# Patient Record
Sex: MALE | Race: WHITE | NOT HISPANIC OR LATINO | Employment: OTHER | URBAN - METROPOLITAN AREA
[De-identification: names, ages, dates, MRNs, and addresses within clinical notes are randomized per-mention and may not be internally consistent; named-entity substitution may affect disease eponyms.]

---

## 2017-01-11 ENCOUNTER — GENERIC CONVERSION - ENCOUNTER (OUTPATIENT)
Dept: OTHER | Facility: OTHER | Age: 47
End: 2017-01-11

## 2017-02-15 ENCOUNTER — GENERIC CONVERSION - ENCOUNTER (OUTPATIENT)
Dept: OTHER | Facility: OTHER | Age: 47
End: 2017-02-15

## 2017-03-15 ENCOUNTER — GENERIC CONVERSION - ENCOUNTER (OUTPATIENT)
Dept: OTHER | Facility: OTHER | Age: 47
End: 2017-03-15

## 2017-03-30 ENCOUNTER — GENERIC CONVERSION - ENCOUNTER (OUTPATIENT)
Dept: OTHER | Facility: OTHER | Age: 47
End: 2017-03-30

## 2017-03-30 DIAGNOSIS — L23.9 ALLERGIC CONTACT DERMATITIS: ICD-10-CM

## 2017-03-30 DIAGNOSIS — Z13.220 ENCOUNTER FOR SCREENING FOR LIPOID DISORDERS: ICD-10-CM

## 2017-03-30 DIAGNOSIS — E78.2 MIXED HYPERLIPIDEMIA: ICD-10-CM

## 2017-03-30 DIAGNOSIS — G80.9 CEREBRAL PALSY (HCC): ICD-10-CM

## 2017-03-30 DIAGNOSIS — R73.01 IMPAIRED FASTING GLUCOSE: ICD-10-CM

## 2017-04-04 ENCOUNTER — TRANSCRIBE ORDERS (OUTPATIENT)
Dept: ADMINISTRATIVE | Facility: HOSPITAL | Age: 47
End: 2017-04-04

## 2017-04-04 ENCOUNTER — APPOINTMENT (OUTPATIENT)
Dept: LAB | Facility: HOSPITAL | Age: 47
End: 2017-04-04
Attending: FAMILY MEDICINE
Payer: MEDICARE

## 2017-04-04 DIAGNOSIS — R00.2 PALPITATIONS: Primary | ICD-10-CM

## 2017-04-04 DIAGNOSIS — Z13.220 ENCOUNTER FOR SCREENING FOR LIPOID DISORDERS: ICD-10-CM

## 2017-04-04 DIAGNOSIS — R00.2 PALPITATIONS: ICD-10-CM

## 2017-04-04 DIAGNOSIS — G80.9 CEREBRAL PALSY (HCC): ICD-10-CM

## 2017-04-04 LAB
ALBUMIN SERPL BCP-MCNC: 4.1 G/DL (ref 3.5–5)
ALP SERPL-CCNC: 76 U/L (ref 46–116)
ALT SERPL W P-5'-P-CCNC: 20 U/L (ref 12–78)
ANION GAP SERPL CALCULATED.3IONS-SCNC: 10 MMOL/L (ref 4–13)
AST SERPL W P-5'-P-CCNC: 16 U/L (ref 5–45)
BILIRUB SERPL-MCNC: 0.4 MG/DL (ref 0.2–1)
BUN SERPL-MCNC: 12 MG/DL (ref 5–25)
CALCIUM SERPL-MCNC: 8.9 MG/DL (ref 8.3–10.1)
CHLORIDE SERPL-SCNC: 108 MMOL/L (ref 100–108)
CHOLEST SERPL-MCNC: 176 MG/DL (ref 50–200)
CO2 SERPL-SCNC: 29 MMOL/L (ref 21–32)
CREAT SERPL-MCNC: 0.95 MG/DL (ref 0.6–1.3)
ERYTHROCYTE [DISTWIDTH] IN BLOOD BY AUTOMATED COUNT: 14.1 % (ref 11.6–15.1)
GFR SERPL CREATININE-BSD FRML MDRD: >60 ML/MIN/1.73SQ M
GLUCOSE P FAST SERPL-MCNC: 98 MG/DL (ref 65–99)
HCT VFR BLD AUTO: 44.5 % (ref 42–52)
HDLC SERPL-MCNC: 38 MG/DL (ref 40–60)
HGB BLD-MCNC: 14.7 G/DL (ref 14–18)
LDLC SERPL CALC-MCNC: 97 MG/DL (ref 0–100)
MCH RBC QN AUTO: 27.8 PG (ref 27–31)
MCHC RBC AUTO-ENTMCNC: 33 G/DL (ref 31.4–37.4)
MCV RBC AUTO: 84 FL (ref 82–98)
PLATELET # BLD AUTO: 118 THOUSANDS/UL (ref 130–400)
PMV BLD AUTO: 10.6 FL (ref 8.9–12.7)
POTASSIUM SERPL-SCNC: 3.9 MMOL/L (ref 3.5–5.3)
PROT SERPL-MCNC: 7.4 G/DL (ref 6.4–8.2)
RBC # BLD AUTO: 5.3 MILLION/UL (ref 4.7–6.1)
SODIUM SERPL-SCNC: 147 MMOL/L (ref 136–145)
TRIGL SERPL-MCNC: 203 MG/DL
TSH SERPL DL<=0.05 MIU/L-ACNC: 2.8 UIU/ML (ref 0.36–3.74)
WBC # BLD AUTO: 4.7 THOUSAND/UL (ref 4.8–10.8)

## 2017-04-04 PROCEDURE — 84443 ASSAY THYROID STIM HORMONE: CPT

## 2017-04-04 PROCEDURE — 85027 COMPLETE CBC AUTOMATED: CPT

## 2017-04-04 PROCEDURE — 36415 COLL VENOUS BLD VENIPUNCTURE: CPT

## 2017-04-04 PROCEDURE — 80061 LIPID PANEL: CPT

## 2017-04-04 PROCEDURE — 80053 COMPREHEN METABOLIC PANEL: CPT

## 2017-04-05 ENCOUNTER — GENERIC CONVERSION - ENCOUNTER (OUTPATIENT)
Dept: OTHER | Facility: OTHER | Age: 47
End: 2017-04-05

## 2017-04-25 ENCOUNTER — ALLSCRIPTS OFFICE VISIT (OUTPATIENT)
Dept: OTHER | Facility: OTHER | Age: 47
End: 2017-04-25

## 2017-07-24 ENCOUNTER — APPOINTMENT (EMERGENCY)
Dept: RADIOLOGY | Facility: HOSPITAL | Age: 47
End: 2017-07-24
Payer: MEDICARE

## 2017-07-24 ENCOUNTER — HOSPITAL ENCOUNTER (EMERGENCY)
Facility: HOSPITAL | Age: 47
Discharge: HOME | End: 2017-07-24
Admitting: EMERGENCY MEDICINE
Payer: MEDICARE

## 2017-07-24 VITALS
RESPIRATION RATE: 17 BRPM | TEMPERATURE: 98.3 F | SYSTOLIC BLOOD PRESSURE: 110 MMHG | HEIGHT: 70 IN | BODY MASS INDEX: 27.2 KG/M2 | WEIGHT: 190 LBS | OXYGEN SATURATION: 98 % | HEART RATE: 79 BPM | DIASTOLIC BLOOD PRESSURE: 64 MMHG

## 2017-07-24 DIAGNOSIS — N20.0 RENAL CALCULUS, RIGHT: Primary | ICD-10-CM

## 2017-07-24 LAB
ALBUMIN SERPL BCP-MCNC: 4.5 G/DL (ref 3.5–5)
ALP SERPL-CCNC: 71 U/L (ref 46–116)
ALT SERPL W P-5'-P-CCNC: 27 U/L (ref 12–78)
ANION GAP SERPL CALCULATED.3IONS-SCNC: 12 MMOL/L (ref 4–13)
AST SERPL W P-5'-P-CCNC: 22 U/L (ref 5–45)
BASOPHILS # BLD AUTO: 0 THOUSANDS/ΜL (ref 0–0.1)
BASOPHILS NFR BLD AUTO: 0 % (ref 0–1)
BILIRUB SERPL-MCNC: 0.5 MG/DL (ref 0.2–1)
BUN SERPL-MCNC: 11 MG/DL (ref 5–25)
CALCIUM SERPL-MCNC: 9.3 MG/DL (ref 8.3–10.1)
CHLORIDE SERPL-SCNC: 104 MMOL/L (ref 100–108)
CO2 SERPL-SCNC: 26 MMOL/L (ref 21–32)
CREAT SERPL-MCNC: 1.28 MG/DL (ref 0.6–1.3)
EOSINOPHIL # BLD AUTO: 0 THOUSAND/ΜL (ref 0–0.61)
EOSINOPHIL NFR BLD AUTO: 0 % (ref 0–6)
ERYTHROCYTE [DISTWIDTH] IN BLOOD BY AUTOMATED COUNT: 14.4 % (ref 11.6–15.1)
GFR SERPL CREATININE-BSD FRML MDRD: 68 ML/MIN/1.73SQ M
GLUCOSE SERPL-MCNC: 108 MG/DL (ref 65–140)
HCT VFR BLD AUTO: 41.1 % (ref 42–52)
HGB BLD-MCNC: 13.9 G/DL (ref 14–18)
HOLD SPECIMEN: NORMAL
LYMPHOCYTES # BLD AUTO: 0.6 THOUSANDS/ΜL (ref 0.6–4.47)
LYMPHOCYTES NFR BLD AUTO: 7 % (ref 14–44)
MCH RBC QN AUTO: 28.4 PG (ref 27–31)
MCHC RBC AUTO-ENTMCNC: 33.9 G/DL (ref 31.4–37.4)
MCV RBC AUTO: 84 FL (ref 82–98)
MONOCYTES # BLD AUTO: 0.2 THOUSAND/ΜL (ref 0.17–1.22)
MONOCYTES NFR BLD AUTO: 3 % (ref 4–12)
NEUTROPHILS # BLD AUTO: 7.2 THOUSANDS/ΜL (ref 1.85–7.62)
NEUTS SEG NFR BLD AUTO: 90 % (ref 43–75)
NRBC BLD AUTO-RTO: 0 /100 WBCS
PLATELET # BLD AUTO: 140 THOUSANDS/UL (ref 130–400)
PMV BLD AUTO: 10 FL (ref 8.9–12.7)
POTASSIUM SERPL-SCNC: 3.7 MMOL/L (ref 3.5–5.3)
PROT SERPL-MCNC: 7.7 G/DL (ref 6.4–8.2)
RBC # BLD AUTO: 4.91 MILLION/UL (ref 4.7–6.1)
SODIUM SERPL-SCNC: 142 MMOL/L (ref 136–145)
WBC # BLD AUTO: 8 THOUSAND/UL (ref 4.8–10.8)

## 2017-07-24 PROCEDURE — 74176 CT ABD & PELVIS W/O CONTRAST: CPT

## 2017-07-24 PROCEDURE — 96375 TX/PRO/DX INJ NEW DRUG ADDON: CPT

## 2017-07-24 PROCEDURE — 99284 EMERGENCY DEPT VISIT MOD MDM: CPT

## 2017-07-24 PROCEDURE — 36415 COLL VENOUS BLD VENIPUNCTURE: CPT | Performed by: PHYSICIAN ASSISTANT

## 2017-07-24 PROCEDURE — 96361 HYDRATE IV INFUSION ADD-ON: CPT

## 2017-07-24 PROCEDURE — 96374 THER/PROPH/DIAG INJ IV PUSH: CPT

## 2017-07-24 PROCEDURE — 85025 COMPLETE CBC W/AUTO DIFF WBC: CPT | Performed by: PHYSICIAN ASSISTANT

## 2017-07-24 PROCEDURE — 80053 COMPREHEN METABOLIC PANEL: CPT | Performed by: PHYSICIAN ASSISTANT

## 2017-07-24 RX ORDER — ONDANSETRON 4 MG/1
4 TABLET, ORALLY DISINTEGRATING ORAL EVERY 8 HOURS PRN
Qty: 15 TABLET | Refills: 0 | Status: SHIPPED | OUTPATIENT
Start: 2017-07-24 | End: 2018-04-19 | Stop reason: ALTCHOICE

## 2017-07-24 RX ORDER — ONDANSETRON 2 MG/ML
4 INJECTION INTRAMUSCULAR; INTRAVENOUS ONCE
Status: COMPLETED | OUTPATIENT
Start: 2017-07-24 | End: 2017-07-24

## 2017-07-24 RX ORDER — KETOROLAC TROMETHAMINE 10 MG/1
10 TABLET, FILM COATED ORAL EVERY 6 HOURS PRN
Qty: 12 TABLET | Refills: 0 | Status: SHIPPED | OUTPATIENT
Start: 2017-07-24 | End: 2018-04-19 | Stop reason: ALTCHOICE

## 2017-07-24 RX ORDER — MORPHINE SULFATE 4 MG/ML
4 INJECTION, SOLUTION INTRAMUSCULAR; INTRAVENOUS ONCE
Status: COMPLETED | OUTPATIENT
Start: 2017-07-24 | End: 2017-07-24

## 2017-07-24 RX ORDER — KETOROLAC TROMETHAMINE 30 MG/ML
30 INJECTION, SOLUTION INTRAMUSCULAR; INTRAVENOUS ONCE
Status: COMPLETED | OUTPATIENT
Start: 2017-07-24 | End: 2017-07-24

## 2017-07-24 RX ORDER — BUSPIRONE HYDROCHLORIDE 10 MG/1
10 TABLET ORAL 3 TIMES DAILY
COMMUNITY
End: 2018-04-19 | Stop reason: ALTCHOICE

## 2017-07-24 RX ORDER — HYDROXYZINE PAMOATE 25 MG/1
25 CAPSULE ORAL 3 TIMES DAILY PRN
COMMUNITY
End: 2018-04-19 | Stop reason: ALTCHOICE

## 2017-07-24 RX ADMIN — ONDANSETRON 4 MG: 2 INJECTION INTRAMUSCULAR; INTRAVENOUS at 12:15

## 2017-07-24 RX ADMIN — KETOROLAC TROMETHAMINE 30 MG: 30 INJECTION, SOLUTION INTRAMUSCULAR at 12:17

## 2017-07-24 RX ADMIN — MORPHINE SULFATE 4 MG: 4 INJECTION, SOLUTION INTRAMUSCULAR; INTRAVENOUS at 14:43

## 2017-07-24 RX ADMIN — SODIUM CHLORIDE 1000 ML: 0.9 INJECTION, SOLUTION INTRAVENOUS at 12:23

## 2017-07-24 RX ADMIN — SODIUM CHLORIDE 1000 ML: 0.9 INJECTION, SOLUTION INTRAVENOUS at 13:30

## 2017-12-11 ENCOUNTER — APPOINTMENT (OUTPATIENT)
Dept: LAB | Facility: HOSPITAL | Age: 47
End: 2017-12-11
Payer: MEDICARE

## 2017-12-11 ENCOUNTER — TRANSCRIBE ORDERS (OUTPATIENT)
Dept: ADMINISTRATIVE | Facility: HOSPITAL | Age: 47
End: 2017-12-11

## 2017-12-11 DIAGNOSIS — F42.2 MIXED OBSESSIONAL THOUGHTS AND ACTS: Primary | ICD-10-CM

## 2017-12-11 DIAGNOSIS — F42.2 MIXED OBSESSIONAL THOUGHTS AND ACTS: ICD-10-CM

## 2017-12-11 LAB
25(OH)D3 SERPL-MCNC: 14.7 NG/ML (ref 30–100)
ALBUMIN SERPL BCP-MCNC: 3.9 G/DL (ref 3.5–5)
ALP SERPL-CCNC: 69 U/L (ref 46–116)
ALT SERPL W P-5'-P-CCNC: 40 U/L (ref 12–78)
ANION GAP SERPL CALCULATED.3IONS-SCNC: 9 MMOL/L (ref 4–13)
AST SERPL W P-5'-P-CCNC: 29 U/L (ref 5–45)
BASOPHILS # BLD AUTO: 0 THOUSANDS/ΜL (ref 0–0.1)
BASOPHILS NFR BLD AUTO: 0 % (ref 0–1)
BILIRUB DIRECT SERPL-MCNC: 0.1 MG/DL (ref 0–0.2)
BILIRUB SERPL-MCNC: 0.4 MG/DL (ref 0.2–1)
BILIRUB UR QL STRIP: NEGATIVE
BUN SERPL-MCNC: 15 MG/DL (ref 5–25)
CALCIUM SERPL-MCNC: 8.9 MG/DL (ref 8.3–10.1)
CHLORIDE SERPL-SCNC: 106 MMOL/L (ref 100–108)
CHOLEST SERPL-MCNC: 196 MG/DL (ref 50–200)
CLARITY UR: CLEAR
CO2 SERPL-SCNC: 28 MMOL/L (ref 21–32)
COLOR UR: YELLOW
CREAT SERPL-MCNC: 1.04 MG/DL (ref 0.6–1.3)
EOSINOPHIL # BLD AUTO: 0.1 THOUSAND/ΜL (ref 0–0.61)
EOSINOPHIL NFR BLD AUTO: 1 % (ref 0–6)
ERYTHROCYTE [DISTWIDTH] IN BLOOD BY AUTOMATED COUNT: 14.4 % (ref 11.6–15.1)
EST. AVERAGE GLUCOSE BLD GHB EST-MCNC: 114 MG/DL
GFR SERPL CREATININE-BSD FRML MDRD: 86 ML/MIN/1.73SQ M
GLUCOSE P FAST SERPL-MCNC: 100 MG/DL (ref 65–99)
GLUCOSE UR STRIP-MCNC: NEGATIVE MG/DL
HBA1C MFR BLD: 5.6 % (ref 4.2–6.3)
HCT VFR BLD AUTO: 38 % (ref 42–52)
HDLC SERPL-MCNC: 36 MG/DL (ref 40–60)
HGB BLD-MCNC: 12.5 G/DL (ref 14–18)
HGB UR QL STRIP.AUTO: NEGATIVE
KETONES UR STRIP-MCNC: NEGATIVE MG/DL
LDLC SERPL CALC-MCNC: 90 MG/DL (ref 0–100)
LEUKOCYTE ESTERASE UR QL STRIP: NEGATIVE
LYMPHOCYTES # BLD AUTO: 1.1 THOUSANDS/ΜL (ref 0.6–4.47)
LYMPHOCYTES NFR BLD AUTO: 22 % (ref 14–44)
MCH RBC QN AUTO: 26.4 PG (ref 27–31)
MCHC RBC AUTO-ENTMCNC: 32.9 G/DL (ref 31.4–37.4)
MCV RBC AUTO: 80 FL (ref 82–98)
MONOCYTES # BLD AUTO: 0.2 THOUSAND/ΜL (ref 0.17–1.22)
MONOCYTES NFR BLD AUTO: 5 % (ref 4–12)
NEUTROPHILS # BLD AUTO: 3.4 THOUSANDS/ΜL (ref 1.85–7.62)
NEUTS SEG NFR BLD AUTO: 71 % (ref 43–75)
NITRITE UR QL STRIP: NEGATIVE
NRBC BLD AUTO-RTO: 0 /100 WBCS
PH UR STRIP.AUTO: 5.5 [PH] (ref 5–9)
PLATELET # BLD AUTO: 141 THOUSANDS/UL (ref 130–400)
PMV BLD AUTO: 11 FL (ref 8.9–12.7)
POTASSIUM SERPL-SCNC: 4.1 MMOL/L (ref 3.5–5.3)
PROT SERPL-MCNC: 7 G/DL (ref 6.4–8.2)
PROT UR STRIP-MCNC: NEGATIVE MG/DL
RBC # BLD AUTO: 4.72 MILLION/UL (ref 4.7–6.1)
SODIUM SERPL-SCNC: 143 MMOL/L (ref 136–145)
SP GR UR STRIP.AUTO: 1.02 (ref 1–1.03)
T4 SERPL-MCNC: 6.3 UG/DL (ref 4.7–13.3)
TRIGL SERPL-MCNC: 348 MG/DL
TSH SERPL DL<=0.05 MIU/L-ACNC: 2.59 UIU/ML (ref 0.36–3.74)
UROBILINOGEN UR QL STRIP.AUTO: 0.2 E.U./DL
WBC # BLD AUTO: 4.8 THOUSAND/UL (ref 4.8–10.8)

## 2017-12-11 PROCEDURE — 82248 BILIRUBIN DIRECT: CPT

## 2017-12-11 PROCEDURE — 80053 COMPREHEN METABOLIC PANEL: CPT

## 2017-12-11 PROCEDURE — 85025 COMPLETE CBC W/AUTO DIFF WBC: CPT | Performed by: NURSE PRACTITIONER

## 2017-12-11 PROCEDURE — 36415 COLL VENOUS BLD VENIPUNCTURE: CPT | Performed by: NURSE PRACTITIONER

## 2017-12-11 PROCEDURE — 81003 URINALYSIS AUTO W/O SCOPE: CPT | Performed by: NURSE PRACTITIONER

## 2017-12-11 PROCEDURE — 83036 HEMOGLOBIN GLYCOSYLATED A1C: CPT

## 2017-12-11 PROCEDURE — 80061 LIPID PANEL: CPT

## 2017-12-11 PROCEDURE — 84436 ASSAY OF TOTAL THYROXINE: CPT

## 2017-12-11 PROCEDURE — 84479 ASSAY OF THYROID (T3 OR T4): CPT

## 2017-12-11 PROCEDURE — 82306 VITAMIN D 25 HYDROXY: CPT

## 2017-12-11 PROCEDURE — 84443 ASSAY THYROID STIM HORMONE: CPT

## 2017-12-12 LAB — T3RU NFR SERPL: 26 % (ref 24–39)

## 2018-01-10 NOTE — PROCEDURES
Procedures by Rohan Bingham DO at 8/16/2016   1:06 AM      Author:  Rohan Bingham DO Service:  Trauma Author Type:  Resident    Filed:  8/16/2016  1:13 AM Date of Service:  8/16/2016  1:06 AM Status:  Attested    :  Rohan Bingham DO (Resident)  Cosigner:  Gala Peterson MD at 8/16/2016  5:48 AM      Procedure Orders:       1  LACERATION REPAIR [[id number]] ordered by Rohan Bingham DO at 08/16/16 0106                 Post-procedure Diagnoses:       1  Laceration of right elbow, initial encounter [S51 011A]              Attestation signed by Gala Peterson MD at 8/16/2016  5:48 AM           I was present and supervised all critical elements of this procedure  I ensured full compliance with sterile procedure was followed  Comments:  No complications                                           Procedure  Laceration Repair  Date/Time: 8/16/2016 11:45 PM  Performed by: Oapl Mccoy by: Laurie Cuevas     Patient location:  ED and bedside  Other Assisting Provider:  Yes (comment) (medical student)    Consent:     Consent obtained:  Verbal    Consent given by:  Patient    Procedural risks discussed: yes  Alternatives discussed: yes  Universal protocol:     Procedure explained and questions answered to patient or proxy's satisfaction: yes      Imaging studies available: yes      Required blood products, implants, devices, and special equipment available:  yes      Immediately prior to procedure, a time out was called: yes      Patient identity confirmed:  Verbally with patient  Anesthesia (see MAR for exact dosages): Anesthesia method:  Local infiltration    Local anesthetic:  Lidocaine 1% w/o epi  Laceration details:     Location:  Shoulder/arm    Shoulder/arm location:  R elbow    Length (cm) of Repair:  6    Depth (mm):  4  Repair type:     Repair type:   Intermediate  Pre-procedure details:     Preparation:  Patient was prepped and draped in usual sterile fashion and imaging obtained to evaluate for foreign bodies  Exploration:     Hemostasis achieved with:  Direct pressure    Wound exploration: entire depth of wound probed and visualized      Wound exploration comment:  Prior to closure joint evaluated for involvement by orthopedics with no involvement noted    Contaminated: yes    Treatment:     Area cleansed with:  Saline and Betadine    Amount of cleaning:  Extensive    Irrigation solution:  Sterile saline    Irrigation method:  Pressure wash  Skin repair:     Repair method:  Sutures    Suture size:  4-0    Suture material:  Nylon    Suture technique:  Simple interrupted    Number of sutures:  20  Approximation:     Approximation:  Close    Approximation difficulty:  Intermediate  Post-procedure details:     Dressing:  Non-adherent dressing, bulky dressing and sterile dressing    Patient tolerance of procedure:   Tolerated well, no immediate complications                           Received for:Provider  EPIC   Aug 16 2016  5:46AM [facility] Standard Time

## 2018-01-12 VITALS
SYSTOLIC BLOOD PRESSURE: 112 MMHG | TEMPERATURE: 97.4 F | WEIGHT: 181.6 LBS | RESPIRATION RATE: 18 BRPM | HEIGHT: 70 IN | DIASTOLIC BLOOD PRESSURE: 66 MMHG | OXYGEN SATURATION: 97 % | BODY MASS INDEX: 26 KG/M2 | HEART RATE: 96 BPM

## 2018-01-13 NOTE — RESULT NOTES
Message   Please inform patient that there is no evidence of H  pylori infection  He did have some inflammation in his duodenum, I have ordered a celiac panel  Please send lab request with his results letter  Verified Results  (1) TISSUE EXAM [id number] 08:16AM Raffaele Ching     Test Name Result Flag Reference   LAB AP CASE REPORT (Report)     Surgical Pathology Report             Case: [id number]                   Authorizing Provider: Bautista Lerner MD      Collected:      05/09/2016 0816        Ordering Location:   [facility]  Received:      05/09/2016 [address] [place] [address]                                     Pathologist:      Kaila Wheeler MD                              Specimens:  A) - Duodenum, Duodenal bx's                                      B) - Stomach, Stomach body gastritis (r/o H  pylori)   LAB AP FINAL DIAGNOSIS (Report)     A  Small bowel, duodenum, biopsy:        - Small bowel mucosa with minute focus with villous flattening   and increased chronic inflammation in the lamina propria and rare   neutrophils in a few crypts and in the lamina propria, consistent with   very focal acute duodenitis  - No dysplasia or malignancy is identified  B  Stomach, body, biopsy:        - Oxyntic mucosa with mild chronic inactive gastritis  - No Helicobacter pylori organisms are identified on the   immunohistochemical stain, performed with an appropriate positive control         - No intestinal metaplasia, dysplasia or malignancy is   identified  Interpretation performed at , [address]   LAB AP SURGICAL ADDITIONAL INFORMATION (Report)     These tests were developed and their performance characteristics   determined by [facility] or [facility]  They may not be cleared or approved by the [place]  Food and   Drug Administration   The FDA has determined that such clearance or   approval is not necessary  These tests are used for clinical purposes  They should not be regarded as investigational or for research  This   laboratory has been approved by [place] [address], designated as a high-complexity   laboratory and is qualified to perform these tests  LAB AP GROSS DESCRIPTION (Report)     A  The specimen is received in formalin, labeled with the patient's name   and hospital number, and is designated duodenal biopsies  The specimen   consists of multiple tan soft tissue fragments measuring in aggregate 0 6   x 0 6 x 0 1 cm  Entirely submitted  One cassette  B  The specimen is received in formalin, labeled with the patient's name   and hospital number, and is designated stomach body gastritis rule out   H  pylori  The specimen consists of multiple tan soft tissue fragments   measuring in aggregate 0 5 x 0 5 x 0 1 cm  Entirely submitted  One   cassette  Note: The estimated total formalin fixation time based upon information   provided by the submitting clinician and the standard processing schedule   is 29 75 hours  MAC       Plan  Acid reflux disease    · (1) CELIAC DISEASE AB PROFILE; Status:Active;  Requested for:[id number];     Signatures   Electronically signed by : PAWEL Reynoso ; May 22 2016 10:49PM EST                       (Author)

## 2018-01-22 VITALS
RESPIRATION RATE: 16 BRPM | HEART RATE: 78 BPM | SYSTOLIC BLOOD PRESSURE: 110 MMHG | HEIGHT: 70 IN | WEIGHT: 183.56 LBS | BODY MASS INDEX: 26.28 KG/M2 | OXYGEN SATURATION: 97 % | DIASTOLIC BLOOD PRESSURE: 54 MMHG | TEMPERATURE: 96 F

## 2018-01-25 ENCOUNTER — OFFICE VISIT (OUTPATIENT)
Dept: FAMILY MEDICINE CLINIC | Facility: CLINIC | Age: 48
End: 2018-01-25
Payer: MEDICARE

## 2018-01-25 VITALS
HEART RATE: 127 BPM | RESPIRATION RATE: 18 BRPM | DIASTOLIC BLOOD PRESSURE: 40 MMHG | WEIGHT: 197 LBS | OXYGEN SATURATION: 97 % | SYSTOLIC BLOOD PRESSURE: 90 MMHG | BODY MASS INDEX: 28.27 KG/M2

## 2018-01-25 DIAGNOSIS — L81.9 BLEEDING PIGMENTED SKIN LESION: ICD-10-CM

## 2018-01-25 PROCEDURE — 17000 DESTRUCT PREMALG LESION: CPT | Performed by: FAMILY MEDICINE

## 2018-01-25 PROCEDURE — 17003 DESTRUCT PREMALG LES 2-14: CPT | Performed by: FAMILY MEDICINE

## 2018-01-26 NOTE — PROGRESS NOTES
Procedures  PROCEDURE: Skin tag removal  Indication: bleeding  Location: neck and abdomen  Quantity: 2    Informed Consent: The indications, risks, benefits and alternatives to the procedure, including no procedure, were discussed in detail  Risks of the procedure were described and verbal consent was obtained  Using sterile technique, area prep with betadine, 1% lidocaine injected approximately 0 5cc  Skin tags were removed using #10 scalp in fashion  The patient tolerated the procedure well  Follow-up care and instructions were reviewed       Assessment/Plan   Skin tag removal  - advise to follow up in 2 weeks  - keep dry for 24 hour, then resume activities

## 2018-02-15 ENCOUNTER — APPOINTMENT (OUTPATIENT)
Dept: LAB | Facility: HOSPITAL | Age: 48
End: 2018-02-15
Payer: MEDICARE

## 2018-02-15 ENCOUNTER — TRANSCRIBE ORDERS (OUTPATIENT)
Dept: ADMINISTRATIVE | Facility: HOSPITAL | Age: 48
End: 2018-02-15

## 2018-02-15 DIAGNOSIS — F42.9 OBSESSIVE-COMPULSIVE DISORDER, UNSPECIFIED TYPE: Primary | ICD-10-CM

## 2018-02-15 DIAGNOSIS — Z79.899 NEED FOR PROPHYLACTIC CHEMOTHERAPY: ICD-10-CM

## 2018-02-15 DIAGNOSIS — F42.9 OBSESSIVE-COMPULSIVE DISORDER, UNSPECIFIED TYPE: ICD-10-CM

## 2018-02-15 LAB
25(OH)D3 SERPL-MCNC: 17.4 NG/ML (ref 30–100)
ALBUMIN SERPL BCP-MCNC: 4 G/DL (ref 3.5–5)
ALP SERPL-CCNC: 67 U/L (ref 46–116)
ALT SERPL W P-5'-P-CCNC: 25 U/L (ref 12–78)
ANION GAP SERPL CALCULATED.3IONS-SCNC: 7 MMOL/L (ref 4–13)
AST SERPL W P-5'-P-CCNC: 19 U/L (ref 5–45)
BASOPHILS # BLD AUTO: 0 THOUSANDS/ΜL (ref 0–0.1)
BASOPHILS NFR BLD AUTO: 1 % (ref 0–1)
BILIRUB DIRECT SERPL-MCNC: 0.1 MG/DL (ref 0–0.2)
BILIRUB SERPL-MCNC: 0.6 MG/DL (ref 0.2–1)
BILIRUB UR QL STRIP: ABNORMAL
BUN SERPL-MCNC: 15 MG/DL (ref 5–25)
CALCIUM SERPL-MCNC: 8.9 MG/DL (ref 8.3–10.1)
CHLORIDE SERPL-SCNC: 105 MMOL/L (ref 100–108)
CHOLEST SERPL-MCNC: 178 MG/DL (ref 50–200)
CLARITY UR: CLEAR
CO2 SERPL-SCNC: 28 MMOL/L (ref 21–32)
COLOR UR: YELLOW
CREAT SERPL-MCNC: 1.08 MG/DL (ref 0.6–1.3)
EOSINOPHIL # BLD AUTO: 0 THOUSAND/ΜL (ref 0–0.61)
EOSINOPHIL NFR BLD AUTO: 1 % (ref 0–6)
ERYTHROCYTE [DISTWIDTH] IN BLOOD BY AUTOMATED COUNT: 15.3 % (ref 11.6–15.1)
EST. AVERAGE GLUCOSE BLD GHB EST-MCNC: 105 MG/DL
GFR SERPL CREATININE-BSD FRML MDRD: 82 ML/MIN/1.73SQ M
GLUCOSE P FAST SERPL-MCNC: 95 MG/DL (ref 65–99)
GLUCOSE UR STRIP-MCNC: NEGATIVE MG/DL
HBA1C MFR BLD: 5.3 % (ref 4.2–6.3)
HCT VFR BLD AUTO: 38.1 % (ref 42–52)
HDLC SERPL-MCNC: 37 MG/DL (ref 40–60)
HGB BLD-MCNC: 12.3 G/DL (ref 14–18)
HGB UR QL STRIP.AUTO: NEGATIVE
KETONES UR STRIP-MCNC: NEGATIVE MG/DL
LDLC SERPL CALC-MCNC: 87 MG/DL (ref 0–100)
LEUKOCYTE ESTERASE UR QL STRIP: NEGATIVE
LG PLATELETS BLD QL SMEAR: PRESENT
LYMPHOCYTES # BLD AUTO: 1 THOUSANDS/ΜL (ref 0.6–4.47)
LYMPHOCYTES NFR BLD AUTO: 23 % (ref 14–44)
MCH RBC QN AUTO: 25.2 PG (ref 27–31)
MCHC RBC AUTO-ENTMCNC: 32.3 G/DL (ref 31.4–37.4)
MCV RBC AUTO: 78 FL (ref 82–98)
MONOCYTES # BLD AUTO: 0.3 THOUSAND/ΜL (ref 0.17–1.22)
MONOCYTES NFR BLD AUTO: 6 % (ref 4–12)
NEUTROPHILS # BLD AUTO: 2.9 THOUSANDS/ΜL (ref 1.85–7.62)
NEUTS SEG NFR BLD AUTO: 69 % (ref 43–75)
NITRITE UR QL STRIP: NEGATIVE
NRBC BLD AUTO-RTO: 0 /100 WBCS
PH UR STRIP.AUTO: 5.5 [PH] (ref 5–9)
PLATELET # BLD AUTO: 151 THOUSANDS/UL (ref 130–400)
PLATELET BLD QL SMEAR: ADEQUATE
PMV BLD AUTO: 11.7 FL (ref 8.9–12.7)
POTASSIUM SERPL-SCNC: 3.5 MMOL/L (ref 3.5–5.3)
PROT SERPL-MCNC: 7 G/DL (ref 6.4–8.2)
PROT UR STRIP-MCNC: NEGATIVE MG/DL
RBC # BLD AUTO: 4.88 MILLION/UL (ref 4.7–6.1)
SODIUM SERPL-SCNC: 140 MMOL/L (ref 136–145)
SP GR UR STRIP.AUTO: >=1.03 (ref 1–1.03)
T4 SERPL-MCNC: 7.3 UG/DL (ref 4.7–13.3)
TRIGL SERPL-MCNC: 268 MG/DL
TSH SERPL DL<=0.05 MIU/L-ACNC: 4.64 UIU/ML (ref 0.36–3.74)
UROBILINOGEN UR QL STRIP.AUTO: 0.2 E.U./DL
WBC # BLD AUTO: 4.2 THOUSAND/UL (ref 4.8–10.8)

## 2018-02-15 PROCEDURE — 85025 COMPLETE CBC W/AUTO DIFF WBC: CPT

## 2018-02-15 PROCEDURE — 80053 COMPREHEN METABOLIC PANEL: CPT

## 2018-02-15 PROCEDURE — 81003 URINALYSIS AUTO W/O SCOPE: CPT | Performed by: NURSE PRACTITIONER

## 2018-02-15 PROCEDURE — 83036 HEMOGLOBIN GLYCOSYLATED A1C: CPT

## 2018-02-15 PROCEDURE — 84443 ASSAY THYROID STIM HORMONE: CPT

## 2018-02-15 PROCEDURE — 82306 VITAMIN D 25 HYDROXY: CPT

## 2018-02-15 PROCEDURE — 80061 LIPID PANEL: CPT

## 2018-02-15 PROCEDURE — 84479 ASSAY OF THYROID (T3 OR T4): CPT

## 2018-02-15 PROCEDURE — 84436 ASSAY OF TOTAL THYROXINE: CPT

## 2018-02-15 PROCEDURE — 82248 BILIRUBIN DIRECT: CPT

## 2018-02-15 PROCEDURE — 36415 COLL VENOUS BLD VENIPUNCTURE: CPT

## 2018-02-16 LAB — T3RU NFR SERPL: 27 % (ref 24–39)

## 2018-02-27 ENCOUNTER — TELEPHONE (OUTPATIENT)
Dept: GASTROENTEROLOGY | Facility: CLINIC | Age: 48
End: 2018-02-27

## 2018-02-27 DIAGNOSIS — K20.90 ESOPHAGITIS: Primary | ICD-10-CM

## 2018-02-27 RX ORDER — OMEPRAZOLE 40 MG/1
40 CAPSULE, DELAYED RELEASE ORAL DAILY
Qty: 30 CAPSULE | Refills: 1 | Status: SHIPPED | OUTPATIENT
Start: 2018-02-27 | End: 2018-07-16 | Stop reason: SDUPTHER

## 2018-03-06 ENCOUNTER — HOSPITAL ENCOUNTER (OUTPATIENT)
Dept: RADIOLOGY | Facility: HOSPITAL | Age: 48
Discharge: HOME | End: 2018-03-06
Payer: MEDICARE

## 2018-03-06 ENCOUNTER — TRANSCRIBE ORDERS (OUTPATIENT)
Dept: ADMINISTRATIVE | Facility: HOSPITAL | Age: 48
End: 2018-03-06

## 2018-03-06 DIAGNOSIS — M99.03 SOMATIC DYSFUNCTION OF LUMBAR REGION: ICD-10-CM

## 2018-03-06 DIAGNOSIS — M54.50 ACUTE MIDLINE LOW BACK PAIN WITHOUT SCIATICA: Primary | ICD-10-CM

## 2018-03-06 PROCEDURE — 72040 X-RAY EXAM NECK SPINE 2-3 VW: CPT

## 2018-03-06 PROCEDURE — 72100 X-RAY EXAM L-S SPINE 2/3 VWS: CPT

## 2018-04-19 ENCOUNTER — OFFICE VISIT (OUTPATIENT)
Dept: FAMILY MEDICINE CLINIC | Facility: CLINIC | Age: 48
End: 2018-04-19
Payer: MEDICARE

## 2018-04-19 VITALS
WEIGHT: 190 LBS | BODY MASS INDEX: 27.2 KG/M2 | HEART RATE: 77 BPM | OXYGEN SATURATION: 98 % | DIASTOLIC BLOOD PRESSURE: 64 MMHG | SYSTOLIC BLOOD PRESSURE: 108 MMHG | RESPIRATION RATE: 16 BRPM | HEIGHT: 70 IN

## 2018-04-19 DIAGNOSIS — D22.9 CHANGE IN COLOR OF SKIN MOLE: Primary | ICD-10-CM

## 2018-04-19 PROCEDURE — 11100 PR BIOPSY OF SKIN LESION: CPT | Performed by: FAMILY MEDICINE

## 2018-04-19 RX ORDER — HYDROXYZINE PAMOATE 25 MG/1
25 CAPSULE ORAL 3 TIMES DAILY PRN
COMMUNITY
End: 2019-10-24

## 2018-04-19 RX ORDER — ERGOCALCIFEROL 1.25 MG/1
CAPSULE ORAL DAILY
Refills: 0 | COMMUNITY
Start: 2018-03-27

## 2018-04-19 RX ORDER — BUSPIRONE HYDROCHLORIDE 15 MG/1
TABLET ORAL
Refills: 1 | COMMUNITY
Start: 2018-03-27 | End: 2019-10-24

## 2018-04-20 PROCEDURE — 88305 TISSUE EXAM BY PATHOLOGIST: CPT | Performed by: PATHOLOGY

## 2018-04-20 NOTE — PROGRESS NOTES
Ruddy Gatica is a 41 y o  male  Vitals:    04/19/18 1627   BP: 108/64   BP Location: Left arm   Patient Position: Sitting   Pulse: 77   Resp: 16   SpO2: 98%   Weight: 86 2 kg (190 lb)   Height: 5' 10" (1 778 m)     Chief Complaint   Patient presents with    Procedure     mole removal on chest         PRE-OP DIAGNOSIS: Suspicious Mole (color changes)  POST-OP DIAGNOSIS: Same   PROCEDURE: skin biopsy   Performing Physician: SOLO RÍOS        Informed consent: Procedure, alternate treatment options, risks, and benefits were thoroughly explained to the patient and informed consent was obtained before the procedure started  PROCEDURE: Excisional Biopsy     An appropriate timeout was performed  The area was prepped and draped in the usual sterile fashion  Local anesthesia achieved with 5 cc of Lidocaine 2% with epinephrine  An elliptical incision was made and the lesion excised  The wound was copiously irrigated  and Three interrupted stitches were placed using 3-0 Vicryl-rapid  Estimated blood loss was less than 1cc  The site was cleaned and antibiotic ointment was placed  The patient tolerated the procedure well  Patient counseled about wound care, when to call us and to follow up in 1 week to remove the stiches  Followup: The patient tolerated the procedure well without complications  Standard post-procedure care is explained and return precautions are given

## 2018-04-30 ENCOUNTER — OFFICE VISIT (OUTPATIENT)
Dept: FAMILY MEDICINE CLINIC | Facility: CLINIC | Age: 48
End: 2018-04-30
Payer: MEDICARE

## 2018-04-30 VITALS
RESPIRATION RATE: 16 BRPM | HEART RATE: 71 BPM | TEMPERATURE: 97.5 F | SYSTOLIC BLOOD PRESSURE: 98 MMHG | BODY MASS INDEX: 27.63 KG/M2 | DIASTOLIC BLOOD PRESSURE: 62 MMHG | HEIGHT: 70 IN | WEIGHT: 193 LBS | OXYGEN SATURATION: 99 %

## 2018-04-30 DIAGNOSIS — Z48.02 ENCOUNTER FOR REMOVAL OF SUTURES: Primary | ICD-10-CM

## 2018-04-30 PROCEDURE — 99213 OFFICE O/P EST LOW 20 MIN: CPT | Performed by: FAMILY MEDICINE

## 2018-05-01 NOTE — PROGRESS NOTES
Assessment/Plan:    No problem-specific Assessment & Plan notes found for this encounter  Diagnoses and all orders for this visit:    Encounter for removal of sutures    Other orders  -     Multiple Vitamins-Minerals (CENTRUM ADULTS PO); Centrum          Suture removed without any complication, pt tolerating suture removal well  No sign of infection, he was advise to leave it open, don't cover up the wound  Discussed with the patient and all questioned fully answered  He will call me if any problems arise  Subjective:      Patient ID: Guadalupe Orozco is a 41 y o  male  Chief Complaint   Patient presents with    Follow-up     mole removal       51-year-old male comes in for follow-up, he had a mole removal last week due to multiple discoloration on the mole, moles sent for   The came back with benign finding, and the suture look clean and intact with no complications        The following portions of the patient's history were reviewed and updated as appropriate: allergies, current medications, past family history, past medical history, past social history, past surgical history and problem list       Review of Systems   Constitutional: Negative for activity change, appetite change, fatigue and unexpected weight change  Chest wall: Left chest wall with 3 stitches  Neurological: Negative for dizziness and facial asymmetry  Psychiatric/Behavioral: Negative for agitation, behavioral problems, confusion and decreased concentration  Objective:    BP 98/62 (BP Location: Left arm, Patient Position: Sitting)   Pulse 71   Temp 97 5 °F (36 4 °C)   Resp 16   Ht 5' 10" (1 778 m)   Wt 87 5 kg (193 lb)   SpO2 99%   BMI 27 69 kg/m²       Physical Exam   Constitutional:  oriented to person, place, and time  well-developed and well-nourished  No distress  Chest wall: Left chest wall with 3 stitches  Psychiatric: normal mood and affect  behavior is normal  Judgment and thought content normal

## 2018-07-16 ENCOUNTER — TELEPHONE (OUTPATIENT)
Dept: GASTROENTEROLOGY | Facility: CLINIC | Age: 48
End: 2018-07-16

## 2018-07-16 DIAGNOSIS — K20.90 ESOPHAGITIS: ICD-10-CM

## 2018-07-16 RX ORDER — OMEPRAZOLE 40 MG/1
40 CAPSULE, DELAYED RELEASE ORAL DAILY
Qty: 30 CAPSULE | Refills: 0 | Status: SHIPPED | OUTPATIENT
Start: 2018-07-16 | End: 2018-09-13 | Stop reason: SDUPTHER

## 2018-07-16 NOTE — TELEPHONE ENCOUNTER
Please advise, I sent for omeprazole 40 mg once daily for 30 days, no refills, to [facility] on file    Thank you

## 2018-07-16 NOTE — TELEPHONE ENCOUNTER
Pt called requesting a refill of [facility]  Pt was aware that he needs fu appt before more refills but pt stated he cant go without the meds   appt scheduled for 08/01 in [place]

## 2018-08-01 ENCOUNTER — OFFICE VISIT (OUTPATIENT)
Dept: GASTROENTEROLOGY | Facility: CLINIC | Age: 48
End: 2018-08-01
Payer: MEDICARE

## 2018-08-01 VITALS
HEART RATE: 81 BPM | DIASTOLIC BLOOD PRESSURE: 66 MMHG | BODY MASS INDEX: 28.03 KG/M2 | WEIGHT: 195.8 LBS | SYSTOLIC BLOOD PRESSURE: 104 MMHG | HEIGHT: 70 IN | TEMPERATURE: 99 F

## 2018-08-01 DIAGNOSIS — K21.00 GASTROESOPHAGEAL REFLUX DISEASE WITH ESOPHAGITIS: Primary | ICD-10-CM

## 2018-08-01 PROCEDURE — 99213 OFFICE O/P EST LOW 20 MIN: CPT | Performed by: PHYSICIAN ASSISTANT

## 2018-08-01 NOTE — PROGRESS NOTES
Follow-up Note -  [facility]  Celia Banda 1970 41 y o  male         Reason:  Followup; GERD    HPI:  30-year-old male with history of cerebral palsy presents for follow-up regarding acid reflux  He had EGD with Dr José Lorenz in May 2016 for evaluation of chronic, long-standing GERD  This EGD showed Moderate duodenitis, mild gastritis and grade a esophagitis  Biopsies were negative for H  Pylori or malignancy  The patient says he is doing well with omeprazole 40 mg daily  He says he has been taking this for a long time, and he says that while he is on at his acid reflux is controlled  He says if he forgets to take it does, he will have some rebound reflux in about 2 or 3 days area he says his reflux symptoms do tend to get worse with certain foods such as pizza and red sauces  He does try to avoid these foods  Otherwise he denies any difficulty swallowing, denies any loss of appetite or weight  Says his bowel habits sometimes alternate between diarrhea and constipation but this has been the case for a long time and it has not really been bothering him lately  No known family history of colon cancer  Denies any rectal bleeding or melena  REVIEW OF SYSTEMS:      CONSTITUTIONAL: Denies any fever, chills, or rigors  Good appetite, and no recent weight loss  HEENT: No earache or tinnitus  Denies hearing loss or visual disturbances  CARDIOVASCULAR: No chest pain or palpitations  RESPIRATORY: Denies any cough, hemoptysis, shortness of breath or dyspnea on exertion  GASTROINTESTINAL: As noted in the History of Present Illness  GENITOURINARY: No problems with urination  Denies any hematuria or dysuria  NEUROLOGIC: No dizziness or vertigo, denies headaches  MUSCULOSKELETAL: Denies any muscle or joint pain  SKIN: Denies skin rashes or itching  ENDOCRINE: Denies excessive thirst  Denies intolerance to heat or cold  PSYCHOSOCIAL: Denies depression or anxiety   Denies any recent memory loss  Past Medical History:   Diagnosis Date    Acid reflux     Arthritis     Cerebral palsy (HCC)     GERD (gastroesophageal reflux disease)     Psychiatric disorder     anxiety, depression    Ulcer       Past Surgical History:   Procedure Laterality Date    ESOPHAGOGASTRODUODENOSCOPY N/A 5/9/2016    Procedure: ESOPHAGOGASTRODUODENOSCOPY (EGD); Surgeon: Brett Maxwell MD;  Location: [facility]; Service:     FOOT SURGERY Bilateral     hardware    OTHER SURGICAL HISTORY Bilateral     lower extremity ligiment extensions-multiple     Social History     Social History    Marital status: Single     Spouse name: N/A    Number of children: N/A    Years of education: N/A     Occupational History    Not on file       Social History Main Topics    Smoking status: Former Smoker     Packs/day: 3 00     Years: 15 00     Quit date: 1/25/1994    Smokeless tobacco: Never Used    Alcohol use Yes      Comment: 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    Drug use: Yes     Types: Marijuana      Comment: quit -greater than 1 year    Sexual activity: Not on file     Other Topics Concern    Not on file     Social History Narrative    No narrative on file     Family History   Problem Relation Age of Onset    No Known Problems Mother     No Known Problems Father     Cancer Maternal Grandmother     Cancer Maternal Grandfather      Aspirin  Current Outpatient Prescriptions   Medication Sig Dispense Refill    busPIRone (BUSPAR) 15 mg tablet TAKE ONE TABLET BY MOUTH THREE TIMES A DAY FOR MODERATE-SEVERE ANXIETY  1    ergocalciferol (VITAMIN D2) 50,000 units TAKE ONE CAPSULE BY MOUTH ONCE WEEKLY AS DIRECTED  0    hydrOXYzine pamoate (VISTARIL) 25 mg capsule Take 25 mg by mouth 3 (three) times a day as needed for anxiety      Multiple Vitamins-Minerals (CENTRUM ADULTS PO) Centrum      omeprazole (PriLOSEC) 40 MG capsule Take 1 capsule (40 mg total) by mouth daily 30 capsule 0    sertraline (ZOLOFT) 100 mg tablet Take 200 mg by mouth daily        multivitamin (THERAGRAN) TABS Take 1 tablet by mouth daily  No current facility-administered medications for this visit  Blood pressure 104/66, pulse 81, temperature 99 °F (37 2 °C), temperature source Tympanic, height 5' 10" (1 778 m), weight 88 8 kg (195 lb 12 8 oz)  PHYSICAL EXAM:      General Appearance:   Alert, some difficulty with ambulation (patient with cerebral palsy) cooperative, no distress, appears stated age    HEENT:   Normocephalic, atraumatic, anicteric      Neck:  Supple, symmetrical, trachea midline, no adenopathy;    thyroid: no enlargement/tenderness/nodules; no carotid  bruit or JVD    Lungs:   Clear to auscultation bilaterally; no rales, rhonchi or wheezing; respirations unlabored    Heart[de-identified]   S1 and S2 normal; regular rate and rhythm; no murmur, rub, or gallop  Abdomen:   Soft, non-tender, non-distended; normal bowel sounds; no masses, no organomegaly    Extremities: No edema, erythema, wounds, rashes   Rectal:   Deferred                      Lab Results   Component Value Date    WBC 4 20 (L) 02/15/2018    HGB 12 3 (L) 02/15/2018    HCT 38 1 (L) 02/15/2018    MCV 78 (L) 02/15/2018     02/15/2018     Lab Results   Component Value Date    GLUCOSE 108 07/24/2017    CALCIUM 8 9 02/15/2018     02/15/2018    K 3 5 02/15/2018    CO2 28 02/15/2018     02/15/2018    BUN 15 02/15/2018    CREATININE 1 08 02/15/2018     Lab Results   Component Value Date    ALT 25 02/15/2018    AST 19 02/15/2018    ALKPHOS 67 02/15/2018    BILITOT 0 60 02/15/2018     Lab Results   Component Value Date    INR 1 19 (H) 08/15/2016    PROTIME 15 2 (H) 08/15/2016       Xr Spine Cervical 2 Or 3 Vw Injury    Result Date: 3/7/2018  Impression: Straightening of cervical lordosis with multilevel degenerative disc disease   Workstation performed: [id number]     Xr Spine Lumbar 2 Or 3 Views Injury    Result Date: 3/7/2018  Impression: Scoliosis with multilevel degenerative disc disease  Workstation performed: [id number]       ASSESSMENT & PLAN:    Gastroesophageal reflux disease with esophagitis  GERD, symptomatically well managed with omeprazole 40 mg once daily  Ideally would like to reduce dependence on long-term PPI, he does report recurrence of reflux symptoms within 2-3 days after missing doses of his PPI  EGD in 2016 showed no evidence of Townsend's esophagus     - We'll review patient's prescription for omeprazole 40 mg once daily  -  Patient was explained about the lifestyle and dietary modifications  Advised to avoid fatty foods, chocolates, caffeine, alcohol and any other triggering foods  Avoid eating for at least 3 hours before going to bed  - I advised patient about trying to taper the PPI; he can try taking omeprazole every other day for 2-3 weeks, and if he has breakthrough reflux symptoms, he can try taking an over-the-counter H2 blocker such as Pepcid or Zantac on the days he is not taking the PPI  If he continues to do well with this, he can continue tapering the omeprazole to every third day, and eventually taper off the medication and control his reflux with diet  If he has continued difficulty with tapering, he can resume the omeprazole 40 mg daily and we will renew his prescriptions and reevaluate as needed

## 2018-08-01 NOTE — ASSESSMENT & PLAN NOTE
GERD, symptomatically well managed with omeprazole 40 mg once daily  Ideally would like to reduce dependence on long-term PPI, he does report recurrence of reflux symptoms within 2-3 days after missing doses of his PPI  EGD in 2016 showed no evidence of Townsend's esophagus     - We'll review patient's prescription for omeprazole 40 mg once daily  -  Patient was explained about the lifestyle and dietary modifications  Advised to avoid fatty foods, chocolates, caffeine, alcohol and any other triggering foods  Avoid eating for at least 3 hours before going to bed  - I advised patient about trying to taper the PPI; he can try taking omeprazole every other day for 2-3 weeks, and if he has breakthrough reflux symptoms, he can try taking an over-the-counter H2 blocker such as Pepcid or Zantac on the days he is not taking the PPI  If he continues to do well with this, he can continue tapering the omeprazole to every third day, and eventually taper off the medication and control his reflux with diet  If he has continued difficulty with tapering, he can resume the omeprazole 40 mg daily and we will renew his prescriptions and reevaluate as needed

## 2018-09-13 ENCOUNTER — TELEPHONE (OUTPATIENT)
Dept: GASTROENTEROLOGY | Facility: AMBULARY SURGERY CENTER | Age: 48
End: 2018-09-13

## 2018-09-13 DIAGNOSIS — K20.90 ESOPHAGITIS: ICD-10-CM

## 2018-09-13 RX ORDER — OMEPRAZOLE 40 MG/1
40 CAPSULE, DELAYED RELEASE ORAL DAILY
Qty: 30 CAPSULE | Refills: 5 | Status: SHIPPED | OUTPATIENT
Start: 2018-09-13 | End: 2019-06-17 | Stop reason: SDUPTHER

## 2018-09-13 NOTE — TELEPHONE ENCOUNTER
Dr Hrenandez's pt called requesting a medication refill for omeprazole (PriLOSEC) 40 MG to be sent to [address] [place] [address] [phone]  Pt is currently at the pharmacy   Please send script

## 2019-06-13 ENCOUNTER — OFFICE VISIT (OUTPATIENT)
Dept: FAMILY MEDICINE CLINIC | Facility: CLINIC | Age: 49
End: 2019-06-13
Payer: MEDICARE

## 2019-06-13 VITALS
DIASTOLIC BLOOD PRESSURE: 60 MMHG | BODY MASS INDEX: 26.11 KG/M2 | HEART RATE: 86 BPM | WEIGHT: 182 LBS | SYSTOLIC BLOOD PRESSURE: 98 MMHG | OXYGEN SATURATION: 98 % | TEMPERATURE: 97.7 F

## 2019-06-13 DIAGNOSIS — K08.9 DENTAL DISORDER: ICD-10-CM

## 2019-06-13 DIAGNOSIS — E03.9 HYPOTHYROIDISM, UNSPECIFIED TYPE: Primary | ICD-10-CM

## 2019-06-13 DIAGNOSIS — E78.1 HYPERTRIGLYCERIDEMIA: ICD-10-CM

## 2019-06-13 PROCEDURE — 99213 OFFICE O/P EST LOW 20 MIN: CPT | Performed by: FAMILY MEDICINE

## 2019-06-13 RX ORDER — NIACIN 1000 MG/1
1000 TABLET, EXTENDED RELEASE ORAL
Qty: 90 TABLET | Refills: 3 | Status: SHIPPED | OUTPATIENT
Start: 2019-06-13 | End: 2019-10-24

## 2019-06-13 RX ORDER — FLUOXETINE HYDROCHLORIDE 40 MG/1
40 CAPSULE ORAL EVERY MORNING
Refills: 3 | COMMUNITY
Start: 2019-03-28

## 2019-06-17 DIAGNOSIS — K20.90 ESOPHAGITIS: ICD-10-CM

## 2019-06-18 RX ORDER — OMEPRAZOLE 40 MG/1
CAPSULE, DELAYED RELEASE ORAL
Qty: 30 CAPSULE | Refills: 5 | Status: SHIPPED | OUTPATIENT
Start: 2019-06-18 | End: 2020-03-13

## 2019-06-27 ENCOUNTER — TELEPHONE (OUTPATIENT)
Dept: FAMILY MEDICINE CLINIC | Facility: CLINIC | Age: 49
End: 2019-06-27

## 2019-10-10 ENCOUNTER — TRANSCRIBE ORDERS (OUTPATIENT)
Dept: ADMINISTRATIVE | Facility: HOSPITAL | Age: 49
End: 2019-10-10

## 2019-10-10 ENCOUNTER — APPOINTMENT (OUTPATIENT)
Dept: LAB | Facility: HOSPITAL | Age: 49
End: 2019-10-10
Attending: FAMILY MEDICINE
Payer: MEDICARE

## 2019-10-10 DIAGNOSIS — F33.1 MAJOR DEPRESSIVE DISORDER, RECURRENT EPISODE, MODERATE (HCC): ICD-10-CM

## 2019-10-10 DIAGNOSIS — F33.1 MAJOR DEPRESSIVE DISORDER, RECURRENT EPISODE, MODERATE (HCC): Primary | ICD-10-CM

## 2019-10-10 DIAGNOSIS — I51.9 MYXEDEMA HEART DISEASE: Primary | ICD-10-CM

## 2019-10-10 DIAGNOSIS — E03.9 MYXEDEMA HEART DISEASE: Primary | ICD-10-CM

## 2019-10-10 DIAGNOSIS — E03.9 MYXEDEMA HEART DISEASE: ICD-10-CM

## 2019-10-10 DIAGNOSIS — E78.1 HYPERTRIGLYCERIDEMIA: ICD-10-CM

## 2019-10-10 DIAGNOSIS — I51.9 MYXEDEMA HEART DISEASE: ICD-10-CM

## 2019-10-10 LAB
25(OH)D3 SERPL-MCNC: 20 NG/ML (ref 30–100)
ALBUMIN SERPL BCP-MCNC: 4 G/DL (ref 3.5–5)
ALP SERPL-CCNC: 71 U/L (ref 46–116)
ALT SERPL W P-5'-P-CCNC: 23 U/L (ref 12–78)
ANION GAP SERPL CALCULATED.3IONS-SCNC: 8 MMOL/L (ref 4–13)
AST SERPL W P-5'-P-CCNC: 18 U/L (ref 5–45)
BASOPHILS # BLD AUTO: 0.03 THOUSANDS/ΜL (ref 0–0.1)
BASOPHILS NFR BLD AUTO: 1 % (ref 0–1)
BILIRUB SERPL-MCNC: 0.6 MG/DL (ref 0.2–1)
BUN SERPL-MCNC: 9 MG/DL (ref 5–25)
CALCIUM SERPL-MCNC: 8.5 MG/DL (ref 8.3–10.1)
CHLORIDE SERPL-SCNC: 106 MMOL/L (ref 100–108)
CHOLEST SERPL-MCNC: 165 MG/DL (ref 50–200)
CO2 SERPL-SCNC: 29 MMOL/L (ref 21–32)
CREAT SERPL-MCNC: 1.06 MG/DL (ref 0.6–1.3)
EOSINOPHIL # BLD AUTO: 0.03 THOUSAND/ΜL (ref 0–0.61)
EOSINOPHIL NFR BLD AUTO: 1 % (ref 0–6)
ERYTHROCYTE [DISTWIDTH] IN BLOOD BY AUTOMATED COUNT: 12.2 % (ref 11.6–15.1)
FOLATE SERPL-MCNC: 19.3 NG/ML (ref 3.1–17.5)
GFR SERPL CREATININE-BSD FRML MDRD: 84 ML/MIN/1.73SQ M
GLUCOSE P FAST SERPL-MCNC: 95 MG/DL (ref 65–99)
HCT VFR BLD AUTO: 45.1 % (ref 36.5–49.3)
HDLC SERPL-MCNC: 34 MG/DL (ref 40–60)
HGB BLD-MCNC: 15.2 G/DL (ref 12–17)
IMM GRANULOCYTES # BLD AUTO: 0.03 THOUSAND/UL (ref 0–0.2)
IMM GRANULOCYTES NFR BLD AUTO: 1 % (ref 0–2)
LDLC SERPL CALC-MCNC: 63 MG/DL (ref 0–100)
LYMPHOCYTES # BLD AUTO: 1.08 THOUSANDS/ΜL (ref 0.6–4.47)
LYMPHOCYTES NFR BLD AUTO: 24 % (ref 14–44)
MAGNESIUM SERPL-MCNC: 1.7 MG/DL (ref 1.6–2.6)
MCH RBC QN AUTO: 29.3 PG (ref 26.8–34.3)
MCHC RBC AUTO-ENTMCNC: 33.7 G/DL (ref 31.4–37.4)
MCV RBC AUTO: 87 FL (ref 82–98)
MONOCYTES # BLD AUTO: 0.28 THOUSAND/ΜL (ref 0.17–1.22)
MONOCYTES NFR BLD AUTO: 6 % (ref 4–12)
NEUTROPHILS # BLD AUTO: 3.01 THOUSANDS/ΜL (ref 1.85–7.62)
NEUTS SEG NFR BLD AUTO: 67 % (ref 43–75)
NONHDLC SERPL-MCNC: 131 MG/DL
NRBC BLD AUTO-RTO: 0 /100 WBCS
PLATELET # BLD AUTO: 138 THOUSANDS/UL (ref 149–390)
PMV BLD AUTO: 13 FL (ref 8.9–12.7)
POTASSIUM SERPL-SCNC: 3.7 MMOL/L (ref 3.5–5.3)
PROT SERPL-MCNC: 7.1 G/DL (ref 6.4–8.2)
RBC # BLD AUTO: 5.19 MILLION/UL (ref 3.88–5.62)
SODIUM SERPL-SCNC: 143 MMOL/L (ref 136–145)
TRIGL SERPL-MCNC: 338 MG/DL
TSH SERPL DL<=0.05 MIU/L-ACNC: 1.8 UIU/ML (ref 0.36–3.74)
VIT B12 SERPL-MCNC: 515 PG/ML (ref 100–900)
WBC # BLD AUTO: 4.46 THOUSAND/UL (ref 4.31–10.16)

## 2019-10-10 PROCEDURE — 82306 VITAMIN D 25 HYDROXY: CPT

## 2019-10-10 PROCEDURE — 85025 COMPLETE CBC W/AUTO DIFF WBC: CPT | Performed by: CLINICAL NURSE SPECIALIST

## 2019-10-10 PROCEDURE — 82746 ASSAY OF FOLIC ACID SERUM: CPT

## 2019-10-10 PROCEDURE — 83735 ASSAY OF MAGNESIUM: CPT

## 2019-10-10 PROCEDURE — 84443 ASSAY THYROID STIM HORMONE: CPT | Performed by: FAMILY MEDICINE

## 2019-10-10 PROCEDURE — 80053 COMPREHEN METABOLIC PANEL: CPT

## 2019-10-10 PROCEDURE — 36415 COLL VENOUS BLD VENIPUNCTURE: CPT

## 2019-10-10 PROCEDURE — 82607 VITAMIN B-12: CPT

## 2019-10-10 PROCEDURE — 80061 LIPID PANEL: CPT | Performed by: CLINICAL NURSE SPECIALIST

## 2019-10-24 ENCOUNTER — OFFICE VISIT (OUTPATIENT)
Dept: FAMILY MEDICINE CLINIC | Facility: CLINIC | Age: 49
End: 2019-10-24
Payer: MEDICARE

## 2019-10-24 VITALS
TEMPERATURE: 98.7 F | RESPIRATION RATE: 20 BRPM | DIASTOLIC BLOOD PRESSURE: 56 MMHG | SYSTOLIC BLOOD PRESSURE: 102 MMHG | WEIGHT: 187 LBS | HEART RATE: 84 BPM | BODY MASS INDEX: 26.83 KG/M2 | OXYGEN SATURATION: 99 %

## 2019-10-24 DIAGNOSIS — G80.9 CEREBRAL PALSY, UNSPECIFIED TYPE (HCC): ICD-10-CM

## 2019-10-24 DIAGNOSIS — E78.1 HYPERTRIGLYCERIDEMIA: Primary | ICD-10-CM

## 2019-10-24 DIAGNOSIS — Z23 ENCOUNTER FOR IMMUNIZATION: ICD-10-CM

## 2019-10-24 PROCEDURE — 90715 TDAP VACCINE 7 YRS/> IM: CPT

## 2019-10-24 PROCEDURE — 90471 IMMUNIZATION ADMIN: CPT

## 2019-10-24 PROCEDURE — 99213 OFFICE O/P EST LOW 20 MIN: CPT | Performed by: FAMILY MEDICINE

## 2019-10-24 RX ORDER — GEMFIBROZIL 600 MG/1
600 TABLET, FILM COATED ORAL
Qty: 60 TABLET | Refills: 6 | Status: SHIPPED | OUTPATIENT
Start: 2019-10-24 | End: 2020-02-13 | Stop reason: SDUPTHER

## 2019-10-27 NOTE — PROGRESS NOTES
Assessment/Plan:    No problem-specific Assessment & Plan notes found for this encounter  Diagnoses and all orders for this visit:    Hypertriglyceridemia  -     Comprehensive metabolic panel  -     Lipid panel  -     gemfibrozil (LOPID) 600 mg tablet; Take 1 tablet (600 mg total) by mouth 2 (two) times a day before meals    Cerebral palsy, unspecified type ([facility] )    Encounter for immunization  -     TDAP Vaccine greater than or equal to 6yo    Other orders  -     Melatonin 1 MG CAPS; Take 3 mg by mouth as needed          Discussed with the patient and all questioned fully answered  He will call me if any problems arise  Subjective:      Patient ID: Aubrey Guzman is a 57 y o  male  Chief Complaint   Patient presents with    Follow-up     test results       57year old male comes in for follow up, has hypertriglyceride, had niacin, pt stated he developed rashes alone with hot flashes, he had stopped medication, recent repeat blood work showed worsening of triglyceride      The following portions of the patient's history were reviewed and updated as appropriate: allergies, current medications, past family history, past medical history, past social history, past surgical history and problem list       Review of Systems   Constitutional: Negative for activity change, appetite change, fatigue and unexpected weight change  Cardiovascular: Negative for chest pain, palpitations and leg swelling  Gastrointestinal: Negative for abdominal distention, abdominal pain, anal bleeding, blood in stool and constipation  Psychiatric/Behavioral: Negative for agitation, behavioral problems, confusion and decreased concentration  Objective:    /56   Pulse 84   Temp 98 7 °F (37 1 °C)   Resp 20   Wt 84 8 kg (187 lb)   SpO2 99%   BMI 26 83 kg/m²       Physical Exam   Constitutional:  oriented to person, place, and time     Heart: S1, S2,Regular rate and rythem  Abdomen: Soft, Nontender  Psychiatric: normal mood and affect  behavior is normal  Judgment and thought content normal

## 2020-02-04 ENCOUNTER — TRANSCRIBE ORDERS (OUTPATIENT)
Dept: ADMINISTRATIVE | Facility: HOSPITAL | Age: 50
End: 2020-02-04

## 2020-02-04 ENCOUNTER — APPOINTMENT (OUTPATIENT)
Dept: LAB | Facility: HOSPITAL | Age: 50
End: 2020-02-04
Attending: FAMILY MEDICINE
Payer: MEDICARE

## 2020-02-04 DIAGNOSIS — E78.1 PURE HYPERGLYCERIDEMIA: Primary | ICD-10-CM

## 2020-02-04 DIAGNOSIS — E78.1 PURE HYPERGLYCERIDEMIA: ICD-10-CM

## 2020-02-04 LAB
ALBUMIN SERPL BCP-MCNC: 4.2 G/DL (ref 3.5–5)
ALP SERPL-CCNC: 60 U/L (ref 46–116)
ALT SERPL W P-5'-P-CCNC: 21 U/L (ref 12–78)
ANION GAP SERPL CALCULATED.3IONS-SCNC: 8 MMOL/L (ref 4–13)
AST SERPL W P-5'-P-CCNC: 14 U/L (ref 5–45)
BILIRUB SERPL-MCNC: 0.6 MG/DL (ref 0.2–1)
BUN SERPL-MCNC: 13 MG/DL (ref 5–25)
CALCIUM SERPL-MCNC: 8.8 MG/DL (ref 8.3–10.1)
CHLORIDE SERPL-SCNC: 104 MMOL/L (ref 100–108)
CHOLEST SERPL-MCNC: 179 MG/DL (ref 50–200)
CO2 SERPL-SCNC: 29 MMOL/L (ref 21–32)
CREAT SERPL-MCNC: 1.13 MG/DL (ref 0.6–1.3)
GFR SERPL CREATININE-BSD FRML MDRD: 77 ML/MIN/1.73SQ M
GLUCOSE P FAST SERPL-MCNC: 114 MG/DL (ref 65–99)
HDLC SERPL-MCNC: 41 MG/DL
LDLC SERPL CALC-MCNC: 109 MG/DL (ref 0–100)
NONHDLC SERPL-MCNC: 138 MG/DL
POTASSIUM SERPL-SCNC: 3.4 MMOL/L (ref 3.5–5.3)
PROT SERPL-MCNC: 7 G/DL (ref 6.4–8.2)
SODIUM SERPL-SCNC: 141 MMOL/L (ref 136–145)
TRIGL SERPL-MCNC: 145 MG/DL

## 2020-02-04 PROCEDURE — 80061 LIPID PANEL: CPT | Performed by: FAMILY MEDICINE

## 2020-02-04 PROCEDURE — 80053 COMPREHEN METABOLIC PANEL: CPT

## 2020-02-04 PROCEDURE — 36415 COLL VENOUS BLD VENIPUNCTURE: CPT

## 2020-02-13 ENCOUNTER — OFFICE VISIT (OUTPATIENT)
Dept: FAMILY MEDICINE CLINIC | Facility: CLINIC | Age: 50
End: 2020-02-13
Payer: MEDICARE

## 2020-02-13 VITALS
OXYGEN SATURATION: 98 % | SYSTOLIC BLOOD PRESSURE: 106 MMHG | WEIGHT: 190.5 LBS | HEART RATE: 72 BPM | BODY MASS INDEX: 27.33 KG/M2 | DIASTOLIC BLOOD PRESSURE: 64 MMHG | TEMPERATURE: 97.2 F

## 2020-02-13 DIAGNOSIS — E78.1 HYPERTRIGLYCERIDEMIA: ICD-10-CM

## 2020-02-13 DIAGNOSIS — G80.9 CEREBRAL PALSY, UNSPECIFIED TYPE (HCC): ICD-10-CM

## 2020-02-13 DIAGNOSIS — F33.1 MAJOR DEPRESSIVE DISORDER, RECURRENT EPISODE, MODERATE (HCC): ICD-10-CM

## 2020-02-13 PROCEDURE — 99213 OFFICE O/P EST LOW 20 MIN: CPT | Performed by: FAMILY MEDICINE

## 2020-02-13 PROCEDURE — 1036F TOBACCO NON-USER: CPT | Performed by: FAMILY MEDICINE

## 2020-02-13 RX ORDER — GEMFIBROZIL 600 MG/1
600 TABLET, FILM COATED ORAL
Qty: 60 TABLET | Refills: 6 | Status: SHIPPED | OUTPATIENT
Start: 2020-02-13 | End: 2021-03-09

## 2020-02-13 NOTE — PROGRESS NOTES
Assessment/Plan:    No problem-specific Assessment & Plan notes found for this encounter  Diagnoses and all orders for this visit:    Hypertriglyceridemia  -     gemfibrozil (LOPID) 600 mg tablet; Take 1 tablet (600 mg total) by mouth 2 (two) times a day before meals    Major depressive disorder, recurrent episode, moderate (HCC)    Cerebral palsy, unspecified type ([facility] )          Advise to repeat blood work in 4-6 months  Discussed with the patient and all questioned fully answered  He will call me if any problems arise  Subjective:      Patient ID: Ángel Becerra is a 54 y o  male  Chief Complaint   Patient presents with    Follow-up     follow up for blood work results       54year old male comes in for follow up, patient's triglyceride was high, I had started him on Niacin, which he can not tolerate the side effect, we subsequently changed to lopid, however he admitted to forget taking them sometimes, denied any side effect, doing OK with this meds      The following portions of the patient's history were reviewed and updated as appropriate: allergies, current medications, past family history, past medical history, past social history, past surgical history and problem list       Review of Systems   Constitutional: Negative for activity change, appetite change, fatigue and unexpected weight change  Respiratory: Negative for apnea, cough, choking, chest tightness and shortness of breath  Cardiovascular: Negative for chest pain, palpitations and leg swelling  Gastrointestinal: Negative for abdominal distention, abdominal pain, anal bleeding, blood in stool and constipation  Musculoskeletal:(+) deformity on foot for cerebral palsey  Psychiatric/Behavioral: Negative for agitation, behavioral problems, confusion and decreased concentration         Objective:    /64 (BP Location: Left arm, Patient Position: Sitting, Cuff Size: Large)   Pulse 72   Temp (!) 97 2 °F (36 2 °C) (Tympanic) Wt 86 4 kg (190 lb 8 oz)   SpO2 98%   BMI 27 33 kg/m²       Physical Exam   Constitutional:  oriented to person, place, and time  well-developed and well-nourished  No distress  Cardiovascular: Normal rate, regular rhythm, normal heart sounds and intact distal pulses  Exam reveals no gallop and no friction rub  No murmur heard  Pulmonary/Chest: Effort normal and breath sounds normal  No respiratory distress  no wheezes  no rales  no tenderness  Abdominal: Soft  Bowel sounds are normal  no distension  There is no tenderness  There is no rebound and no guarding  Musculoskeletal: deformities with his cerebral palsey    Psychiatric: normal mood and affect  behavior is normal  Judgment and thought content normal

## 2020-03-13 DIAGNOSIS — K20.90 ESOPHAGITIS: ICD-10-CM

## 2020-03-13 RX ORDER — OMEPRAZOLE 40 MG/1
CAPSULE, DELAYED RELEASE ORAL
Qty: 30 CAPSULE | Refills: 5 | Status: SHIPPED | OUTPATIENT
Start: 2020-03-13 | End: 2020-12-10

## 2020-05-13 ENCOUNTER — OFFICE VISIT (OUTPATIENT)
Dept: URGENT CARE | Facility: CLINIC | Age: 50
End: 2020-05-13
Payer: MEDICARE

## 2020-05-13 ENCOUNTER — TELEMEDICINE (OUTPATIENT)
Dept: FAMILY MEDICINE CLINIC | Facility: CLINIC | Age: 50
End: 2020-05-13
Payer: MEDICARE

## 2020-05-13 ENCOUNTER — TELEPHONE (OUTPATIENT)
Dept: FAMILY MEDICINE CLINIC | Facility: CLINIC | Age: 50
End: 2020-05-13

## 2020-05-13 VITALS
WEIGHT: 187 LBS | SYSTOLIC BLOOD PRESSURE: 121 MMHG | HEIGHT: 70 IN | HEART RATE: 108 BPM | BODY MASS INDEX: 26.77 KG/M2 | RESPIRATION RATE: 18 BRPM | TEMPERATURE: 100.7 F | DIASTOLIC BLOOD PRESSURE: 75 MMHG

## 2020-05-13 DIAGNOSIS — R39.9 UTI SYMPTOMS: Primary | ICD-10-CM

## 2020-05-13 DIAGNOSIS — R11.2 NON-INTRACTABLE VOMITING WITH NAUSEA, UNSPECIFIED VOMITING TYPE: ICD-10-CM

## 2020-05-13 DIAGNOSIS — Z87.442 HISTORY OF NEPHROLITHIASIS: ICD-10-CM

## 2020-05-13 DIAGNOSIS — N23 KIDNEY PAIN: Primary | ICD-10-CM

## 2020-05-13 DIAGNOSIS — R10.9 RIGHT FLANK PAIN: ICD-10-CM

## 2020-05-13 PROCEDURE — 1036F TOBACCO NON-USER: CPT | Performed by: PHYSICIAN ASSISTANT

## 2020-05-13 PROCEDURE — 99213 OFFICE O/P EST LOW 20 MIN: CPT | Performed by: PHYSICIAN ASSISTANT

## 2020-05-13 PROCEDURE — 99214 OFFICE O/P EST MOD 30 MIN: CPT | Performed by: NURSE PRACTITIONER

## 2020-05-13 RX ORDER — ZINC GLUCONATE 50 MG
50 TABLET ORAL DAILY
COMMUNITY
End: 2021-06-02

## 2020-05-13 RX ORDER — LEVOFLOXACIN 750 MG/1
750 TABLET ORAL EVERY 24 HOURS
Qty: 5 TABLET | Refills: 0 | Status: SHIPPED | OUTPATIENT
Start: 2020-05-13 | End: 2020-05-18

## 2020-05-13 RX ORDER — ONDANSETRON 4 MG/1
4 TABLET, ORALLY DISINTEGRATING ORAL EVERY 8 HOURS PRN
Qty: 15 TABLET | Refills: 0 | Status: SHIPPED | OUTPATIENT
Start: 2020-05-13 | End: 2021-03-29

## 2020-05-13 RX ORDER — ONDANSETRON 4 MG/1
4 TABLET, ORALLY DISINTEGRATING ORAL ONCE
Status: COMPLETED | OUTPATIENT
Start: 2020-05-13 | End: 2020-05-13

## 2020-05-13 RX ADMIN — ONDANSETRON 4 MG: 4 TABLET, ORALLY DISINTEGRATING ORAL at 16:17

## 2020-08-24 ENCOUNTER — TRANSCRIBE ORDERS (OUTPATIENT)
Dept: ADMINISTRATIVE | Facility: HOSPITAL | Age: 50
End: 2020-08-24

## 2020-08-24 ENCOUNTER — OFFICE VISIT (OUTPATIENT)
Dept: FAMILY MEDICINE CLINIC | Facility: CLINIC | Age: 50
End: 2020-08-24
Payer: MEDICARE

## 2020-08-24 ENCOUNTER — APPOINTMENT (OUTPATIENT)
Dept: LAB | Facility: HOSPITAL | Age: 50
End: 2020-08-24
Attending: FAMILY MEDICINE
Payer: MEDICARE

## 2020-08-24 VITALS
HEART RATE: 85 BPM | OXYGEN SATURATION: 98 % | TEMPERATURE: 97.9 F | HEIGHT: 69 IN | RESPIRATION RATE: 18 BRPM | SYSTOLIC BLOOD PRESSURE: 116 MMHG | DIASTOLIC BLOOD PRESSURE: 76 MMHG | BODY MASS INDEX: 28.58 KG/M2 | WEIGHT: 193 LBS

## 2020-08-24 DIAGNOSIS — Z13.0 SCREENING FOR IRON DEFICIENCY ANEMIA: ICD-10-CM

## 2020-08-24 DIAGNOSIS — D64.9 ANEMIA, UNSPECIFIED TYPE: ICD-10-CM

## 2020-08-24 DIAGNOSIS — Z13.0 SCREENING FOR IRON DEFICIENCY ANEMIA: Primary | ICD-10-CM

## 2020-08-24 DIAGNOSIS — Z13.0 SCREENING, ANEMIA, DEFICIENCY, IRON: ICD-10-CM

## 2020-08-24 DIAGNOSIS — E78.2 MIXED HYPERLIPIDEMIA: ICD-10-CM

## 2020-08-24 DIAGNOSIS — R79.89 ABNORMAL TSH: ICD-10-CM

## 2020-08-24 DIAGNOSIS — R31.9 HEMATURIA, UNSPECIFIED TYPE: Primary | ICD-10-CM

## 2020-08-24 LAB
ALBUMIN SERPL BCP-MCNC: 4.1 G/DL (ref 3.5–5)
ALP SERPL-CCNC: 55 U/L (ref 46–116)
ALT SERPL W P-5'-P-CCNC: 28 U/L (ref 12–78)
ANION GAP SERPL CALCULATED.3IONS-SCNC: 9 MMOL/L (ref 4–13)
AST SERPL W P-5'-P-CCNC: 16 U/L (ref 5–45)
BILIRUB SERPL-MCNC: 0.9 MG/DL (ref 0.2–1)
BUN SERPL-MCNC: 14 MG/DL (ref 5–25)
CALCIUM SERPL-MCNC: 8.8 MG/DL (ref 8.3–10.1)
CHLORIDE SERPL-SCNC: 104 MMOL/L (ref 100–108)
CHOLEST SERPL-MCNC: 198 MG/DL (ref 50–200)
CO2 SERPL-SCNC: 27 MMOL/L (ref 21–32)
CREAT SERPL-MCNC: 1.09 MG/DL (ref 0.6–1.3)
ERYTHROCYTE [DISTWIDTH] IN BLOOD BY AUTOMATED COUNT: 12.4 % (ref 11.6–15.1)
FERRITIN SERPL-MCNC: 22 NG/ML (ref 8–388)
GFR SERPL CREATININE-BSD FRML MDRD: 80 ML/MIN/1.73SQ M
GLUCOSE P FAST SERPL-MCNC: 87 MG/DL (ref 65–99)
HCT VFR BLD AUTO: 45.4 % (ref 36.5–49.3)
HDLC SERPL-MCNC: 39 MG/DL
HGB BLD-MCNC: 15 G/DL (ref 12–17)
IRON SATN MFR SERPL: 35 %
IRON SERPL-MCNC: 136 UG/DL (ref 65–175)
LDLC SERPL CALC-MCNC: 112 MG/DL (ref 0–100)
MCH RBC QN AUTO: 29 PG (ref 26.8–34.3)
MCHC RBC AUTO-ENTMCNC: 33 G/DL (ref 31.4–37.4)
MCV RBC AUTO: 88 FL (ref 82–98)
NONHDLC SERPL-MCNC: 159 MG/DL
PLATELET # BLD AUTO: 160 THOUSANDS/UL (ref 149–390)
PMV BLD AUTO: 12.7 FL (ref 8.9–12.7)
POTASSIUM SERPL-SCNC: 3.6 MMOL/L (ref 3.5–5.3)
PROT SERPL-MCNC: 7.3 G/DL (ref 6.4–8.2)
RBC # BLD AUTO: 5.17 MILLION/UL (ref 3.88–5.62)
SODIUM SERPL-SCNC: 140 MMOL/L (ref 136–145)
T4 FREE SERPL-MCNC: 1.02 NG/DL (ref 0.76–1.46)
TIBC SERPL-MCNC: 391 UG/DL (ref 250–450)
TRIGL SERPL-MCNC: 233 MG/DL
TSH SERPL DL<=0.05 MIU/L-ACNC: 4.5 UIU/ML (ref 0.36–3.74)
WBC # BLD AUTO: 4.66 THOUSAND/UL (ref 4.31–10.16)

## 2020-08-24 PROCEDURE — 83540 ASSAY OF IRON: CPT

## 2020-08-24 PROCEDURE — 84443 ASSAY THYROID STIM HORMONE: CPT | Performed by: FAMILY MEDICINE

## 2020-08-24 PROCEDURE — 83550 IRON BINDING TEST: CPT

## 2020-08-24 PROCEDURE — 80053 COMPREHEN METABOLIC PANEL: CPT | Performed by: FAMILY MEDICINE

## 2020-08-24 PROCEDURE — 1036F TOBACCO NON-USER: CPT | Performed by: FAMILY MEDICINE

## 2020-08-24 PROCEDURE — 82728 ASSAY OF FERRITIN: CPT

## 2020-08-24 PROCEDURE — 36415 COLL VENOUS BLD VENIPUNCTURE: CPT | Performed by: FAMILY MEDICINE

## 2020-08-24 PROCEDURE — 99214 OFFICE O/P EST MOD 30 MIN: CPT | Performed by: FAMILY MEDICINE

## 2020-08-24 PROCEDURE — 80061 LIPID PANEL: CPT | Performed by: FAMILY MEDICINE

## 2020-08-24 PROCEDURE — 85027 COMPLETE CBC AUTOMATED: CPT | Performed by: FAMILY MEDICINE

## 2020-08-24 PROCEDURE — 84439 ASSAY OF FREE THYROXINE: CPT | Performed by: FAMILY MEDICINE

## 2020-08-24 NOTE — PROGRESS NOTES
Assessment/Plan:    No problem-specific Assessment & Plan notes found for this encounter  Diagnoses and all orders for this visit:    Hematuria, unspecified type    Mixed hyperlipidemia  -     Comprehensive metabolic panel  -     Lipid panel    Screening, anemia, deficiency, iron  -     CBC    Abnormal TSH  -     TSH, 3rd generation with Free T4 reflex    Anemia, unspecified type  -     Iron Panel (Includes Ferritin, Iron Sat%, Iron, and TIBC); Future          54year old male with PMHx of Cerebral Palsy, GERD, and previous episodes of nephrolithiasis presents with resolved flank pain/hematuria  Urine analysis in the office was normal so urine was not sent out for further studies  Repeat lipid panel due to previously elevated LDL, TSH with reflex for previous elevation, CBC and iron studies due to previously being anemic  Patient was counseled on diet, exercise and sleep  No other screening is due at this time given his age  Subjective:      Patient ID: Morris Etienne is a 54 y o  male  Chief Complaint   Patient presents with    Follow-up      6 mths  f/u had UTI was treated ,no symptom now   no new food or drug allergies  KINGSTON Mota is a 54year old male with a PMHx of Cerebral Palsy, GERD, hyperlipidemia, and anxiety who presents to the office for an acute visit concerning a "UTI" that he had in May  During that time tripp was experiencing flank pain, urgency, and one episode of hematuria described as "a blood streak" though he is currently asymptomatic  He explains that he has had previous episodes of kidney stones for which he takes himself to the ED but due to the ongoing COVID-19 pandemic he decided not to go and deferred to calling [facility]  He does admits to some weight gain since the start of the pandemic due to a poor diet but denies any other Sx at this time, including bowel or urinary changes      PMHx  - Cerebral palsy  - GERD: currently on omeprazole  - Anxiety/depression: currently on fluoxetine    Meds  - as listed in PMHx  - gemfibrozil for elevated LDL  - melatonin 3mg  - vitamin D    Surgical Hx  - right foot surgery  - Fx of the right femoral neck    FHx  - 1 sister who he speaks to daily but does not know her PMHx  - Mother has hearing issues  - Not in contact with biological father    Rich Free  - Former tobacco use from teens to mid 25s  - States that he is an alcoholic but had his last drink 1/2015  - Not sexually active  - On disability, tries to exercise but is unable to main consistency, poor diet since the start of the pandemic, sleeps well but wakes up at least once to urinate      The following portions of the patient's history were reviewed and updated as appropriate: allergies, current medications, past family history, past medical history, past social history, past surgical history and problem list     Review of Systems   Constitutional: Negative for chills, fatigue and fever  HENT: Positive for dental problem ("loosing teeth")  Negative for hearing loss, sore throat and trouble swallowing  Eyes: Negative for visual disturbance  Respiratory: Negative for cough, shortness of breath and wheezing  Cardiovascular: Negative for chest pain and leg swelling  Gastrointestinal: Negative for abdominal pain, constipation, diarrhea, nausea and vomiting  Genitourinary: Positive for flank pain (has resolved), hematuria (has resolved) and urgency (has resolved)  Negative for difficulty urinating, discharge, dysuria and testicular pain  Musculoskeletal: Positive for arthralgias and gait problem (uses cane due to chornic condition)  Neurological: Negative for headaches  Psychiatric/Behavioral: Negative for sleep disturbance  The patient is not nervous/anxious            Objective:    /76 (BP Location: Left arm, Patient Position: Sitting, Cuff Size: Standard)   Pulse 85   Temp 97 9 °F (36 6 °C) (Tympanic)   Resp 18   Ht 5' 9" (1 753 m)   Wt 87 5 kg (193 lb)   SpO2 98%   BMI 28 50 kg/m²        Physical Exam  Vitals signs and nursing note reviewed  Constitutional:       Appearance: Normal appearance  HENT:      Head: Normocephalic and atraumatic  Right Ear: External ear normal       Left Ear: External ear normal       Mouth/Throat:      Mouth: Mucous membranes are moist       Comments: Missing teeth  Eyes:      Extraocular Movements: Extraocular movements intact  Conjunctiva/sclera: Conjunctivae normal    Neck:      Musculoskeletal: Neck supple  Cardiovascular:      Rate and Rhythm: Normal rate and regular rhythm  Heart sounds: No murmur  No gallop  Pulmonary:      Effort: Pulmonary effort is normal       Breath sounds: Normal breath sounds  Abdominal:      General: Bowel sounds are normal       Tenderness: There is abdominal tenderness (left lower quadrant, mild)  There is no right CVA tenderness, left CVA tenderness, guarding or rebound  Lymphadenopathy:      Cervical: No cervical adenopathy  Skin:     General: Skin is warm and dry  Capillary Refill: Capillary refill takes less than 2 seconds  Neurological:      Mental Status: He is alert and oriented to person, place, and time  Cranial Nerves: No cranial nerve deficit  Sensory: No sensory deficit  Motor: Weakness (2/5 dorsi/plantarflexion bilat, 5/5 for other muscle groups) present  Gait: Gait abnormal (uses cane due to chronicn condition)  Deep Tendon Reflexes: Reflexes abnormal (+3 patellar reflexes)     Psychiatric:         Mood and Affect: Mood normal                Luann Vale MD

## 2020-11-08 DIAGNOSIS — K20.90 ESOPHAGITIS: ICD-10-CM

## 2020-11-23 RX ORDER — OMEPRAZOLE 40 MG/1
CAPSULE, DELAYED RELEASE ORAL
Qty: 30 CAPSULE | Refills: 5 | OUTPATIENT
Start: 2020-11-23

## 2020-11-24 ENCOUNTER — TELEPHONE (OUTPATIENT)
Dept: GASTROENTEROLOGY | Facility: CLINIC | Age: 50
End: 2020-11-24

## 2020-12-08 DIAGNOSIS — K20.90 ESOPHAGITIS: ICD-10-CM

## 2020-12-10 RX ORDER — OMEPRAZOLE 40 MG/1
CAPSULE, DELAYED RELEASE ORAL
Qty: 30 CAPSULE | Refills: 1 | Status: SHIPPED | OUTPATIENT
Start: 2020-12-10 | End: 2021-03-29

## 2021-01-08 ENCOUNTER — OFFICE VISIT (OUTPATIENT)
Dept: GASTROENTEROLOGY | Facility: CLINIC | Age: 51
End: 2021-01-08
Payer: MEDICARE

## 2021-01-08 VITALS
HEIGHT: 69 IN | BODY MASS INDEX: 29.18 KG/M2 | DIASTOLIC BLOOD PRESSURE: 80 MMHG | TEMPERATURE: 97.4 F | WEIGHT: 197 LBS | SYSTOLIC BLOOD PRESSURE: 134 MMHG

## 2021-01-08 DIAGNOSIS — K21.00 GASTROESOPHAGEAL REFLUX DISEASE WITH ESOPHAGITIS WITHOUT HEMORRHAGE: Primary | ICD-10-CM

## 2021-01-08 DIAGNOSIS — K21.9 GASTROESOPHAGEAL REFLUX DISEASE, UNSPECIFIED WHETHER ESOPHAGITIS PRESENT: Primary | ICD-10-CM

## 2021-01-08 DIAGNOSIS — Z12.11 COLON CANCER SCREENING: ICD-10-CM

## 2021-01-08 PROCEDURE — 99213 OFFICE O/P EST LOW 20 MIN: CPT | Performed by: PHYSICIAN ASSISTANT

## 2021-01-08 RX ORDER — FAMOTIDINE 40 MG/1
40 TABLET, FILM COATED ORAL DAILY
Qty: 30 TABLET | Refills: 1 | Status: SHIPPED | OUTPATIENT
Start: 2021-01-08 | End: 2021-03-08

## 2021-01-08 NOTE — PROGRESS NOTES
Assessment and Plan    #1  GERD: on omeprazole 40 mg daily with good control of symptoms, if he skips 2 days he will have symptoms   -discussed long-term side effects of omeprazole with patient  Discussed the possibility of weaning off onto Pepcid  He will try omeprazole every other day with Pepcid on the days he is not taking it for about 2 weeks  If doing well he will convert to Pepcid 40 mg daily indefinitely  If he has worsening symptoms he will contact us we will resume omeprazole possibly at a lower dose such as 20 mg daily  -anti-reflux diet and measures discussed with the patient  -no alarm symptoms that would warrant EGD at this time    #2  Colon cancer screening:  Discussed that the new recommendation is to start age 39  Patient would like to discuss with his PCP prior to scheduling  He will call us after discussion with his PCP  --------------------------------------------------------------------------------------------------------------------    Chief Complaint:  Follow-up GERD    HPI: Porfirio Rodriguez is a 52 y o  male with a history of cerebral palsy, GERD, depression who presents today for follow up  He has not been seen since 2018  The patient is getting omeprazole 40 mg daily for his GERD symptoms  Reports that he is doing well on this but if he skips 2 days in a row he will have worsening symptoms  He denies any dysphagia symptoms  Denies any nausea or vomiting  Weight has been stable  Denies any significant abdominal pain  Reports that occasionally he may have some constipation or loose stools depending on what he eats which generally speaking his bowels are regular  Denies any melena or blood in the stool  His last upper endoscopy was in 2016 with some mild esophagitis and gastritis  He has never had a colonoscopy  He has no family history of colon cancer        Review of Systems:   General: negative for fatigue, fever, night sweats or unexpected weight loss  Psychological: negative for anxiety or depression  Ophthalmic: negative for blurry vision or scleral icterus  ENT: negative for headaches, oral lesions, sore throat, vocal changes or dysphagia  Hematological and Lymphatic: negative for pallor or swollen lymph nodes  Respiratory: negative for cough, shortness of breath or wheezing  Cardiovascular: negative for chest pain, edema or murmur  Gastrointestinal: as mentioned in HPI  Genito-Urinary: negative for dysuria or incontinence  Musculoskeletal: negative for joint pain, joint stiffness or joint swelling  Dermatological: negative for pruritus, rash, or jaundice    Current Medications  Current Outpatient Medications   Medication Sig Dispense Refill    ergocalciferol (VITAMIN D2) 50,000 units TAKE ONE CAPSULE BY MOUTH ONCE WEEKLY AS DIRECTED  0    FLUoxetine (PROzac) 40 MG capsule Take 40 mg by mouth every morning  3    gemfibrozil (LOPID) 600 mg tablet Take 1 tablet (600 mg total) by mouth 2 (two) times a day before meals 60 tablet 6    Melatonin 1 MG CAPS Take 3 mg by mouth as needed      Multiple Vitamins-Minerals (CENTRUM ADULTS PO) Centrum      NON FORMULARY       omeprazole (PriLOSEC) 40 MG capsule TAKE ONE CAPSULE ONCE DAILY BY MOUTH 30 capsule 1    zinc gluconate 50 mg tablet Take 50 mg by mouth daily      famotidine (PEPCID) 40 MG tablet Take 1 tablet (40 mg total) by mouth daily 30 tablet 1    multivitamin (THERAGRAN) TABS Take 1 tablet by mouth daily   ondansetron (ZOFRAN-ODT) 4 mg disintegrating tablet Take 1 tablet (4 mg total) by mouth every 8 (eight) hours as needed for nausea or vomiting (Patient not taking: Reported on 8/24/2020) 15 tablet 0     No current facility-administered medications for this visit          Past Medical History  Past Medical History:   Diagnosis Date    Acid reflux     Arthritis     Cerebral palsy (HCC)     GERD (gastroesophageal reflux disease)     Psychiatric disorder     anxiety, depression    Ulcer        Past Surgical History  Past Surgical History:   Procedure Laterality Date    ESOPHAGOGASTRODUODENOSCOPY N/A 2016    Procedure: ESOPHAGOGASTRODUODENOSCOPY (EGD); Surgeon: Gurmeet Lama MD;  Location: [facility];   Service:     FOOT SURGERY Bilateral     hardware    HIP SURGERY      OTHER SURGICAL HISTORY Bilateral     lower extremity ligiment extensions-multiple    UPPER GASTROINTESTINAL ENDOSCOPY         Past Social History   Social History     Socioeconomic History    Marital status: Single     Spouse name: None    Number of children: None    Years of education: None    Highest education level: None   Occupational History    None   Social Needs    Financial resource strain: None    Food insecurity     Worry: None     Inability: None    Transportation needs     Medical: None     Non-medical: None   Tobacco Use    Smoking status: Former Smoker     Packs/day: 3 00     Years: 15 00     Pack years: 45 00     Quit date: 1994     Years since quittin 9    Smokeless tobacco: Never Used   Substance and Sexual Activity    Alcohol use: Not Currently     Comment: 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    Drug use: Not Currently     Types: Marijuana     Comment: quit -greater than 1 year    Sexual activity: None   Lifestyle    Physical activity     Days per week: None     Minutes per session: None    Stress: None   Relationships    Social connections     Talks on phone: None     Gets together: None     Attends Mandaeism service: None     Active member of club or organization: None     Attends meetings of clubs or organizations: None     Relationship status: None    Intimate partner violence     Fear of current or ex partner: None     Emotionally abused: None     Physically abused: None     Forced sexual activity: None   Other Topics Concern    None   Social History Narrative    None       The following portions of the patient's history were reviewed and updated as appropriate: allergies, current medications, past family history, past medical history, past social history, past surgical history and problem list     Vital Signs  Vitals:    01/08/21 1351   BP: 134/80   BP Location: Left arm   Patient Position: Sitting   Cuff Size: Adult   Temp: (!) 97 4 °F (36 3 °C)   TempSrc: Temporal   Weight: 89 4 kg (197 lb)   Height: 5' 9" (1 753 m)       Physical Exam:  General appearance: alert, cooperative, no distress  HEENT: normocephalic, anicteric, no eye erythema or discharge, no oropharyngeal thrush  Neck: supple, trachea midline, no adenopathy  Lungs: CTA b/l, no rales, rhonchi, or wheezing, unlabored respirations  Heart: RRR, no murmur, rubs, or gallops  Abdomen: soft, non-tender, non-distended, normal bowel sounds, no masses or organomegaly  Rectal: deferred  Extremities: no cyanosis, clubbing, or edema  Musculoskeletal:  Abnormal gait and uses cane secondary to cerebral palsy  Skin: color and texture normal, no jaundice, no rashes or lesions  Psychiatric: alert and oriented, normal affect and behavior

## 2021-01-08 NOTE — PATIENT INSTRUCTIONS
Take pepcid 40 mg every toher day for 2 weeks alternating with omeprazole 40 mg every other day    After two weeks, convert to Pepcid 40 mg daily and stop omeprazole    Call with worsening symptoms if they occur while switching

## 2021-02-03 ENCOUNTER — TELEPHONE (OUTPATIENT)
Dept: FAMILY MEDICINE CLINIC | Facility: CLINIC | Age: 51
End: 2021-02-03

## 2021-02-03 NOTE — TELEPHONE ENCOUNTER
Left patient a VM asking to please call back to set up an apt  If patient calls back please refer to message below  Thank you        ----- Message from Louisa Armas MD sent at 2/1/2021 11:34 AM EST -----  Regarding: Please schedule patient for Medicare Annual Wellness Visit  Please schedule patient for Medicare Annual Wellness Visit within the next 8 weeks  PCP transferred from Dr Mary Heller to Dr Terri Vines   Thanks, Dr Eller Pallas

## 2021-02-06 DIAGNOSIS — E78.1 HYPERTRIGLYCERIDEMIA: ICD-10-CM

## 2021-02-26 ENCOUNTER — TELEPHONE (OUTPATIENT)
Dept: FAMILY MEDICINE CLINIC | Facility: CLINIC | Age: 51
End: 2021-02-26

## 2021-02-26 NOTE — TELEPHONE ENCOUNTER
Patient came to provide copies of COVID vaccination records from [facility]  Copies are scanned into this encounter

## 2021-03-05 DIAGNOSIS — K21.00 GASTROESOPHAGEAL REFLUX DISEASE WITH ESOPHAGITIS WITHOUT HEMORRHAGE: ICD-10-CM

## 2021-03-08 RX ORDER — FAMOTIDINE 40 MG/1
TABLET, FILM COATED ORAL
Qty: 30 TABLET | Refills: 5 | Status: SHIPPED | OUTPATIENT
Start: 2021-03-08 | End: 2021-11-03

## 2021-03-09 ENCOUNTER — TELEPHONE (OUTPATIENT)
Dept: FAMILY MEDICINE CLINIC | Facility: CLINIC | Age: 51
End: 2021-03-09

## 2021-03-09 RX ORDER — GEMFIBROZIL 600 MG/1
TABLET, FILM COATED ORAL
Qty: 60 TABLET | Refills: 6 | Status: SHIPPED | OUTPATIENT
Start: 2021-03-09

## 2021-03-09 NOTE — TELEPHONE ENCOUNTER
Patient came into office and asked for refill of gemfibrozil (LOPID) 600 mg tablet  Pharm requested refill in Feb  Confirmed to send to [address] [place] [address]

## 2021-03-09 NOTE — TELEPHONE ENCOUNTER
Called patient to set up appt  Patient stated he was on other line right now and needs to call us back

## 2021-03-09 NOTE — TELEPHONE ENCOUNTER
Refill request  This was probably sent via interface to wrong provider and now patient doesn't have any medication  This is a [facility] patient, but since it's urgent can you please address this since you are here in office? Thank you!

## 2021-03-09 NOTE — TELEPHONE ENCOUNTER
----- Message from Teodora Spatz, MD sent at 3/9/2021  2:27 PM EST -----  Can you please call this patient to schedule office visit with me in person  His medication is refilled x1, but he needs to be seen   Ty

## 2021-03-29 ENCOUNTER — HOSPITAL ENCOUNTER (EMERGENCY)
Facility: HOSPITAL | Age: 51
Discharge: HOME | End: 2021-03-29
Attending: EMERGENCY MEDICINE
Payer: MEDICARE

## 2021-03-29 ENCOUNTER — APPOINTMENT (EMERGENCY)
Dept: RADIOLOGY | Facility: HOSPITAL | Age: 51
End: 2021-03-29
Payer: MEDICARE

## 2021-03-29 VITALS
BODY MASS INDEX: 28.52 KG/M2 | TEMPERATURE: 97.9 F | OXYGEN SATURATION: 98 % | SYSTOLIC BLOOD PRESSURE: 140 MMHG | DIASTOLIC BLOOD PRESSURE: 76 MMHG | WEIGHT: 193.12 LBS | RESPIRATION RATE: 22 BRPM

## 2021-03-29 DIAGNOSIS — M25.511 RIGHT SHOULDER PAIN: Primary | ICD-10-CM

## 2021-03-29 DIAGNOSIS — S43.409A SHOULDER SPRAIN: ICD-10-CM

## 2021-03-29 DIAGNOSIS — S40.019A CONTUSION OF SHOULDER REGION: ICD-10-CM

## 2021-03-29 PROCEDURE — 73030 X-RAY EXAM OF SHOULDER: CPT

## 2021-03-29 PROCEDURE — 99283 EMERGENCY DEPT VISIT LOW MDM: CPT

## 2021-03-29 PROCEDURE — 99284 EMERGENCY DEPT VISIT MOD MDM: CPT | Performed by: EMERGENCY MEDICINE

## 2021-03-29 RX ORDER — CYCLOBENZAPRINE HCL 10 MG
5 TABLET ORAL 2 TIMES DAILY PRN
Qty: 10 TABLET | Refills: 0 | Status: SHIPPED | OUTPATIENT
Start: 2021-03-29 | End: 2021-04-05 | Stop reason: ALTCHOICE

## 2021-03-29 NOTE — DISCHARGE INSTRUCTIONS
-apply ice to the affected area follow up home compresses  Please get back in to slow activity so as not to freeze her shoulder by keeping it not mobile   -follow-up with orthopedics in a week if your symptoms do not improve

## 2021-03-29 NOTE — ED PROVIDER NOTES
History  Chief Complaint   Patient presents with    Shoulder Injury     Pt c/o R shoulder pain since 1400 yesterday after a mechancle fall while trying to get up  82-year-old male presents with the right-sided shoulder pain since 2:00 p m  Yesterday when he tried to set a bed frame when he slipped and fell onto his right shoulder  He is right-hand dominant  Painful movement of the arm  Denies any other injuries or complaints  Has been taking Tylenol  History provided by:  Patient   used: No        Prior to Admission Medications   Prescriptions Last Dose Informant Patient Reported? Taking? FLUoxetine (PROzac) 40 MG capsule 3/28/2021 at Unknown time Self Yes Yes   Sig: Take 40 mg by mouth every morning   Melatonin 1 MG CAPS 3/28/2021 at Unknown time Self Yes Yes   Sig: Take 3 mg by mouth as needed   Multiple Vitamins-Minerals (CENTRUM ADULTS PO) 3/28/2021 at Unknown time Self Yes Yes   Sig: Centrum   NON FORMULARY 3/28/2021 at Unknown time Self Yes Yes   ergocalciferol (VITAMIN D2) 50,000 units 3/28/2021 at Unknown time Self Yes Yes   Sig: daily    famotidine (PEPCID) 40 MG tablet 3/28/2021 at Unknown time  No Yes   Sig: TAKE ONE TABLET ONCE DAILY BY MOUTH   gemfibrozil (LOPID) 600 mg tablet 3/28/2021 at Unknown time  No Yes   Sig: TAKE ONE TABLET BY MOUTH TWICE A DAY BEFORE MEALS   zinc gluconate 50 mg tablet  Self Yes No   Sig: Take 50 mg by mouth daily      Facility-Administered Medications: None       Past Medical History:   Diagnosis Date    Acid reflux     Arthritis     Cerebral palsy (HCC)     GERD (gastroesophageal reflux disease)     Psychiatric disorder     anxiety, depression    Ulcer        Past Surgical History:   Procedure Laterality Date    ESOPHAGOGASTRODUODENOSCOPY N/A 5/9/2016    Procedure: ESOPHAGOGASTRODUODENOSCOPY (EGD); Surgeon: Cirilo Coulter MD;  Location: [facility];   Service:     FOOT SURGERY Bilateral     hardware    HIP SURGERY      OTHER SURGICAL HISTORY Bilateral     lower extremity ligiment extensions-multiple    UPPER GASTROINTESTINAL ENDOSCOPY         Family History   Problem Relation Age of Onset    No Known Problems Mother     No Known Problems Father     Cancer Maternal Grandmother     Cancer Maternal Grandfather     Cancer Maternal Aunt      I have reviewed and agree with the history as documented  E-Cigarette/Vaping     E-Cigarette/Vaping Substances     Social History     Tobacco Use    Smoking status: Former Smoker     Packs/day: 3 00     Years: 15 00     Pack years: 45 00     Quit date: 1994     Years since quittin 1    Smokeless tobacco: Never Used   Substance Use Topics    Alcohol use: Not Currently     Comment: 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    Drug use: Not Currently     Types: Marijuana     Comment: quit -greater than 1 year       Review of Systems   Constitutional: Negative  HENT: Negative  Eyes: Negative  Respiratory: Negative  Cardiovascular: Negative  Gastrointestinal: Negative  Endocrine: Negative  Genitourinary: Negative  Musculoskeletal: Positive for arthralgias and myalgias  Skin: Negative  Allergic/Immunologic: Negative  Neurological: Negative  Hematological: Negative  Psychiatric/Behavioral: Negative  All other systems reviewed and are negative  Physical Exam  Physical Exam  Vitals signs and nursing note reviewed  Constitutional:       Appearance: He is well-developed  HENT:      Head: Normocephalic and atraumatic  Eyes:      Pupils: Pupils are equal, round, and reactive to light  Neck:      Musculoskeletal: Normal range of motion and neck supple  Cardiovascular:      Rate and Rhythm: Normal rate and regular rhythm  Pulmonary:      Effort: Pulmonary effort is normal       Breath sounds: Normal breath sounds  Abdominal:      General: Bowel sounds are normal       Palpations: Abdomen is soft  Musculoskeletal: Normal range of motion        Comments: Right shoulder: tender to palpation by the acromioclavicular joint, no deformity, axillary nerve is intact, radial pulse is intact,    Skin:     General: Skin is warm and dry  Neurological:      Mental Status: He is alert and oriented to person, place, and time  Vital Signs  ED Triage Vitals [03/29/21 0604]   Temperature Pulse Respirations Blood Pressure SpO2   97 9 °F (36 6 °C) -- 22 140/76 98 %      Temp Source Heart Rate Source Patient Position - Orthostatic VS BP Location FiO2 (%)   Tympanic -- Sitting Left arm --      Pain Score       7           Vitals:    03/29/21 0604   BP: 140/76   Patient Position - Orthostatic VS: Sitting         Visual Acuity      ED Medications  Medications - No data to display    Diagnostic Studies  Results Reviewed     None                 XR shoulder 2+ views RIGHT    (Results Pending)              Procedures  Procedures         ED Course                                           MDM  Number of Diagnoses or Management Options     Amount and/or Complexity of Data Reviewed  Tests in the radiology section of CPT®: reviewed and ordered  Tests in the medicine section of CPT®: ordered and reviewed    Patient Progress  Patient progress: stable      Disposition  Final diagnoses:   Right shoulder pain   Contusion of shoulder region   Shoulder sprain     Time reflects when diagnosis was documented in both MDM as applicable and the Disposition within this note     Time User Action Codes Description Comment    3/29/2021  6:36 AM AneanitauDenzelIvette Add [M25 511] Right shoulder pain     3/29/2021  6:36 AM Denzel Laineztha Add [S40 019A] Contusion of shoulder region     3/29/2021  6:36 AM AneDenzel sabillontha Add [S43 409A] Shoulder sprain       ED Disposition     ED Disposition Condition Date/Time Comment    Discharge Stable Mon Mar 29, 2021  6:36 AM An Linda discharge to home/self care              Follow-up Information     Follow up With Specialties Details Why Contact Info Additional [address] Emergency Department Emergency Medicine  If symptoms worsen [address] [address]  [address] [address] Emergency Department, [place], [place], Ashley Campbell MD Orthopedic Surgery   [address] [place], [place] [address]  [phone]             Patient's Medications   Discharge Prescriptions    CYCLOBENZAPRINE (FLEXERIL) 10 MG TABLET    Take 0 5 tablets (5 mg total) by mouth 2 (two) times a day as needed for muscle spasms for up to 5 days       Start Date: 3/29/2021 End Date: 4/3/2021       Order Dose: 5 mg       Quantity: 10 tablet    Refills: 0     No discharge procedures on file      PDMP Review     None          ED Provider  Electronically Signed by           Nataly Madrid DO  03/29/21 8482

## 2021-04-05 ENCOUNTER — OFFICE VISIT (OUTPATIENT)
Dept: FAMILY MEDICINE CLINIC | Facility: CLINIC | Age: 51
End: 2021-04-05
Payer: MEDICARE

## 2021-04-05 VITALS
SYSTOLIC BLOOD PRESSURE: 110 MMHG | HEIGHT: 69 IN | RESPIRATION RATE: 18 BRPM | BODY MASS INDEX: 28.46 KG/M2 | OXYGEN SATURATION: 98 % | TEMPERATURE: 97.7 F | HEART RATE: 128 BPM | WEIGHT: 192.13 LBS | DIASTOLIC BLOOD PRESSURE: 70 MMHG

## 2021-04-05 DIAGNOSIS — E78.1 HYPERTRIGLYCERIDEMIA: ICD-10-CM

## 2021-04-05 DIAGNOSIS — Z71.3 NUTRITIONAL COUNSELING: ICD-10-CM

## 2021-04-05 DIAGNOSIS — H54.7 POOR VISION: ICD-10-CM

## 2021-04-05 DIAGNOSIS — Z11.59 NEED FOR HEPATITIS C SCREENING TEST: ICD-10-CM

## 2021-04-05 DIAGNOSIS — S72.001S CLOSED FRACTURE OF NECK OF RIGHT FEMUR, SEQUELA: ICD-10-CM

## 2021-04-05 DIAGNOSIS — M85.861 OTHER SPECIFIED DISORDERS OF BONE DENSITY AND STRUCTURE, RIGHT LOWER LEG: ICD-10-CM

## 2021-04-05 DIAGNOSIS — S46.811A: ICD-10-CM

## 2021-04-05 DIAGNOSIS — Z00.00 MEDICARE ANNUAL WELLNESS VISIT, INITIAL: Primary | ICD-10-CM

## 2021-04-05 PROCEDURE — G0439 PPPS, SUBSEQ VISIT: HCPCS | Performed by: FAMILY MEDICINE

## 2021-04-05 RX ORDER — BACLOFEN 10 MG/1
TABLET ORAL
Qty: 60 TABLET | Refills: 1 | Status: SHIPPED | OUTPATIENT
Start: 2021-04-05 | End: 2021-06-02

## 2021-04-05 NOTE — PATIENT INSTRUCTIONS

## 2021-04-06 ENCOUNTER — TRANSCRIBE ORDERS (OUTPATIENT)
Dept: ADMINISTRATIVE | Facility: HOSPITAL | Age: 51
End: 2021-04-06

## 2021-04-06 ENCOUNTER — APPOINTMENT (OUTPATIENT)
Dept: LAB | Facility: HOSPITAL | Age: 51
End: 2021-04-06
Payer: MEDICARE

## 2021-04-06 DIAGNOSIS — Z11.59 NEED FOR HEPATITIS C SCREENING TEST: ICD-10-CM

## 2021-04-06 DIAGNOSIS — E78.1 HYPERTRIGLYCERIDEMIA: ICD-10-CM

## 2021-04-06 LAB
CHOLEST SERPL-MCNC: 183 MG/DL (ref 50–200)
HCV AB SER QL: NORMAL
HDLC SERPL-MCNC: 43 MG/DL
LDLC SERPL CALC-MCNC: 66 MG/DL (ref 0–100)
TRIGL SERPL-MCNC: 371 MG/DL

## 2021-04-06 PROCEDURE — 36415 COLL VENOUS BLD VENIPUNCTURE: CPT

## 2021-04-06 PROCEDURE — 86803 HEPATITIS C AB TEST: CPT

## 2021-04-06 PROCEDURE — 80061 LIPID PANEL: CPT
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
## 2021-04-09 ENCOUNTER — TELEPHONE (OUTPATIENT)
Dept: GASTROENTEROLOGY | Facility: CLINIC | Age: 51
End: 2021-04-09

## 2021-04-09 NOTE — TELEPHONE ENCOUNTER
I called patient and left a message on his voicemail to return my phone call at the [facility] the location so we can address this further

## 2021-04-15 ENCOUNTER — APPOINTMENT (OUTPATIENT)
Dept: RADIOLOGY | Facility: CLINIC | Age: 51
End: 2021-04-15
Payer: MEDICARE

## 2021-04-15 ENCOUNTER — OFFICE VISIT (OUTPATIENT)
Dept: OBGYN CLINIC | Facility: CLINIC | Age: 51
End: 2021-04-15
Payer: MEDICARE

## 2021-04-15 VITALS
WEIGHT: 194.8 LBS | SYSTOLIC BLOOD PRESSURE: 110 MMHG | DIASTOLIC BLOOD PRESSURE: 71 MMHG | BODY MASS INDEX: 28.85 KG/M2 | HEIGHT: 69 IN | HEART RATE: 81 BPM

## 2021-04-15 DIAGNOSIS — M89.8X1 PAIN OF RIGHT CLAVICLE: ICD-10-CM

## 2021-04-15 DIAGNOSIS — M24.811 INTERNAL DERANGEMENT OF RIGHT SHOULDER: Primary | ICD-10-CM

## 2021-04-15 PROCEDURE — 99203 OFFICE O/P NEW LOW 30 MIN: CPT | Performed by: ORTHOPAEDIC SURGERY

## 2021-04-15 PROCEDURE — 73000 X-RAY EXAM OF COLLAR BONE: CPT

## 2021-04-15 RX ORDER — CHLORAL HYDRATE 500 MG
1000 CAPSULE ORAL 2 TIMES DAILY
COMMUNITY

## 2021-04-15 NOTE — PROGRESS NOTES
Assessment/Plan:  1  Internal derangement of right shoulder  Ambulatory referral to Orthopedic Surgery    MRI shoulder right wo contrast   2  Pain of right clavicle  XR clavicle right     Annalise Schuler has right shoulder pain concerning for possible rotator cuff injury  He does have some tenderness along the clavicle but there is no fracture on his previous x-rays or on repeat x-rays today  Given his weakness and lack of range of motion in the shoulder I would like an MRI to evaluate the rotator cuff for a tear  He will follow up in the office after the MRI is complete  Subjective:   Didier Lu is a 52 y o  male who presents to the office for evaluation for right-sided shoulder pain  He has a history of cerebral palsy and lost his balance after he was trying to take apart a bed frame and landed on his right shoulder  This injury occurred on 3/28/2021  He went to the emergency room the next day and had x-rays of the right shoulder showing no abnormality  He was given a sling and advised to follow up in our office  Today he has pain that is aching and throbbing and constant over the lateral aspect of the shoulder  He has noticed that he cannot raise his arm to his side  He has severe discomfort with motion or even trying to put his arm behind his back  He denies any history of shoulder pain or weakness in the past   He has been taking Tylenol for the pain which has not helped significantly  Review of Systems   Constitutional: Negative for chills, fever and unexpected weight change  HENT: Negative for hearing loss, nosebleeds and sore throat  Eyes: Negative for pain, redness and visual disturbance  Respiratory: Negative for cough, shortness of breath and wheezing  Cardiovascular: Negative for chest pain, palpitations and leg swelling  Gastrointestinal: Negative for abdominal pain, nausea and vomiting  Endocrine: Negative for polyphagia and polyuria     Genitourinary: Negative for dysuria and hematuria  Musculoskeletal:        See HPI   Skin: Negative for rash and wound  Neurological: Negative for dizziness, numbness and headaches  Psychiatric/Behavioral: Negative for decreased concentration and suicidal ideas  The patient is not nervous/anxious  Past Medical History:   Diagnosis Date    Acid reflux     Arthritis     Cerebral palsy (HCC)     GERD (gastroesophageal reflux disease)     Psychiatric disorder     anxiety, depression    Ulcer        Past Surgical History:   Procedure Laterality Date    ESOPHAGOGASTRODUODENOSCOPY N/A 2016    Procedure: ESOPHAGOGASTRODUODENOSCOPY (EGD); Surgeon: Maritza Salomon MD;  Location: [facility]; Service:     FOOT SURGERY Bilateral     hardware    HIP SURGERY      OTHER SURGICAL HISTORY Bilateral     lower extremity ligiment extensions-multiple    UPPER GASTROINTESTINAL ENDOSCOPY         Family History   Problem Relation Age of Onset    No Known Problems Mother     No Known Problems Father     Cancer Maternal Grandmother     Cancer Maternal Grandfather     Cancer Maternal Aunt        Social History     Occupational History    Not on file   Tobacco Use    Smoking status: Former Smoker     Packs/day: 3 00     Years: 15 00     Pack years: 45 00     Quit date: 1994     Years since quittin 2    Smokeless tobacco: Never Used   Substance and Sexual Activity    Alcohol use: Not Currently     Comment: 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    Drug use: Not Currently     Types: Marijuana     Comment: quit -greater than 1 year    Sexual activity: Not on file         Current Outpatient Medications:     baclofen 10 mg tablet, Take 1 tablet (10 mg total) by mouth daily at bedtime for 1 day, THEN 1 tablet (10 mg total) 2 (two) times a day for 1 day, THEN 1 tablet (10 mg total) 3 (three) times a day for 20 days  , Disp: 60 tablet, Rfl: 1    ergocalciferol (VITAMIN D2) 50,000 units, daily , Disp: , Rfl: 0    famotidine (PEPCID) 40 MG tablet, TAKE ONE TABLET ONCE DAILY BY MOUTH, Disp: 30 tablet, Rfl: 5    FLUoxetine (PROzac) 40 MG capsule, Take 40 mg by mouth every morning, Disp: , Rfl: 3    gemfibrozil (LOPID) 600 mg tablet, TAKE ONE TABLET BY MOUTH TWICE A DAY BEFORE MEALS, Disp: 60 tablet, Rfl: 6    Melatonin 1 MG CAPS, Take 3 mg by mouth as needed, Disp: , Rfl:     Multiple Vitamins-Minerals (CENTRUM ADULTS PO), Centrum, Disp: , Rfl:     NON FORMULARY, , Disp: , Rfl:     Omega-3 Fatty Acids (fish oil) 1,000 mg, Take 1,000 mg by mouth 2 (two) times a day, Disp: , Rfl:     zinc gluconate 50 mg tablet, Take 50 mg by mouth daily, Disp: , Rfl:     Allergies   Allergen Reactions    Aspirin GI Bleeding and Vomiting     Other reaction(s): Unknown  Patient has ulcers and does not take aspirin        Objective:  Vitals:    04/15/21 1106   BP: 110/71   Pulse: 81       Right Shoulder Exam     Tenderness   The patient is experiencing tenderness in the clavicle (Supraspinatus insertion)  Range of Motion   Active abduction:  90 abnormal   Passive abduction:  100 abnormal   Extension:  20 abnormal   External rotation:  70 abnormal   Forward flexion:  100 abnormal   Internal rotation 0 degrees:  Sacrum abnormal     Muscle Strength   Abduction: 3/5   Internal rotation: 5/5   External rotation: 4/5   Supraspinatus: 3/5   Biceps: 5/5     Tests   Butt test: positive  Impingement: positive  Drop arm: positive    Other   Erythema: absent  Sensation: normal  Pulse: present            Physical Exam  Vitals signs reviewed  Constitutional:       Appearance: He is well-developed  HENT:      Head: Normocephalic and atraumatic  Eyes:      Conjunctiva/sclera: Conjunctivae normal       Pupils: Pupils are equal, round, and reactive to light  Neck:      Musculoskeletal: Normal range of motion and neck supple  Cardiovascular:      Rate and Rhythm: Normal rate  Pulmonary:      Effort: Pulmonary effort is normal  No respiratory distress     Musculoskeletal: Comments: As noted in HPI   Skin:     General: Skin is warm and dry  Neurological:      Mental Status: He is alert and oriented to person, place, and time  Psychiatric:         Behavior: Behavior normal          I have personally reviewed pertinent films in PACS and my interpretation is as follows: Three-view x-rays of the right shoulder  Dated 3/29/2021  demonstrate no evidence of acute fracture  Two view x-rays of the right clavicle today demonstrate no abnormality

## 2021-04-23 ENCOUNTER — TELEMEDICINE (OUTPATIENT)
Dept: FAMILY MEDICINE CLINIC | Facility: CLINIC | Age: 51
End: 2021-04-23
Payer: MEDICARE

## 2021-04-23 DIAGNOSIS — R29.6 MULTIPLE FALLS: Primary | ICD-10-CM

## 2021-04-23 DIAGNOSIS — G80.9 CEREBRAL PALSY, UNSPECIFIED TYPE (HCC): ICD-10-CM

## 2021-04-23 PROCEDURE — 99442 PR PHYS/QHP TELEPHONE EVALUATION 11-20 MIN: CPT | Performed by: FAMILY MEDICINE

## 2021-04-23 NOTE — PROGRESS NOTES
Virtual Brief Visit    Assessment/Plan:    Problem List Items Addressed This Visit        Nervous and Auditory    Cerebral palsy [facility])    Relevant Orders    Ambulatory referral to Physical Therapy      Other Visit Diagnoses     Multiple falls    -  Primary    Relevant Orders    Ambulatory referral to Physical Therapy        History of cerebral palsy with recent increase in falls at home, no concern for syncope or vertigo, as described sounds more mechanical/MSK related  Believe he will benefit from formal PT with balance center, he has not had any formal PT in several years  Referral placed & will send PT office information to patient via [address]  Continue to follow up with orthopedics and MRI for shoulder injury as already scheduled  Call back with any worsening symptoms or new concerns  Discussed with attending physician Dr Bang Nevarez  Reason for visit is   Chief Complaint   Patient presents with    Virtual Brief Visit        Encounter provider Qasim Lobo DO    Provider located at [address] [place] [id number]    Recent Visits  No visits were found meeting these conditions  Showing recent visits within past 7 days and meeting all other requirements     Today's Visits  Date Type Provider Dept   04/23/21 Telemedicine Qasim Lobo DO [address] today's visits and meeting all other requirements     Future Appointments  No visits were found meeting these conditions  Showing future appointments within next 150 days and meeting all other requirements        After connecting through telephone, the patient was identified by name and date of birth  Mina Luis Manuels was informed that this is a telemedicine visit and that the visit is being conducted through telephone  My office door was closed  No one else was in the room  He acknowledged consent and understanding of privacy and security of the platform   The patient has agreed to participate and understands he can discontinue the visit at any time  Patient is aware this is a billable service  Subjective    Maryuri Macdonald is a 52 y o  male  Hx cerebral palsy  Recent fall with shoulder injury 3/29, seen by orthopedics, has MRI scheduled  Since then he reports several more falls  Normally ambulates with a cane  He feels his balance has worsened since recent injury and frequently has to take several extra steps to regain balance, more unsteady when changing positions or certain movements (ex reaching for a door), feels his center of gravity is changing  No chest pain, palpitations, dizziness or vertigo related  Not following with any specialists related to cerebral palsy, was last in PT related to recovery from hip fracture  Had more regular/extensive therapy when he was a child/teenager  Does have a home program he does some exercises/stretches from but not on a regular basis  Past Medical History:   Diagnosis Date    Acid reflux     Arthritis     Cerebral palsy (HCC)     GERD (gastroesophageal reflux disease)     Psychiatric disorder     anxiety, depression    Ulcer        Past Surgical History:   Procedure Laterality Date    ESOPHAGOGASTRODUODENOSCOPY N/A 5/9/2016    Procedure: ESOPHAGOGASTRODUODENOSCOPY (EGD); Surgeon: Devon Murray MD;  Location: [facility] GI [facility]; Service:     FOOT SURGERY Bilateral     hardware    HIP SURGERY      OTHER SURGICAL HISTORY Bilateral     lower extremity ligiment extensions-multiple    UPPER GASTROINTESTINAL ENDOSCOPY         Current Outpatient Medications   Medication Sig Dispense Refill    baclofen 10 mg tablet Take 1 tablet (10 mg total) by mouth daily at bedtime for 1 day, THEN 1 tablet (10 mg total) 2 (two) times a day for 1 day, THEN 1 tablet (10 mg total) 3 (three) times a day for 20 days   60 tablet 1    ergocalciferol (VITAMIN D2) 50,000 units daily   0    famotidine (PEPCID) 40 MG tablet TAKE ONE TABLET ONCE DAILY BY MOUTH 30 tablet 5    FLUoxetine (PROzac) 40 MG capsule Take 40 mg by mouth every morning  3    gemfibrozil (LOPID) 600 mg tablet TAKE ONE TABLET BY MOUTH TWICE A DAY BEFORE MEALS 60 tablet 6    Melatonin 1 MG CAPS Take 3 mg by mouth as needed      Multiple Vitamins-Minerals (CENTRUM ADULTS PO) Centrum      NON FORMULARY       Omega-3 Fatty Acids (fish oil) 1,000 mg Take 1,000 mg by mouth 2 (two) times a day      zinc gluconate 50 mg tablet Take 50 mg by mouth daily       No current facility-administered medications for this visit  Allergies   Allergen Reactions    Aspirin GI Bleeding and Vomiting     Other reaction(s): Unknown  Patient has ulcers and does not take aspirin        Review of Systems   Constitutional: Negative for chills and fever  Respiratory: Negative for cough and shortness of breath  Cardiovascular: Negative for chest pain and palpitations  Neurological: Negative for dizziness, syncope and weakness  There were no vitals filed for this visit  I spent 15 minutes directly with the patient during this visit    VIRTUAL VISIT DISCLAIMER    Maryuri Macdonald acknowledges that he has consented to an online visit or consultation  He understands that the online visit is based solely on information provided by him, and that, in the absence of a face-to-face physical evaluation by the physician, the diagnosis he receives is both limited and provisional in terms of accuracy and completeness  This is not intended to replace a full medical face-to-face evaluation by the physician  Maryuri Macdonald understands and accepts these terms

## 2021-05-03 ENCOUNTER — EVALUATION (OUTPATIENT)
Dept: PHYSICAL THERAPY | Facility: CLINIC | Age: 51
End: 2021-05-03
Payer: MEDICARE

## 2021-05-03 DIAGNOSIS — G80.9 CEREBRAL PALSY, UNSPECIFIED TYPE (HCC): ICD-10-CM

## 2021-05-03 DIAGNOSIS — R29.6 MULTIPLE FALLS: Primary | ICD-10-CM

## 2021-05-03 PROCEDURE — 97163 PT EVAL HIGH COMPLEX 45 MIN: CPT

## 2021-05-03 NOTE — PROGRESS NOTES
PT Evaluation   Today's date: 5/3/2021  Patient name: Christian Ferrer  : 1970  MRN: [id number]  Referring provider: Mona Carreno DO  Dx:   Encounter Diagnosis     ICD-10-CM    1  Multiple falls  R29 6 Ambulatory referral to Physical Therapy   2  Cerebral palsy, unspecified type ([facility] )  G80 9 Ambulatory referral to Physical Therapy         Assessment  Assessment details: Patient is a 52 y o  Male who presents to skilled outpatient PT with a PMHx of Cerebral Palsy and deficits in LE strength, balance, and ambulation impacting his ability to perform ADLs safely  Patient's results of sensation and coordination testing were unremarkable with noted slowed alternate toe taps movements  Patient's score on TUG yielded results within normal ranges, however is at high risk for falls per results of 5xSTS, 10 meter, and JONES with cut off scores taken from [employer]TA and Rehab Measures  Reduced somatosensory awareness as indicated by increased postural sway and LoB on 4th condition of mCTSIB with demonstrated difficulty on compliant surfaces and dependence on vision  Patient utilized B/L medial knee collapse during standing activities as compensatory mechanism for improved balance and stability  He ambulated with SPC throughout the session and his gait abnormalities are as follows: crouched gait, B/L hip IR, B/L medial knee collapse, increased [place], decreased toe clearance, inconsistent step length/foot placement, and significant lateral lean to cane side  Due to time constraints, unable to assess functional endurance via 6 Minute Walk Test today with plan to assess next session  Educated the patient on difference between strength and spasticity as well as how we can utilize vibration therapy to assist in general strengthening and gait improvements   Patient will benefit from skilled outpatient PT in order to maximize his function with ADLs and ambulation and reduce his risk for falls  Impairments: Abnormal coordination, Abnormal gait, Abnormal muscle tone, Abnormal or restricted ROM, Activity intolerance, Impaired balance, Impaired physical strength, Lacks appropriate HEP, Poor posture, Safety issue and Weight-bearing intolerance  Understanding of Dx/Px/POC: Good  Prognosis: Good    Patient verbalized understanding of POC  Please contact me if you have any questions or recommendations  Thank you for the referral and the opportunity to share in [address] [place] [address] care          Plan  Plan details: Vibration plate, LE strengthening, ambulation, balance    Patient would benefit from: PT Eval and Skilled PT  Planned modality interventions: Biofeedback and TENS  Planned therapy interventions: Abdominal trunk stabilization, ADL training, Balance, Balance/WB training, Breathing training, Body mechanics training, Coordination, Functional ROM exercises, Gait training, HEP, Joint mobilization, Manual therapy, Tobin taping, Motor coordination training, Neuromuscular re-education, Patient education, Postural training, Strengthening, Stretching, Therapeutic activities, Therapeutic exercises, Therapeutic training and Activity modification  Frequency: 2x/wk  Duration in weeks: 12  Plan of Care beginning date: 5-3-2021  Plan of Care expiration date: 12 weeks - 7-  Treatment plan discussed with: Patient       Goals  Short Term Goals (4 weeks):    - Patient will improve time on TUG by 2 9 seconds from 13 35 seconds to 10 45 seconds to facilitate improved safety in all ambulation  - Patient will be independent in basic HEP 2-3 weeks  - Patient will improve 5xSTS score by 2 3 seconds from 23 89 seconds to 21 59 seconds to promote improved LE functional strength needed for ADLs  - Patient will complete 6 Minute Walk Test in order to promote improving cardiovascular endurance    Long Term Goals (12 weeks):  - Patient will be independent in a comprehensive home exercise program  - Patient will complete the DGI in order to progress safety  - Patient will improve gait speed by 0 59 ft/sec from 2 95 ft/sec to 3 54 ft/sec to improve safety with community ambulation  - Patient will improve JONES by 6 points from 34/56 to 40/56 to facilitate return to safe independent ambulation  - Patient will be able to demonstrate HT in gait without veering  - Patient will improve 6 Minute Walk Test score by 190 feet to promote improved cardiovascular endurance  - Patient will report 50% reduction in near falls in order to improve safety with functional tasks and reduce his risk for falls  - Patient will report going on walks at least 3 days per week to promote independence and improved cardiovascular endurance  - Patient will report 50% reduction in near falls when ambulating on uneven terrain      Cut off score    All date taken from APTA Neuro Section or Rehab Measures      Jones/64  MDC: 6 pts  Age Norms:  61-76: M - 54   F - 55  70-79: M - 47   F - 53  80-89: M - 48   F - 50 5xSTS: Nida et al 2010  MDC: 2 3 sec  Age Norms:  60-69: 11 1 sec  70-79: 12 6 sec  80-89: 14 8 sec   TUG  MDC: 4 14 sec  Cut off score:  >13 5 sec community dwelling adults  >32 2 frail elderly  <20 I for basic transfers  >30 dependent on transfers 10 Meter Walk Test: Jerrell Ocasio and Dominique Medico al   20-29: M - 1 35 m   F - 1 34 m  30-39: M - 1 43 m   F - 1 34 m  40-49: M - 1 43 m   F - 1 39 m  50-59: M - 1 43 m   F - 1 31 m  60-69: M - 1 34 m   F - 1 24 m  70-79: M - 1 26 m   F - 1 13 m  80-89: M - 0 97 m   F - 0 94 m   FGA  MCID: 4 pts  Geriatrics/community < 22/30 fall risk  Geriatrics/community < 20/30 unexplained falls DGI  MDC: vestibular - 4 pts  MDC: geriatric/community - 3 pts  Falls risk <19/24   6 Minute Walk Test  Age Norms  61-76: M - 1876 ft (571 80 m)  F - 1765 ft (537 98 m)  70-79: M - 1729 ft (527 00 m)  F - 1545 ft (470 92 m)  80-89: M - 1368 ft (416 97 m)  F - 1286 ft (391 97 m) mCTSIB  Norm: 20-60 yrs  Eyes open firm: norm sway 0 21-0 48  Eyes closed firm: norm sway 0 48-0 99  Eyes open foam: norm sway 0 38-0 71  Eyes closed foam: norm sway 0 70-2 22         Subjective    History of Present Illness  Mechanism of injury: Patient is a 53 y/o male who reports to skilled outpatient PT with complaints of frequent falls and a PMHx of Cerebral Palsy  He noted over the last year his balance and strength with ambulation has been declining with frequent missteps and staggering  He ambulates independently with a SPC  Most recent fall resulted in landing on his R shoulder and has had pain with it ever since  He note he has an upcoming MRI for it  Primary AD: SPC  Assist level at home: Independent      Pain  Current pain ratin/10  At best pain ratin/10  At worst pain rating: 3/10  Location: R shoulder  Aggravating factors: moving, [address]  Steps to enter house: No  Stairs in house: No   Lives in: 1st floor apartment  Lives with: Alone    Employment status: Disability  Hand dominance: R    Treatments  Previous treatment: PT  Current treatment: PT  Diagnostic Testing: Upcoming MRI for R shoulder      Objective     Sensation  - Light touch:  Intact  - Deep pressure: Intact    Coordination  - Dysmetria: Intact  - Dysdiadochokinesia: Intact  - Alternating Toe Taps: able to perform, slowed movement    Tone  - Modified Catarina: 2        Outcome Measures Initial Eval  5/3/2021        5xSTS 23 89 sec,   2 UE        TUG 13 35 sec, w/ SPC        10 meter 2 95 ft/sec  0 89 m/s        JONES 34/56        FGA Defer        DGI Defer        mCTSIB  - FTEO (firm)  - FTEC (firm)  - FTEO (foam)  - FTEC (foam)   30 sec  30 sec (+)  30 sec (+)  3 sec LoB        6MWT Defer                               Precautions: Hx of Cerebral Palsy, Falls  Past Medical History:   Diagnosis Date    Acid reflux     Arthritis     Cerebral palsy (HCC)     GERD (gastroesophageal reflux disease)     Psychiatric disorder anxiety, depression    Ulcer

## 2021-05-04 ENCOUNTER — OFFICE VISIT (OUTPATIENT)
Dept: PHYSICAL THERAPY | Facility: CLINIC | Age: 51
End: 2021-05-04
Payer: MEDICARE

## 2021-05-04 DIAGNOSIS — G80.9 CEREBRAL PALSY, UNSPECIFIED TYPE (HCC): ICD-10-CM

## 2021-05-04 DIAGNOSIS — R29.6 MULTIPLE FALLS: Primary | ICD-10-CM

## 2021-05-04 PROCEDURE — 97530 THERAPEUTIC ACTIVITIES: CPT

## 2021-05-04 NOTE — PROGRESS NOTES
Daily Note  IE: 5-3-2021 (POC: 2021)    Today's date: 2021  Patient name: Yusef Warren  : 1970  MRN: [id number]  Referring provider: Olivia Monday,   Dx:   Encounter Diagnosis     ICD-10-CM    1  Multiple falls  R29 6    2  Cerebral palsy, unspecified type ([facility] )  G80 9                   Subjective: Patient reports no new changes, complaints, or falls  Objective: See treatment diary below    TA:  - Objective Measures: 6 Minute Walk (2x - before and after vibration plate)  - Patient education: see assessment  - Stair negotiation (up/over  steps): 3 cycles, 2 HR, PTCG    NMR:  - Vibration Plate (4 min, level 45): Static Standing, Mini Squats    Assessment: Patient able to tolerate treatment session well today with initiation of exercise program  Trialed vibration plate training to promote improved quadriceps strengthening with ability to tolerate up to 4 minutes today  Educated patient on research on vibration plate, how it can promote strengthening and improved gait mechanics, and its effects on spasticity  Trialed 6 Minute Walk Test before and after vibration plate training with ability to ambulate 100 ft more immediately following  Patient fatigued at end of session, however noted "I definitely feel a big difference in my legs after getting off the vibration " He will continue to benefit from skilled outpatient PT in order to maximize his function and reduce his risk for falls  Plan: Continue per plan of care             Outcome Measures Initial Eval  5-3-2021   5-4-2021       5xSTS 23 89 sec,   2 UE        TUG 13 35 sec, w/ SPC        10 meter 2 95 ft/sec  0 89 m/s        KAREN 34/56        FGA Defer        DGI Defer        mCTSIB  - FTEO (firm)  - FTEC (firm)  - FTEO (foam)  - FTEC (foam)   30 sec  30 sec (+)  30 sec (+)  3 sec LoB        6MWT Defer 675 ft w/ SPC, PTCG  (before)    775 ft w/ SPC, PTCG (after)                              Precautions: Hx of Cerebral Palsy, Falls  Past Medical History:   Diagnosis Date    Acid reflux     Arthritis     Cerebral palsy (HCC)     GERD (gastroesophageal reflux disease)     Psychiatric disorder     anxiety, depression    Ulcer

## 2021-05-07 ENCOUNTER — HOSPITAL ENCOUNTER (OUTPATIENT)
Dept: RADIOLOGY | Facility: HOSPITAL | Age: 51
Discharge: HOME | End: 2021-05-07
Attending: ORTHOPAEDIC SURGERY
Payer: MEDICARE

## 2021-05-07 DIAGNOSIS — M24.811 INTERNAL DERANGEMENT OF RIGHT SHOULDER: ICD-10-CM

## 2021-05-07 PROCEDURE — 73221 MRI JOINT UPR EXTREM W/O DYE: CPT

## 2021-05-07 PROCEDURE — G1004 CDSM NDSC: HCPCS

## 2021-05-11 ENCOUNTER — OFFICE VISIT (OUTPATIENT)
Dept: OBGYN CLINIC | Facility: CLINIC | Age: 51
End: 2021-05-11
Payer: MEDICARE

## 2021-05-11 VITALS
BODY MASS INDEX: 27.63 KG/M2 | DIASTOLIC BLOOD PRESSURE: 71 MMHG | HEIGHT: 70 IN | SYSTOLIC BLOOD PRESSURE: 110 MMHG | WEIGHT: 193 LBS | HEART RATE: 90 BPM

## 2021-05-11 DIAGNOSIS — S46.011D TRAUMATIC COMPLETE TEAR OF RIGHT ROTATOR CUFF, SUBSEQUENT ENCOUNTER: Primary | ICD-10-CM

## 2021-05-11 DIAGNOSIS — M67.813 BICEPS TENDINOSIS OF RIGHT SHOULDER: ICD-10-CM

## 2021-05-11 PROCEDURE — 99213 OFFICE O/P EST LOW 20 MIN: CPT | Performed by: ORTHOPAEDIC SURGERY

## 2021-05-11 NOTE — PROGRESS NOTES
Assessment/Plan:  1  Traumatic complete tear of right rotator cuff, subsequent encounter  Ambulatory referral to Orthopedic Surgery   2  Biceps tendinosis of right shoulder  Ambulatory referral to Orthopedic Surgery       Radha Hays has right-sided shoulder pain and a full-thickness rotator cuff tear involving the supraspinatus and infraspinatus tendon  There also appears to be a intrasubstance  Proximal biceps tear and severe tenosynovitis in this region  I recommend that he see an orthopedic surgeon and consider arthroscopic repair of his rotator cuff at this time given his significant weakness and findings on MRI  He states that he had previously seen Dr Mel Gardner is requesting that he return to see Dr Mel Gardner for this injury and surgical consultation  For was placed today and he will follow up with Dr Mel Gardner going forward for all treatment options  Subjective:   Frann Severe is a 52 y o  male who presents   To the office for follow-up for right-sided shoulder pain  He was last seen in the office on 4/15/2021 after a fall as he was trying to take apart a bed frame  This injury occurred on 3/28/2021  At last office visit he had significant weakness on examination of his right shoulder and there was concern for rotator cuff injury  He was sent for an MRI and returns for results today  He states his pain is feeling slightly better but he still has weakness and difficulty with range of motion  Review of Systems   Constitutional: Negative for chills, fever and unexpected weight change  HENT: Negative for hearing loss, nosebleeds and sore throat  Eyes: Negative for pain, redness and visual disturbance  Respiratory: Negative for cough, shortness of breath and wheezing  Cardiovascular: Negative for chest pain, palpitations and leg swelling  Gastrointestinal: Negative for abdominal pain, nausea and vomiting  Endocrine: Negative for polyphagia and polyuria     Genitourinary: Negative for dysuria and hematuria  Musculoskeletal:        See HPI   Skin: Negative for rash and wound  Neurological: Negative for dizziness, numbness and headaches  Psychiatric/Behavioral: Negative for decreased concentration and suicidal ideas  The patient is not nervous/anxious  Past Medical History:   Diagnosis Date    Acid reflux     Arthritis     Cerebral palsy (HCC)     GERD (gastroesophageal reflux disease)     Psychiatric disorder     anxiety, depression    Ulcer        Past Surgical History:   Procedure Laterality Date    ESOPHAGOGASTRODUODENOSCOPY N/A 2016    Procedure: ESOPHAGOGASTRODUODENOSCOPY (EGD); Surgeon: Jaleesa Villagran MD;  Location: [facility];   Service:     FOOT SURGERY Bilateral     hardware    HIP SURGERY      OTHER SURGICAL HISTORY Bilateral     lower extremity ligiment extensions-multiple    UPPER GASTROINTESTINAL ENDOSCOPY         Family History   Problem Relation Age of Onset    No Known Problems Mother     No Known Problems Father     Cancer Maternal Grandmother     Cancer Maternal Grandfather     Cancer Maternal Aunt        Social History     Occupational History    Not on file   Tobacco Use    Smoking status: Former Smoker     Packs/day: 3 00     Years: 15 00     Pack years: 45 00     Quit date: 1994     Years since quittin 3    Smokeless tobacco: Never Used   Substance and Sexual Activity    Alcohol use: Not Currently     Comment: 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    Drug use: Not Currently     Types: Marijuana     Comment: quit -greater than 1 year    Sexual activity: Not on file         Current Outpatient Medications:     ergocalciferol (VITAMIN D2) 50,000 units, daily , Disp: , Rfl: 0    famotidine (PEPCID) 40 MG tablet, TAKE ONE TABLET ONCE DAILY BY MOUTH, Disp: 30 tablet, Rfl: 5    FLUoxetine (PROzac) 40 MG capsule, Take 40 mg by mouth every morning, Disp: , Rfl: 3    gemfibrozil (LOPID) 600 mg tablet, TAKE ONE TABLET BY MOUTH TWICE A DAY BEFORE MEALS, Disp: 60 tablet, Rfl: 6    Melatonin 1 MG CAPS, Take 3 mg by mouth as needed, Disp: , Rfl:     Multiple Vitamins-Minerals (CENTRUM ADULTS PO), Centrum, Disp: , Rfl:     NON FORMULARY, , Disp: , Rfl:     Omega-3 Fatty Acids (fish oil) 1,000 mg, Take 1,000 mg by mouth 2 (two) times a day, Disp: , Rfl:     baclofen 10 mg tablet, Take 1 tablet (10 mg total) by mouth daily at bedtime for 1 day, THEN 1 tablet (10 mg total) 2 (two) times a day for 1 day, THEN 1 tablet (10 mg total) 3 (three) times a day for 20 days  , Disp: 60 tablet, Rfl: 1    zinc gluconate 50 mg tablet, Take 50 mg by mouth daily, Disp: , Rfl:     Allergies   Allergen Reactions    Aspirin GI Bleeding and Vomiting     Other reaction(s): Unknown  Patient has ulcers and does not take aspirin        Objective:  Vitals:    05/11/21 1227   BP: 110/71   Pulse: 90       Right Shoulder Exam     Tenderness   The patient is experiencing no tenderness  Range of Motion   Active abduction:  90 abnormal   Passive abduction:  90 abnormal   External rotation: normal   Forward flexion:  90 abnormal   Internal rotation 0 degrees:  Sacrum abnormal     Muscle Strength   Abduction: 3/5   Internal rotation: 5/5   External rotation: 4/5   Supraspinatus: 3/5   Subscapularis: 5/5   Biceps: 4/5     Tests   Butt test: positive  Impingement: positive  Drop arm: positive    Other   Erythema: absent  Sensation: normal  Pulse: present            Physical Exam  Vitals signs reviewed  Constitutional:       Appearance: He is well-developed  HENT:      Head: Normocephalic and atraumatic  Eyes:      Conjunctiva/sclera: Conjunctivae normal       Pupils: Pupils are equal, round, and reactive to light  Neck:      Musculoskeletal: Normal range of motion and neck supple  Cardiovascular:      Rate and Rhythm: Normal rate  Pulmonary:      Effort: Pulmonary effort is normal  No respiratory distress     Musculoskeletal:      Comments: As noted in HPI   Skin: General: Skin is warm and dry  Neurological:      Mental Status: He is alert and oriented to person, place, and time  Psychiatric:         Behavior: Behavior normal          I have personally reviewed pertinent films in PACS and my interpretation is as follows:    MRI of the right shoulder demonstrates a full-thickness tear of the supraspinatus and infraspinatus tendon without retraction  Partial tear of the proximal biceps with significant tenosynovitis

## 2021-05-12 ENCOUNTER — OFFICE VISIT (OUTPATIENT)
Dept: PHYSICAL THERAPY | Facility: CLINIC | Age: 51
End: 2021-05-12
Payer: MEDICARE

## 2021-05-12 DIAGNOSIS — R29.6 MULTIPLE FALLS: Primary | ICD-10-CM

## 2021-05-12 DIAGNOSIS — G80.9 CEREBRAL PALSY, UNSPECIFIED TYPE (HCC): ICD-10-CM

## 2021-05-12 PROCEDURE — 97112 NEUROMUSCULAR REEDUCATION: CPT | Performed by: PHYSICAL THERAPIST

## 2021-05-12 NOTE — PROGRESS NOTES
Daily Note  IE: 5-3-2021 (POC: 2021)    Today's date: 2021  Patient name: Jennifer Ng  : 1970  MRN: [id number]  Referring provider: Jaiden Duffy DO  Dx:   Encounter Diagnosis     ICD-10-CM    1  Multiple falls  R29 6    2  Cerebral palsy, unspecified type ([facility] )  G80 9                   Subjective: Patient reports no new changes, complaints, or falls  Pt has HA rated 2-3/10  Objective: See treatment diary below    NMR:  - Vibration Plate (5 min, level 50): Static Standing, Mini Squats, Marching   - STS, 10x  - Side step at // bars, 10 laps  - Standing marching on foam pad with 1-2" hold, 15x each leg  - Standing hip abduction on foam pad with 1-2" hold, 15x each leg  - Obstacle course with wooden beams, foam pad, and hurdles, fwd 3x, lat 2x  - Side step with cone taps with B feet, 3 laps in // bars with 5 cones   - Stair negotiation (up/over  steps): 3 cycles, 2 HR, PTCG     Assessment: Patient tolerated treatment well today  Pt progressed time on vibration plate and variety of exercises before feeling leg fatigue  Pt was able to negotiate stairs with reciprocal pattern  Pt did well with obstacle course but has difficulty clearing hurdles, R>L  Pt shows difficulty with lateral stepping with wide steps, using narrower base instead  Pt showed fairly good motor control with cone taps although used 1UE for balance  Pt will benefit from skilled physical therapy to improve balance and safety throughout all ADLs  Plan: Continue per plan of care             Outcome Measures Initial Eval  5-3-2021   5-4-2021       5xSTS 23 89 sec,   2 UE        TUG 13 35 sec, w/ SPC        10 meter 2 95 ft/sec  0 89 m/s        JONES 34/56        FGA Defer        DGI Defer        mCTSIB  - FTEO (firm)  - FTEC (firm)  - FTEO (foam)  - FTEC (foam)   30 sec  30 sec (+)  30 sec (+)  3 sec LoB        6MWT Defer 675 ft w/ SPC, PTCG  (before)    775 ft w/ SPC, PTCG (after) Precautions: Hx of Cerebral Palsy, Falls  Past Medical History:   Diagnosis Date    Acid reflux     Arthritis     Cerebral palsy (HCC)     GERD (gastroesophageal reflux disease)     Psychiatric disorder     anxiety, depression    Ulcer

## 2021-05-14 ENCOUNTER — OFFICE VISIT (OUTPATIENT)
Dept: PHYSICAL THERAPY | Facility: CLINIC | Age: 51
End: 2021-05-14
Payer: MEDICARE

## 2021-05-14 DIAGNOSIS — R29.6 MULTIPLE FALLS: Primary | ICD-10-CM

## 2021-05-14 DIAGNOSIS — G80.9 CEREBRAL PALSY, UNSPECIFIED TYPE (HCC): ICD-10-CM

## 2021-05-14 PROCEDURE — 97112 NEUROMUSCULAR REEDUCATION: CPT | Performed by: PHYSICAL THERAPIST

## 2021-05-14 NOTE — PROGRESS NOTES
Daily Note  IE: 5-3-2021 (POC: 2021)    Today's date: 2021  Patient name: Pili Briones  : 1970  MRN: [id number]  Referring provider: Mando Burleson DO  Dx:   Encounter Diagnosis     ICD-10-CM    1  Multiple falls  R29 6    2  Cerebral palsy, unspecified type ([facility] )  G80 9                   Subjective: Patient reports 1 LOB since previous session and bumped into the wall       Objective: See treatment diary below    NMR:    - STS, 15x, 2 UE  - Side step at // bars, 10 laps  - Standing marching, 15x each leg w/ 2" hold, 1 UE  - Standing hip abduction, 15x each leg, w/ 2" hold, 1 UE  - Stepping over 6" hurdles, foam beams, boomwhackers in // bars: 15x 2 UE  - Stair negotiation (up/over  steps): 5 cycles, 2 HR, PTCG       Assessment: Patient tolerated treatment well  Patient challenged with foot clearance, most difficult with RLE  When stepping over obstacles, patient utilizes external rotation of LE to assist with foot clearance  He maintains 1-2 UE assist during all exercises  He will benefit from skilled PT services to improve balance and safety with all ADLs  Plan: Continue per plan of care             Outcome Measures Initial Eval  5-3-2021   5-4-2021       5xSTS 23 89 sec,   2 UE        TUG 13 35 sec, w/ SPC        10 meter 2 95 ft/sec  0 89 m/s        KAREN 34/56        FGA Defer        DGI Defer        mCTSIB  - FTEO (firm)  - FTEC (firm)  - FTEO (foam)  - FTEC (foam)   30 sec  30 sec (+)  30 sec (+)  3 sec LoB        6MWT Defer 675 ft w/ SPC, PTCG  (before)    775 ft w/ SPC, PTCG (after)                              Precautions: Hx of Cerebral Palsy, Falls  Past Medical History:   Diagnosis Date    Acid reflux     Arthritis     Cerebral palsy (HCC)     GERD (gastroesophageal reflux disease)     Psychiatric disorder     anxiety, depression    Ulcer

## 2021-05-17 ENCOUNTER — TELEPHONE (OUTPATIENT)
Dept: OBGYN CLINIC | Facility: HOSPITAL | Age: 51
End: 2021-05-17

## 2021-05-18 ENCOUNTER — OFFICE VISIT (OUTPATIENT)
Dept: PHYSICAL THERAPY | Facility: CLINIC | Age: 51
End: 2021-05-18
Payer: MEDICARE

## 2021-05-18 DIAGNOSIS — R29.6 MULTIPLE FALLS: Primary | ICD-10-CM

## 2021-05-18 DIAGNOSIS — G80.9 CEREBRAL PALSY, UNSPECIFIED TYPE (HCC): ICD-10-CM

## 2021-05-18 PROCEDURE — 97112 NEUROMUSCULAR REEDUCATION: CPT | Performed by: PHYSICAL THERAPIST

## 2021-05-18 NOTE — PROGRESS NOTES
Daily Note  IE: 5-3-2021 (POC: 2021)    Today's date: 2021  Patient name: Renaldo Montenegro  : 1970  MRN: [id number]  Referring provider: Mark Tran DO  Dx:   Encounter Diagnosis     ICD-10-CM    1  Multiple falls  R29 6    2  Cerebral palsy, unspecified type ([facility] [address] )  G80 9        Start Time: 3451  Stop Time: 0930  Total time in clinic (min): 39 minutes    Subjective: Client reports having his "foot drag" the other day, but denied any falls  He arrived late to session  Objective: See treatment diary below    NMR: Solo step utilized throughout session     - STS, 15x, 0-1 UE  - Walking with green theraband resisting against hip flexion on RLE  - Fwd/bwd walking 50 feet x 8 with emphasis on hip extension  - Vibration plate 50 mHz x 5 5 min 1UE support  Also completed aye  - Educated on HEP to include prone laying x 5-10 minutes to promote stretch to hip flexors  He verbalized good understanding      Assessment: Client with good participation throughout  Re-initiated use of vibration plate today with good tolerance, but reported fatigue after 5 minutes  Initiated and trained client with resisted hip flexion during walking today with noted improvements in foot clearance  Also initiated and trained backwards walking to improve weight shifting and glute activation  He will benefit from skilled PT services to improve balance and safety with all ADLs  Plan: Continue per plan of care             Outcome Measures Initial Eval  5-3-2021   5-4-2021       5xSTS 23 89 sec,   2 UE        TUG 13 35 sec, w/ SPC        10 meter 2 95 ft/sec  0 89 m/s        JONES 34/56        FGA Defer        DGI Defer        mCTSIB  - FTEO (firm)  - FTEC (firm)  - FTEO (foam)  - FTEC (foam)   30 sec  30 sec (+)  30 sec (+)  3 sec LoB        6MWT Defer 675 ft w/ SPC, PTCG  (before)    775 ft w/ SPC, PTCG (after)                              Precautions: Hx of Cerebral Palsy, Falls  Past Medical History:   Diagnosis Date    Acid reflux     Arthritis     Cerebral palsy (HCC)     GERD (gastroesophageal reflux disease)     Psychiatric disorder     anxiety, depression    Ulcer

## 2021-05-21 ENCOUNTER — OFFICE VISIT (OUTPATIENT)
Dept: PHYSICAL THERAPY | Facility: CLINIC | Age: 51
End: 2021-05-21
Payer: MEDICARE

## 2021-05-21 DIAGNOSIS — R29.6 MULTIPLE FALLS: Primary | ICD-10-CM

## 2021-05-21 DIAGNOSIS — G80.9 CEREBRAL PALSY, UNSPECIFIED TYPE (HCC): ICD-10-CM

## 2021-05-21 PROCEDURE — 97112 NEUROMUSCULAR REEDUCATION: CPT | Performed by: PHYSICAL THERAPIST

## 2021-05-21 NOTE — PROGRESS NOTES
Daily Note  IE: 5-3-2021 (POC: 2021)    Today's date: 2021  Patient name: Frann Severe  : 1970  MRN: [id number]  Referring provider: Kar Beach DO  Dx:   Encounter Diagnosis     ICD-10-CM    1  Multiple falls  R29 6    2  Cerebral palsy, unspecified type ([facility] [address] )  G80 9                   Subjective: Patient reports to therapy with no new changes or falls        Objective: See treatment diary below    NMR:   * Gait belt all exercises    - STS, 15x, 1 UE  - Sidestepping in // bars: 4 laps, 1 UE  - FWD/BWD walking: 10 ft x 10 laps  - Step taps: 6" step, 20x, 1 UE  - Walking with green TB against hip flexion on RLE  - Vibration plate: 6 minutes, HL, 50 mHz, 1 UE support        Assessment: Patient tolerated treatment well  Patient able to tolerated 6 minutes on vibration plate with 20 marches before fatigue  Utilized green TB during walking, which showed improvements with foot clearance  Green TB used during backward walking to assist with hip extension  He will benefit from skilled PT services to improve balance and safety with all ADLs  Plan: Continue per plan of care             Outcome Measures Initial Eval  5-3-2021   5-4-2021       5xSTS 23 89 sec,   2 UE        TUG 13 35 sec, w/ SPC        10 meter 2 95 ft/sec  0 89 m/s        JONES 34/56        FGA Defer        DGI Defer        mCTSIB  - FTEO (firm)  - FTEC (firm)  - FTEO (foam)  - FTEC (foam)   30 sec  30 sec (+)  30 sec (+)  3 sec LoB        6MWT Defer 675 ft w/ SPC, PTCG  (before)    775 ft w/ SPC, PTCG (after)                              Precautions: Hx of Cerebral Palsy, Falls  Past Medical History:   Diagnosis Date    Acid reflux     Arthritis     Cerebral palsy (HCC)     GERD (gastroesophageal reflux disease)     Psychiatric disorder     anxiety, depression    Ulcer

## 2021-05-24 ENCOUNTER — OFFICE VISIT (OUTPATIENT)
Dept: PHYSICAL THERAPY | Facility: CLINIC | Age: 51
End: 2021-05-24
Payer: MEDICARE

## 2021-05-24 DIAGNOSIS — G80.9 CEREBRAL PALSY, UNSPECIFIED TYPE (HCC): ICD-10-CM

## 2021-05-24 DIAGNOSIS — R29.6 MULTIPLE FALLS: Primary | ICD-10-CM

## 2021-05-24 PROCEDURE — 97112 NEUROMUSCULAR REEDUCATION: CPT | Performed by: PHYSICAL THERAPIST

## 2021-05-24 NOTE — PROGRESS NOTES
Daily Note  IE: 5-3-2021 (POC: 2021)    Today's date: 2021  Patient name: Mina Tan  : 1970  MRN: [id number]  Referring provider: Génesis Blandon DO  Dx:   Encounter Diagnosis     ICD-10-CM    1  Multiple falls  R29 6    2  Cerebral palsy, unspecified type ([facility] [address] )  G80 9                   Subjective: Patient reports to therapy with no new changes or falls        Objective: See treatment diary below    NMR:   * SOLO Step    - STS, 15x, 0 UE  - FWD/BWD walkin ft x 5 laps  - Step taps: 6" step, 15x, 1 UE  - Walking with green TB against hip flexion on RLE  - Walking with green TB assist hip extension on RLE  - Standing marches: 10x  - Ambulation w/o SPC: 2 laps x 20 ft  - Vibration plate: 3 minutes, HL, 50 mHz, 1 UE support---- due to increased pain        Assessment: Patient tolerated treatment well  Utilized green TB during walking to add resistance when moving forward and add assistance with hip extension when moving backward  Patient reports difficulty with prone stretches at home due to increased right shoulder pain  He was able to complete 3 minutes on vibration plate at end of session before needing to stop due to increased pain in B/L legs  He will benefit from skilled PT services to improve balance and safety with all ADLs  Plan: Continue per plan of care             Outcome Measures Initial Eval  5-3-2021   5-4-2021       5xSTS 23 89 sec,   2 UE        TUG 13 35 sec, w/ SPC        10 meter 2 95 ft/sec  0 89 m/s        KAREN 34/56        FGA Defer        DGI Defer        mCTSIB  - FTEO (firm)  - FTEC (firm)  - FTEO (foam)  - FTEC (foam)   30 sec  30 sec (+)  30 sec (+)  3 sec LoB        6MWT Defer 675 ft w/ SPC, PTCG  (before)    775 ft w/ SPC, PTCG (after)                              Precautions: Hx of Cerebral Palsy, Falls  Past Medical History:   Diagnosis Date    Acid reflux     Arthritis     Cerebral palsy (HCC)     GERD (gastroesophageal reflux disease)     Psychiatric disorder     anxiety, depression    Ulcer

## 2021-05-26 ENCOUNTER — OFFICE VISIT (OUTPATIENT)
Dept: PHYSICAL THERAPY | Facility: CLINIC | Age: 51
End: 2021-05-26
Payer: MEDICARE

## 2021-05-26 DIAGNOSIS — G80.9 CEREBRAL PALSY, UNSPECIFIED TYPE (HCC): ICD-10-CM

## 2021-05-26 DIAGNOSIS — R29.6 MULTIPLE FALLS: Primary | ICD-10-CM

## 2021-05-26 PROCEDURE — 97112 NEUROMUSCULAR REEDUCATION: CPT

## 2021-05-26 PROCEDURE — 97116 GAIT TRAINING THERAPY: CPT

## 2021-05-26 NOTE — PROGRESS NOTES
Daily Note  IE: 5-3-2021 (POC: 2021)    Today's date: 2021  Patient name: Marc Grewal  : 1970  MRN: [id number]  Referring provider: Nely Trinidad DO  Dx:   Encounter Diagnosis     ICD-10-CM    1  Multiple falls  R29 6    2  Cerebral palsy, unspecified type ([address] )  G80 9                   Subjective: Patient reports to therapy with no new changes or falls        Objective: See treatment diary below    NMR:   * SOLO Step    - STS, 20x, 0 UE  - FWD/BWD walking w/ SPC: 20 ft x 6 laps  - Fwd/bwd march to knee pad over foam beam: 10 reps B/L w/ SPC  - Reciprocal fwd ambulation over foam beams: 6 cycles,     - Step taps: 6" step, 15x, 1 UE  - Walking with green TB against hip flexion on RLE  - Walking with green TB assist hip extension on RLE  - Standing marches: 10x  - Ambulation w/o SPC: 2 laps x 20 ft  - Vibration plate: 4 minutes, HL, 50 mHz, 1 UE support---- due to increased pain        Assessment: Patient tolerated treatment well  Utilized green TB during walking to add resistance when moving forward and add assistance with hip extension when moving backward  Patient reports difficulty with prone stretches at home due to increased right shoulder pain  He was able to complete 3 minutes on vibration plate at end of session before needing to stop due to increased pain in B/L legs  He will benefit from skilled PT services to improve balance and safety with all ADLs  Plan: Continue per plan of care             Outcome Measures Initial Eval  5-3-2021   5-4-2021       5xSTS 23 89 sec,   2 UE        TUG 13 35 sec, w/ SPC        10 meter 2 95 ft/sec  0 89 m/s        JONES 34/56        FGA Defer        DGI Defer        mCTSIB  - FTEO (firm)  - FTEC (firm)  - FTEO (foam)  - FTEC (foam)   30 sec  30 sec (+)  30 sec (+)  3 sec LoB        6MWT Defer 675 ft w/ SPC, PTCG  (before)    775 ft w/ SPC, PTCG (after)                              Precautions: Hx of Cerebral Palsy, Falls  Past Medical History:   Diagnosis Date    Acid reflux     Arthritis     Cerebral palsy (HCC)     GERD (gastroesophageal reflux disease)     Psychiatric disorder     anxiety, depression    Ulcer

## 2021-05-27 ENCOUNTER — OFFICE VISIT (OUTPATIENT)
Dept: OBGYN CLINIC | Facility: OTHER | Age: 51
End: 2021-05-27
Payer: MEDICARE

## 2021-05-27 ENCOUNTER — TELEPHONE (OUTPATIENT)
Dept: OBGYN CLINIC | Facility: MEDICAL CENTER | Age: 51
End: 2021-05-27

## 2021-05-27 VITALS
HEART RATE: 74 BPM | HEIGHT: 70 IN | SYSTOLIC BLOOD PRESSURE: 114 MMHG | BODY MASS INDEX: 27.03 KG/M2 | WEIGHT: 188.8 LBS | DIASTOLIC BLOOD PRESSURE: 63 MMHG

## 2021-05-27 DIAGNOSIS — M75.101 TEAR OF RIGHT SUPRASPINATUS TENDON: ICD-10-CM

## 2021-05-27 DIAGNOSIS — S46.811A TEAR OF RIGHT INFRASPINATUS TENDON, INITIAL ENCOUNTER: Primary | ICD-10-CM

## 2021-05-27 DIAGNOSIS — M25.511 ACUTE PAIN OF RIGHT SHOULDER: ICD-10-CM

## 2021-05-27 PROCEDURE — 99213 OFFICE O/P EST LOW 20 MIN: CPT | Performed by: ORTHOPAEDIC SURGERY

## 2021-05-27 RX ORDER — CHLORHEXIDINE GLUCONATE 0.12 MG/ML
15 RINSE ORAL ONCE
Status: CANCELLED | OUTPATIENT
Start: 2021-05-27 | End: 2021-05-27

## 2021-05-27 NOTE — PATIENT INSTRUCTIONS
You are being scheduled for a shoulder arthroscopy to treat your symptoms  Below are some instructions and information on what to expect before and after your surgery  Pre-Surgical Preparation for Arthroscopic Shoulder Surgery: You will be contacted the evening prior to your surgery to confirm the scheduled time of the procedure and when to arrive at the hospital    Do not eat or drink anything after midnight the night before your surgery  Since you are having out-patient surgery, make sure that you have someone who can drive you home later in the day  Also, prepare that person for a long day, as the process of safely preparing for and recovering from the procedure is more time consuming than the actual procedure! As you will be in a sling after surgery, please wear or bring a loose fitting button-down shirt so that you can easily place this over the sling when you leave the surgical suite  This avoids having to place the operative arm in a sleeve  Most patients find that this is the easiest outfit to wear for the first week or so after surgery so you may want to plan accordingly  Most patients find that lying down in bed after shoulder surgery accentuates their discomfort  This is likely related to the effect of gravity on the swelling in the shoulder  As a result, most patients sleep better in a recliner or in bed with pillows propped up behind their back for the first few days or weeks after surgery  It is a good idea to plan for this ahead of time so there will be less hassle getting things set up the night after surgery  What to Expect After Arthroscopic Shoulder Surgery: It is normal to have swelling and discomfort in the shoulder for several days or a week after surgery  It is also normal to have a small amount of drainage from the surgical wounds (especially the first few days after surgery), as we put fluid into the shoulder to visualize the structures during surgery  It is NOT normal to have foul smelling, purulent drainage and if this is noted, please contact the office immediately or proceed to the emergency room for evaluation as this may indicate an infection  Applying ice bags to the shoulder may help with pain that is not controlled by the regional block  Ice should be applied 20-30 minutes at a time, every hour or two  Make sure to put a thin towel or T-shirt next to your skin to avoid direct contact of the ice with the skin  Icing is most helpful in the first 48 hours, although many people find that continuing past this time frame lessens their postoperative pain  Please note that your post-operative dressing is not conductive to ice, so if you need to, it is okay to remove that dressing even the night after surgery and place band-aids over the wounds in order for the ice to take effect  Pain Control    Most patients will receive a nerve block, the local anesthetic may keep your whole arm numb for up to 4 days  You will be given a prescription for narcotic pain medication when you are discharged from the hospital   With the newer nerve block that is being utilized, patients are rarely requiring the use of this narcotic pain medication  If you find you do not tolerate that type of pain medicine well, call our office and we will try another one  In addition to the narcotic pain medication, it is safe to use an anti-inflammatory (unless the patient has a medical condition that would not allow safe use of this mediation)  This includes the Advil, Motrin, Ibuprofen and Alleve category of medications  Simply follow the over the counter dosing on the package and use as indicated as another adjunct  Importantly since these medications are all very similar, use only one of them  Tylenol is a separate medication that can be utilized as well and can be taken at the same time as the other medication or given in a "staggered" manner    Just make sure that you follow the dosing on the over the counter bottle instructions  Also make sure that the pain medication prescribed by Dr Mer Fang team does not contain acetaminophen (this is found in Percocet and Vicodin)  Typically we do not prescribe those types of pain medications but if for some reason that has been prescribed DO NOT add more Tylenol (acetaminophen) as you could end up taking too much of that medication  As mentioned above, most patients find that lying down accentuates their discomfort  You might sleep better in a recliner, or propped up in bed  Dr Millie Figueroa encourages patients to safely ambulate around the house as much as possible in the first few days after the procedure as this can help with blood circulation in the legs  While the incidence of blood clots is very rare following shoulder surgery, early ambulation is a great way to help decrease the already low rate  24 hours after the surgery you may remove the bandage and cover incisions with Band-Aids if needed  At that time you may shower, the wounds will have a surgical glue that will protect them from shower water but do not submerge your incisions directly (bathing or swimming) until at least 2 weeks post-operatively  It is safe to let the arm hang at the side and take a shower and put on a shirt without the sling on  Just make sure that you do not use the operative are to reach out and grab anything as that may damage the repair  When you are done showering and getting dressed please return the operative arm to the sling  Unless noted otherwise in your discharge paperwork, Dr Millie Figueroa uses absorbable sutures so they do not need to be removed  Dr Lala Ortiz physician assistant (PA) will see you in the office a few days after the procedure to review the intra-operative findings and to initiate physical therapy if appropriate    A post-operative appointment should have been scheduled for you already, but if for some reason this did not happen, please call the office to make one  Physical therapy is important after nearly all shoulder surgeries and a detailed rehabilitation plan based on the specific intra-operative findings and procedures will be provided to your therapist at the first post-operative office visit  Most patients have post-operative therapy appointments scheduled pre-operatively, but if you do not, that will be handled at the first post-operative office visit  Unless expressly directed otherwise it is safe to remove the sling even the first day after the surgery and let the arm hang by the side  This allows patients to shower and even put a shirt on (bad arm in the sleeve first)  It is also safe to flex and extend their wrist, hand and fingers as much as possible when the block wears off  These simple motions can serve to pump fluid out of the forearm and decrease swelling in the arm

## 2021-05-27 NOTE — PROGRESS NOTES
Assessment  Diagnoses and all orders for this visit:    Tear of right infraspinatus tendon, initial encounter    Tear of right supraspinatus tendon    Acute pain of right shoulder        Discussion and Plan:    Patient does have a full-thickness complete supraspinatus and infraspinatus tendon tear without signs of chronicity, this is consistent with an acute injury as he described and fortunately is amenable to repair, non-operative care was discussed but would likely lead to a situation where the tear retracts and becomes atrophic, therefore I am recommending the patient consider acute arthroscopic repair of his right rotator cuff  After discussing this he does wish to proceed forward with scheduling    A thorough discussion was performed with the patient regarding the risks and benefit of operative and nonoperative treatment of their rotator cuff tear  Risks discussed include but were not limited to infection, neurovascular injury, recurrent tear, nonhealing of the repair, need for further surgery, need for biceps tenodesis or tenotomy, stiffness, need for prolonged rehabilitation, as well as the risk of anesthesia  After this discussion all questions were answered and informed consent was obtained in the office for arthroscopic rotator cuff repair of the right shoulder  The patient will be scheduled for this procedure accordingly  Subjective:   Patient ID: Sherie Bustillos is a 52 y o  male      HPI  The patient presents with a chief complaint of right shoulder pain  The pain began 1 month(s) ago and is associated with an acute injury  Patient reports on 4/15/21 he was taking apart a bed frame when he fell  He was seen by Dr Dary Page who ordered an MRI and referred the patient here today for orthopedic consultation  The patient describes the pain as aching, dull and sharp in intensity,  intermittent in timing, and localizes the pain to the  right globally    The pain is worse with movement and relieved by rest   The pain is not associated with numbness and tingling  The pain is not associated with constitutional symptoms  The patient is awoken at night by the pain  The following portions of the patient's history were reviewed and updated as appropriate: allergies, current medications, past family history, past medical history, past social history, past surgical history and problem list     Review of Systems   Constitutional: Negative for chills and fever  HENT: Negative for drooling and hearing loss  Eyes: Negative for visual disturbance  Respiratory: Negative for cough and shortness of breath  Cardiovascular: Negative for chest pain  Gastrointestinal: Negative for abdominal pain  Skin: Negative for rash  Psychiatric/Behavioral: Negative for agitation  Objective:  /63 (BP Location: Left arm, Patient Position: Sitting, Cuff Size: Standard)   Pulse 74   Ht 5' 10" (1 778 m)   Wt 85 6 kg (188 lb 12 8 oz)   BMI 27 09 kg/m²       Right Shoulder Exam     Range of Motion   External rotation: 40 (PROM 80)   Forward flexion: 90 (PROM 160)   Internal rotation 0 degrees: Lumbar     Muscle Strength   Abduction: 3/5   External rotation: 3/5     Tests   Butt test: positive  Impingement: positive    Other   Erythema: absent  Sensation: normal  Pulse: present            Physical Exam  Vitals signs and nursing note reviewed  Constitutional:       Appearance: He is well-developed  HENT:      Head: Normocephalic and atraumatic  Eyes:      Pupils: Pupils are equal, round, and reactive to light  Neck:      Musculoskeletal: Normal range of motion and neck supple  Cardiovascular:      Rate and Rhythm: Normal rate and regular rhythm  Heart sounds: Normal heart sounds  Pulmonary:      Effort: Pulmonary effort is normal  No respiratory distress  Breath sounds: Normal breath sounds  Abdominal:      General: There is no distension        Palpations: Abdomen is soft    Skin:     General: Skin is warm and dry  Neurological:      Mental Status: He is alert and oriented to person, place, and time  Psychiatric:         Behavior: Behavior normal            I have personally reviewed pertinent films in PACS and my interpretation is as follows    Right shoulder MRI demonstrates full thickness tears of the supraspinatus and infraspinatus, small intrasubstance tear long head of the biceps      Scribe Attestation    I,:  Ingris Shoemaker am acting as a scribe while in the presence of the attending physician :       I,:  Carolyn Ren MD personally performed the services described in this documentation    as scribed in my presence :

## 2021-05-27 NOTE — TELEPHONE ENCOUNTER
Patient sees Dr Patrice Keyes a Financial Counselor calling to let the office know that he was approved for a tomas care of 100% after Medicare      Faisal Keyes No [phone]

## 2021-05-28 NOTE — TELEPHONE ENCOUNTER
Looks like patient is scheduled for surgery with Dr Rissa Younger on 06/04  Not sure if you needed this info

## 2021-06-01 ENCOUNTER — APPOINTMENT (OUTPATIENT)
Dept: PHYSICAL THERAPY | Facility: CLINIC | Age: 51
End: 2021-06-01
Payer: MEDICARE

## 2021-06-02 ENCOUNTER — ANESTHESIA EVENT (OUTPATIENT)
Dept: PERIOP | Facility: AMBULARY SURGERY CENTER | Age: 51
End: 2021-06-02
Payer: MEDICARE

## 2021-06-02 NOTE — PRE-PROCEDURE INSTRUCTIONS
Pre-Surgery Instructions:   Medication Instructions    ergocalciferol (VITAMIN D2) 50,000 units Instructed patient per Anesthesia Guidelines   famotidine (PEPCID) 40 MG tablet Instructed patient per Anesthesia Guidelines   FLUoxetine (PROzac) 40 MG capsule Instructed patient per Anesthesia Guidelines   gemfibrozil (LOPID) 600 mg tablet Instructed patient per Anesthesia Guidelines   Melatonin 1 MG CAPS Instructed patient per Anesthesia Guidelines   Multiple Vitamins-Minerals (CENTRUM ADULTS PO) Instructed patient per Anesthesia Guidelines   NON FORMULARY Instructed patient per Anesthesia Guidelines   Omega-3 Fatty Acids (fish oil) 1,000 mg Instructed patient per Anesthesia Guidelines  Spoke with patient medication list reviewed  Pt told to stop all vitamins/supplements now  Told pt cbd drop ok but not the DOS  Pt states he wont take any medications DOS  Npo after midnight  Pt denies covid symptoms hes been vaccinated fully 2/21/21  Pt understands shower instructions 1 adult visitor policy and the need for a ride home after surgery  [facility] will call between 2p-7p 6/3 with arrival time and directions

## 2021-06-03 ENCOUNTER — OFFICE VISIT (OUTPATIENT)
Dept: PHYSICAL THERAPY | Facility: CLINIC | Age: 51
End: 2021-06-03
Payer: MEDICARE

## 2021-06-03 DIAGNOSIS — R29.6 MULTIPLE FALLS: Primary | ICD-10-CM

## 2021-06-03 DIAGNOSIS — G80.9 CEREBRAL PALSY, UNSPECIFIED TYPE (HCC): ICD-10-CM

## 2021-06-03 PROCEDURE — 97112 NEUROMUSCULAR REEDUCATION: CPT

## 2021-06-03 NOTE — LETTER
Kim 3, 2021    Tami Nicholas DO  [facility]    Patient: Renaldo Montenegro   YOB: 1970   Date of Visit: 6/3/2021     Encounter Diagnosis     ICD-10-CM    1  Multiple falls  R29 6    2  Cerebral palsy, unspecified type [facility])  G80 9        Dear Dr Sarika Benavides: Thank you for your recent referral of Renaldo Montenegro  Please review the attached evaluation summary from Gregorio's recent visit  Please verify that you agree with the plan of care by signing the attached order  If you have any questions or concerns, please do not hesitate to call  We will stop PT for balance while patient heals from shoulder surgery  Please reconsult for balance therapy when medically appropriate post shoulder surgery  I sincerely appreciate the opportunity to share in the care of one of your patients and hope to have another opportunity to work with you in the near future  Sincerely,    Jose Manuel Slade, PT      Referring Provider:      I certify that I have read the below Plan of Care and certify the need for these services furnished under this plan of treatment while under my care  Tami Nicholas DO  [address] [address]  [place]          Daily Note/ Discharge Summary  IE: 5-3-2021 ([address] [facility] [address]: 2021)    Today's date: 6/3/2021  Patient name: Renaldo Montenegro  : 1970  MRN: [id number]  Referring provider: Mark Tran DO  Dx:   Encounter Diagnosis     ICD-10-CM    1  Multiple falls  R29 6    2  Cerebral palsy, unspecified type ([facility] [address] )  G80 9                   Subjective: Patient reports to therapy with no new changes or falls  He reports pending shoulder surgery tomorrow and will not be able to continue balance therapy until medically appropriate  Objective: See treatment diary below   Reassessed- see measures below:    Collazo/56 (was 34/56)  10MWT (gaitspeed): 1 0 m/second (was  89 m/sec)  6MWT: 1000 feet (RPE 7/10), 2 LOB on turns w/ self-correction (was 775 feet)  5xSTS: 19 27 seconds  (was 23 89 seconds)  TU 07 seconds (was 13 35 seconds)    NMR: All NMR not performed today (only reassessment for discharge performed)  * SOLO Step    - STS, 20x, 0 UE  - FWD/BWD walking w/ SPC: 20 ft x 6 laps  - Fwd/bwd march to knee pad over foam beam: 10 reps B/L w/ SPC  - Reciprocal fwd ambulation over foam beams: 6 cycles,     - Step taps: 6" step, 15x, 1 UE  - Walking with green TB against hip flexion on RLE  - Walking with green TB assist hip extension on RLE  - Standing marches: 10x  - Ambulation w/o SPC: 2 laps x 20 ft  - Vibration plate: 4 minutes, HL, 50 mHz, 1 UE support---- due to increased pain        Assessment: Patient tolerated treatment well  Reassessed  Partially met PT goals but will discharge from PT for this episode due to pending surgery (shld) tomorrow  Significant gains made with balance and gait speed, reduced falls reported, but he is still a fall risk and will continue to improve with additional therapy  Recommend restart PT for balance once appropriate and medically able  Patient agreeable with plan  Will discharge PT for this episode of care and restart when appropriate     Goals  Short Term Goals (4 weeks):    - Patient will improve time on TUG by 2 9 seconds from 13 35 seconds to 10 45 seconds to facilitate improved safety in all ambulation- NOT MET  - Patient will be independent in basic HEP 2-3 weeks- MET  - Patient will improve 5xSTS score by 2 3 seconds from 23 89 seconds to 21 59 seconds to promote improved LE functional strength needed for ADLs- MET  - Patient will complete 6 Minute Walk Test in order to promote improving cardiovascular endurance- MET     Long Term Goals (12 weeks):  - Patient will be independent in a comprehensive home exercise program  - Patient will complete the DGI in order to progress safety- NOT MET  - Patient will improve gait speed by 0 59 ft/sec from 2 95 ft/sec to 3 54 ft/sec to improve safety with community ambulation- NOT MET  - Patient will improve JONES by 6 points from 34/56 to 40/56 to facilitate return to safe independent ambulation- MET  - Patient will be able to demonstrate HT in gait without veering-NOT MET  - Patient will improve 6 Minute Walk Test score by 190 feet to promote improved cardiovascular endurance-MET  - Patient will report 50% reduction in near falls in order to improve safety with functional tasks and reduce his risk for falls- MET  - Patient will report going on walks at least 3 days per week to promote independence and improved cardiovascular endurance- Not MET  - Patient will report 50% reduction in near falls when ambulating on uneven terrain- MET        Plan: Continue per plan of care             Outcome Measures Initial Eval  5-3-2021   5-4-2021 6/3/21      5xSTS 23 89 sec,   2 UE  19 27 sec      TUG 13 35 sec, w/ SPC  12 07 w/ SPC      10 meter 2 95 ft/sec  0 89 m/s  1 0 m/sec      JONES 34/56  40/56      FGA Defer        DGI Defer        mCTSIB  - FTEO (firm)  - FTEC (firm)  - FTEO (foam)  - FTEC (foam)   30 sec  30 sec (+)  30 sec (+)  3 sec LoB        6MWT Defer 675 ft w/ SPC, PTCG  (before)    775 ft w/ SPC, PTCG (after) 1000 ft w/ SPC, 2 LOB w/ self-correction                             Precautions: Hx of Cerebral Palsy, Falls  Past Medical History:   Diagnosis Date    Acid reflux     Arthritis     Cerebral palsy (HCC)     GERD (gastroesophageal reflux disease)     Psychiatric disorder     anxiety, depression    Ulcer

## 2021-06-03 NOTE — PROGRESS NOTES
Daily Note/ Discharge Summary  IE: 5-3-2021 (POC: 2021)    Today's date: 6/3/2021  Patient name: Clarissa Pickard  : 1970  MRN: [id number]  Referring provider: Jewell Mcqueen DO  Dx:   Encounter Diagnosis     ICD-10-CM    1  Multiple falls  R29 6    2  Cerebral palsy, unspecified type ([facility] [address] )  G80 9                   Subjective: Patient reports to therapy with no new changes or falls  He reports pending shoulder surgery tomorrow and will not be able to continue balance therapy until medically appropriate  Objective: See treatment diary below  Reassessed- see measures below:    Collazo/56 (was 34/56)  10MWT (gaitspeed): 1 0 m/second (was   89 m/sec)  6MWT: 1000 feet (RPE 7/10), 2 LOB on turns w/ self-correction (was 775 feet)  5xSTS: 19 27 seconds  (was 23 89 seconds)  TU 07 seconds (was 13 35 seconds)    NMR: All NMR not performed today (only reassessment for discharge performed)  * SOLO Step    - STS, 20x, 0 UE  - FWD/BWD walking w/ SPC: 20 ft x 6 laps  - Fwd/bwd march to knee pad over foam beam: 10 reps B/L w/ SPC  - Reciprocal fwd ambulation over foam beams: 6 cycles,     - Step taps: 6" step, 15x, 1 UE  - Walking with green TB against hip flexion on RLE  - Walking with green TB assist hip extension on RLE  - Standing marches: 10x  - Ambulation w/o SPC: 2 laps x 20 ft  - Vibration plate: 4 minutes, HL, 50 mHz, 1 UE support---- due to increased pain        Assessment: Patient tolerated treatment well  Reassessed  Partially met PT goals but will discharge from PT for this episode due to pending surgery (shld) tomorrow  Significant gains made with balance and gait speed, reduced falls reported, but he is still a fall risk and will continue to improve with additional therapy  Recommend restart PT for balance once appropriate and medically able  Patient agreeable with plan  Will discharge PT for this episode of care and restart when appropriate     Goals  Short Term Goals (4 weeks):    - Patient will improve time on TUG by 2 9 seconds from 13 35 seconds to 10 45 seconds to facilitate improved safety in all ambulation- NOT MET  - Patient will be independent in basic HEP 2-3 weeks- MET  - Patient will improve 5xSTS score by 2 3 seconds from 23 89 seconds to 21 59 seconds to promote improved LE functional strength needed for ADLs- MET  - Patient will complete 6 Minute Walk Test in order to promote improving cardiovascular endurance- MET     Long Term Goals (12 weeks):  - Patient will be independent in a comprehensive home exercise program  - Patient will complete the DGI in order to progress safety- NOT MET  - Patient will improve gait speed by 0 59 ft/sec from 2 95 ft/sec to 3 54 ft/sec to improve safety with community ambulation- NOT MET  - Patient will improve JONES by 6 points from 34/56 to 40/56 to facilitate return to safe independent ambulation- MET  - Patient will be able to demonstrate HT in gait without veering-NOT MET  - Patient will improve 6 Minute Walk Test score by 190 feet to promote improved cardiovascular endurance-MET  - Patient will report 50% reduction in near falls in order to improve safety with functional tasks and reduce his risk for falls- MET  - Patient will report going on walks at least 3 days per week to promote independence and improved cardiovascular endurance- Not MET  - Patient will report 50% reduction in near falls when ambulating on uneven terrain- MET        Plan: Continue per plan of care             Outcome Measures Initial Eval  5-3-2021   5-4-2021 6/3/21      5xSTS 23 89 sec,   2 UE  19 27 sec      TUG 13 35 sec, w/ SPC  12 07 w/ SPC      10 meter 2 95 ft/sec  0 89 m/s  1 0 m/sec      JONES 34/56  40/56      FGA Defer        DGI Defer        mCTSIB  - FTEO (firm)  - FTEC (firm)  - FTEO (foam)  - FTEC (foam)   30 sec  30 sec (+)  30 sec (+)  3 sec LoB        6MWT Defer 675 ft w/ SPC, PTCG  (before)    775 ft w/ SPC, PTCG (after) 1000 ft w/ SPC, 2 LOB w/ self-correction                             Precautions: Hx of Cerebral Palsy, Falls  Past Medical History:   Diagnosis Date    Acid reflux     Arthritis     Cerebral palsy (HCC)     GERD (gastroesophageal reflux disease)     Psychiatric disorder     anxiety, depression    Ulcer

## 2021-06-03 NOTE — ANESTHESIA PREPROCEDURE EVALUATION
Procedure:  SHOULDER ARTHROSCOPIC ROTATOR CUFF REPAIR (Right Shoulder)    Relevant Problems   GI/HEPATIC   (+) Gastroesophageal reflux disease with esophagitis      NEURO/PSYCH   (+) Major depressive disorder, recurrent episode, moderate (HCC)      Other   (+) Cerebral palsy (HCC)        Physical Exam    Airway    Mallampati score: II  TM Distance: >3 FB  Neck ROM: full     Dental       Cardiovascular      Pulmonary      Other Findings        Anesthesia Plan  ASA Score- 2     Anesthesia Type- general with ASA Monitors  Additional Monitors:   Airway Plan: LMA  Comment: IS Block for post op pain per surgeon request          Plan Factors-    Chart reviewed  Induction- intravenous  Postoperative Plan-     Informed Consent- Anesthetic plan and risks discussed with patient  I personally reviewed this patient with the CRNA  Discussed and agreed on the Anesthesia Plan with the CRNA  Elder Acuna

## 2021-06-04 ENCOUNTER — HOSPITAL ENCOUNTER (OUTPATIENT)
Facility: AMBULARY SURGERY CENTER | Age: 51
Setting detail: OUTPATIENT SURGERY
Discharge: HOME | End: 2021-06-04
Attending: ORTHOPAEDIC SURGERY | Admitting: ORTHOPAEDIC SURGERY
Payer: MEDICARE

## 2021-06-04 ENCOUNTER — ANESTHESIA (OUTPATIENT)
Dept: PERIOP | Facility: AMBULARY SURGERY CENTER | Age: 51
End: 2021-06-04
Payer: MEDICARE

## 2021-06-04 VITALS
HEIGHT: 70 IN | WEIGHT: 188 LBS | BODY MASS INDEX: 26.92 KG/M2 | SYSTOLIC BLOOD PRESSURE: 112 MMHG | RESPIRATION RATE: 17 BRPM | TEMPERATURE: 97.5 F | OXYGEN SATURATION: 99 % | HEART RATE: 88 BPM | DIASTOLIC BLOOD PRESSURE: 69 MMHG

## 2021-06-04 DIAGNOSIS — S46.811A TEAR OF RIGHT INFRASPINATUS TENDON, INITIAL ENCOUNTER: ICD-10-CM

## 2021-06-04 DIAGNOSIS — M75.101 TEAR OF RIGHT SUPRASPINATUS TENDON: Primary | ICD-10-CM

## 2021-06-04 PROCEDURE — C9290 INJ, BUPIVACAINE LIPOSOME: HCPCS | Performed by: ANESTHESIOLOGY

## 2021-06-04 PROCEDURE — 99024 POSTOP FOLLOW-UP VISIT: CPT | Performed by: ORTHOPAEDIC SURGERY

## 2021-06-04 PROCEDURE — 29827 SHO ARTHRS SRG RT8TR CUF RPR: CPT | Performed by: ORTHOPAEDIC SURGERY

## 2021-06-04 PROCEDURE — 29826 SHO ARTHRS SRG DECOMPRESSION: CPT | Performed by: ORTHOPAEDIC SURGERY

## 2021-06-04 PROCEDURE — C1713 ANCHOR/SCREW BN/BN,TIS/BN: HCPCS | Performed by: ORTHOPAEDIC SURGERY

## 2021-06-04 DEVICE — DEPUY MITEK HEALIX ADVANCE 4.5 BR: Type: IMPLANTABLE DEVICE | Site: SHOULDER | Status: FUNCTIONAL

## 2021-06-04 DEVICE — HEALIX ADVANCE KNOTLESS BR ANCHOR TCP/PLGA ABSORBABLE ANCHOR 5.5MM
Type: IMPLANTABLE DEVICE | Site: SHOULDER | Status: FUNCTIONAL
Brand: HEALIX ADVANCE

## 2021-06-04 RX ORDER — OXYCODONE HYDROCHLORIDE 5 MG/1
5 TABLET ORAL EVERY 4 HOURS PRN
Status: DISCONTINUED | OUTPATIENT
Start: 2021-06-04 | End: 2021-06-04 | Stop reason: HOSPADM

## 2021-06-04 RX ORDER — ONDANSETRON 2 MG/ML
4 INJECTION INTRAMUSCULAR; INTRAVENOUS EVERY 6 HOURS PRN
Status: DISCONTINUED | OUTPATIENT
Start: 2021-06-04 | End: 2021-06-04 | Stop reason: HOSPADM

## 2021-06-04 RX ORDER — DEXAMETHASONE SODIUM PHOSPHATE 10 MG/ML
INJECTION, SOLUTION INTRAMUSCULAR; INTRAVENOUS AS NEEDED
Status: DISCONTINUED | OUTPATIENT
Start: 2021-06-04 | End: 2021-06-04

## 2021-06-04 RX ORDER — PROPOFOL 10 MG/ML
INJECTION, EMULSION INTRAVENOUS AS NEEDED
Status: DISCONTINUED | OUTPATIENT
Start: 2021-06-04 | End: 2021-06-04

## 2021-06-04 RX ORDER — MIDAZOLAM HYDROCHLORIDE 2 MG/2ML
INJECTION, SOLUTION INTRAMUSCULAR; INTRAVENOUS AS NEEDED
Status: DISCONTINUED | OUTPATIENT
Start: 2021-06-04 | End: 2021-06-04

## 2021-06-04 RX ORDER — OXYCODONE HYDROCHLORIDE 5 MG/1
TABLET ORAL
Qty: 13 TABLET | Refills: 0 | Status: SHIPPED | OUTPATIENT
Start: 2021-06-04

## 2021-06-04 RX ORDER — CEFAZOLIN SODIUM 2 G/50ML
2000 SOLUTION INTRAVENOUS ONCE
Status: COMPLETED | OUTPATIENT
Start: 2021-06-04 | End: 2021-06-04

## 2021-06-04 RX ORDER — KETOROLAC TROMETHAMINE 30 MG/ML
INJECTION, SOLUTION INTRAMUSCULAR; INTRAVENOUS AS NEEDED
Status: DISCONTINUED | OUTPATIENT
Start: 2021-06-04 | End: 2021-06-04

## 2021-06-04 RX ORDER — ONDANSETRON 2 MG/ML
INJECTION INTRAMUSCULAR; INTRAVENOUS AS NEEDED
Status: DISCONTINUED | OUTPATIENT
Start: 2021-06-04 | End: 2021-06-04

## 2021-06-04 RX ORDER — FENTANYL CITRATE/PF 50 MCG/ML
50 SYRINGE (ML) INJECTION
Status: DISCONTINUED | OUTPATIENT
Start: 2021-06-04 | End: 2021-06-04 | Stop reason: HOSPADM

## 2021-06-04 RX ORDER — CHLORHEXIDINE GLUCONATE 0.12 MG/ML
15 RINSE ORAL ONCE
Status: DISCONTINUED | OUTPATIENT
Start: 2021-06-04 | End: 2021-06-04 | Stop reason: HOSPADM

## 2021-06-04 RX ORDER — LIDOCAINE HYDROCHLORIDE 10 MG/ML
INJECTION, SOLUTION EPIDURAL; INFILTRATION; INTRACAUDAL; PERINEURAL AS NEEDED
Status: DISCONTINUED | OUTPATIENT
Start: 2021-06-04 | End: 2021-06-04

## 2021-06-04 RX ORDER — METOCLOPRAMIDE HYDROCHLORIDE 5 MG/ML
10 INJECTION INTRAMUSCULAR; INTRAVENOUS ONCE AS NEEDED
Status: DISCONTINUED | OUTPATIENT
Start: 2021-06-04 | End: 2021-06-04 | Stop reason: HOSPADM

## 2021-06-04 RX ORDER — ACETAMINOPHEN 325 MG/1
650 TABLET ORAL EVERY 6 HOURS PRN
Status: DISCONTINUED | OUTPATIENT
Start: 2021-06-04 | End: 2021-06-04 | Stop reason: HOSPADM

## 2021-06-04 RX ORDER — ONDANSETRON 2 MG/ML
4 INJECTION INTRAMUSCULAR; INTRAVENOUS ONCE AS NEEDED
Status: DISCONTINUED | OUTPATIENT
Start: 2021-06-04 | End: 2021-06-04 | Stop reason: HOSPADM

## 2021-06-04 RX ORDER — BUPIVACAINE HYDROCHLORIDE 5 MG/ML
INJECTION, SOLUTION PERINEURAL AS NEEDED
Status: DISCONTINUED | OUTPATIENT
Start: 2021-06-04 | End: 2021-06-04

## 2021-06-04 RX ORDER — EPHEDRINE SULFATE 50 MG/ML
INJECTION INTRAVENOUS AS NEEDED
Status: DISCONTINUED | OUTPATIENT
Start: 2021-06-04 | End: 2021-06-04

## 2021-06-04 RX ORDER — SODIUM CHLORIDE, SODIUM LACTATE, POTASSIUM CHLORIDE, CALCIUM CHLORIDE 600; 310; 30; 20 MG/100ML; MG/100ML; MG/100ML; MG/100ML
50 INJECTION, SOLUTION INTRAVENOUS CONTINUOUS
Status: DISCONTINUED | OUTPATIENT
Start: 2021-06-04 | End: 2021-06-04 | Stop reason: HOSPADM

## 2021-06-04 RX ADMIN — MIDAZOLAM HYDROCHLORIDE 2 MG: 1 INJECTION, SOLUTION INTRAMUSCULAR; INTRAVENOUS at 09:18

## 2021-06-04 RX ADMIN — KETOROLAC TROMETHAMINE 30 MG: 30 INJECTION, SOLUTION INTRAMUSCULAR at 10:58

## 2021-06-04 RX ADMIN — SODIUM CHLORIDE, SODIUM LACTATE, POTASSIUM CHLORIDE, AND CALCIUM CHLORIDE: .6; .31; .03; .02 INJECTION, SOLUTION INTRAVENOUS at 08:58

## 2021-06-04 RX ADMIN — EPHEDRINE SULFATE 10 MG: 50 INJECTION, SOLUTION INTRAVENOUS at 10:33

## 2021-06-04 RX ADMIN — LIDOCAINE HYDROCHLORIDE 50 MG: 10 INJECTION, SOLUTION EPIDURAL; INFILTRATION; INTRACAUDAL at 10:10

## 2021-06-04 RX ADMIN — ONDANSETRON 4 MG: 2 INJECTION INTRAMUSCULAR; INTRAVENOUS at 10:14

## 2021-06-04 RX ADMIN — CEFAZOLIN SODIUM 2000 MG: 2 SOLUTION INTRAVENOUS at 10:05

## 2021-06-04 RX ADMIN — PHENYLEPHRINE HYDROCHLORIDE 50 MCG: 10 INJECTION INTRAVENOUS at 10:33

## 2021-06-04 RX ADMIN — PROPOFOL 200 MG: 10 INJECTION, EMULSION INTRAVENOUS at 10:10

## 2021-06-04 RX ADMIN — BUPIVACAINE 20 ML: 13.3 INJECTION, SUSPENSION, LIPOSOMAL INFILTRATION at 09:20

## 2021-06-04 RX ADMIN — DEXAMETHASONE SODIUM PHOSPHATE 4 MG: 10 INJECTION, SOLUTION INTRAMUSCULAR; INTRAVENOUS at 10:14

## 2021-06-04 RX ADMIN — BUPIVACAINE HYDROCHLORIDE 7.5 ML: 5 INJECTION, SOLUTION PERINEURAL at 09:20

## 2021-06-04 NOTE — ANESTHESIA PROCEDURE NOTES
Peripheral Block    Patient location during procedure: holding area  Start time: 6/4/2021 9:20 AM  Reason for block: at surgeon's request and post-op pain management  Staffing  Anesthesiologist: Messi Ornelas MD  Performed: anesthesiologist   Preanesthetic Checklist  Completed: patient identified, site marked, surgical consent, pre-op evaluation, timeout performed, IV checked, risks and benefits discussed and monitors and equipment checked  Peripheral Block  Patient position: supine  Prep: ChloraPrep  Patient monitoring: continuous pulse ox and frequent blood pressure checks  Block type: interscalene  Laterality: right  Injection technique: single-shot  Procedures: ultrasound guided, Ultrasound guidance required for the procedure to increase accuracy and safety of medication placement and decrease risk of complications    Ultrasound permanent image saved  Needle  Needle type: Stimuplex   Needle gauge: 22 G  Needle length: 5 cm  Needle localization: ultrasound guidance  Test dose: negative  Assessment  Injection assessment: incremental injection and local visualized surrounding nerve on ultrasound  Paresthesia pain: none  Heart rate change: no  Slow fractionated injection: yes  Post-procedure:  site cleaned  patient tolerated the procedure well with no immediate complications

## 2021-06-04 NOTE — OP NOTE
OPERATIVE REPORT  PATIENT NAME: Clarissa Pickard    :  1970  MRN: [id number]  Pt Location: AN  OR ROOM 06    SURGERY DATE: 2021     SURGEON: Mary Garcia MD     ASSISTANT: Glenda Reyes PA-C     NOTE: Glenda Reyes PA-C was present throughout the entire procedure and performed essential assistance with patient prepping, draping, positioning, suture management, arthroscope handling while knots were being tied and sutures were being passed, wound closure, sterile dressing application and sling application, all under my direct supervision  NOTE: No qualified resident physician was available for assistance    PREOPERATIVE DIAGNOSIS:  Right Shoulder Supraspinatus Tear    POSTOPERATIVE DIAGNOSIS: Same    PROCEDURES: Surgical Arthroscopy Right Shoulder with Rotator Cuff Repair and Subacromial Decompression    ANESTHESIA STAFF: Gurvinder Martinez MD     ANESTHESIA TYPE: General LMA with ultrasound guided interscalene block (Exparel)  The interscalene block was provided by the anesthesia staff per my request for postoperative pain control and to decrease the use of postoperative narcotic medication for pain control  COMPLICATIONS: None    FINDINGS: Supraspinatus Tear    SPECIMEN(S):  None    ESTIMATED BLOOD LOSS: Minimal    INDICATION:  Briefly, the patient is a 52 y o   male with right shoulder pain following an acute injury  MRI scan confirmed a full thickness supraspinatus tear  The patient elected for arthroscopic treatment  Informed consent was obtained after a thorough discussion of the risks and benefits of the procedure, as well as alternatives to the procedure  OPERATIVE TECHNIQUE:  On the day of surgery, I identified the patients right shoulder and marked it with my initials  The patient was taken to the operating room where anesthesia was induced and 2 grams of IV Cefazolin were given   The patient was examined in the supine position and was found to have globally limited range of motion of the right shoulder that was equivalent to his unaffected left shoulder and this was felt to represent full normal motion for this patient with no instability   The patient was then positioned in the [address] [place] [address] chair position  All bony prominences were padded  The shoulder was prepped and draped in normal sterile fashion  After a time-out for safety, a standard posterolateral arthroscopic portal was made  Glenohumeral evaluation revealed intact glenohumeral articular cartilage with no loose bodies  There was a full thickness supraspinatus tear which did not include the infraspinatus as was suggested on the preoperative imaging by the radiologist but this was what I expected based on my review of his images  The subscapularis was intact as was the circumferential glenoid labrum and the long-head biceps was exiting normally, the bicipital pulley was not disrupted with a full-thickness supraspinatus tendon tear  After the intra-articular evaluation was completed, the scope was then placed in the subacromial space through the same portal where a thorough bursectomy was performed  The full thickness supraspinatus tear was found to measure 2 0 cm from anterior to posterior and was able to be easily reduced to the tuberosity  The tuberosity was prepared in routine fashion and a double row suture bridge configuration repair with two medial 4 5 mm double loaded Mitek Healix Advanced anchors and two lateral 5 5 mm Mitek Healix Advanced anchors using seven stiches through the tendon (one simple and three horizontal mattress stitches) was performed achieving anatomic reduction of the rotator cuff tendon to the tuberosity  The long head of biceps was not indicated for tenodesis given intra-operative appearance  The CA ligament was frayed so it was released off the anterolateral edge of acromion and a gentle acromioplasty was performed  The area was then irrigated  Scope was withdrawn   Wounds were closed with 4-0 Monocryl and Histoacryl  Sterile dressings and a sling with an abduction pillow was placed  The patient was awoken without complication and returned to the recovery room in good condition  We will see the patient back in the office next week to initiate therapy following standard rotator cuff repair rehabilitation protocol  At the end of procedure, the counts were correct       PATIENT DISPOSITION:  Stable to PACU      SIGNATURE: Peace Connelly MD  DATE: June 4, 2021  TIME: 11:04 AM

## 2021-06-04 NOTE — ANESTHESIA POSTPROCEDURE EVALUATION
Post-Op Assessment Note    CV Status:  Stable  Pain Score: 0    Pain management: adequate     Mental Status:  Alert and awake   Hydration Status:  Euvolemic   PONV Controlled:  Controlled   Airway Patency:  Patent      Post Op Vitals Reviewed: Yes      Staff: Anesthesiologist, CRNA         No complications documented      /61 (06/04/21 1109)    Temp (!) 97 °F (36 1 °C) (06/04/21 1109)    Pulse 74 (06/04/21 1109)   Resp 14 (06/04/21 1109)    SpO2 (P) 97 % (06/04/21 1109)

## 2021-06-04 NOTE — H&P
I identified and marked the patient in the pre-op holding area after confirming the surgical consent  No changes to medical health since the H&P was preformed  The patient's prescription history was queried in the [facility] to ensure compliance with applicable state laws

## 2021-06-04 NOTE — DISCHARGE INSTRUCTIONS
You are being scheduled for a shoulder arthroscopy to treat your symptoms  Below are some instructions and information on what to expect before and after your surgery  Pre-Surgical Preparation for Arthroscopic Shoulder Surgery: You will be contacted the evening prior to your surgery to confirm the scheduled time of the procedure and when to arrive at the hospital    Do not eat or drink anything after midnight the night before your surgery  Since you are having out-patient surgery, make sure that you have someone who can drive you home later in the day  Also, prepare that person for a long day, as the process of safely preparing for and recovering from the procedure is more time consuming than the actual procedure! As you will be in a sling after surgery, please wear or bring a loose fitting button-down shirt so that you can easily place this over the sling when you leave the surgical suite  This avoids having to place the operative arm in a sleeve  Most patients find that this is the easiest outfit to wear for the first week or so after surgery so you may want to plan accordingly  Most patients find that lying down in bed after shoulder surgery accentuates their discomfort  This is likely related to the effect of gravity on the swelling in the shoulder  As a result, most patients sleep better in a recliner or in bed with pillows propped up behind their back for the first few days or weeks after surgery  It is a good idea to plan for this ahead of time so there will be less hassle getting things set up the night after surgery  What to Expect After Arthroscopic Shoulder Surgery: It is normal to have swelling and discomfort in the shoulder for several days or a week after surgery  It is also normal to have a small amount of drainage from the surgical wounds (especially the first few days after surgery), as we put fluid into the shoulder to visualize the structures during surgery  It is NOT normal to have foul smelling, purulent drainage and if this is noted, please contact the office immediately or proceed to the emergency room for evaluation as this may indicate an infection  Applying ice bags to the shoulder may help with pain that is not controlled by the regional block  Ice should be applied 20-30 minutes at a time, every hour or two  Make sure to put a thin towel or T-shirt next to your skin to avoid direct contact of the ice with the skin  Icing is most helpful in the first 48 hours, although many people find that continuing past this time frame lessens their postoperative pain  Please note that your post-operative dressing is not conductive to ice, so if you need to, it is okay to remove that dressing even the night after surgery and place band-aids over the wounds in order for the ice to take effect  Pain Control    Most patients will receive a nerve block, the local anesthetic may keep your whole arm numb for up to 4 days  You will be given a prescription for narcotic pain medication when you are discharged from the hospital   With the newer nerve block that is being utilized, patients are rarely requiring the use of this narcotic pain medication  If you find you do not tolerate that type of pain medicine well, call our office and we will try another one  In addition to the narcotic pain medication, it is safe to use an anti-inflammatory (unless the patient has a medical condition that would not allow safe use of this mediation)  This includes the Advil, Motrin, Ibuprofen and Alleve category of medications  Simply follow the over the counter dosing on the package and use as indicated as another adjunct  Importantly since these medications are all very similar, use only one of them  Tylenol is a separate medication that can be utilized as well and can be taken at the same time as the other medication or given in a "staggered" manner    Just make sure that you follow the dosing on the over the counter bottle instructions  Also make sure that the pain medication prescribed by Dr Alvy Aase team does not contain acetaminophen (this is found in Percocet and Vicodin)  Typically we do not prescribe those types of pain medications but if for some reason that has been prescribed DO NOT add more Tylenol (acetaminophen) as you could end up taking too much of that medication  As mentioned above, most patients find that lying down accentuates their discomfort  You might sleep better in a recliner, or propped up in bed  Dr Miesha Suero encourages patients to safely ambulate around the house as much as possible in the first few days after the procedure as this can help with blood circulation in the legs  While the incidence of blood clots is very rare following shoulder surgery, early ambulation is a great way to help decrease the already low rate  24 hours after the surgery you may remove the bandage and cover incisions with Band-Aids if needed  At that time you may shower, the wounds will have a surgical glue that will protect them from shower water but do not submerge your incisions directly (bathing or swimming) until at least 2 weeks post-operatively  It is safe to let the arm hang at the side and take a shower and put on a shirt without the sling on  Just make sure that you do not use the operative are to reach out and grab anything as that may damage the repair  When you are done showering and getting dressed please return the operative arm to the sling  Unless noted otherwise in your discharge paperwork, Dr Miesha Suero uses absorbable sutures so they do not need to be removed  Dr Astrid Thorpe physician assistant (PA) will see you in the office a few days after the procedure to review the intra-operative findings and to initiate physical therapy if appropriate    A post-operative appointment should have been scheduled for you already, but if for some reason this did not happen, please call the office to make one  Physical therapy is important after nearly all shoulder surgeries and a detailed rehabilitation plan based on the specific intra-operative findings and procedures will be provided to your therapist at the first post-operative office visit  Most patients have post-operative therapy appointments scheduled pre-operatively, but if you do not, that will be handled at the first post-operative office visit  Unless expressly directed otherwise it is safe to remove the sling even the first day after the surgery and let the arm hang by the side  This allows patients to shower and even put a shirt on (bad arm in the sleeve first)  It is also safe to flex and extend their wrist, hand and fingers as much as possible when the block wears off  These simple motions can serve to pump fluid out of the forearm and decrease swelling in the arm

## 2021-06-07 ENCOUNTER — APPOINTMENT (OUTPATIENT)
Dept: PHYSICAL THERAPY | Facility: CLINIC | Age: 51
End: 2021-06-07
Payer: MEDICARE

## 2021-06-07 ENCOUNTER — OFFICE VISIT (OUTPATIENT)
Dept: OBGYN CLINIC | Facility: OTHER | Age: 51
End: 2021-06-07

## 2021-06-07 VITALS
HEIGHT: 70 IN | HEART RATE: 120 BPM | SYSTOLIC BLOOD PRESSURE: 98 MMHG | DIASTOLIC BLOOD PRESSURE: 67 MMHG | WEIGHT: 189.2 LBS | BODY MASS INDEX: 27.09 KG/M2

## 2021-06-07 DIAGNOSIS — Z47.89 AFTERCARE FOLLOWING SURGERY OF THE MUSCULOSKELETAL SYSTEM: Primary | ICD-10-CM

## 2021-06-07 PROCEDURE — 99024 POSTOP FOLLOW-UP VISIT: CPT | Performed by: PHYSICIAN ASSISTANT

## 2021-06-07 NOTE — PROGRESS NOTES
Assessment:       1  Aftercare following surgery of the musculoskeletal system          Plan:          Patient is doing well postoperatively following rotator cuff repair  Operative note, images, and standard rotator cuff repair rehab protocol were discussed/reviewed  All questions were addressed/answered to the patient's satisfaction  He has an appointment with PT  Follow-up is in 2 months to assess patient's progress  Subjective:     Patient ID: Pili Briones is a 52 y o  male  Chief Complaint:    HPI    Patient presents for follow-up status post right shoulder arthroscopy with rotator cuff repair on 2021  Pain is controlled  Patient has mild residual paresthesia following anesthetic block  Social History     Occupational History    Not on file   Tobacco Use    Smoking status: Former Smoker     Packs/day: 3 00     Years: 15 00     Pack years: 45 00     Quit date: 1994     Years since quittin 3    Smokeless tobacco: Never Used   Substance and Sexual Activity    Alcohol use: Not Currently     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    Drug use: Not Currently     Types: Marijuana     Comment: quit -greater than 1 year    Sexual activity: Not on file      Review of Systems   Constitutional: Negative  Respiratory: Negative  Musculoskeletal: Positive for myalgias  Negative for arthralgias  Skin: Positive for wound  Neurological: Positive for weakness and numbness  Psychiatric/Behavioral: Negative  Objective:         Neurological Testing     Additional Neurological Details  Mild paresthesia in index, middle, and ring fingers of right hand - relieved by removal of abduction pillow  Physical Exam  HENT:      Head: Atraumatic  Cardiovascular:      Pulses: Normal pulses  Pulmonary:      Effort: Pulmonary effort is normal    Musculoskeletal:      Comments: Right Arm in sling  Range of motion and strength not tested  Skin:     General: Skin is warm and dry  Capillary Refill: Capillary refill takes less than 2 seconds  Comments: Incisions dry and clean  Neurological:      Mental Status: He is alert and oriented to person, place, and time  Sensory: Sensory deficit present  Comments: Mild paresthesia in index, middle, and ring fingers of right hand - relieved by removal of abduction pillow     Psychiatric:         Mood and Affect: Mood normal          Behavior: Behavior normal

## 2021-06-08 ENCOUNTER — EVALUATION (OUTPATIENT)
Dept: PHYSICAL THERAPY | Facility: CLINIC | Age: 51
End: 2021-06-08
Payer: MEDICARE

## 2021-06-08 DIAGNOSIS — S46.811A TEAR OF RIGHT INFRASPINATUS TENDON, INITIAL ENCOUNTER: ICD-10-CM

## 2021-06-08 DIAGNOSIS — M75.101 TEAR OF RIGHT SUPRASPINATUS TENDON: ICD-10-CM

## 2021-06-08 PROCEDURE — 97110 THERAPEUTIC EXERCISES: CPT

## 2021-06-08 PROCEDURE — 97162 PT EVAL MOD COMPLEX 30 MIN: CPT

## 2021-06-08 NOTE — PROGRESS NOTES
PT Evaluation     Today's date: 2021  Patient name: Cleveland Plaza  : 1970  MRN: [id number]  Referring provider: Kathy Almanzar MD  Dx:   Encounter Diagnosis     ICD-10-CM    1  Tear of right supraspinatus tendon  M75 101 Ambulatory referral to Physical Therapy   2  Tear of right infraspinatus tendon, initial encounter  S46 811A Ambulatory referral to Physical Therapy       Start Time: 1400  Stop Time: 1443  Total time in clinic (min): 43 minutes    Assessment  Assessment details: Cleveland Plaza is a pleasant 52 y o  male who presents with referring diagnoses of tear of right supraspinatus and infraspinatus tendons  The patient is s/p the following procedure completed on 21: Surgical Arthroscopy Right Shoulder with Rotator Cuff Repair and Subacromial Decompression  The patient's greatest concerns are future ill health (and wanting to prevent it)  The primary movement impairment diagnoses include muscular power and hypomobility (due to post-operative restrictions at this time)  The previously identified impairments have limited the patient's ability to perform usual activities, including reach over head, lift objects, and perform usual ADLs  No referral is needed at this time based upon examination findings  Primary Impairments:  1) Hypomobility  2) Muscular power    Etiologic factors include recent surgery  Patient will be progressed in accordance with post-operative protocol established by surgeon  Patient was educated on post-operative precautions and restrictions during today's session, including lifting and range of motion restrictions  He was educated on HEP during today's session, including elbow, wrist, and c/s ROM and gentle scapular isos, expressed good understanding      Patient was educated on importance of abiding to restrictions with driving for 6 weeks due to limited range of motion and active movement, he expressed good understanding but expressed concern with ability to go places (including PT) due to this  Impairments: abnormal coordination, abnormal muscle firing, abnormal muscle tone, abnormal or restricted ROM, impaired physical strength, lacks appropriate home exercise program, pain with function and scapular dyskinesis  Understanding of Dx/Px/POC: good   Prognosis: good  Prognosis details: Positive Prognostic Factors: age, motivation for improvement     Negative Prognostic Factors: post-operative status, comorbid conditions    Goals  STG - 6 weeks (end of phase I)  1  Patient will be I and compliant with HEP per post-op protocol  2  Pt will achieve passive forward flexion to at least 125 degrees (per protocol)  3  Pt will achieve passive ER in scaption to at least 40 degrees (per protocol)  4  Pt will achieve passive IR in scaption to at least 50 degrees (per protocol)  5  Pt will express reduction in pain by 50%  ITG - 12 weeks (end of phase II)  1  Pt FOTO will improve to >43/100   2  Pt will achieve full AROM  3  Pt will express no pain  4  Pt will demonstrate 4/5 strength in all planes  5  Pt will perform AROM shoulder flexion w/o scapular compensation/shrugging  LTG - 16 weeks (end of phase III)  1  Pt will be able to tolerate progression to-low level functional activities (ie  Light chores around house) (per protocol)  2  Pt will demonstrate return of dynamic shoulder stability (per protocol)  3  Pt will return to higher demanding work/ADLs  4  Pt FOTO will improve to >60/100   5  Pt will demonstrate at least 4+/5 strength in all planes           Plan  Patient would benefit from: skilled physical therapy  Planned modality interventions: TENS, thermotherapy: hydrocollator packs and cryotherapy  Planned therapy interventions: balance/weight bearing training, joint mobilization, manual therapy, neuromuscular re-education, patient education, postural training, strengthening, stretching, therapeutic activities, therapeutic exercise, home exercise program, gait training and functional ROM exercises  Frequency: 2x week  Treatment plan discussed with: patient        Subjective Evaluation    History of Present Illness  Date of surgery: 2021  Mechanism of injury: Patient is a 51 y/o male reporting to physical therapy s/p R RTC repair (infraspinatus and supraspinatus) on 21  Patient reports having follow up with surgeon yesterday, who states they are pleased with his status at this time  Patient will be staying with his mom for the next 10 days before returning to his home alone  Note: Patient has hx of CP, was being seen by neuro PT prior to surgery for balance training  He reports having a few stumbles while in sling since surgery  He has been ambulating w/ SPC in L hand  Goals: Restore shoulder ROM  Independent donning/doffing clothes  Lifting >10 lbs  Independent w/ ADLs (e g  putting dishes away)  Lifting overhead  Note: Patient is unable to use cryotherapy due to CP  Pain  Current pain ratin  At best pain ratin  At worst pain ratin  Quality: dull ache    Hand dominance: right    Patient Goals  Patient goals for therapy: decreased pain, increased motion, increased strength, independence with ADLs/IADLs and return to sport/leisure activities          Objective     Observations     Right Shoulder  Positive for incision  Additional Observation Details  3 portal incisions at shoulder, (-) for redness, warmth, drainage    Cervical/Thoracic Screen   Cervical range of motion within normal limits with the following exceptions: 25% restriction in all planes      Neurological Testing     Sensation     Shoulder   Left Shoulder   Intact: light touch    Right Shoulder   Intact: light touch    Active Range of Motion     Additional Active Range of Motion Details  Unable to perform due to post-operative status  Elbow, wrist, hand ROM WFL    Passive Range of Motion     Additional Passive Range of Motion Details  Unable to perform shoulder PROM due to post-operative status      Strength/Myotome Testing     Additional Strength Details  Unable to perform shoulder MMT due to post-operative status  Initiated light scap isometrics (flexion, IR, ER) during eval, clayton well              Precautions: Cerebral Palsy, s/p R RTC repair (6/4/21)  Phase 1 - Immediate Post-Operative Phase: 6/4/21 - 7/16/21  Phase 2 - Protection and Active Motion Phase: 7/16/21 - 8/27/21  Phase 3 - Early Strengthening Phase: 8/27/21 - 9/24/21  Phase 4 - Aggressive Rehab Phase: 9/24/21 -   See protocol attached to paper chart for further details      Manuals 6/8            Visit Number IE                                                   Neuro Re-Ed             Scap isos 3" x5                                                                                          Ther Ex             Elbow ROM x10            Wrist, hand ROM x10            Cervical ROM x10                                                                             Ther Activity                                       Gait Training                                       Modalities

## 2021-06-09 ENCOUNTER — APPOINTMENT (OUTPATIENT)
Dept: PHYSICAL THERAPY | Facility: CLINIC | Age: 51
End: 2021-06-09
Payer: MEDICARE

## 2021-06-11 ENCOUNTER — OFFICE VISIT (OUTPATIENT)
Dept: PHYSICAL THERAPY | Facility: CLINIC | Age: 51
End: 2021-06-11
Payer: MEDICARE

## 2021-06-11 DIAGNOSIS — S46.811A TEAR OF RIGHT INFRASPINATUS TENDON, INITIAL ENCOUNTER: ICD-10-CM

## 2021-06-11 DIAGNOSIS — M75.101 TEAR OF RIGHT SUPRASPINATUS TENDON: Primary | ICD-10-CM

## 2021-06-11 PROCEDURE — 97112 NEUROMUSCULAR REEDUCATION: CPT

## 2021-06-11 PROCEDURE — 97110 THERAPEUTIC EXERCISES: CPT

## 2021-06-11 NOTE — PROGRESS NOTES
Daily Note     Today's date: 2021  Patient name: Maryuri Macdonald  : 1970  MRN: [id number]  Referring provider: Sherice Henriquez MD  Dx:   Encounter Diagnosis     ICD-10-CM    1  Tear of right supraspinatus tendon  M75 101    2  Tear of right infraspinatus tendon, initial encounter  S46 811A        Start Time: 1145  Stop Time: 1228  Total time in clinic (min): 43 minutes    Subjective: Patient reports mild shoulder soreness at the start of today's session  He reports compliance with HEP, and that he would like to review them again today  Patient reports having a "heat rash" over left side of neck he attributes to sling (picture uploaded to media section of patient's chart)  Objective: See treatment diary below  PROM flexion: 75 degrees  PROM abduction: 55 degrees  PROM ER: 20 degrees    Assessment: Tolerated treatment well  Initiated gentle PT PROM for right shoulder during today's session, clayton well  Reviewed initial HEP including PROM of c/s, elbow, and wrist, clayton well and expressed good understanding  All activities (PROM, pendulums) completed within restrictions of post-operative protocol  Completed stair taps and review of turning due to dx of CP and concern of falls s/p RTC sx  Pt will continue to benefit from skilled PT to improve functional mobility and activity tolerance  Plan: Continue per plan of care        Precautions: Cerebral Palsy, s/p R RTC repair (21), FALL RISK, no cryotherapy (not tolerated)    Phase 1 - Immediate Post-Operative Phase: 21 - 21  Phase 2 - Protection and Active Motion Phase: 21 - 21  Phase 3 - Early Strengthening Phase: 21 - 21  Phase 4 - Aggressive Rehab Phase: 21 -   See protocol attached to paper chart for further details      Manuals            Visit Number IE 2                                                  Neuro Re-Ed             Scap isos 3" x5 3" x10 ea           Ball squeeze  x30 Ther Ex             Elbow ROM x10 x10           Wrist, hand ROM x10 x10           Cervical ROM x10 x10           Pendulums  sw  cw/ccw x10 ea                                                  PROM R shld  10' flex, abd, ER           Ther Activity                                       Gait Training             Step taps  x15 ea           Turn w/ amb  rev           Exagg gait, marching  rev           Modalities                                         [facility] Access Code [email or web address]

## 2021-06-14 ENCOUNTER — APPOINTMENT (OUTPATIENT)
Dept: PHYSICAL THERAPY | Facility: CLINIC | Age: 51
End: 2021-06-14
Payer: MEDICARE

## 2021-06-15 ENCOUNTER — OFFICE VISIT (OUTPATIENT)
Dept: PHYSICAL THERAPY | Facility: CLINIC | Age: 51
End: 2021-06-15
Payer: MEDICARE

## 2021-06-15 DIAGNOSIS — S46.811A TEAR OF RIGHT INFRASPINATUS TENDON, INITIAL ENCOUNTER: ICD-10-CM

## 2021-06-15 DIAGNOSIS — M75.101 TEAR OF RIGHT SUPRASPINATUS TENDON: Primary | ICD-10-CM

## 2021-06-15 PROCEDURE — 97112 NEUROMUSCULAR REEDUCATION: CPT

## 2021-06-15 PROCEDURE — 97110 THERAPEUTIC EXERCISES: CPT

## 2021-06-15 NOTE — PROGRESS NOTES
Daily Note     Today's date: 6/15/2021  Patient name: Geraldo Vallejo  : 1970  MRN: [id number]  Referring provider: Gabrielle Estes MD  Dx:   Encounter Diagnosis     ICD-10-CM    1  Tear of right supraspinatus tendon  M75 101    2  Tear of right infraspinatus tendon, initial encounter  S46 811A        Start Time: 1700  Stop Time: 1738  Total time in clinic (min): 38 minutes    Subjective: Patient reports no pain or soreness at the start of today's session  He reports having a few bouts of tone/spasticity into R UE today  Objective: See treatment diary below      Assessment: Tolerated treatment well  Patient progressing well toward phase 1 ROM goals within post-operative restrictions  Patient with improving tolerance to isometric exercises  Pt will continue to benefit from skilled PT to improve functional mobility and activity tolerance  Plan: Continue per plan of care        Precautions: Cerebral Palsy, s/p R RTC repair (21), FALL RISK, no cryotherapy (not tolerated)    Phase 1 - Immediate Post-Operative Phase: 21 - 21  Phase 2 - Protection and Active Motion Phase: 21 - 21  Phase 3 - Early Strengthening Phase: 21 - 21  Phase 4 - Aggressive Rehab Phase: 21 -   See protocol attached to paper chart for further details      Manuals 6/8 6/11 6/15          Visit Number IE 2 3                                                 Neuro Re-Ed             Scap isos 3" x5 3" x10 ea 3" x20 ea          Ball squeeze  x30 x30                                                                           Ther Ex             Elbow ROM x10 x10 x30          Wrist, hand ROM x10 x10 x30          Cervical ROM x10 x10 x30          Pendulums  sw  cw/ccw x10 ea Sw, cw/ccw x15 ea                                                 PROM R shld  10' flex, abd, ER 10' total          Ther Activity                                       Gait Training             Step taps  x15 ea nv          Turn w/ amb rev nikolas arrieta marching  rev connor          Modalities                                         [facility] Access Code [email or web address]

## 2021-06-16 ENCOUNTER — APPOINTMENT (OUTPATIENT)
Dept: PHYSICAL THERAPY | Facility: CLINIC | Age: 51
End: 2021-06-16
Payer: MEDICARE

## 2021-06-17 ENCOUNTER — OFFICE VISIT (OUTPATIENT)
Dept: PHYSICAL THERAPY | Facility: CLINIC | Age: 51
End: 2021-06-17
Payer: MEDICARE

## 2021-06-17 DIAGNOSIS — M75.101 TEAR OF RIGHT SUPRASPINATUS TENDON: Primary | ICD-10-CM

## 2021-06-17 DIAGNOSIS — S46.811A TEAR OF RIGHT INFRASPINATUS TENDON, INITIAL ENCOUNTER: ICD-10-CM

## 2021-06-17 PROCEDURE — 97112 NEUROMUSCULAR REEDUCATION: CPT

## 2021-06-17 PROCEDURE — 97110 THERAPEUTIC EXERCISES: CPT

## 2021-06-17 NOTE — PROGRESS NOTES
Daily Note     Today's date: 2021  Patient name: Chele Ibarra  : 1970  MRN: [id number]  Referring provider: Alda Noel MD  Dx:   Encounter Diagnosis     ICD-10-CM    1  Tear of right supraspinatus tendon  M75 101    2  Tear of right infraspinatus tendon, initial encounter  S46 811A        Start Time:   Stop Time:   Total time in clinic (min): 40 minutes    Subjective: Patient reports no shoulder soreness at the start of today's session  He reports experiencing occasional "cold pains" into right shoulder  Objective: See treatment diary below      Assessment: Tolerated treatment well  Review of appropriate turning techniques to improve JENNA, clayton well  R shoulder PROM progressing well toward phase 1 goals (90 deg flexion, 60 deg abd)  Pt will continue to benefit from skilled PT to improve functional mobility and activity tolerance  Plan: Continue per plan of care        Precautions: Cerebral Palsy, s/p R RTC repair (21), FALL RISK, no cryotherapy (not tolerated)    Phase 1 - Immediate Post-Operative Phase: 21 - 21  Phase 2 - Protection and Active Motion Phase: 21 - 21  Phase 3 - Early Strengthening Phase: 21 - 21  Phase 4 - Aggressive Rehab Phase: 21 -   See protocol attached to paper chart for further details      Manuals 6/8 6/11 6/15 6/18         Visit Number IE 2 3 4                                                Neuro Re-Ed             Scap isos 3" x5 3" x10 ea 3" x20 ea          Ball squeeze  x30 x30                                                                           Ther Ex             Elbow ROM x10 x10 x30 x30         Wrist, hand ROM x10 x10 x30 x30         Cervical ROM x10 x10 x30 x30         Pendulums  sw  cw/ccw x10 ea Sw, cw/ccw x15 ea Sw, cw/ccw x15                                                PROM R shld  10' flex, abd, ER 10' total 10' total         Ther Activity                                       Gait Training Step taps  x15 ea nv x25 lalita         Turn w/ amb  rev nv x10         Exagg gait, marching  rev nv 1000'         Modalities                                         [facility] Access Code [email or web address]

## 2021-06-21 ENCOUNTER — APPOINTMENT (OUTPATIENT)
Dept: PHYSICAL THERAPY | Facility: CLINIC | Age: 51
End: 2021-06-21
Payer: MEDICARE

## 2021-06-22 ENCOUNTER — OFFICE VISIT (OUTPATIENT)
Dept: PHYSICAL THERAPY | Facility: CLINIC | Age: 51
End: 2021-06-22
Payer: MEDICARE

## 2021-06-22 DIAGNOSIS — M75.101 TEAR OF RIGHT SUPRASPINATUS TENDON: Primary | ICD-10-CM

## 2021-06-22 DIAGNOSIS — S46.811A TEAR OF RIGHT INFRASPINATUS TENDON, INITIAL ENCOUNTER: ICD-10-CM

## 2021-06-22 PROCEDURE — 97110 THERAPEUTIC EXERCISES: CPT

## 2021-06-22 NOTE — PROGRESS NOTES
Daily Note     Today's date: 2021  Patient name: Paul Eubanks  : 1970  MRN: [id number]  Referring provider: Lazarus Hake, MD  Dx:   Encounter Diagnosis     ICD-10-CM    1  Tear of right supraspinatus tendon  M75 101    2  Tear of right infraspinatus tendon, initial encounter  S46 811A        Start Time: 1700  Stop Time: 1745  Total time in clinic (min): 45 minutes    Subjective: Patient reports experiencing mild shoulder pain at the start of today's session after dropping an object and using R UE to reach for it  Objective: See treatment diary below      Assessment: Tolerated treatment well  Patient w/o shoulder pain post session  He continues to progress toward ROM targets for phase 1 in all planes within post-operative protocol  Continued w/ gait training within recommendations of protocol due to high fall risk category  Pt will continue to benefit from skilled PT to improve functional mobility and activity tolerance  Plan: Continue per plan of care        Precautions: Cerebral Palsy, s/p R RTC repair (21), FALL RISK, no cryotherapy (not tolerated)    Phase 1 - Immediate Post-Operative Phase: 21 - 21  Phase 2 - Protection and Active Motion Phase: 21 - 21  Phase 3 - Early Strengthening Phase: 21 - 21  Phase 4 - Aggressive Rehab Phase: 21 -   See protocol attached to paper chart for further details      Manuals 6/8 6/11 6/15 6/18 6/22        Visit Number IE 2 3 4 5                                               Neuro Re-Ed             Scap isos 3" x5 3" x10 ea 3" x20 ea          Ball squeeze  x30 x30  x30                                                                         Ther Ex             Elbow ROM x10 x10 x30 x30 x30        Wrist, hand ROM x10 x10 x30 x30 x30        Cervical ROM x10 x10 x30 x30 x30        Pendulums  sw  cw/ccw x10 ea Sw, cw/ccw x15 ea Sw, cw/ccw x15 Sw, cw/ccw x20 ea                                               PROM R shld 10' flex, abd, ER 10' total 10' total 15'        Ther Activity                                       Gait Training             Staggered stance hold on foam      3x30" ea        Step taps  x15 ea nv x25 ea x25 ea        Turn w/ amb  rev nv x10 x10        Exagg gait, marching  rev nv 1000' 1000'        [facility]

## 2021-06-23 ENCOUNTER — APPOINTMENT (OUTPATIENT)
Dept: PHYSICAL THERAPY | Facility: CLINIC | Age: 51
End: 2021-06-23
Payer: MEDICARE

## 2021-06-24 ENCOUNTER — OFFICE VISIT (OUTPATIENT)
Dept: PHYSICAL THERAPY | Facility: CLINIC | Age: 51
End: 2021-06-24
Payer: MEDICARE

## 2021-06-24 DIAGNOSIS — M75.101 TEAR OF RIGHT SUPRASPINATUS TENDON: Primary | ICD-10-CM

## 2021-06-24 DIAGNOSIS — S46.811A TEAR OF RIGHT INFRASPINATUS TENDON, INITIAL ENCOUNTER: ICD-10-CM

## 2021-06-24 PROCEDURE — 97116 GAIT TRAINING THERAPY: CPT

## 2021-06-24 PROCEDURE — 97110 THERAPEUTIC EXERCISES: CPT

## 2021-06-24 NOTE — PROGRESS NOTES
Daily Note     Today's date: 2021  Patient name: Sourav Costa  : 1970  MRN: [id number]  Referring provider: Perry Chery MD  Dx:   Encounter Diagnosis     ICD-10-CM    1  Tear of right supraspinatus tendon  M75 101    2  Tear of right infraspinatus tendon, initial encounter  S46 811A        Start Time: 174  Stop Time:   Total time in clinic (min): 43 minutes    Subjective: Patient reports no shoulder pain or soreness at the start of today's session  He states that he has been using his rolling walker w/ tray table on it only with his left UE to transfer objects/meals around his apartment  He inquired during today's session if this was an acceptable way to use AD over [facility]  Objective: See treatment diary below      Assessment: Tolerated treatment well  Gait training included education on JENNA and review of ambulation w/ RW (staying within support of walker, hand placement to limit tipping), pt expressed good understanding  Pt continues to progress well toward ROM goals for phase I of RTC rehab  Pt will continue to benefit from skilled PT to improve functional mobility and activity tolerance  Plan: Continue per plan of care        Precautions: Cerebral Palsy, s/p R RTC repair (21), FALL RISK, no cryotherapy (not tolerated)    Phase 1 - Immediate Post-Operative Phase: 21 - 21  Phase 2 - Protection and Active Motion Phase: 21 - 21  Phase 3 - Early Strengthening Phase: 21 - 21  Phase 4 - Aggressive Rehab Phase: 21 -   See protocol attached to paper chart for further details      Manuals 6/8 6/11 6/15 6/18 6/22 6/24       Visit Number IE 2 3 4 5 6                                              Neuro Re-Ed             Scap isos 3" x5 3" x10 ea 3" x20 ea          Ball squeeze  x30 x30  x30 x30                                                                        Ther Ex             Elbow ROM x10 x10 x30 x30 x30 x30       Wrist, hand ROM x10 x10 x30 x30 x30 x30       Cervical ROM x10 x10 x30 x30 x30 x30       Pendulums  sw  cw/ccw x10 ea Sw, cw/ccw x15 ea Sw, cw/ccw x15 Sw, cw/ccw x20 ea Sw, cw/ccw x30 ea                                              PROM R shld  10' flex, abd, ER 10' total 10' total 15' 15'       Ther Activity                                       Gait Training             Staggered stance hold on foam      3x30" ea        Step taps  x15 ea nv x25 ea x25 ea x25 ea       Turn w/ amb  rev nv x10 x10 x10       Exagg gait, marching  rev nv 1000' 1000' 10' total       Modalities                                         [facility] Access Code [email or web address]

## 2021-06-28 ENCOUNTER — APPOINTMENT (OUTPATIENT)
Dept: PHYSICAL THERAPY | Facility: CLINIC | Age: 51
End: 2021-06-28
Payer: MEDICARE

## 2021-06-29 ENCOUNTER — OFFICE VISIT (OUTPATIENT)
Dept: PHYSICAL THERAPY | Facility: CLINIC | Age: 51
End: 2021-06-29
Payer: MEDICARE

## 2021-06-29 DIAGNOSIS — S46.811A TEAR OF RIGHT INFRASPINATUS TENDON, INITIAL ENCOUNTER: ICD-10-CM

## 2021-06-29 DIAGNOSIS — M75.101 TEAR OF RIGHT SUPRASPINATUS TENDON: Primary | ICD-10-CM

## 2021-06-29 PROCEDURE — 97110 THERAPEUTIC EXERCISES: CPT

## 2021-06-29 NOTE — PROGRESS NOTES
Daily Note     Today's date: 2021  Patient name: Kelly Gong  : 1970  MRN: [id number]  Referring provider: Sophie Calderon MD  Dx:   Encounter Diagnosis     ICD-10-CM    1  Tear of right supraspinatus tendon  M75 101    2  Tear of right infraspinatus tendon, initial encounter  S46 811A        Start Time: 1615  Stop Time: 1655  Total time in clinic (min): 40 minutes    Subjective: Patient reports experiencing increased levels of pain while attempting to sleep over the weekend  He states that he took tylenol and this pain improved  Objective: See treatment diary below      Assessment: Tolerated treatment well  Patient progressing well toward phase 1 goals and range of motion requirements  Limitation noted in passive ER (20 deg) due to soreness, improved following STM  Occasional spasticity noted with passive range through right UE that improved w/ sustained holds  Pt will continue to benefit from skilled PT to improve functional mobility and activity tolerance  Plan: Continue per plan of care        Precautions: Cerebral Palsy, s/p R RTC repair (21), FALL RISK, no cryotherapy (not tolerated)    Phase 1 - Immediate Post-Operative Phase: 21 - 21  Phase 2 - Protection and Active Motion Phase: 21 - 21  Phase 3 - Early Strengthening Phase: 21 - 21  Phase 4 - Aggressive Rehab Phase: 21 -   See protocol attached to paper chart for further details      Manuals 6/8 6/11 6/15 6/18 6/22 6/24 6/29      Visit Number IE 2 3 4 5 6 7                                             Neuro Re-Ed             Scap isos 3" x5 3" x10 ea 3" x20 ea          Ball squeeze  x30 x30  x30 x30 x30                                                                       Ther Ex             Elbow ROM x10 x10 x30 x30 x30 x30 2x30      Wrist, hand ROM x10 x10 x30 x30 x30 x30 x30      Cervical ROM x10 x10 x30 x30 x30 x30 x30      Pendulums  sw  cw/ccw x10 ea Sw, cw/ccw x15 ea Sw, cw/ccw x15 Sw, cw/ccw x20 ea Sw, cw/ccw x30 ea Sw, cw/ccw x30 ea                                             PROM R shld  10' flex, abd, ER 10' total 10' total 15' 15' 15'      Ther Activity                                       Gait Training             Staggered stance hold on foam      3x30" ea        Step taps  x15 ea nv x25 ea x25 ea x25 ea       Turn w/ amb  rev nv x10 x10 x10       Exagg gait, marching  rev nv 1000' 1000' 10' total       Modalities                                         [address]

## 2021-06-30 ENCOUNTER — APPOINTMENT (OUTPATIENT)
Dept: PHYSICAL THERAPY | Facility: CLINIC | Age: 51
End: 2021-06-30
Payer: MEDICARE

## 2021-07-01 ENCOUNTER — OFFICE VISIT (OUTPATIENT)
Dept: PHYSICAL THERAPY | Facility: CLINIC | Age: 51
End: 2021-07-01
Payer: MEDICARE

## 2021-07-01 DIAGNOSIS — M75.101 TEAR OF RIGHT SUPRASPINATUS TENDON: Primary | ICD-10-CM

## 2021-07-01 DIAGNOSIS — S46.811A TEAR OF RIGHT INFRASPINATUS TENDON, INITIAL ENCOUNTER: ICD-10-CM

## 2021-07-01 PROCEDURE — 97116 GAIT TRAINING THERAPY: CPT

## 2021-07-01 PROCEDURE — 97110 THERAPEUTIC EXERCISES: CPT

## 2021-07-01 NOTE — PROGRESS NOTES
Daily Note     Today's date: 2021  Patient name: Colin Braxton  : 1970  MRN: [id number]  Referring provider: Derek Gordon MD  Dx:   Encounter Diagnosis     ICD-10-CM    1  Tear of right supraspinatus tendon  M75 101    2  Tear of right infraspinatus tendon, initial encounter  S46 811A        Start Time: 1615  Stop Time: 1655  Total time in clinic (min): 40 minutes    Subjective: Patient reports improvement in right shoulder pain/soreness since previous session  Objective: See treatment diary below      Assessment: Tolerated treatment well  Patient able to achieve 90 degrees flexion, 60 degrees flexion, and 25 degrees ER in passive range of motion  PROM will be progressed nv within post-operative protocol (after 21)  Pt will continue to benefit from skilled PT to improve functional mobility and activity tolerance  Plan: Continue per plan of care        Precautions: Cerebral Palsy, s/p R RTC repair (21), FALL RISK, no cryotherapy (not tolerated)    Phase 1 - Immediate Post-Operative Phase: 21 - 21  Phase 2 - Protection and Active Motion Phase: 21 - 21  Phase 3 - Early Strengthening Phase: 21 - 21  Phase 4 - Aggressive Rehab Phase: 21 -   See protocol attached to paper chart for further details      Manuals 6/8 6/11 6/15 6/18 6/22 6/24 6/29 7/1     Visit Number IE 2 3 4 5 6 7 8     STM R shld        5'     Gr I-III AP, inf glide R shld                          Neuro Re-Ed             Scap isos 3" x5 3" x10 ea 3" x20 ea          Ball squeeze  x30 x30  x30 x30 x30 3' total                                                                      Ther Ex             Elbow ROM x10 x10 x30 x30 x30 x30 2x30 3' total     Wrist, hand ROM x10 x10 x30 x30 x30 x30 x30 3' total     Cervical ROM x10 x10 x30 x30 x30 x30 x30 90" ea     Pendulums  sw  cw/ccw x10 ea Sw, cw/ccw x15 ea Sw, cw/ccw x15 Sw, cw/ccw x20 ea Sw, cw/ccw x30 ea Sw, cw/ccw x30 ea 5' total PROM R shld  10' flex, abd, ER 10' total 10' total 15' 15' 15' 15'     Ther Activity                                       Gait Training             Staggered stance hold on foam      3x30" ea        Step taps  x15 ea nv x25 ea x25 ea x25 ea  x30 ea     Turn w/ amb  rev nv x10 x10 x10       Exagg gait, marching  rev nv 1000' 1000' 10' total       Modalities                                         [facility] Access Code [email or web address]

## 2021-07-06 ENCOUNTER — OFFICE VISIT (OUTPATIENT)
Dept: PHYSICAL THERAPY | Facility: CLINIC | Age: 51
End: 2021-07-06
Payer: MEDICARE

## 2021-07-06 DIAGNOSIS — M75.101 TEAR OF RIGHT SUPRASPINATUS TENDON: Primary | ICD-10-CM

## 2021-07-06 DIAGNOSIS — S46.811A TEAR OF RIGHT INFRASPINATUS TENDON, INITIAL ENCOUNTER: ICD-10-CM

## 2021-07-06 PROCEDURE — 97140 MANUAL THERAPY 1/> REGIONS: CPT

## 2021-07-06 PROCEDURE — 97110 THERAPEUTIC EXERCISES: CPT

## 2021-07-06 NOTE — PROGRESS NOTES
Daily Note     Today's date: 2021  Patient name: Nava Torres  : 1970  MRN: [id number]  Referring provider: Barbie Estrella MD  Dx:   Encounter Diagnosis     ICD-10-CM    1  Tear of right supraspinatus tendon  M75 101    2  Tear of right infraspinatus tendon, initial encounter  S46 811A        Start Time: 1700  Stop Time: 1740  Total time in clinic (min): 40 minutes    Subjective: Patient reports continued compliance w/ HEP  He reports mild shoulder soreness at the start of today's session  Objective: See treatment diary below      Assessment: Tolerated treatment well  Patient eligible to progress PROM per post-operative protocol; ~95 deg flexion, 80 deg abduction, 25 deg ER achieved  Muscle guarding improved w/ manual contact to superior aspect of shoulder  Initiated AAROM shoulder flexion in supine per post-op protocol, clayton well  Pt will continue to benefit from skilled PT to improve functional mobility and activity tolerance  Plan: Continue per plan of care        Precautions: Cerebral Palsy, s/p R RTC repair (21), FALL RISK, no cryotherapy (not tolerated)    Phase 1 - Immediate Post-Operative Phase: 21 - 21  Phase 2 - Protection and Active Motion Phase: 21 - 21  Phase 3 - Early Strengthening Phase: 21 - 21  Phase 4 - Aggressive Rehab Phase: 21 -   See protocol attached to paper chart for further details      Manuals 6/8 6/11 6/15 6/18 6/22 6/24 6/29 7/1 7/6    Visit Number IE 2 3 4 5 6 7 8 9    STM R shld        5' 8'    Gr I-III AP, inf glide R shld                          Neuro Re-Ed             Scap isos 3" x5 3" x10 ea 3" x20 ea          Ball squeeze  x30 x30  x30 x30 x30 3' total 3' total                                                                     Ther Ex             Elbow ROM x10 x10 x30 x30 x30 x30 2x30 3' total 3'    Wrist, hand ROM x10 x10 x30 x30 x30 x30 x30 3' total 3'    Cervical ROM x10 x10 x30 x30 x30 x30 x30 90" ea 90" ea Pendulums  sw  cw/ccw x10 ea Sw, cw/ccw x15 ea Sw, cw/ccw x15 Sw, cw/ccw x20 ea Sw, cw/ccw x30 ea Sw, cw/ccw x30 ea 5' total 5' total                                           PROM R shld  10' flex, abd, ER 10' total 10' total 15' 15' 15' 15' 15'    Ther Activity                                       Gait Training             Staggered stance hold on foam      3x30" ea    3x30"    Step taps  x15 ea nv x25 ea x25 ea x25 ea  x30 ea x30 ea    Turn w/ amb  rev nv x10 x10 x10       Exagg gait, marching  rev nv 1000' 1000' 10' total       Modalities                                         [facility] Access Code [email or web address]

## 2021-07-08 ENCOUNTER — APPOINTMENT (OUTPATIENT)
Dept: PHYSICAL THERAPY | Facility: CLINIC | Age: 51
End: 2021-07-08
Payer: MEDICARE

## 2021-07-13 ENCOUNTER — OFFICE VISIT (OUTPATIENT)
Dept: PHYSICAL THERAPY | Facility: CLINIC | Age: 51
End: 2021-07-13
Payer: MEDICARE

## 2021-07-13 DIAGNOSIS — S46.811A TEAR OF RIGHT INFRASPINATUS TENDON, INITIAL ENCOUNTER: ICD-10-CM

## 2021-07-13 DIAGNOSIS — M75.101 TEAR OF RIGHT SUPRASPINATUS TENDON: Primary | ICD-10-CM

## 2021-07-13 PROCEDURE — 97140 MANUAL THERAPY 1/> REGIONS: CPT

## 2021-07-13 PROCEDURE — 97110 THERAPEUTIC EXERCISES: CPT

## 2021-07-13 NOTE — PROGRESS NOTES
Progress Note     Today's date: 2021  Patient name: Edil Mata  : 1970  MRN: [id number]  Referring provider: Kerry Delgado MD  Dx:   Encounter Diagnosis     ICD-10-CM    1  Tear of right supraspinatus tendon  M75 101    2  Tear of right infraspinatus tendon, initial encounter  S46 811A        Start Time: 1315  Stop Time: 1358  Total time in clinic (min): 43 minutes    Subjective: Patient states he is pleased with his progress and pain control to this point  He states he has been compliant w/ sling wear as well as HEP  Goals  STG - 6 weeks (end of phase I)  1  Patient will be I and compliant with HEP per post-op protocol  - MET  2  Pt will achieve passive forward flexion to at least 125 degrees (per protocol)  - MET  3  Pt will achieve passive ER in scaption to at least 40 degrees (per protocol)  - MET  4  Pt will achieve passive IR in scaption to at least 50 degrees (per protocol)  - MET  5  Pt will express reduction in pain by 50%  - MET    ITG - 12 weeks (end of phase II) - CONTINUED AT PN ON 21  1  Pt FOTO will improve to >43/100   2  Pt will achieve full AROM  3  Pt will express no pain  4  Pt will demonstrate 4/5 strength in all planes  5  Pt will perform AROM shoulder flexion w/o scapular compensation/shrugging  LTG - 16 weeks (end of phase III) - CONTINUED AT PN ON 21  1  Pt will be able to tolerate progression to-low level functional activities (ie  Light chores around house) (per protocol)  2  Pt will demonstrate return of dynamic shoulder stability (per protocol)  3  Pt will return to higher demanding work/ADLs  4  Pt FOTO will improve to >60/100   5  Pt will demonstrate at least 4+/5 strength in all planes  Objective: See treatment diary below    Observations     Right Shoulder -well-healed portal incisions at PN on 21  Positive for incision       Additional Observation Details  3 portal incisions at shoulder, (-) for redness, warmth, drainage    Cervical/Thoracic Screen - grossly the same at  on 7/13/21  Cervical range of motion within normal limits with the following exceptions: 25% restriction in all planes  Neurological Testing     Sensation     Shoulder   Left Shoulder   Intact: light touch    Right Shoulder   Intact: light touch    Active Range of Motion     Not performed at  on 7/13/21 in accordance with post-operative protocol    Passive Range of Motion - at  on 7/13/21  Flexion: 125 degrees  Abduction: 90 degrees  IR: to body  ER: 40 degrees      Strength/Myotome Testing     Additional Strength Details  Unable to perform shoulder MMT due to post-operative status        Assessment: Patient has made gains in range of motion and movement tolerance since starting physical therapy  Progressions have occurred within accordance with surgeon's post-operative protocol  He has progressed well toward or met all goals set for phase 1 (immeidate post-operative phase) at this time and will benefit from progression to phase 2 after 7/16/21  Patient is a strong candidate to benefit from continued skilled PT due to progression toward goals, motivation for improvement, and as he is below his PLOF  Plan: Continue per plan of care    Patient eligible to progress to phase 2 after 7/16/21     Precautions: Cerebral Palsy, s/p R RTC repair (6/4/21), FALL RISK, no cryotherapy (not tolerated)    Phase 1 - Immediate Post-Operative Phase: 6/4/21 - 7/16/21  Phase 2 - Protection and Active Motion Phase: 7/16/21 - 8/27/21  Phase 3 - Early Strengthening Phase: 8/27/21 - 9/24/21  Phase 4 - Aggressive Rehab Phase: 9/24/21 -   See protocol attached to paper chart for further details      Manuals 6/8 6/11 6/15 6/18 6/22 6/24 6/29 7/1 7/6 7/13   Visit Number IE 2 3 4 5 6 7 8 9 10   STM R shld        5' 8' 8'   Gr I-III AP, inf glide R shld                          Neuro Re-Ed             Scap isos 3" x5 3" x10 ea 3" x20 ea          Ball squeeze  x30 x30  x30 x30 x30 3' total 3' total 3'                                                                    Ther Ex             Elbow ROM x10 x10 x30 x30 x30 x30 2x30 3' total 3' 3'   Wrist, hand ROM x10 x10 x30 x30 x30 x30 x30 3' total 3' 3'   Cervical ROM x10 x10 x30 x30 x30 x30 x30 90" ea 90" ea 90" ea   Pendulums  sw  cw/ccw x10 ea Sw, cw/ccw x15 ea Sw, cw/ccw x15 Sw, cw/ccw x20 ea Sw, cw/ccw x30 ea Sw, cw/ccw x30 ea 5' total 5' total 5' total    Pulleys          5" x10 flexion                             PROM R shld  10' flex, abd, ER 10' total 10' total 15' 15' 15' 15' 15' 15'   Ther Activity                                       Gait Training             Staggered stance hold on foam      3x30" ea    3x30"    Step taps  x15 ea nv x25 ea x25 ea x25 ea  x30 ea x30 ea    Turn w/ amb  rev nv x10 x10 x10       Exagg gait, marching  rev nv 1000' 1000' 10' total       Modalities                                         [facility] Access Code [email or web address]

## 2021-07-15 ENCOUNTER — OFFICE VISIT (OUTPATIENT)
Dept: PHYSICAL THERAPY | Facility: CLINIC | Age: 51
End: 2021-07-15
Payer: MEDICARE

## 2021-07-15 DIAGNOSIS — S46.811A TEAR OF RIGHT INFRASPINATUS TENDON, INITIAL ENCOUNTER: ICD-10-CM

## 2021-07-15 DIAGNOSIS — M75.101 TEAR OF RIGHT SUPRASPINATUS TENDON: Primary | ICD-10-CM

## 2021-07-15 PROCEDURE — 97110 THERAPEUTIC EXERCISES: CPT

## 2021-07-15 NOTE — PROGRESS NOTES
Daily Note     Today's date: 7/15/2021  Patient name: Elizabeth Oreilly  : 1970  MRN: [id number]  Referring provider: Jarvis Dangelo MD  Dx:   Encounter Diagnosis     ICD-10-CM    1  Tear of right supraspinatus tendon  M75 101    2  Tear of right infraspinatus tendon, initial encounter  S46 811A        Start Time: 1655  Stop Time: 1745  Total time in clinic (min): 50 minutes    Subjective: Patient reports no pain at the start of today's session  Objective: See treatment diary below      Assessment: Tolerated treatment well  Patient with mild anterior shoulder discomfort that subsided following MT and PROM to shoulder  Pt continues to progress well w/ PROM/AAROM  Pt will continue to benefit from skilled PT to improve functional mobility and activity tolerance  Plan: Continue per plan of care        Precautions: Cerebral Palsy, s/p R RTC repair (21), FALL RISK, no cryotherapy (not tolerated)    Phase 1 - Immediate Post-Operative Phase: 21 - 21  Phase 2 - Protection and Active Motion Phase: 21 - 21  Phase 3 - Early Strengthening Phase: 21 - 21  Phase 4 - Aggressive Rehab Phase: 21 -   See protocol attached to paper chart for further details      Manuals 7/15  6/15 6/18 6/22 6/24 6/29 7/1 7/6 7/13   Visit Number 11  3 4 5 6 7 8 9 10   STM R shld        5' 8' 8'   Gr I-III AP, inf glide R shld                          Neuro Re-Ed             Scap isos   3" x20 ea          Ball squeeze 3'  x30  x30 x30 x30 3' total 3' total 3'                                                                    Ther Ex             Elbow ROM 3'  x30 x30 x30 x30 2x30 3' total 3' 3'   Wrist, hand ROM 3'  x30 x30 x30 x30 x30 3' total 3' 3'   Cervical ROM 90" ea  x30 x30 x30 x30 x30 90" ea 90" ea 90" ea   Pendulums 5' total  Sw, cw/ccw x15 ea Sw, cw/ccw x15 Sw, cw/ccw x20 ea Sw, cw/ccw x30 ea Sw, cw/ccw x30 ea 5' total 5' total 5' total    Pulleys 5" x20         5" x10 flexion PROM R shld   10' total 10' total 15' 15' 15' 15' 15' 15'   Ther Activity                                       Gait Training             Staggered stance hold on foam      3x30" ea    3x30"    Step taps   nv x25 ea x25 ea x25 ea  x30 ea x30 ea    Turn w/ amb   nv x10 x10 x10       Exagg gait, marching   nv 1000' 1000' 10' total       Modalities                                         [facility] Access Code [email or web address]

## 2021-07-20 ENCOUNTER — OFFICE VISIT (OUTPATIENT)
Dept: PHYSICAL THERAPY | Facility: CLINIC | Age: 51
End: 2021-07-20
Payer: MEDICARE

## 2021-07-20 DIAGNOSIS — S46.811A TEAR OF RIGHT INFRASPINATUS TENDON, INITIAL ENCOUNTER: ICD-10-CM

## 2021-07-20 DIAGNOSIS — M75.101 TEAR OF RIGHT SUPRASPINATUS TENDON: Primary | ICD-10-CM

## 2021-07-20 PROCEDURE — 97140 MANUAL THERAPY 1/> REGIONS: CPT

## 2021-07-20 PROCEDURE — 97110 THERAPEUTIC EXERCISES: CPT

## 2021-07-20 NOTE — PROGRESS NOTES
Daily Note     Today's date: 2021  Patient name: Sofia Kilpatrick  : 1970  MRN: [id number]  Referring provider: Elaina Wang  Dx:   Encounter Diagnosis     ICD-10-CM    1  Tear of right supraspinatus tendon  M75 101    2  Tear of right infraspinatus tendon, initial encounter  C65 856X                   Subjective: Patient states he has d/c sling per post-operative protocol  He states he has begun to gently move his arm actively within comfortable ranges  He states he has been experiences mild soreness  Objective: See treatment diary below      Assessment: Tolerated treatment well  Patient with quick fatigue during AROM progressions in both flexion and SL ER  Provided pt w/ manual contact to improve joint alignment and external rotator activation, clayton well  Pt ROM progressing well at this time  He will continue to benefit from skilled PT to improve functional mobility and activity tolerance  Plan: Continue per plan of care        Precautions: Cerebral Palsy, s/p R RTC repair (21), FALL RISK, no cryotherapy (not tolerated)    Phase 1 - Immediate Post-Operative Phase: 21 - 21  Phase 2 - Protection and Active Motion Phase: 21 - 21  Phase 3 - Early Strengthening Phase: 21 - 21  Phase 4 - Aggressive Rehab Phase: 21 -   See protocol attached to paper chart for further details      Manuals 7/15 7/20  6/18 6/22 6/24 6/29 7/1 7/6 7/13   Visit Number 11 12  4 5 6 7 8 9 10   STM R shld        5' 8' 8'   Gr I-III AP, inf glide R shld                          Neuro Re-Ed             Scap isos             Ball squeeze 3' 3'   x30 x30 x30 3' total 3' total 3'                                                                    Ther Ex             Elbow ROM 3' 3'  x30 x30 x30 2x30 3' total 3' 3'   Wrist, hand ROM 3' 3'  x30 x30 x30 x30 3' total 3' 3'   Cervical ROM 90" ea 90" ea  x30 x30 x30 x30 90" ea 90" ea 90" ea   Pendulums 5' total 5' total  Sw, cw/ccw x15 Sw, cw/ccw x20 ea Sw, cw/ccw x30 ea Sw, cw/ccw x30 ea 5' total 5' total 5' total    Pulleys 5" x20 5" x20         5" x10 flexion   Shld flexion  Supine  AAROM 2x10   AROM x10           Shld ER  SL AROM x10           Shld abd                                       PROM R shld  15'  10' total 15' 15' 15' 15' 15' 15'   Ther Activity                                       Gait Training             Staggered stance hold on foam      3x30" ea    3x30"    Step taps    x25 ea x25 ea x25 ea  x30 ea x30 ea    Turn w/ amb    x10 x10 x10       Exagg gait, marching    1000' 1000' 10' total       Modalities                                         [facility] Access Code [email or web address]

## 2021-07-22 ENCOUNTER — OFFICE VISIT (OUTPATIENT)
Dept: PHYSICAL THERAPY | Facility: CLINIC | Age: 51
End: 2021-07-22
Payer: MEDICARE

## 2021-07-22 DIAGNOSIS — M75.101 TEAR OF RIGHT SUPRASPINATUS TENDON: Primary | ICD-10-CM

## 2021-07-22 DIAGNOSIS — S46.811A TEAR OF RIGHT INFRASPINATUS TENDON, INITIAL ENCOUNTER: ICD-10-CM

## 2021-07-22 PROCEDURE — 97140 MANUAL THERAPY 1/> REGIONS: CPT

## 2021-07-22 PROCEDURE — 97110 THERAPEUTIC EXERCISES: CPT

## 2021-07-22 NOTE — PROGRESS NOTES
Daily Note     Today's date: 2021  Patient name: Ady Restrepo  : 1970  MRN: [id number]  Referring provider: Jerzy Granados  Dx:   Encounter Diagnosis     ICD-10-CM    1  Tear of right supraspinatus tendon  M75 101    2  Tear of right infraspinatus tendon, initial encounter  S46 811A        Start Time: 1615  Stop Time: 1700  Total time in clinic (min): 45 minutes    Subjective: Patient reports some soreness after last session which has improved since  Objective: See treatment diary below      Assessment: Tolerated treatment well  Patient tolerated exercises today, difficulty with AROM  Carole Peals He will continue to benefit from skilled PT to improve functional mobility and activity tolerance  Plan: Continue per plan of care        Precautions: Cerebral Palsy, s/p R RTC repair (21), FALL RISK, no cryotherapy (not tolerated)    Phase 1 - Immediate Post-Operative Phase: 21 - 21  Phase 2 - Protection and Active Motion Phase: 21 - 21  Phase 3 - Early Strengthening Phase: 21 - 21  Phase 4 - Aggressive Rehab Phase: 21 -   See protocol attached to paper chart for further details      Manuals 7/15 7/20 7/22  6/22 6/24 6/29 7/1 7/6 7/13   Visit Number 11 12 13  5 6 7 8 9 10   STM R shld        5' 8' 8'   Gr I-III AP, inf glide R shld                          Neuro Re-Ed             Scap isos             Ball squeeze 3' 3' 3'  x30 x30 x30 3' total 3' total 3'                                                                    Ther Ex             Elbow ROM 3' 3' 3'  x30 x30 2x30 3' total 3' 3'   Wrist, hand ROM 3' 3' 3'  x30 x30 x30 3' total 3' 3'   Cervical ROM 90" ea 90" ea 90" ea  x30 x30 x30 90" ea 90" ea 90" ea   Pendulums 5' total 5' total 5' total  Sw, cw/ccw x20 ea Sw, cw/ccw x30 ea Sw, cw/ccw x30 ea 5' total 5' total 5' total    Pulleys 5" x20 5" x20  5"x20       5" x10 flexion   Shld flexion  Supine  AAROM 2x10   AROM x10 Supine  AAROM 2x10   AROM x10 Shld ER  SL AROM x10 SL AROM x10          Shld abd                                       PROM R shld  15' 15'  15' 15' 15' 15' 15' 15'   Ther Activity                                       Gait Training             Staggered stance hold on foam      3x30" ea    3x30"    Step taps     x25 ea x25 ea  x30 ea x30 ea    Turn w/ amb     x10 x10       Exagg gait, marching     1000' 10' total       Modalities                                         [employer] Access Code [email or web address]

## 2021-07-27 ENCOUNTER — OFFICE VISIT (OUTPATIENT)
Dept: PHYSICAL THERAPY | Facility: CLINIC | Age: 51
End: 2021-07-27
Payer: MEDICARE

## 2021-07-27 DIAGNOSIS — M75.101 TEAR OF RIGHT SUPRASPINATUS TENDON: Primary | ICD-10-CM

## 2021-07-27 DIAGNOSIS — S46.811A TEAR OF RIGHT INFRASPINATUS TENDON, INITIAL ENCOUNTER: ICD-10-CM

## 2021-07-27 PROCEDURE — 97110 THERAPEUTIC EXERCISES: CPT

## 2021-07-27 PROCEDURE — 97112 NEUROMUSCULAR REEDUCATION: CPT

## 2021-07-27 NOTE — PROGRESS NOTES
Daily Note     Today's date: 2021  Patient name: Brain Amin  : 1970  MRN: [id number]  Referring provider: Maria Luz Pinedo  Dx:   Encounter Diagnosis     ICD-10-CM    1  Tear of right supraspinatus tendon  M75 101    2  Tear of right infraspinatus tendon, initial encounter  S46 811A        Start Time: 1615  Stop Time: 1700  Total time in clinic (min): 45 minutes    Subjective: Patient reports 0/10 pain in his R shoulder today  He states he was able to reach his hair this morning in the shower, noting minimal difficulties  Patient asked about kayaking at [place] the second week of August        Objective: See treatment diary below      Assessment: Tolerated treatment well  Progressing well through protocol  Fatigued quickly by end of session  Manual assist required for R shoulder flex and ER  No pain noted post tx  Advised to ask primary PT regarding kayaking at [place]  Patient would benefit from continued PT to maximize function for independence with community activities  Plan: Continue per plan of care        Precautions: Cerebral Palsy, s/p R RTC repair (21), FALL RISK, no cryotherapy (not tolerated)    Phase 1 - Immediate Post-Operative Phase: 21 - 21  Phase 2 - Protection and Active Motion Phase: 21 - 21  Phase 3 - Early Strengthening Phase: 21 - 21  Phase 4 - Aggressive Rehab Phase: 21 -   See protocol attached to paper chart for further details      Manuals 7/15 7/20 7/22 7/27 6/22 6/24 6/29 7/1 7/6 7/13   Visit Number 11 12 13 14 5 6 7 8 9 10   STM R shld        5' 8' 8'   Gr I-III AP, inf glide R shld                          Neuro Re-Ed             Scap isos             Ball squeeze 3' 3' 3' 3' x30 x30 x30 3' total 3' total 3'                                                                    Ther Ex             Elbow ROM 3' 3' 3' 3' x30 x30 2x30 3' total 3' 3'   Wrist, hand ROM 3' 3' 3' 3' x30 x30 x30 3' total 3' 3'   Cervical ROM 90" ea 90" ea 90" ea 90" ea x30 x30 x30 90" ea 90" ea 90" ea   Pendulums 5' total 5' total 5' total 5' total Sw, cw/ccw x20 ea Sw, cw/ccw x30 ea Sw, cw/ccw x30 ea 5' total 5' total 5' total    Pulleys 5" x20 5" x20  5"x20 5"x20      5" x10 flexion   Shld flexion  Supine  AAROM 2x10   AROM x10 Supine  AAROM 2x10   AROM x10 Supine  AAROM 2x10   AROM x10         Shld ER  SL AROM x10 SL AROM x10 SL AROM x10         Shld abd                                       PROM R shld  15' 15' 15' 15' 15' 15' 15' 15' 15'   Ther Activity                                       Gait Training             Staggered stance hold on foam      3x30" ea    3x30"    Step taps     x25 ea x25 ea  x30 ea x30 ea    Turn w/ amb     x10 x10       Exagg gait, marching     1000' 10' total       Modalities                                         [facility] Access Code [email or web address]

## 2021-07-29 ENCOUNTER — OFFICE VISIT (OUTPATIENT)
Dept: PHYSICAL THERAPY | Facility: CLINIC | Age: 51
End: 2021-07-29
Payer: MEDICARE

## 2021-07-29 DIAGNOSIS — S46.811A TEAR OF RIGHT INFRASPINATUS TENDON, INITIAL ENCOUNTER: ICD-10-CM

## 2021-07-29 DIAGNOSIS — M75.101 TEAR OF RIGHT SUPRASPINATUS TENDON: Primary | ICD-10-CM

## 2021-07-29 PROCEDURE — 97140 MANUAL THERAPY 1/> REGIONS: CPT

## 2021-07-29 PROCEDURE — 97110 THERAPEUTIC EXERCISES: CPT

## 2021-07-29 NOTE — PROGRESS NOTES
Daily Note     Today's date: 2021  Patient name: Ady Restrepo  : 1970  MRN: [id number]  Referring provider: Jerzy Granados  Dx:   Encounter Diagnosis     ICD-10-CM    1  Tear of right supraspinatus tendon  M75 101    2  Tear of right infraspinatus tendon, initial encounter  S46 811A        Start Time: 1700  Stop Time: 1740  Total time in clinic (min): 40 minutes    Subjective: Patient states he is pleased with his progress to this point  Patient has f/u with surgeon scheduled for 21  He states he will be going away starting  and inquired about kayaking while away  He reports continued compliance with HEP and limited pain to this point  Objective: See treatment diary below  A/PROM shoulder:  -Flexion: 100 / 130 degrees  -Abduction: 90 / 100 degrees  -ER: 30 / 45 degrees  -IR: to body      Assessment: Tolerated treatment well  Patient heavily educated regarding adherence to post-operative protocol, mainly no resistance training (and no kayaking) until next phase due to tissue healing; expressed good understanding  Patient with restriction in AROM during today's session  Pt encouraged to perform AROM ER in sitting; demo good technique and understanding  Pt w/ tone during PROM, which may be limiting progression in range  Pt will continue to benefit from skilled PT to improve functional mobility and activity tolerance  Plan: Continue per plan of care        Precautions: Cerebral Palsy, s/p R RTC repair (21), FALL RISK, no cryotherapy (not tolerated)    Phase 1 - Immediate Post-Operative Phase: 21 - 21  Phase 2 - Protection and Active Motion Phase: 21 - 21  Phase 3 - Early Strengthening Phase: 21 - 21  Phase 4 - Aggressive Rehab Phase: 21 -   See protocol attached to paper chart for further details      Manuals 7/15 7/20 7/22 7/27 7/29  6/29 7/1 7/6 7/13   Visit Number 11 12 13 14 15  7 8 9 10   STM R shld     5'   5' 8' 8'   Gr I-III AP, inf glide R shld     5'                     Neuro Re-Ed             Scap isos             Ball squeeze 3' 3' 3' 3' 3'  x30 3' total 3' total 3'                                                                    Ther Ex             Elbow ROM 3' 3' 3' 3' 3'  2x30 3' total 3' 3'   Wrist, hand ROM 3' 3' 3' 3' 3'  x30 3' total 3' 3'   Cervical ROM 90" ea 90" ea 90" ea 90" ea 90" ea  x30 90" ea 90" ea 90" ea   Pendulums 5' total 5' total 5' total 5' total rev  Sw, cw/ccw x30 ea 5' total 5' total 5' total    Pulleys 5" x20 5" x20  5"x20 5"x20 Flex, abd 5" x20 ea     5" x10 flexion   Shld flexion  Supine  AAROM 2x10   AROM x10 Supine  AAROM 2x10   AROM x10 Supine  AAROM 2x10   AROM x10 supine AAROM 2x10    AROM x10        Shld ER  SL AROM x10 SL AROM x10 SL AROM x10 seated AROM 2x10        Shld abd             Wall slides                          PROM R shld  15' 15' 15' 15'  15' 15' 15' 15'   Ther Activity                                       Gait Training             Staggered stance hold on foam          3x30"    Step taps        x30 ea x30 ea    Turn w/ amb             Exagg gait, marching             Modalities                                         [facility] Access Code [email or web address]

## 2021-08-02 ENCOUNTER — OFFICE VISIT (OUTPATIENT)
Dept: OBGYN CLINIC | Facility: OTHER | Age: 51
End: 2021-08-02

## 2021-08-02 VITALS
SYSTOLIC BLOOD PRESSURE: 95 MMHG | HEART RATE: 86 BPM | WEIGHT: 193.2 LBS | BODY MASS INDEX: 27.66 KG/M2 | DIASTOLIC BLOOD PRESSURE: 60 MMHG | HEIGHT: 70 IN

## 2021-08-02 DIAGNOSIS — Z47.89 AFTERCARE FOLLOWING SURGERY OF THE MUSCULOSKELETAL SYSTEM: Primary | ICD-10-CM

## 2021-08-02 PROCEDURE — 99024 POSTOP FOLLOW-UP VISIT: CPT | Performed by: PHYSICIAN ASSISTANT

## 2021-08-02 NOTE — PROGRESS NOTES
Assessment:       1  Aftercare following surgery of the musculoskeletal system          Plan:          Patient is doing well postoperatively  And improving with PT  Operative note, images, and rehab protocol were revisited  I highlighted the importance adhering to timeline detailed in the rehab protocol  All questions were addressed to the patient's satisfaction  Follow-up will be in 2 months to assess patient's progress  Subjective:     Patient ID: Jazmin Sepulveda is a 52 y o  male  Chief Complaint:    HPI      The patient presents to the office follow-up status post right shoulder arthroscopy with rotator cuff repair and subacromial decompression  He reports only occasional pain, usually after rehab exercises, that resolves a few hours later  He has no new concerns  He recognizes his limitations relative to his CP  Social History     Occupational History    Not on file   Tobacco Use    Smoking status: Former Smoker     Packs/day: 3 00     Years: 15 00     Pack years: 45 00     Quit date: 1994     Years since quittin 5    Smokeless tobacco: Never Used   Vaping Use    Vaping Use: Never used   Substance and Sexual Activity    Alcohol use: Not Currently     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    Drug use: Not Currently     Types: Marijuana     Comment: quit -greater than 1 year    Sexual activity: Not on file      Review of Systems   Constitutional: Negative  Respiratory: Negative  Musculoskeletal: Positive for myalgias  Negative for arthralgias  Skin: Negative for wound  Neurological: Positive for weakness  Negative for numbness  Psychiatric/Behavioral: Negative  Objective:     Ortho ExamPhysical Exam  HENT:      Head: Atraumatic  Cardiovascular:      Pulses: Normal pulses  Pulmonary:      Effort: Pulmonary effort is normal    Musculoskeletal:      Comments: Range of motion right shoulder: Active forward flexion 60°, passive forward flexion 100°  External rotation 30°   Skin:     General: Skin is warm and dry  Capillary Refill: Capillary refill takes less than 2 seconds  Comments: Surgical incisions dry and clean, healed  Neurological:      Mental Status: He is alert and oriented to person, place, and time  Sensory: No sensory deficit     Psychiatric:         Mood and Affect: Mood normal          Behavior: Behavior normal

## 2021-08-03 ENCOUNTER — OFFICE VISIT (OUTPATIENT)
Dept: PHYSICAL THERAPY | Facility: CLINIC | Age: 51
End: 2021-08-03
Payer: MEDICARE

## 2021-08-03 DIAGNOSIS — S46.811A TEAR OF RIGHT INFRASPINATUS TENDON, INITIAL ENCOUNTER: ICD-10-CM

## 2021-08-03 DIAGNOSIS — M75.101 TEAR OF RIGHT SUPRASPINATUS TENDON: Primary | ICD-10-CM

## 2021-08-03 PROCEDURE — 97140 MANUAL THERAPY 1/> REGIONS: CPT

## 2021-08-03 PROCEDURE — 97110 THERAPEUTIC EXERCISES: CPT

## 2021-08-03 NOTE — PROGRESS NOTES
Daily Note     Today's date: 8/3/2021  Patient name: Tay Durand  : 1970  MRN: [id number]  Referring provider: Luis Hopper  Dx:   Encounter Diagnosis     ICD-10-CM    1  Tear of right supraspinatus tendon  M75 101    2  Tear of right infraspinatus tendon, initial encounter  S46 811A        Start Time: 1700  Stop Time: 1743  Total time in clinic (min): 43 minutes    Subjective: Patient reports no pain at the start of today's session  He had f/u appointment w/ ortho , who was pleased with his progress to this point  Objective: See treatment diary below      Assessment: Tolerated treatment well  Patient able to achieve >90 degrees of AROM flexion; AROM abduction to 45 degrees, quick fatigue noted  Pt making gains in motor control and muscular recruitment during today's session  Pt provided w/ vc to limit scapular shrug and trunk SB as compensatory patterns  Pt will continue to benefit form skilled PT to improve functional mobility and activity tolerance  Plan: Continue per plan of care        Precautions: Cerebral Palsy, s/p R RTC repair (21), FALL RISK, no cryotherapy (not tolerated)    Phase 1 - Immediate Post-Operative Phase: 21 - 21  Phase 2 - Protection and Active Motion Phase: 21 - 21  Phase 3 - Early Strengthening Phase: 21 - 21  Phase 4 - Aggressive Rehab Phase: 21 -   See protocol attached to paper chart for further details      Manuals 7/15 7/20 7/22 7/27 7/29 8/3  7/1 7/6 7/13   Visit Number 11 12 13 14 15 16  8 9 10   STM R shld     5' 5'  5' 8' 8'   Gr I-III AP, inf glide R shld     5' 5'                    Neuro Re-Ed             Scap isos             Ball squeeze 3' 3' 3' 3' 3'   3' total 3' total 3'                                                                    Ther Ex             Elbow ROM 3' 3' 3' 3' 3'   3' total 3' 3'   Wrist, hand ROM 3' 3' 3' 3' 3'   3' total 3' 3'   Cervical ROM 90" ea 90" ea 90" ea 90" ea 90" ea   90" ea 90" ea 90" ea   Pendulums 5' total 5' total 5' total 5' total rev   5' total 5' total 5' total    Pulleys 5" x20 5" x20  5"x20 5"x20 Flex, abd 5" x20 ea Flex, abd 5" x20 ea    5" x10 flexion   Shld flexion  Supine  AAROM 2x10   AROM x10 Supine  AAROM 2x10   AROM x10 Supine  AAROM 2x10   AROM x10 supine AAROM 2x10    AROM x10 seated AROM 2x8 w/ self-OP       Shld ER  SL AROM x10 SL AROM x10 SL AROM x10 seated AROM 2x10 seated AROM x30       Shld abd      seated x15       Wall slides      2x10                    PROM R shld  15' 15' 15' 15'   15' 15' 15'   Ther Activity                                       Gait Training             Staggered stance hold on foam          3x30"    Step taps        x30 ea x30 ea    Turn w/ amb             Exagg gait, marching             Modalities                                         [facility] Access Code [email or web address]

## 2021-08-05 ENCOUNTER — OFFICE VISIT (OUTPATIENT)
Dept: PHYSICAL THERAPY | Facility: CLINIC | Age: 51
End: 2021-08-05
Payer: MEDICARE

## 2021-08-05 DIAGNOSIS — S46.811A TEAR OF RIGHT INFRASPINATUS TENDON, INITIAL ENCOUNTER: ICD-10-CM

## 2021-08-05 DIAGNOSIS — M75.101 TEAR OF RIGHT SUPRASPINATUS TENDON: Primary | ICD-10-CM

## 2021-08-05 PROCEDURE — 97110 THERAPEUTIC EXERCISES: CPT

## 2021-08-05 PROCEDURE — 97140 MANUAL THERAPY 1/> REGIONS: CPT

## 2021-08-05 NOTE — PROGRESS NOTES
Daily Note     Today's date: 2021  Patient name: Keiko Man  : 1970  MRN: [id number]  Referring provider: Abimael Gupta  Dx:   Encounter Diagnosis     ICD-10-CM    1  Tear of right supraspinatus tendon  M75 101    2  Tear of right infraspinatus tendon, initial encounter  S46 811A        Start Time: 1700  Stop Time: 1740  Total time in clinic (min): 40 minutes    Subjective: Patient reports no right shoulder pain or soreness at the start of today's session  He states he has been trying to use his arm ROM more t/o the day  Objective: See treatment diary below      Assessment: Patient pushed up from [address] during sit <> stand transfer during today's session  He then stated he has realized he has been doing this more at home  Pt heavily educated about fragility of RTC repair at this phase of healing, as well as post-operative restrictions, in which pt cannot perform any strengthening/weight through R UE; pt expressed good understanding  Pt provided w/ vc to limit scap shrug w/ flexion, abduction  Pt will continue to benefit from skilled PT to improve functional mobility and activity tolerance  Plan: Continue per plan of care        Precautions: Cerebral Palsy, s/p R RTC repair (21), FALL RISK, no cryotherapy (not tolerated)    Phase 1 - Immediate Post-Operative Phase: 21 - 21  Phase 2 - Protection and Active Motion Phase: 21 - 21  Phase 3 - Early Strengthening Phase: 21 - 21  Phase 4 - Aggressive Rehab Phase: 21 -   See protocol attached to paper chart for further details      Manuals 7/15 7/20 7/22 7/27 7/29 8/3 8/5      Visit Number 11 12 13 14 15 16 17      STM R shld     5' 5' 5'      Gr I-III AP, inf glide R shld     5' 5' 5'                   Neuro Re-Ed             Scap isos             Ball squeeze 3' 3' 3' 3' 3'                                                                         Ther Ex             Elbow ROM 3' 3' 3' 3' 3'  3' Wrist, hand ROM 3' 3' 3' 3' 3'  3'      Cervical ROM 90" ea 90" ea 90" ea 90" ea 90" ea        Pendulums 5' total 5' total 5' total 5' total rev  rev      Pulleys 5" x20 5" x20  5"x20 5"x20 Flex, abd 5" x20 ea Flex, abd 5" x20 ea Flex, abd 5" x20 ea      Shld flexion  Supine  AAROM 2x10   AROM x10 Supine  AAROM 2x10   AROM x10 Supine  AAROM 2x10   AROM x10 supine AAROM 2x10    AROM x10 seated AROM 2x8 w/ self-OP seated AROM 2x10      Shld ER  SL AROM x10 SL AROM x10 SL AROM x10 seated AROM 2x10 seated AROM x30 seated AROM x30      Shld abd      seated x15 seated x15      Wall slides      2x10 2x10                   PROM R shld  15' 15' 15' 15'  15'      Ther Activity                                       Gait Training             Staggered stance hold on foam              Step taps             Turn w/ amb             Exagg gait, marching             Modalities                                         [facility] Access Code [email or web address]

## 2021-08-10 ENCOUNTER — APPOINTMENT (OUTPATIENT)
Dept: PHYSICAL THERAPY | Facility: CLINIC | Age: 51
End: 2021-08-10
Payer: MEDICARE

## 2021-08-12 ENCOUNTER — OFFICE VISIT (OUTPATIENT)
Dept: PHYSICAL THERAPY | Facility: CLINIC | Age: 51
End: 2021-08-12
Payer: MEDICARE

## 2021-08-12 DIAGNOSIS — S46.811A TEAR OF RIGHT INFRASPINATUS TENDON, INITIAL ENCOUNTER: ICD-10-CM

## 2021-08-12 DIAGNOSIS — M75.101 TEAR OF RIGHT SUPRASPINATUS TENDON: Primary | ICD-10-CM

## 2021-08-12 PROCEDURE — 97110 THERAPEUTIC EXERCISES: CPT

## 2021-08-12 NOTE — PROGRESS NOTES
Daily Note     Today's date: 2021  Patient name: Patricia Vega  : 1970  MRN: [id number]  Referring provider: Dory Solomon  Dx:   Encounter Diagnosis     ICD-10-CM    1  Tear of right supraspinatus tendon  M75 101    2  Tear of right infraspinatus tendon, initial encounter  S46 811A        Start Time: 1700  Stop Time: 1730  Total time in clinic (min): 30 minutes    Subjective: Patient returns to physical therapy after being away for the last week  He states he has been moving his arm more with little discomfort  Objective: See treatment diary below  shld flexion to 130; abduction to 95 deg      Assessment: Tolerated treatment fair  Patient with soreness at muscle belly of supraspinatus post session  Limited AROM due to irritability and stiffness reported  Quick fatigue noted w/ shld ER in SL  Pt will continue to benefit from skilled PT to improve functional mobility and activity tolerance  Plan: Continue per plan of care        Precautions: Cerebral Palsy, s/p R RTC repair (21), FALL RISK, no cryotherapy (not tolerated)    Phase 1 - Immediate Post-Operative Phase: 21 - 21  Phase 2 - Protection and Active Motion Phase: 21 - 21  Phase 3 - Early Strengthening Phase: 21 - 21  Phase 4 - Aggressive Rehab Phase: 21 -   See protocol attached to paper chart for further details      Manuals 7/15 7/20 7/22 7/27 7/29 8/3 8/5 8/12     Visit Number 11 12 13 14 15 16 17 18     STM R shld     5' 5' 5' 5'     Gr I-III AP, inf glide R shld     5' 5' 5' 5'    stm right shld                  Neuro Re-Ed             Scap isos             Ball squeeze 3' 3' 3' 3' 3'                                                                         Ther Ex             Elbow ROM 3' 3' 3' 3' 3'  3' 3'     Wrist, hand ROM 3' 3' 3' 3' 3'  3' 3'     Cervical ROM 90" ea 90" ea 90" ea 90" ea 90" ea        Pendulums 5' total 5' total 5' total 5' total rev  rev      Pulleys 5" x20 5" x20  5"x20 5"x20 Flex, abd 5" x20 ea Flex, abd 5" x20 ea Flex, abd 5" x20 ea Flex, abd 5" x20     Shld flexion  Supine  AAROM 2x10   AROM x10 Supine  AAROM 2x10   AROM x10 Supine  AAROM 2x10   AROM x10 supine AAROM 2x10    AROM x10 seated AROM 2x8 w/ self-OP seated AROM 2x10 supine AROM w/ wand 2x10    AROM 2x10     Shld ER  SL AROM x10 SL AROM x10 SL AROM x10 seated AROM 2x10 seated AROM x30 seated AROM x30 SL 2x10     Shld abd      seated x15 seated x15 seated x20     Wall slides      2x10 2x10 nt                   PROM R shld  15' 15' 15' 15'  15' 15'     Ther Activity                                       Gait Training             Staggered stance hold on foam              Step taps             Turn w/ amb             Exagg gait, marching             Modalities                                         [facility] Access Code [employer]

## 2021-08-17 ENCOUNTER — OFFICE VISIT (OUTPATIENT)
Dept: PHYSICAL THERAPY | Facility: CLINIC | Age: 51
End: 2021-08-17
Payer: MEDICARE

## 2021-08-17 DIAGNOSIS — S46.811A TEAR OF RIGHT INFRASPINATUS TENDON, INITIAL ENCOUNTER: ICD-10-CM

## 2021-08-17 DIAGNOSIS — M75.101 TEAR OF RIGHT SUPRASPINATUS TENDON: Primary | ICD-10-CM

## 2021-08-17 PROCEDURE — 97110 THERAPEUTIC EXERCISES: CPT

## 2021-08-17 NOTE — PROGRESS NOTES
Daily Note     Today's date: 2021  Patient name: Sofia Kilpatrick  : 1970  MRN: [id number]  Referring provider: Elaina Wang  Dx:   Encounter Diagnosis     ICD-10-CM    1  Tear of right supraspinatus tendon  M75 101    2  Tear of right infraspinatus tendon, initial encounter  S46 811A        Start Time: 1650  Stop Time: 1745  Total time in clinic (min): 55 minutes    Subjective: Patient states he fell at camp last week, landing on buttocks, utilized left UE to stand back up, no injury sustained  Pt states he has been experiencing intermittent anterior shoulder pain over the last few days  Additionally, he states he has been experiencing lateral elbow pain frequently, which he states has been a problem in the past, previous dx of epicondylitis  Objective: See treatment diary below  Pain at origin of biceps long head tendon during active shld flexion  No pain along biceps with resisted elbow flexion  No pain with resisted pronation/supination, wrist flexion/extension  Assessment: Tolerated treatment fair  Patient with limited by lateral elbow pain during today's session  Performed IASTM at wrist extensor mm origin which yielded improvement in pain  Pt encouraged to perform shld A/AROM to tolerance until nv  Pt will continue to benefit from skilled PT to improve functional mobility and activity tolerance  Plan: Continue per plan of care        Precautions: Cerebral Palsy, s/p R RTC repair (21), FALL RISK, no cryotherapy (not tolerated)    Phase 1 - Immediate Post-Operative Phase: 21 - 21  Phase 2 - Protection and Active Motion Phase: 21 - 21  Phase 3 - Early Strengthening Phase: 21 - 21  Phase 4 - Aggressive Rehab Phase: 21 -   See protocol attached to paper chart for further details      Manuals 7/15 7/20 7/22 7/27 7/29 8/3 8/5 8/12 8/17    Visit Number 11 12 13 14 15 16 17 18 19    STM R shld     5' 5' 5' 5' 5'    IASTM lateral elbow    Gr I-III AP, inf glide R shld     5' 5' 5' 5'     5'                 Neuro Re-Ed             Scap isos             Ball squeeze 3' 3' 3' 3' 3'                                                                         Ther Ex             Elbow ROM 3' 3' 3' 3' 3'  3' 3' 3'    Wrist, hand ROM 3' 3' 3' 3' 3'  3' 3' 3'    Cervical ROM 90" ea 90" ea 90" ea 90" ea 90" ea        Pendulums 5' total 5' total 5' total 5' total rev  rev      Pulleys 5" x20 5" x20  5"x20 5"x20 Flex, abd 5" x20 ea Flex, abd 5" x20 ea Flex, abd 5" x20 ea Flex, abd 5" x20 Flex, abd 5" x20    Shld flexion  Supine  AAROM 2x10   AROM x10 Supine  AAROM 2x10   AROM x10 Supine  AAROM 2x10   AROM x10 supine AAROM 2x10    AROM x10 seated AROM 2x8 w/ self-OP seated AROM 2x10 supine AROM w/ wand 2x10    AROM 2x10 AAROM supine 2x10    AROM 2x10    Shld ER  SL AROM x10 SL AROM x10 SL AROM x10 seated AROM 2x10 seated AROM x30 seated AROM x30 SL 2x10 SL x10    seated x20    Shld abd      seated x15 seated x15 seated x20 seated x20    Wall slides      2x10 2x10 nt  Partial x5  Full x5                 PROM R shld  15' 15' 15' 15'  15' 15' 15'    Ther Activity                                       Gait Training             Staggered stance hold on foam              Step taps             Turn w/ amb             Exagg gait, marching             Modalities                                         [facility] Access Code [employer]

## 2021-08-19 ENCOUNTER — OFFICE VISIT (OUTPATIENT)
Dept: PHYSICAL THERAPY | Facility: CLINIC | Age: 51
End: 2021-08-19
Payer: MEDICARE

## 2021-08-19 DIAGNOSIS — M75.101 TEAR OF RIGHT SUPRASPINATUS TENDON: Primary | ICD-10-CM

## 2021-08-19 DIAGNOSIS — S46.811A TEAR OF RIGHT INFRASPINATUS TENDON, INITIAL ENCOUNTER: ICD-10-CM

## 2021-08-19 PROCEDURE — 97110 THERAPEUTIC EXERCISES: CPT

## 2021-08-19 NOTE — PROGRESS NOTES
Progress Note     Today's date: 2021  Patient name: Patricia Vega  : 1970  MRN: [id number]  Referring provider: Yves Andino  Dx:   Encounter Diagnosis     ICD-10-CM    1  Tear of right supraspinatus tendon  M75 101    2  Tear of right infraspinatus tendon, initial encounter  K06 [id number]                   Subjective: "I think I slept on my shoulder funny last night so it hurt this morning, but it's better now "    Goals  STG - 6 weeks (end of phase I)  1  Patient will be I and compliant with HEP per post-op protocol  - MET  2  Pt will achieve passive forward flexion to at least 125 degrees (per protocol)  - MET  3  Pt will achieve passive ER in scaption to at least 40 degrees (per protocol)  - MET  4  Pt will achieve passive IR in scaption to at least 50 degrees (per protocol)  - MET  5  Pt will express reduction in pain by 50%  - MET    ITG - 12 weeks (end of phase II) - CONTINUED AT PN ON 21 (11 weeks post-op)  1  Pt FOTO will improve to >43/100  - PROGRESSED  2  Pt will achieve full AROM  - PROGRESSED   3  Pt will express no pain  - NOT MET  4  Pt will demonstrate 4/5 strength in all planes  - UNABLE TO TEST 2/2 POST OP STATUS  5  Pt will perform AROM shoulder flexion w/o scapular compensation/shrugging  - PROGRESSED    LTG - 16 weeks (end of phase III) - CONTINUED AT PN ON 21  1  Pt will be able to tolerate progression to-low level functional activities (ie  Light chores around house) (per protocol)  2  Pt will demonstrate return of dynamic shoulder stability (per protocol)  3  Pt will return to higher demanding work/ADLs  4  Pt FOTO will improve to >60/100   5  Pt will demonstrate at least 4+/5 strength in all planes         Objective: See treatment diary below    Passive Range of Motion - at PN on 21  Flexion: 125 degrees  Abduction: 90 degrees  IR: to body  ER: 40 degrees    RIGHT SHOULDER A/PROM AT PN ON 21  Flexion: 110 / 130  Abduction: 90 / 100  IR: to body / 50  ER: 50 / 60      Strength/Myotome Testing     Additional Strength Details  Unable to perform shoulder MMT due to post-operative status      Assessment: Patient has made gains in range of motion and functional mobility since starting physical therapy  Patient has demonstrated improved ability to perform ADLs w/ utilization of right UE while limiting lifting/resisted movements  Despite these gains, the patient remains below his PLOF due to post-operative restrictions, as well as stiffness  Pt w/ TTP at anterior aspect of right shoulder which is exacerbated with active shoulder flexion/abduction  Pt sx will be closely monitored for possible referral back to ortho surg for follow up of anterior shld pain at end of phase 2  PT POC and goals were updated during today's session; pt expressed good understanding and no questions  Plan: Continue per plan of care        Precautions: Cerebral Palsy, s/p R RTC repair (6/4/21), FALL RISK, no cryotherapy (not tolerated)    Phase 1 - Immediate Post-Operative Phase: 6/4/21 - 7/16/21  Phase 2 - Protection and Active Motion Phase: 7/16/21 - 8/27/21  Phase 3 - Early Strengthening Phase: 8/27/21 - 9/24/21  Phase 4 - Aggressive Rehab Phase: 9/24/21 -   See protocol attached to paper chart for further details      Manuals 7/15 7/20 7/22 7/27 7/29 8/3 8/5 8/12 8/17 8/19   Visit Number 11 12 13 14 15 16 17 18 19 20   STM R shld     5' 5' 5' 5' 5'    IASTM lateral elbow    Gr I-III AP, inf glide R shld     5' 5' 5' 5'     5' 5'                Neuro Re-Ed             Scap isos             Ball squeeze 3' 3' 3' 3' 3'                                                                         Ther Ex             Elbow ROM 3' 3' 3' 3' 3'  3' 3' 3' 3'   Wrist, hand ROM 3' 3' 3' 3' 3'  3' 3' 3' 3'   Cervical ROM 90" ea 90" ea 90" ea 90" ea 90" ea        Pendulums 5' total 5' total 5' total 5' total rev  rev      Pulleys 5" x20 5" x20  5"x20 5"x20 Flex, abd 5" x20 ea Flex, abd 5" x20 ea Flex, abd 5" x20 ea Flex, abd 5" x20 Flex, abd 5" x20 Flex, abd 5" x20   Shld flexion  Supine  AAROM 2x10   AROM x10 Supine  AAROM 2x10   AROM x10 Supine  AAROM 2x10   AROM x10 supine AAROM 2x10    AROM x10 seated AROM 2x8 w/ self-OP seated AROM 2x10 supine AROM w/ wand 2x10    AROM 2x10 AAROM supine 2x10    AROM 2x10 AAROM supine 2x10    AROM 2x10   Shld ER  SL AROM x10 SL AROM x10 SL AROM x10 seated AROM 2x10 seated AROM x30 seated AROM x30 SL 2x10 SL x10    seated x20 SL x20    seated x20   Shld abd      seated x15 seated x15 seated x20 seated x20 seated w/ cane x20    Wall slides      2x10 2x10 nt  Partial x5  Full x5 Partial x5                PROM R shld  15' 15' 15' 15'  15' 15' 15' 15'   Ther Activity                                       Gait Training             Staggered stance hold on foam              Step taps             Turn w/ amb             Exagg gait, marching             Modalities                                         [facility] Access Code [employer]

## 2021-08-24 ENCOUNTER — OFFICE VISIT (OUTPATIENT)
Dept: PHYSICAL THERAPY | Facility: CLINIC | Age: 51
End: 2021-08-24
Payer: MEDICARE

## 2021-08-24 DIAGNOSIS — M75.101 TEAR OF RIGHT SUPRASPINATUS TENDON: Primary | ICD-10-CM

## 2021-08-24 DIAGNOSIS — S46.811A TEAR OF RIGHT INFRASPINATUS TENDON, INITIAL ENCOUNTER: ICD-10-CM

## 2021-08-24 PROCEDURE — 97110 THERAPEUTIC EXERCISES: CPT

## 2021-08-24 NOTE — PROGRESS NOTES
Daily Note     Today's date: 2021  Patient name: Deidre Lainez  : 1970  MRN: [id number]  Referring provider: Nish Sapp  Dx:   Encounter Diagnosis     ICD-10-CM    1  Tear of right supraspinatus tendon  M75 101    2  Tear of right infraspinatus tendon, initial encounter  S46 811A        Start Time: 1700  Stop Time: 1740  Total time in clinic (min): 40 minutes    Subjective: "I feel like I've been doing better with my range of motion over the last few days "      Objective: See treatment diary below      Assessment: Tolerated treatment well  Patient demonstrated improving control during AROM abduction and flexion during today's session  Patient with tenderness at biceps long head insertion; pain reproduced with minimal resistance  Pt sx will be closely monitored through progression to phase 3 for possible referral back to surgeon  Pt will continue to benefit from skilled PT to improve functional mobility and activity tolerance  Plan: Continue per plan of care        Precautions: Cerebral Palsy, s/p R RTC repair (21), FALL RISK, no cryotherapy (not tolerated)    Phase 1 - Immediate Post-Operative Phase: 21 - 21  Phase 2 - Protection and Active Motion Phase: 21 - 21  Phase 3 - Early Strengthening Phase: 21 - 21  Phase 4 - Aggressive Rehab Phase: 21 -   See protocol attached to paper chart for further details      Manuals 8/24  7/22 7/27 7/29 8/3 8/5 8/12 8/17 8/19   Visit Number 21  13 14 15 16 17 18 19 20   STM R shld 5'    5' 5' 5' 5' 5'    IASTM lateral elbow    Gr I-III AP, inf glide R shld 5'    5' 5' 5' 5'     5' 5'                Neuro Re-Ed             Scap isos             Ball squeeze   3' 3' 3'                                                                         Ther Ex             Elbow ROM 3'  3' 3' 3'  3' 3' 3' 3'   Wrist, hand ROM 3'  3' 3' 3'  3' 3' 3' 3'   Cervical ROM   90" ea 90" ea 90" ea        Pendulums   5' total 5' total rev  rev      Pulleys Flex, abd 5" x20  5"x20 5"x20 Flex, abd 5" x20 ea Flex, abd 5" x20 ea Flex, abd 5" x20 ea Flex, abd 5" x20 Flex, abd 5" x20 Flex, abd 5" x20   Shld flexion AAROM seated x20    AROM x20  Supine  AAROM 2x10   AROM x10 Supine  AAROM 2x10   AROM x10 supine AAROM 2x10    AROM x10 seated AROM 2x8 w/ self-OP seated AROM 2x10 supine AROM w/ wand 2x10    AROM 2x10 AAROM supine 2x10    AROM 2x10 AAROM supine 2x10    AROM 2x10   Shld ER seated x30  SL AROM x10 SL AROM x10 seated AROM 2x10 seated AROM x30 seated AROM x30 SL 2x10 SL x10    seated x20 SL x20    seated x20   Shld abd Seated w/ cane x20    ecc control x10     seated x15 seated x15 seated x20 seated x20 seated w/ cane x20    Wall slides      2x10 2x10 nt  Partial x5  Full x5 Partial x5                PROM R shld 15'  15' 15' 15'  15' 15' 15' 15'   Ther Activity                                       Gait Training             Staggered stance hold on foam              Step taps             Turn w/ amb             Exagg gait, marching             Modalities                                         [facility] Access Code [employer]

## 2021-08-26 ENCOUNTER — OFFICE VISIT (OUTPATIENT)
Dept: PHYSICAL THERAPY | Facility: CLINIC | Age: 51
End: 2021-08-26
Payer: MEDICARE

## 2021-08-26 DIAGNOSIS — S46.811A TEAR OF RIGHT INFRASPINATUS TENDON, INITIAL ENCOUNTER: ICD-10-CM

## 2021-08-26 DIAGNOSIS — M75.101 TEAR OF RIGHT SUPRASPINATUS TENDON: Primary | ICD-10-CM

## 2021-08-26 PROCEDURE — 97140 MANUAL THERAPY 1/> REGIONS: CPT

## 2021-08-26 PROCEDURE — 97110 THERAPEUTIC EXERCISES: CPT

## 2021-08-26 NOTE — PROGRESS NOTES
Daily Note     Today's date: 2021  Patient name: John Cuevas  : 1970  MRN: [id number]  Referring provider: Annmarie Florez  Dx:   Encounter Diagnosis     ICD-10-CM    1  Tear of right supraspinatus tendon  M75 101    2  Tear of right infraspinatus tendon, initial encounter  S46 811A        Start Time: 1700  Stop Time: 1743  Total time in clinic (min): 43 minutes    Subjective: "I feel like I've been doing pretty good with my motion over the last few days " Patient reports experiencing occasional anterior and lateral shoulder pain since previous session  Objective: See treatment diary below  AROM shoulder flexion: 115 degrees  PROM shoulder flexion: 145 degrees      Assessment: Due to moderate irritability of right shoulder pain, physician contacted regarding possible biceps pathology  Pt w/ quick fatigue during active shoulder flexion and abduction during today's session  Pt with improvement in ROM following STM and TPR to pec major, subscap, and biceps  Pt eligible to progress to phase 3 of post-op protocol pending progress after 21; plan to initiate additional mina-scap activities and resistance to distal mm nv if appropriate  Pt will continue to benefit from skilled PT to improve functional mobility and activity tolerance  Plan: Continue per plan of care        Precautions: Cerebral Palsy, s/p R RTC repair (21), FALL RISK, no cryotherapy (not tolerated)    Phase 1 - Immediate Post-Operative Phase: 21 - 21  Phase 2 - Protection and Active Motion Phase: 21 - 21  Phase 3 - Early Strengthening Phase: 21 - 21  Phase 4 - Aggressive Rehab Phase: 21 -   See protocol attached to paper chart for further details      Manuals 8/24 8/26  7/27 7/29 8/3 8/5 8/12 8/17 8/19   Visit Number 21 22  14 15 16 17 18 19 20   STM R shld 5' 8' total pec major, biceps, subscap   5' 5' 5' 5' 5'    IASTM lateral elbow    Gr I-III AP, inf glide R shld 5' 5'   5' 5' 5' 5'     5' 5'   KT tape, R biceps  3'           Neuro Re-Ed             Scap isos             Ball squeeze    3' 3'                                                                         Ther Ex             Elbow ROM 3' 3'  3' 3'  3' 3' 3' 3'   Wrist, hand ROM 3' 3'  3' 3'  3' 3' 3' 3'   Cervical ROM    90" ea 90" ea        Pendulums    5' total rev  rev      Pulleys Flex, abd 5" x20 Flex, abd 5" x20  5"x20 Flex, abd 5" x20 ea Flex, abd 5" x20 ea Flex, abd 5" x20 ea Flex, abd 5" x20 Flex, abd 5" x20 Flex, abd 5" x20   Shld flexion AAROM seated x20    AROM x20 AROM x20  Supine  AAROM 2x10   AROM x10 supine AAROM 2x10    AROM x10 seated AROM 2x8 w/ self-OP seated AROM 2x10 supine AROM w/ wand 2x10    AROM 2x10 AAROM supine 2x10    AROM 2x10 AAROM supine 2x10    AROM 2x10   Shld ER seated x30 seated x30  SL AROM x10 seated AROM 2x10 seated AROM x30 seated AROM x30 SL 2x10 SL x10    seated x20 SL x20    seated x20   Shld abd Seated w/ cane x20    ecc control x10 AROM x10    seated x15 seated x15 seated x20 seated x20 seated w/ cane x20    Wall slides      2x10 2x10 nt  Partial x5  Full x5 Partial x5                PROM R shld 15' 15'  15' 15'  15' 15' 15' 15'   Ther Activity                                       Gait Training             Staggered stance hold on foam              Step taps             Turn w/ amb             Exagg gait, marching             Modalities                                         [facility] Access Code [employer]

## 2021-08-31 ENCOUNTER — OFFICE VISIT (OUTPATIENT)
Dept: PHYSICAL THERAPY | Facility: CLINIC | Age: 51
End: 2021-08-31
Payer: MEDICARE

## 2021-08-31 DIAGNOSIS — M75.101 TEAR OF RIGHT SUPRASPINATUS TENDON: Primary | ICD-10-CM

## 2021-08-31 DIAGNOSIS — S46.811A TEAR OF RIGHT INFRASPINATUS TENDON, INITIAL ENCOUNTER: ICD-10-CM

## 2021-08-31 PROCEDURE — 97140 MANUAL THERAPY 1/> REGIONS: CPT

## 2021-08-31 PROCEDURE — 97112 NEUROMUSCULAR REEDUCATION: CPT

## 2021-08-31 PROCEDURE — 97110 THERAPEUTIC EXERCISES: CPT

## 2021-08-31 NOTE — PROGRESS NOTES
Daily Note     Today's date: 2021  Patient name: Ary Yun  : 1970  MRN: [id number]  Referring provider: Brittney Tran  Dx:   Encounter Diagnosis     ICD-10-CM    1  Tear of right supraspinatus tendon  M75 101    2  Tear of right infraspinatus tendon, initial encounter  S46 811A        Start Time:   Stop Time:   Total time in clinic (min): 45 minutes    Subjective: Pt reports no pain prior to session      Objective: See treatment diary below        Assessment: Progressed to phase 3 strengthening today with no pain  Pt demo inc shld hiking with shld abd  Pt will continue to benefit from skilled PT to improve functional mobility and activity tolerance  Plan: Continue per plan of care        Precautions: Cerebral Palsy, s/p R RTC repair (21), FALL RISK, no cryotherapy (not tolerated)    Phase 1 - Immediate Post-Operative Phase: 21 - 21  Phase 2 - Protection and Active Motion Phase: 21 - 21  Phase 3 - Early Strengthening Phase: 21 - 21  Phase 4 - Aggressive Rehab Phase: 21 -   See protocol attached to paper chart for further details      Manuals 8/24 8/26 8/31  7/29 8/3 8/5 8/12 8/17 8/19   Visit Number 21 22 23  15 16 17 18 19 20   STM R shld 5' 8' total pec major, biceps, subscap   5' 5' 5' 5' 5'    IASTM lateral elbow    Gr I-III AP, inf glide R shld 5' 5'   5' 5' 5' 5'     5' 5'   KT tape, R biceps  3' 3' DM          Neuro Re-Ed             Scap isos             Ball squeeze     3'                                                                         Ther Ex             Elbow ROM 3' 3' 3'  3'  3' 3' 3' 3'   Wrist, hand ROM 3' 3' 3'  3'  3' 3' 3' 3'   Cervical ROM     90" ea        Pendulums     rev  rev      Pulleys Flex, abd 5" x20 Flex, abd 5" x20 Flex, abd 5" x20  Flex, abd 5" x20 ea Flex, abd 5" x20 ea Flex, abd 5" x20 ea Flex, abd 5" x20 Flex, abd 5" x20 Flex, abd 5" x20   Shld flexion AAROM seated x20    AROM x20 AROM x20 AROM x20  supine AAROM 2x10    AROM x10 seated AROM 2x8 w/ self-OP seated AROM 2x10 supine AROM w/ wand 2x10    AROM 2x10 AAROM supine 2x10    AROM 2x10 AAROM supine 2x10    AROM 2x10   Shld ER seated x30 seated x30 seated 20x  seated AROM 2x10 seated AROM x30 seated AROM x30 SL 2x10 SL x10    seated x20 SL x20    seated x20   Shld abd Seated w/ cane x20    ecc control x10 AROM x10 AROM x10   seated x15 seated x15 seated x20 seated x20 seated w/ cane x20    Wall slides      2x10 2x10 nt  Partial x5  Full x5 Partial x5   Prone rows  20x 20x          Prone ext  20x 20x          Prone horiz abd   AA therapist 10x          Tband IR/ER   YTB standing 20x ea                       PROM R shld 15' 15' 10'  15'  15' 15' 15' 15'   Ther Activity                                       Gait Training             Staggered stance hold on foam              Step taps             Turn w/ amb             Exagg gait, marching             Modalities                                         [employer] Access Code [employer]

## 2021-09-02 ENCOUNTER — OFFICE VISIT (OUTPATIENT)
Dept: PHYSICAL THERAPY | Facility: CLINIC | Age: 51
End: 2021-09-02
Payer: MEDICARE

## 2021-09-02 DIAGNOSIS — S46.811A TEAR OF RIGHT INFRASPINATUS TENDON, INITIAL ENCOUNTER: ICD-10-CM

## 2021-09-02 DIAGNOSIS — M75.101 TEAR OF RIGHT SUPRASPINATUS TENDON: Primary | ICD-10-CM

## 2021-09-02 PROCEDURE — 97110 THERAPEUTIC EXERCISES: CPT

## 2021-09-02 NOTE — PROGRESS NOTES
Daily Note     Today's date: 2021  Patient name: Elma Peabody  : 1970  MRN: [id number]  Referring provider: Juan Diego Austin  Dx:   Encounter Diagnosis     ICD-10-CM    1  Tear of right supraspinatus tendon  M75 101    2  Tear of right infraspinatus tendon, initial encounter  S46 811A        Start Time: 1645  Stop Time: 1740  Total time in clinic (min): 55 minutes    Subjective: Patient reports experiencing moderate shoulder soreness at the start of today's session  Objective: See treatment diary below      Assessment: Tolerated treatment well  Patient provided w/ graded verbal and tactile cues during today's session to limit scap protraction and improve mina scap activation, particularly with prone exercises and standing shoulder IR/ER  Pt will continue to benefit from skilled PT to improve functional mobility and activity tolerance  Plan: Continue per plan of care        Precautions: Cerebral Palsy, s/p R RTC repair (21), FALL RISK, no cryotherapy (not tolerated)    Phase 1 - Immediate Post-Operative Phase: 21 - 21  Phase 2 - Protection and Active Motion Phase: 21 - 21  Phase 3 - Early Strengthening Phase: 21 - 21  Phase 4 - Aggressive Rehab Phase: 21 -   See protocol attached to paper chart for further details      Manuals 8/24 8/26 8/31 9/2  8/3 8/5 8/12 8/17 8/19   Visit Number 21 22 23 24  16 17 18 19 20   STM R shld 5' 8' total pec major, biceps, subscap  8' total  5' 5' 5' 5'    IASTM lateral elbow    Gr I-III AP, inf glide R shld 5' 5'    5' 5' 5'     5' 5'   KT tape, R biceps  3' 3' DM          Neuro Re-Ed             Scap isos             Ball squeeze                                                                              Ther Ex             Elbow ROM 3' 3' 3' 3# x20   3' 3' 3' 3'   Wrist, hand ROM 3' 3' 3'    3' 3' 3' 3'   Cervical ROM             Pendulums       rev      Pulleys Flex, abd 5" x20 Flex, abd 5" x20 Flex, abd 5" x20 Flex, abd 5" x20   Flex, abd 5" x20 ea Flex, abd 5" x20 ea Flex, abd 5" x20 Flex, abd 5" x20 Flex, abd 5" x20   Shld flexion AAROM seated x20    AROM x20 AROM x20 AROM x20 W/ cane, ecc emphasis x10  seated AROM 2x8 w/ self-OP seated AROM 2x10 supine AROM w/ wand 2x10    AROM 2x10 AAROM supine 2x10    AROM 2x10 AAROM supine 2x10    AROM 2x10   Shld ER seated x30 seated x30 seated 20x   seated AROM x30 seated AROM x30 SL 2x10 SL x10    seated x20 SL x20    seated x20   Shld abd Seated w/ cane x20    ecc control x10 AROM x10 AROM x10 W/ cane, ecc empahsis x10  seated x15 seated x15 seated x20 seated x20 seated w/ cane x20    Wall slides      2x10 2x10 nt  Partial x5  Full x5 Partial x5   Prone rows  20x 20x x20         Prone ext  20x 20x x20         Prone horiz abd   AA therapist 10x AROM x10         Tband IR/ER   YTB standing 20x ea YTB x20 ea                      PROM R shld 15' 15' 10' 15'   15' 15' 15' 15'   Ther Activity                                       Gait Training             Staggered stance hold on foam              Step taps             Turn w/ amb             Exagg gait, marching             Modalities

## 2021-09-07 ENCOUNTER — OFFICE VISIT (OUTPATIENT)
Dept: PHYSICAL THERAPY | Facility: CLINIC | Age: 51
End: 2021-09-07
Payer: MEDICARE

## 2021-09-07 DIAGNOSIS — M75.101 TEAR OF RIGHT SUPRASPINATUS TENDON: Primary | ICD-10-CM

## 2021-09-07 DIAGNOSIS — S46.811A TEAR OF RIGHT INFRASPINATUS TENDON, INITIAL ENCOUNTER: ICD-10-CM

## 2021-09-07 PROCEDURE — 97110 THERAPEUTIC EXERCISES: CPT

## 2021-09-07 NOTE — PROGRESS NOTES
Daily Note     Today's date: 2021  Patient name: Stefani Del Angel  : 1970  MRN: [id number]  Referring provider: Alcides Garza  Dx:   Encounter Diagnosis     ICD-10-CM    1  Tear of right supraspinatus tendon  M75 101    2  Tear of right infraspinatus tendon, initial encounter  S46 811A        Start Time: 1615  Stop Time: 1655  Total time in clinic (min): 40 minutes    Subjective: Patient reports continued improvement in shoulder function since previous session  Objective: See treatment diary below      Assessment: Tolerated treatment well  Patient demo significant compensatory scap elevation during early phase of prone horizontal abduction that improved with graded verbal and tactile cues  Patient with quick fatigue during active shoulder flexion and abduction activities  Patient will continue to benefit from skilled PT to improve functional mobility and activity tolerance  Plan: Continue per plan of care        Precautions: Cerebral Palsy, s/p R RTC repair (21), FALL RISK, no cryotherapy (not tolerated)    Phase 1 - Immediate Post-Operative Phase: 21 - 21  Phase 2 - Protection and Active Motion Phase: 21 - 21  Phase 3 - Early Strengthening Phase: 21 - 21  Phase 4 - Aggressive Rehab Phase: 21 -   See protocol attached to paper chart for further details      Manuals    Visit Number 21 22 23 24 25  17 18 19 20   STM R shld 5' 8' total pec major, biceps, subscap  8' total   5' 5' 5'    IASTM lateral elbow    Gr I-III AP, inf glide R shld 5' 5'     5' 5'     5' 5'   KT tape, R biceps  3' 3' DM  3'        Neuro Re-Ed             Scap isos             Ball squeeze                                                                              Ther Ex             Elbow ROM 3' 3' 3' 3# x20 3# x30  3' 3' 3' 3'   Wrist, hand ROM 3' 3' 3'    3' 3' 3' 3'   Cervical ROM             Pendulums       rev      Pulleys Flex, abd 5" x20 Flex, abd 5" x20 Flex, abd 5" x20 Flex, abd 5" x20  Flex, abd 5" x20  Flex, abd 5" x20 ea Flex, abd 5" x20 Flex, abd 5" x20 Flex, abd 5" x20   Shld flexion AAROM seated x20    AROM x20 AROM x20 AROM x20 W/ cane, ecc emphasis x10 Wall slide w/ lift off x10  seated AROM 2x10 supine AROM w/ wand 2x10    AROM 2x10 AAROM supine 2x10    AROM 2x10 AAROM supine 2x10    AROM 2x10   Shld ER seated x30 seated x30 seated 20x    seated AROM x30 SL 2x10 SL x10    seated x20 SL x20    seated x20   Shld abd Seated w/ cane x20    ecc control x10 AROM x10 AROM x10 W/ cane, ecc empahsis x10 W/ cane, ecc empahsis x10  seated x15 seated x20 seated x20 seated w/ cane x20    Wall slides       2x10 nt  Partial x5  Full x5 Partial x5   Prone rows  20x 20x x20 x20        Prone ext  20x 20x x20 x20        Prone horiz abd   AA therapist 10x AROM x10 x10        Tband IR/ER   YTB standing 20x ea YTB x20 ea YTB x30 ea                     PROM R shld 15' 15' 10' 15' 15'  15' 15' 15' 15'   Ther Activity                                       Gait Training             Staggered stance hold on foam              Step taps             Turn w/ amb             Exagg gait, marching             Modalities

## 2021-09-09 ENCOUNTER — OFFICE VISIT (OUTPATIENT)
Dept: PHYSICAL THERAPY | Facility: CLINIC | Age: 51
End: 2021-09-09
Payer: MEDICARE

## 2021-09-09 DIAGNOSIS — M75.101 TEAR OF RIGHT SUPRASPINATUS TENDON: Primary | ICD-10-CM

## 2021-09-09 DIAGNOSIS — S46.811A TEAR OF RIGHT INFRASPINATUS TENDON, INITIAL ENCOUNTER: ICD-10-CM

## 2021-09-09 PROCEDURE — 97110 THERAPEUTIC EXERCISES: CPT

## 2021-09-09 NOTE — PROGRESS NOTES
Daily Note     Today's date: 2021  Patient name: Roman Yoo  : 1970  MRN: [id number]  Referring provider: Kimberly Ruelas  Dx:   Encounter Diagnosis     ICD-10-CM    1  Tear of right supraspinatus tendon  M75 101    2  Tear of right infraspinatus tendon, initial encounter  S46 811A        Start Time: 1615  Stop Time: 1700  Total time in clinic (min): 45 minutes    Subjective: Patient reports mild shoulder soreness at the start of today's session  Objective: See treatment diary below      Assessment: Tolerated treatment well  Patient provided with graded verbal and tactile cues to improve shoulder flexion quality and scap depression/retraction via both open and close chain activities; demonstrated improved ROM w/ postural correction  Quick fatigue noted w/ prone exercises  Progressed resistance with standing IR/ER, clayton well w/ good technique  Pt will continue to benefit from skilled PT to improve functional mobility and activity tolerance  Plan: Continue per plan of care        Precautions: Cerebral Palsy, s/p R RTC repair (21), FALL RISK, no cryotherapy (not tolerated)    Phase 1 - Immediate Post-Operative Phase: 21 - 21  Phase 2 - Protection and Active Motion Phase: 21 - 21  Phase 3 - Early Strengthening Phase: 21 - 21  Phase 4 - Aggressive Rehab Phase: 21 -   See protocol attached to paper chart for further details      Manuals    Visit Number 21 22 23 24 25 26  18 19 20   STM R shld 5' 8' total pec major, biceps, subscap  8' total    5' 5'    IASTM lateral elbow    Gr I-III AP, inf glide R shld 5' 5'      5'     5' 5'   KT tape, R biceps  3' 3' DM  3'        Neuro Re-Ed             Scap isos             Ball squeeze                                                                              Ther Ex             Elbow ROM 3' 3' 3' 3# x20 3# x30 4# 3x15  3' 3' 3'   Wrist, hand ROM 3' 3' 3'     3' 3' 3' Cervical ROM             Pendulums             Pulleys Flex, abd 5" x20 Flex, abd 5" x20 Flex, abd 5" x20 Flex, abd 5" x20  Flex, abd 5" x20 Flexion, abd 5" 3x8 ea  Flex, abd 5" x20 Flex, abd 5" x20 Flex, abd 5" x20   Shld flexion AAROM seated x20    AROM x20 AROM x20 AROM x20 W/ cane, ecc emphasis x10 Wall slide w/ lift off x10 AROM 3 x 5-8  Tactile cues scap depression  supine AROM w/ wand 2x10    AROM 2x10 AAROM supine 2x10    AROM 2x10 AAROM supine 2x10    AROM 2x10   Shld ER seated x30 seated x30 seated 20x     SL 2x10 SL x10    seated x20 SL x20    seated x20   Shld abd Seated w/ cane x20    ecc control x10 AROM x10 AROM x10 W/ cane, ecc empahsis x10 W/ cane, ecc empahsis x10   seated x20 seated x20 seated w/ cane x20    Wall slides        nt  Partial x5  Full x5 Partial x5   Prone rows  20x 20x x20 x20 2x12       Prone ext  20x 20x x20 x20 2x12       Prone horiz abd   AA therapist 10x AROM x10 x10 2x8       Tband IR/ER   YTB standing 20x ea YTB x20 ea YTB x30 ea RTB x30 ea       Incline pushup for scap setting      5' total w/ edu                                 PROM R shld 15' 15' 10' 15' 15' 10'  15' 15' 15'   Ther Activity                                       Gait Training             Staggered stance hold on foam              Step taps             Turn w/ amb             Exagg gait, marching             Modalities

## 2021-09-14 ENCOUNTER — OFFICE VISIT (OUTPATIENT)
Dept: PHYSICAL THERAPY | Facility: CLINIC | Age: 51
End: 2021-09-14
Payer: MEDICARE

## 2021-09-14 DIAGNOSIS — S46.811A TEAR OF RIGHT INFRASPINATUS TENDON, INITIAL ENCOUNTER: ICD-10-CM

## 2021-09-14 DIAGNOSIS — M75.101 TEAR OF RIGHT SUPRASPINATUS TENDON: Primary | ICD-10-CM

## 2021-09-14 PROCEDURE — 97110 THERAPEUTIC EXERCISES: CPT

## 2021-09-14 PROCEDURE — 97140 MANUAL THERAPY 1/> REGIONS: CPT

## 2021-09-14 NOTE — PROGRESS NOTES
Daily Note     Today's date: 2021  Patient name: Ary Yun  : 1970  MRN: [id number]  Referring provider: Brittney Tran  Dx:   Encounter Diagnosis     ICD-10-CM    1  Tear of right supraspinatus tendon  M75 101    2  Tear of right infraspinatus tendon, initial encounter  S46 811A        Start Time: 1700  Stop Time: 1745  Total time in clinic (min): 45 minutes    Subjective: Patient reports experiencing muscular soreness following previous session that subsided the next day  He reports no pain or soreness at the start of today's session  Objective: See treatment diary below      Assessment: Tolerated treatment well  Patient with improved mina-scap activation at end of today's session; utilized mirror feedback during active shoulder elevation in scapular plane to improve compensatory scap shrug, clayton well  Improved rotational ROM noted  Pt will continue to benefit from skilled PT to improve functional mobility and activity tolerance  Plan: Continue per plan of care        Precautions: Cerebral Palsy, s/p R RTC repair (21), FALL RISK, no cryotherapy (not tolerated)    Phase 1 - Immediate Post-Operative Phase: 21 - 21  Phase 2 - Protection and Active Motion Phase: 21 - 21  Phase 3 - Early Strengthening Phase: 21 - 21  Phase 4 - Aggressive Rehab Phase: 21 -   See protocol attached to paper chart for further details      Manuals    Visit Number 21 22 23 24 25 26 27  19 20   STM R shld 5' 8' total pec major, biceps, subscap  8' total   3'  5'    IASTM lateral elbow    Gr I-III AP, inf glide R shld 5' 5'     4'  5' 5'   KT tape, R biceps  3' 3' DM  3'  3'      Neuro Re-Ed             Scap isos             Ball squeeze                                                                              Ther Ex             Elbow ROM 3' 3' 3' 3# x20 3# x30 4# 3x15 4# 3x15  3' 3'   Wrist, hand ROM 3' 3' 3'      3' 3' Cervical ROM             Pendulums             Pulleys Flex, abd 5" x20 Flex, abd 5" x20 Flex, abd 5" x20 Flex, abd 5" x20  Flex, abd 5" x20 Flexion, abd 5" 3x8 ea flexion x20 w/ contract relax for scap depression/retraction  Flex, abd 5" x20 Flex, abd 5" x20   Shld flexion AAROM seated x20    AROM x20 AROM x20 AROM x20 W/ cane, ecc emphasis x10 Wall slide w/ lift off x10 AROM 3 x 5-8  Tactile cues scap depression AROM x8 w/ scap set    Wall slides x8  AAROM supine 2x10    AROM 2x10 AAROM supine 2x10    AROM 2x10   Shld ER seated x30 seated x30 seated 20x    SL w/ manual contact 5" x10  SL x10    seated x20 SL x20    seated x20   Shld abd Seated w/ cane x20    ecc control x10 AROM x10 AROM x10 W/ cane, ecc empahsis x10 W/ cane, ecc empahsis x10    seated x20 seated w/ cane x20    Wall slides         Partial x5  Full x5 Partial x5   Prone rows  20x 20x x20 x20 2x12       Prone ext  20x 20x x20 x20 2x12       Prone horiz abd   AA therapist 10x AROM x10 x10 2x8       Tband IR/ER   YTB standing 20x ea YTB x20 ea YTB x30 ea RTB x30 ea       Incline pushup for scap setting      5' total w/ edu 5' total                                 PROM R shld 15' 15' 10' 15' 15' 10' 15'  15' 15'   Ther Activity                                       Gait Training             Staggered stance hold on foam              Step taps             Turn w/ amb             Exagg gait, marching             Modalities

## 2021-09-16 ENCOUNTER — OFFICE VISIT (OUTPATIENT)
Dept: PHYSICAL THERAPY | Facility: CLINIC | Age: 51
End: 2021-09-16
Payer: MEDICARE

## 2021-09-16 DIAGNOSIS — S46.811A TEAR OF RIGHT INFRASPINATUS TENDON, INITIAL ENCOUNTER: ICD-10-CM

## 2021-09-16 DIAGNOSIS — M75.101 TEAR OF RIGHT SUPRASPINATUS TENDON: Primary | ICD-10-CM

## 2021-09-16 PROCEDURE — 97140 MANUAL THERAPY 1/> REGIONS: CPT

## 2021-09-16 PROCEDURE — 97110 THERAPEUTIC EXERCISES: CPT

## 2021-09-16 PROCEDURE — 97112 NEUROMUSCULAR REEDUCATION: CPT

## 2021-09-16 NOTE — PROGRESS NOTES
Daily Note     Today's date: 2021  Patient name: Fatmata Rios  : 1970  MRN: [id number]  Referring provider: Edwardo Coates  Dx:   Encounter Diagnosis     ICD-10-CM    1  Tear of right supraspinatus tendon  M75 101    2  Tear of right infraspinatus tendon, initial encounter  S46 811A        Start Time: 1655          Subjective: Patient reports no pain prior to session  Objective: See treatment diary below      Assessment: Tolerated treatment well  Patient required cues for proper scapular setting during exercises, demo good understanding  Pt will continue to benefit from skilled PT to improve functional mobility and activity tolerance  Plan: Continue per plan of care        Precautions: Cerebral Palsy, s/p R RTC repair (21), FALL RISK, no cryotherapy (not tolerated)    Phase 1 - Immediate Post-Operative Phase: 21 - 21  Phase 2 - Protection and Active Motion Phase: 21 - 21  Phase 3 - Early Strengthening Phase: 21 - 21  Phase 4 - Aggressive Rehab Phase: 21 -   See protocol attached to paper chart for further details      Manuals      Visit Number 21 22 23 24 25 26 27 28     STM R shld 5' 8' total pec major, biceps, subscap  8' total   3'      Gr I-III AP, inf glide R shld 5' 5'     4'      KT tape, R biceps  3' 3' DM  3'  3'      Neuro Re-Ed             Scap isos             Ball squeeze                                                                              Ther Ex             Elbow ROM 3' 3' 3' 3# x20 3# x30 4# 3x15 4# 3x15 4# 3x15     Wrist, hand ROM 3' 3' 3'          Cervical ROM             Pendulums             Pulleys Flex, abd 5" x20 Flex, abd 5" x20 Flex, abd 5" x20 Flex, abd 5" x20  Flex, abd 5" x20 Flexion, abd 5" 3x8 ea flexion x20 w/ contract relax for scap depression/retraction Flexion, abd 5" 3x8 ea     Shld flexion AAROM seated x20    AROM x20 AROM x20 AROM x20 W/ cane, ecc emphasis x10 Wall slide w/ lift off x10 AROM 3 x 5-8  Tactile cues scap depression AROM x8 w/ scap set    Wall slides x8 AROM x8 w/ scap set    Wall slides x8       Shld ER seated x30 seated x30 seated 20x    SL w/ manual contact 5" x10 SL ER w/ assist  x10     Shld abd Seated w/ cane x20    ecc control x10 AROM x10 AROM x10 W/ cane, ecc empahsis x10 W/ cane, ecc empahsis x10        Wall slides             Prone rows  20x 20x x20 x20 2x12       Prone ext  20x 20x x20 x20 2x12       Prone horiz abd   AA therapist 10x AROM x10 x10 2x8       Tband IR/ER   YTB standing 20x ea YTB x20 ea YTB x30 ea RTB x30 ea  RTB 30x     Incline pushup for scap setting      5' total w/ edu 5' total                                 PROM R shld 15' 15' 10' 15' 15' 10' 15' 15'     Ther Activity                                       Gait Training             Staggered stance hold on foam              Step taps             Turn w/ amb             Exagg gait, marching             Modalities

## 2021-09-20 ENCOUNTER — OFFICE VISIT (OUTPATIENT)
Dept: PHYSICAL THERAPY | Facility: CLINIC | Age: 51
End: 2021-09-20
Payer: MEDICARE

## 2021-09-20 DIAGNOSIS — S46.811A TEAR OF RIGHT INFRASPINATUS TENDON, INITIAL ENCOUNTER: ICD-10-CM

## 2021-09-20 DIAGNOSIS — M75.101 TEAR OF RIGHT SUPRASPINATUS TENDON: Primary | ICD-10-CM

## 2021-09-20 PROCEDURE — 97140 MANUAL THERAPY 1/> REGIONS: CPT

## 2021-09-20 PROCEDURE — 97110 THERAPEUTIC EXERCISES: CPT

## 2021-09-20 NOTE — PROGRESS NOTES
Daily Note     Today's date: 2021  Patient name: Laila Higginbotham  : 1970  MRN: [id number]  Referring provider: Yajaira Davies  Dx:   Encounter Diagnosis     ICD-10-CM    1  Tear of right supraspinatus tendon  M75 101    2  Tear of right infraspinatus tendon, initial encounter  S46 811A        Start Time: 1315  Stop Time: 1400  Total time in clinic (min): 45 minutes    Subjective: Patient reports mild shoulder soreness at the start of today's session, stating he has been trying to use it more functionally over the last few days  Objective: See treatment diary below      Assessment: Tolerated treatment well  Patient with improved ability to perform shoulder elevation activities without scap shrug  Initiated lateral raise, elicited lateral elbow pain, held additional reps  Pt will continue to benefit from skilled PT to improve functional mobility and activity tolerance  Plan: Continue per plan of care        Precautions: Cerebral Palsy, s/p R RTC repair (21), FALL RISK, no cryotherapy (not tolerated)    Phase 1 - Immediate Post-Operative Phase: 21 - 21  Phase 2 - Protection and Active Motion Phase: 21 - 21  Phase 3 - Early Strengthening Phase: 21 - 21  Phase 4 - Aggressive Rehab Phase: 21 -   See protocol attached to paper chart for further details      Manuals     Visit Number 21 22 23 24 25 26 27 28 29    STM R shld 5' 8' total pec major, biceps, subscap  8' total   3'  IASTM biceps x8'    Gr I-III AP, inf glide R shld 5' 5'     4'      KT tape, R biceps  3' 3' DM  3'  3'      Neuro Re-Ed             Scap isos             Ball squeeze                                                                              Ther Ex             Elbow ROM 3' 3' 3' 3# x20 3# x30 4# 3x15 4# 3x15 4# 3x15 4# 3x15 (3-x-5)    Wrist, hand ROM 3' 3' 3'          Cervical ROM             Pendulums             Pulleys Flex, abd 5" x20 Flex, abd 5" x20 Flex, abd 5" x20 Flex, abd 5" x20  Flex, abd 5" x20 Flexion, abd 5" 3x8 ea flexion x20 w/ contract relax for scap depression/retraction Flexion, abd 5" 3x8 ea Flexion x15    Shld flexion AAROM seated x20    AROM x20 AROM x20 AROM x20 W/ cane, ecc emphasis x10 Wall slide w/ lift off x10 AROM 3 x 5-8  Tactile cues scap depression AROM x8 w/ scap set    Wall slides x8 AROM x8 w/ scap set    Wall slides x8   AROM x15 w/ scap set    Shld ER seated x30 seated x30 seated 20x    SL w/ manual contact 5" x10 SL ER w/ assist  x10 SL ER w/ assist x15    Shld abd Seated w/ cane x20    ecc control x10 AROM x10 AROM x10 W/ cane, ecc empahsis x10 W/ cane, ecc empahsis x10        Wall slides             Prone rows  20x 20x x20 x20 2x12   2x12    Prone ext  20x 20x x20 x20 2x12   2x12    Prone horiz abd   AA therapist 10x AROM x10 x10 2x8   2x8    Tband IR/ER   YTB standing 20x ea YTB x20 ea YTB x30 ea RTB x30 ea  RTB 30x RTB x30 ea    Incline pushup for scap setting      5' total w/ edu 5' total                                 PROM R shld 15' 15' 10' 15' 15' 10' 15' 15' 15'    Ther Activity                                       Gait Training             Staggered stance hold on foam              Step taps             Turn w/ amb             Exagg gait, marching             Modalities

## 2021-09-21 ENCOUNTER — APPOINTMENT (OUTPATIENT)
Dept: PHYSICAL THERAPY | Facility: CLINIC | Age: 51
End: 2021-09-21
Payer: MEDICARE

## 2021-09-23 ENCOUNTER — OFFICE VISIT (OUTPATIENT)
Dept: PHYSICAL THERAPY | Facility: CLINIC | Age: 51
End: 2021-09-23
Payer: MEDICARE

## 2021-09-23 DIAGNOSIS — S46.811A TEAR OF RIGHT INFRASPINATUS TENDON, INITIAL ENCOUNTER: ICD-10-CM

## 2021-09-23 DIAGNOSIS — M75.101 TEAR OF RIGHT SUPRASPINATUS TENDON: Primary | ICD-10-CM

## 2021-09-23 PROCEDURE — 97110 THERAPEUTIC EXERCISES: CPT

## 2021-09-23 NOTE — PROGRESS NOTES
Daily Note     Today's date: 2021  Patient name: Sofia Kilpatrick  : 1970  MRN: [id number]  Referring provider: Elaina Wang  Dx:   Encounter Diagnosis     ICD-10-CM    1  Tear of right supraspinatus tendon  M75 101    2  Tear of right infraspinatus tendon, initial encounter  S46 811A        Start Time: 1650  Stop Time: 1745  Total time in clinic (min): 55 minutes    Subjective: Pt comes in feeling good today, mentions that carrying his umbrella today was different and that "I could feel the muscles kicking in for the first time in a while"  Objective: See treatment diary below      Assessment: Tolerated treatment well  Patient demonstrated better technique for shoulder elevation without compensatory scap shrug or cueing to "pull down and back"  Pt showed good form and endurance during prone rows, extensions, and abductions  Patient with moderate discomfort at proximal biceps during today's session  Pt w/ mild skin irritation, KT tape held, pt expressed good understanding w/ continued monitoring  Pt will continue to benefit from skilled PT to improve functional mobility and activity tolerance  Plan: Continue per plan of care        Precautions: Cerebral Palsy, s/p R RTC repair (21), FALL RISK, no cryotherapy (not tolerated)    Phase 1 - Immediate Post-Operative Phase: 21 - 21  Phase 2 - Protection and Active Motion Phase: 21 - 21  Phase 3 - Early Strengthening Phase: 21 - 21  Phase 4 - Aggressive Rehab Phase: 21 -   See protocol attached to paper chart for further details      Manuals    Visit Number 21 22 23 24 25 26 27 28 29 30   STM R shld 5' 8' total pec major, biceps, subscap  8' total   3'  IASTM biceps x8'    Gr I-III AP, inf glide R shld 5' 5'     4'      KT tape, R biceps  3' 3' DM  3'  3'      Neuro Re-Ed             [employer] Ther Ex             Elbow ROM 3' 3' 3' 3# x20 3# x30 4# 3x15 4# 3x15 4# 3x15 4# 3x15 (3-x-5) 4# 3x15   Wrist, hand ROM 3' 3' 3'          Cervical ROM             Pendulums             Pulleys Flex, abd 5" x20 Flex, abd 5" x20 Flex, abd 5" x20 Flex, abd 5" x20  Flex, abd 5" x20 Flexion, abd 5" 3x8 ea flexion x20 w/ contract relax for scap depression/retraction Flexion, abd 5" 3x8 ea Flexion x15 Flexion x15       Shld flexion AAROM seated x20    AROM x20 AROM x20 AROM x20 W/ cane, ecc emphasis x10 Wall slide w/ lift off x10 AROM 3 x 5-8  Tactile cues scap depression AROM x8 w/ scap set    Wall slides x8 AROM x8 w/ scap set    Wall slides x8   AROM x15 w/ scap set AROM x15 w/ scap set   Shld ER seated x30 seated x30 seated 20x    SL w/ manual contact 5" x10 SL ER w/ assist  x10 SL ER w/ assist x15 SL ER w/ assist x15   Shld abd Seated w/ cane x20    ecc control x10 AROM x10 AROM x10 W/ cane, ecc empahsis x10 W/ cane, ecc empahsis x10     W/ cane     x10   Wall slides             Prone rows  20x 20x x20 x20 2x12   2x12 2x15   Prone ext  20x 20x x20 x20 2x12   2x12 2x15   Prone horiz abd   AA therapist 10x AROM x10 x10 2x8   2x8 2x10   Tband IR/ER   YTB standing 20x ea YTB x20 ea YTB x30 ea RTB x30 ea  RTB 30x RTB x30 ea RTB x30   Incline pushup for scap setting      5' total w/ edu 5' total                                 PROM R shld 15' 15' 10' 15' 15' 10' 15' 15' 15' 15'   Ther Activity                                       Gait Training             Staggered stance hold on foam              Step taps             Turn w/ amb             Exagg gait, marching             Modalities

## 2021-09-27 ENCOUNTER — APPOINTMENT (OUTPATIENT)
Dept: PHYSICAL THERAPY | Facility: CLINIC | Age: 51
End: 2021-09-27
Payer: MEDICARE

## 2021-09-28 ENCOUNTER — APPOINTMENT (OUTPATIENT)
Dept: PHYSICAL THERAPY | Facility: CLINIC | Age: 51
End: 2021-09-28
Payer: MEDICARE

## 2021-09-30 ENCOUNTER — EVALUATION (OUTPATIENT)
Dept: PHYSICAL THERAPY | Facility: CLINIC | Age: 51
End: 2021-09-30
Payer: MEDICARE

## 2021-09-30 DIAGNOSIS — S46.811A TEAR OF RIGHT INFRASPINATUS TENDON, INITIAL ENCOUNTER: ICD-10-CM

## 2021-09-30 DIAGNOSIS — M75.101 TEAR OF RIGHT SUPRASPINATUS TENDON: Primary | ICD-10-CM

## 2021-09-30 PROCEDURE — 97110 THERAPEUTIC EXERCISES: CPT

## 2021-09-30 NOTE — PROGRESS NOTES
Progress Note     Today's date: 2021  Patient name: Gadiel De Anda  : 1970  MRN: [id number]  Referring provider: Kirit Fisher  Dx:   Encounter Diagnosis     ICD-10-CM    1  Tear of right supraspinatus tendon  M75 101    2  Tear of right infraspinatus tendon, initial encounter  S46 811A        Start Time: 1700  Stop Time: 1745  Total time in clinic (min): 45 minutes    Subjective: Pt reports 80% improvement in function since starting PT  He reports improvement in anterior shoulder pain since previous session  Goals  STG - 6 weeks (end of phase I) - ALL MET  1  Patient will be I and compliant with HEP per post-op protocol  - MET  2  Pt will achieve passive forward flexion to at least 125 degrees (per protocol)  - MET  3  Pt will achieve passive ER in scaption to at least 40 degrees (per protocol)  - MET  4  Pt will achieve passive IR in scaption to at least 50 degrees (per protocol)  - MET  5  Pt will express reduction in pain by 50%  - MET    ITG - 12 weeks (end of phase II) - CONTINUED AT PN ON 21 FOR 5 WEEKS  1  Pt FOTO will improve to >43/100  - PROGRESSED  2  Pt will achieve full AROM  - MET   3  Pt will express no pain  - PROGRESSED  4  Pt will demonstrate 4/5 strength in all planes  - MET  5  Pt will perform AROM shoulder flexion w/o scapular compensation/shrugging  - PROGRESSED    LTG - 16 weeks (end of phase III) - CONTINUED AT PN ON 21 FOR 5 WEEKS  1  Pt will be able to tolerate progression to-low level functional activities (ie  Light chores around house) (per protocol)  - met  2  Pt will demonstrate return of dynamic shoulder stability (per protocol)  - progressed  3  Pt will return to higher demanding work/ADLs  - progressed  4  Pt FOTO will improve to >60/100   5  Pt will demonstrate at least 4+/5 strength in all planes   - progressed        Objective: See treatment diary below    MMT:   L;R  Shoulder Flexion:  / 4/5  Shoulder Abduction:  / 4/5  IR:  / 4/5  ER: 5/5 / 4/5     Sensation: all intact and equal bilaterally     AROM in standing  Flexion 110  Abduction: 125  Functional ER: T3  Functional IR: L3    AROM in supine   Flexion: 145  Abduction: 90    PROM in supine:  Flexion:  155  Abduction: 120  IR: 55 (neutral) / 58 (at 45 degrees)   ER: 63 (both neutral and at 45 degrees)          Assessment: Tolerated treatment well  Since starting physical therapy patient has progressed in ROM significantly and is demonstrating better control of his dynamic shoulder stability  Pt has improved in his AROM as compared to last eval and has a higher pain tolerance for all motions  Pt was able to tolerate MMT during session and demonstrated at least a 4/5 for all motions on the affected arm  Despite these gains, the patient remains below his PLOF due to pain and stiffness limiting his ROM  PT POC and goals were updated during today's session; pt expressed good understanding and no questions  Plan: Continue per plan of care        Precautions: Cerebral Palsy, s/p R RTC repair (6/4/21), FALL RISK, no cryotherapy (not tolerated)    Phase 1 - Immediate Post-Operative Phase: 6/4/21 - 7/16/21  Phase 2 - Protection and Active Motion Phase: 7/16/21 - 8/27/21  Phase 3 - Early Strengthening Phase: 8/27/21 - 9/24/21  Phase 4 - Aggressive Rehab Phase: 9/24/21 -   See protocol attached to paper chart for further details      Manuals 9/30 8/31 9/2 9/7 9/9 9/14 9/16 9/20 9/23   Visit Number 31  23 24 25 26 27 28 29 30   STM R shld  8' total pec major, biceps, subscap  8' total   3'  IASTM biceps x8'    Gr I-III AP, inf glide R shld  5'     4'      KT tape, R biceps  3' 3' DM  3'  3'      IR MWM 10" x10            Neuro Re-Ed             Scap isos             Ball squeeze                                                                              Ther Ex             Elbow ROM 4# 3x15 3' 3' 3# x20 3# x30 4# 3x15 4# 3x15 4# 3x15 4# 3x15 (3-x-5) 4# 3x15   Wrist, hand ROM  3' 3' Cervical ROM             Pendulums             Pulleys Flex x20 Flex, abd 5" x20 Flex, abd 5" x20 Flex, abd 5" x20  Flex, abd 5" x20 Flexion, abd 5" 3x8 ea flexion x20 w/ contract relax for scap depression/retraction Flexion, abd 5" 3x8 ea Flexion x15 Flexion x15       Shld flexion AROM scaption x20 AROM x20 AROM x20 W/ cane, ecc emphasis x10 Wall slide w/ lift off x10 AROM 3 x 5-8  Tactile cues scap depression AROM x8 w/ scap set    Wall slides x8 AROM x8 w/ scap set    Wall slides x8   AROM x15 w/ scap set AROM x15 w/ scap set   Shld ER  seated x30 seated 20x    SL w/ manual contact 5" x10 SL ER w/ assist  x10 SL ER w/ assist x15 SL ER w/ assist x15   Shld abd  AROM x10 AROM x10 W/ cane, ecc empahsis x10 W/ cane, ecc empahsis x10     W/ cane     x10   Wall slides             Prone rows  20x 20x x20 x20 2x12   2x12 2x15   Prone ext  20x 20x x20 x20 2x12   2x12 2x15   Prone horiz abd   AA therapist 10x AROM x10 x10 2x8   2x8 2x10   Tband IR/ER   YTB standing 20x ea YTB x20 ea YTB x30 ea RTB x30 ea  RTB 30x RTB x30 ea RTB x30   Incline pushup for scap setting      5' total w/ edu 5' total        Re-eval, PT POC, HEP                         PROM R shld 10' 15' 10' 15' 15' 10' 15' 15' 15' 15'   Ther Activity                                       Gait Training             Staggered stance hold on foam              Step taps             Turn w/ amb             Exagg gait, marching             Modalities

## 2021-10-05 ENCOUNTER — OFFICE VISIT (OUTPATIENT)
Dept: PHYSICAL THERAPY | Facility: CLINIC | Age: 51
End: 2021-10-05
Payer: MEDICARE

## 2021-10-05 DIAGNOSIS — M75.101 TEAR OF RIGHT SUPRASPINATUS TENDON: Primary | ICD-10-CM

## 2021-10-05 DIAGNOSIS — S46.811A TEAR OF RIGHT INFRASPINATUS TENDON, INITIAL ENCOUNTER: ICD-10-CM

## 2021-10-05 PROCEDURE — 97110 THERAPEUTIC EXERCISES: CPT

## 2021-10-07 ENCOUNTER — OFFICE VISIT (OUTPATIENT)
Dept: PHYSICAL THERAPY | Facility: CLINIC | Age: 51
End: 2021-10-07
Payer: MEDICARE

## 2021-10-07 DIAGNOSIS — M75.101 TEAR OF RIGHT SUPRASPINATUS TENDON: Primary | ICD-10-CM

## 2021-10-07 DIAGNOSIS — S46.811A TEAR OF RIGHT INFRASPINATUS TENDON, INITIAL ENCOUNTER: ICD-10-CM

## 2021-10-07 PROCEDURE — 97140 MANUAL THERAPY 1/> REGIONS: CPT

## 2021-10-07 PROCEDURE — 97110 THERAPEUTIC EXERCISES: CPT

## 2021-10-11 ENCOUNTER — OFFICE VISIT (OUTPATIENT)
Dept: OBGYN CLINIC | Facility: OTHER | Age: 51
End: 2021-10-11
Payer: MEDICARE

## 2021-10-11 VITALS
SYSTOLIC BLOOD PRESSURE: 104 MMHG | BODY MASS INDEX: 26.69 KG/M2 | WEIGHT: 186 LBS | HEART RATE: 73 BPM | DIASTOLIC BLOOD PRESSURE: 71 MMHG

## 2021-10-11 DIAGNOSIS — M75.101 TEAR OF RIGHT SUPRASPINATUS TENDON: Primary | ICD-10-CM

## 2021-10-11 PROBLEM — S46.811A TEAR OF RIGHT INFRASPINATUS TENDON: Status: RESOLVED | Noted: 2021-05-27 | Resolved: 2021-10-11

## 2021-10-11 PROCEDURE — 99213 OFFICE O/P EST LOW 20 MIN: CPT | Performed by: PHYSICIAN ASSISTANT

## 2021-10-11 RX ORDER — OMEPRAZOLE MAGNESIUM 2.5 MG/1
GRANULE, DELAYED RELEASE ORAL EVERY 24 HOURS
COMMUNITY
End: 2022-07-20

## 2021-10-12 ENCOUNTER — APPOINTMENT (OUTPATIENT)
Dept: PHYSICAL THERAPY | Facility: CLINIC | Age: 51
End: 2021-10-12
Payer: MEDICARE

## 2021-10-14 ENCOUNTER — OFFICE VISIT (OUTPATIENT)
Dept: PHYSICAL THERAPY | Facility: CLINIC | Age: 51
End: 2021-10-14
Payer: MEDICARE

## 2021-10-14 DIAGNOSIS — S46.811A TEAR OF RIGHT INFRASPINATUS TENDON, INITIAL ENCOUNTER: ICD-10-CM

## 2021-10-14 DIAGNOSIS — M75.101 TEAR OF RIGHT SUPRASPINATUS TENDON: Primary | ICD-10-CM

## 2021-10-14 PROCEDURE — 97110 THERAPEUTIC EXERCISES: CPT

## 2021-10-19 ENCOUNTER — OFFICE VISIT (OUTPATIENT)
Dept: PHYSICAL THERAPY | Facility: CLINIC | Age: 51
End: 2021-10-19
Payer: MEDICARE

## 2021-10-19 DIAGNOSIS — S46.811A TEAR OF RIGHT INFRASPINATUS TENDON, INITIAL ENCOUNTER: ICD-10-CM

## 2021-10-19 DIAGNOSIS — M75.101 TEAR OF RIGHT SUPRASPINATUS TENDON: Primary | ICD-10-CM

## 2021-10-19 PROCEDURE — 97110 THERAPEUTIC EXERCISES: CPT

## 2021-10-21 ENCOUNTER — OFFICE VISIT (OUTPATIENT)
Dept: PHYSICAL THERAPY | Facility: CLINIC | Age: 51
End: 2021-10-21
Payer: MEDICARE

## 2021-10-21 DIAGNOSIS — M75.101 TEAR OF RIGHT SUPRASPINATUS TENDON: Primary | ICD-10-CM

## 2021-10-21 DIAGNOSIS — S46.811A TEAR OF RIGHT INFRASPINATUS TENDON, INITIAL ENCOUNTER: ICD-10-CM

## 2021-10-21 PROCEDURE — 97110 THERAPEUTIC EXERCISES: CPT

## 2021-10-26 ENCOUNTER — OFFICE VISIT (OUTPATIENT)
Dept: PHYSICAL THERAPY | Facility: CLINIC | Age: 51
End: 2021-10-26
Payer: MEDICARE

## 2021-10-26 DIAGNOSIS — S46.811A TEAR OF RIGHT INFRASPINATUS TENDON, INITIAL ENCOUNTER: ICD-10-CM

## 2021-10-26 DIAGNOSIS — M75.101 TEAR OF RIGHT SUPRASPINATUS TENDON: Primary | ICD-10-CM

## 2021-10-26 PROCEDURE — 97110 THERAPEUTIC EXERCISES: CPT

## 2021-10-28 ENCOUNTER — OFFICE VISIT (OUTPATIENT)
Dept: PHYSICAL THERAPY | Facility: CLINIC | Age: 51
End: 2021-10-28
Payer: MEDICARE

## 2021-10-28 DIAGNOSIS — S46.811A TEAR OF RIGHT INFRASPINATUS TENDON, INITIAL ENCOUNTER: ICD-10-CM

## 2021-10-28 DIAGNOSIS — M75.101 TEAR OF RIGHT SUPRASPINATUS TENDON: Primary | ICD-10-CM

## 2021-10-28 PROCEDURE — 97110 THERAPEUTIC EXERCISES: CPT

## 2021-11-03 DIAGNOSIS — K21.00 GASTROESOPHAGEAL REFLUX DISEASE WITH ESOPHAGITIS WITHOUT HEMORRHAGE: ICD-10-CM

## 2021-11-03 RX ORDER — FAMOTIDINE 40 MG/1
TABLET, FILM COATED ORAL
Qty: 30 TABLET | Refills: 5 | Status: SHIPPED | OUTPATIENT
Start: 2021-11-03 | End: 2022-06-01

## 2021-11-15 ENCOUNTER — EVALUATION (OUTPATIENT)
Dept: PHYSICAL THERAPY | Facility: CLINIC | Age: 51
End: 2021-11-15
Payer: MEDICARE

## 2021-11-15 DIAGNOSIS — G80.9 CEREBRAL PALSY, UNSPECIFIED TYPE (HCC): Primary | ICD-10-CM

## 2021-11-15 DIAGNOSIS — R26.89 OTHER ABNORMALITIES OF GAIT AND MOBILITY: ICD-10-CM

## 2021-11-15 DIAGNOSIS — R26.89 BALANCE DISORDER: ICD-10-CM

## 2021-11-15 PROCEDURE — 97163 PT EVAL HIGH COMPLEX 45 MIN: CPT

## 2021-11-15 NOTE — PROGRESS NOTES
PT Evaluation   Today's date: 11-  Patient name: Chele Ibarra  : 1970  MRN: [id number]  Referring provider: Gabo Grady DO  Dx:   Encounter Diagnosis     ICD-10-CM    1  Cerebral palsy, unspecified type ([facility] )  G80 9    2  Balance disorder  R26 89    3  Other abnormalities of gait and mobility  R26 89          Assessment  Assessment details: Patient is a 51 y/o Male who presents to skilled outpatient PT for gait and balance deficits secondary to diagnosis of Cerebral Palsy  Sensation screen intact  Noted difficulty with coordination tasks limited by flexibility and weakness and resulted in slowed movements  Further [facility] signs consistent with diagnosis of Cerebral Palsy include hyperreflexia at B/L patellar tendon DTRs, clonus, and he scored a 2 on the Modified Catarina Scale  He demonstrated HIGH risk for falls via scores associated with 5xSTS and JONES as well as limited functional cardio capacity per 6MWT  He ambulated with a SPC throughout the session and demonstrated the following gait abnormalities: crouch gait, inconsistent step length/foot placement, decreased pelvic disassociation, and absent heel strike throughout entirety of gait cycle  He will benefit from skilled outpatient PT in order to maximize his function and independence within his home and the community as he lives alone          Impairments: Abnormal coordination, Abnormal gait, Abnormal muscle tone, Abnormal or restricted ROM, Activity intolerance, Impaired balance, Impaired physical strength, Lacks appropriate HEP, Poor posture, Poor body mechanics, Pain with function, Safety issue, Weight-bearing intolerance and Abnormal movement  Understanding of Dx/Px/POC: Good  Prognosis: Good     Patient verbalized understanding of POC            Please contact me if you have any questions or recommendations   Thank you for the referral and the opportunity to share in [facility] care            Plan  Plan details: Gait and balance training  Patient would benefit from: PT Eval and Skilled PT  Planned modality interventions: Biofeedback, Cryotherapy, TENS and Thermotherapy: Hydrocollator Packs  Planned therapy interventions: Abdominal trunk stabilization, ADL training, Balance, Balance/WB training, Breathing training, Body mechanics training, Coordination, Functional ROM exercises, Gait training, HEP, Joint mobilization, Manual therapy, Tobin taping, Motor coordination training, Neuromuscular re-education, Patient education, Postural training, Strengthening, Stretching, Therapeutic activities, Therapeutic exercises, Therapeutic training, Transfer training and Activity modification  Frequency: 2x/wk  Duration in weeks: 12  Plan of Care beginning date: 11-  Plan of Care expiration date: 12 weeks - 2-7-2022  Treatment plan discussed with: Patient        Goals  Short Term Goals (4 weeks):    - Patient will be independent in basic HEP 2-3 weeks  - Patient will improve 5xSTS score by 2 3 seconds from 16 40 seconds to 14 10 seconds to promote improved LE functional strength needed for ADLs  - Patient will follow up with orthotist for consult about bracing to encourage improved weight bearing through appropriate aspects of feet in order to maximize safety and function     Long Term Goals (12 weeks):  - Patient will be independent in a comprehensive home exercise program  - Patient will improve JONES by 6 points from 40/56 to 46/56 to facilitate return to safe independent ambulation  - Patient will complete the FGA to address dynamic balance safety  - Patient will be able to demonstrate HT in gait without veering  - Patient will improve 6 Minute Walk Test score by 190 feet to promote improved cardiovascular endurance  - Patient will report 50% reduction in near falls in order to improve safety with functional tasks and reduce his risk for falls  - Patient will report going on walks at least 3 days per week to promote independence and improved cardiovascular endurance  - Patient will be able to ascend/descend stairs reciprocally with 1 UE assist to promote independence and safety with ADLs  - Patient will report 50% reduction in near falls when ambulating on uneven terrain        Cut off score     All date taken from [facility] Neuro Section or Rehab Measures    Collazo/56  MDC: 6 pts  Age Norms:  61-76: M - 54   F - 55  70-79: M - 47   F - 53  80-89: M - 48   F - 50 5xSTS: Nida et al 2010  MDC: 2 3 sec  Age Norms:  60-69: 11 1 sec  70-79: 12 6 sec  80-89: 14 8 sec   TUG  MDC: 4 14 sec  Cut off score:  >13 5 sec community dwelling adults  >32 2 frail elderly  <20 I for basic transfers  >30 dependent on transfers 10 Meter Walk Test: Nguyen rodrigues 2011  MDC: 0 18 m/s  20-29: M - 1 35 m   F - 1 34 m  30-39: M - 1 43 m   F - 1 34 m  40-49: M - 1 43 m   F - 1 39 m  50-59: M - 1 43 m   F - 1 31 m  60-69: M - 1 34 m   F - 1 24 m  70-79: M - 1 26 m   F - 1 13 m  80-89: M - 0 97 m   F - 0 94 m    Household Ambulator < 0 4 m/s  Limited Community Ambulator 0 4 - 0 8 m/s  [facility] Ambulator 0 8 - 1 2 m/s  Safely cross the street > 1 2 m/s   FGA  MCID: 4 pts  Geriatrics/community < 22/30 fall risk  Geriatrics/community < 20/30 unexplained falls    DGI  MDC: vestibular - 4 pts  MDC: geriatric/community - 3 pts  Falls risk <19/24 mCTSIB  Norm: 20-60 yrs  Eyes open firm: norm sway 0 21-0 48  Eyes closed firm: norm sway 0 48-0 99  Eyes open foam: norm sway 0 38-0 71  Eyes closed foam: norm sway 0 70-2 22   6 Minute Walk Test  MDC: 190 98 ft  MCID: 164 ft    Age Norms  61-76: M - 1876 ft (571 80 m)  F - 1765 ft (537 98 m)  70-79: M - 1729 ft (527 00 m)  F - 1545 ft (470 92 m)  80-89: M - 1368 ft (416 97 m)  F - 1286 ft (391 97 m) ABC: Vishnu Tucker & Samy, 2003  <67% increased risk for falls         Subjective    History of Present Illness  - Mechanism of injury: Patient is a 53 y/o male who reports to skilled outpatient PT with complaints of frequent falls and a PMHx of Cerebral Palsy  Patient recently received PT for his shoulder following surgical repair of R rotator cuff  He returns to balance therapy for gait and balance training  No recent falls since last bout of PT   - Primary AD: SPC  - Assist level at home: Independently, has someone coming in to clean the apartment  - Decreased fine motor tasks: No        Pain  - Current pain ratin/10  - At best pain ratin/10  - At worst pain ratin/10  - Location: N/A  - Aggravating factors: N/A     Social Support  - Steps to enter house: No  - Stairs in house: No   - Lives in: 1st floor apartment  - Lives with: Alone     - Employment status: Disability  - Hand dominance: R    Treatments  - Previous treatment: PT  - Current treatment: PT  - Diagnostic Testing:         Objective      Sensation  - Light touch: Intact  - Deep pressure: Inact     Coordination  - Heel to Shin: Maintained contact, limited via weakness and mobiltiy  - Alternate Toe Taps: Slowed     Reflexes/Clonus  - Clonus: No  - Patellar DTR: 3+:  Increased     Modified Catarina  - 2     Gait  - Abnormalities: crouch gait, inconsistent step length/foot placement, decreased pelvic disassociation, absent heel strike throughout entirety of gait cycle      Outcome Measures Initial Eval  11-        5xSTS 16 40 sec,   0 UE        TUG 11 22 sec,   w/ SPC        10 meter         JONES 40/56        FGA Defer        DGI Defer        mCTSIB  - FTEO (firm)  - FTEC (firm)  - FTEO (foam)  - FTEC (foam)   30 sec  30 sec          6MWT 1010 ft w/ SPC, 1050 ft w/ SPC post plate                               Precautions: CP, Falls  Past Medical History:   Diagnosis Date    Acid reflux     Anxiety     Arthritis     Cerebral palsy (HCC)     GERD (gastroesophageal reflux disease)     H/O ETOH abuse     Psychiatric disorder     anxiety, depression    Ulcer

## 2021-11-17 ENCOUNTER — OFFICE VISIT (OUTPATIENT)
Dept: PHYSICAL THERAPY | Facility: CLINIC | Age: 51
End: 2021-11-17
Payer: MEDICARE

## 2021-11-17 DIAGNOSIS — R26.89 BALANCE DISORDER: ICD-10-CM

## 2021-11-17 DIAGNOSIS — R26.89 OTHER ABNORMALITIES OF GAIT AND MOBILITY: ICD-10-CM

## 2021-11-17 DIAGNOSIS — G80.9 CEREBRAL PALSY, UNSPECIFIED TYPE (HCC): Primary | ICD-10-CM

## 2021-11-17 PROCEDURE — 97530 THERAPEUTIC ACTIVITIES: CPT

## 2021-11-22 ENCOUNTER — OFFICE VISIT (OUTPATIENT)
Dept: PHYSICAL THERAPY | Facility: CLINIC | Age: 51
End: 2021-11-22
Payer: MEDICARE

## 2021-11-22 DIAGNOSIS — G80.9 CEREBRAL PALSY, UNSPECIFIED TYPE (HCC): Primary | ICD-10-CM

## 2021-11-22 DIAGNOSIS — S46.811A TEAR OF RIGHT INFRASPINATUS TENDON, INITIAL ENCOUNTER: ICD-10-CM

## 2021-11-22 DIAGNOSIS — M75.101 TEAR OF RIGHT SUPRASPINATUS TENDON: ICD-10-CM

## 2021-11-22 DIAGNOSIS — R26.89 BALANCE DISORDER: ICD-10-CM

## 2021-11-22 DIAGNOSIS — R26.89 OTHER ABNORMALITIES OF GAIT AND MOBILITY: ICD-10-CM

## 2021-11-22 PROCEDURE — 97112 NEUROMUSCULAR REEDUCATION: CPT

## 2021-11-24 ENCOUNTER — OFFICE VISIT (OUTPATIENT)
Dept: PHYSICAL THERAPY | Facility: CLINIC | Age: 51
End: 2021-11-24
Payer: MEDICARE

## 2021-11-24 DIAGNOSIS — R26.89 BALANCE DISORDER: ICD-10-CM

## 2021-11-24 DIAGNOSIS — G80.9 CEREBRAL PALSY, UNSPECIFIED TYPE (HCC): Primary | ICD-10-CM

## 2021-11-24 DIAGNOSIS — R26.89 OTHER ABNORMALITIES OF GAIT AND MOBILITY: ICD-10-CM

## 2021-11-24 PROCEDURE — 97112 NEUROMUSCULAR REEDUCATION: CPT

## 2021-11-29 ENCOUNTER — OFFICE VISIT (OUTPATIENT)
Dept: PHYSICAL THERAPY | Facility: CLINIC | Age: 51
End: 2021-11-29
Payer: MEDICARE

## 2021-11-29 DIAGNOSIS — R26.89 BALANCE DISORDER: ICD-10-CM

## 2021-11-29 DIAGNOSIS — R26.89 OTHER ABNORMALITIES OF GAIT AND MOBILITY: ICD-10-CM

## 2021-11-29 DIAGNOSIS — G80.9 CEREBRAL PALSY, UNSPECIFIED TYPE (HCC): Primary | ICD-10-CM

## 2021-11-29 PROCEDURE — 97112 NEUROMUSCULAR REEDUCATION: CPT

## 2021-11-29 NOTE — PROGRESS NOTES
Daily Note  IE: 11- (POC: 2022)    Today's date: 2021  Patient name: Clarissa Pickard  : 1970  MRN: [id number]  Referring provider: Jewell Mcqueen DO  Dx:   Encounter Diagnosis     ICD-10-CM    1  Cerebral palsy, unspecified type ([facility] )  G80 9    2  Balance disorder  R26 89    3  Other abnormalities of gait and mobility  R26 89                   Subjective: Patient arrives to PT today with no new changes, complaints, or falls  Objective: See treatment diary below    NMR:  - Seated AP pelvic mobility on kavitha disc: 30 reps  - Seated rotational trunk iso holds on kavitha disc: 5 reps B/L, 5 sec holds  - Resisted fwd ambulation (yellow) in SOLO Step: 6 laps, 20 ft down/back  - Fwd ambulation w/ marching in SOLO Step: 2 laps, 20 ft down/back, CG      Assessment: Patient able to tolerate treatment session well today with increased focus on reciprocal movements during ambulation activities  Demonstrated decreased hip extension during ambulation which resulted in reduced step length that improved following PT manual cues at hips  He will continue to benefit from skilled outpatient PT in order to maximize his function and improve his gait and balance  Plan: Continue per plan of care        Precautions: Cerebral Palsy, s/p R RTC repair (21), FALL RISK, no cryotherapy (not tolerated)    Phase 1 - Immediate Post-Operative Phase: 21 - 21  Phase 2 - Protection and Active Motion Phase: 21 - 21  Phase 3 - Early Strengthening Phase: 21 - 21  Phase 4 - Aggressive Rehab Phase: 21 -   See protocol attached to paper chart for further details    Outcome Measures Initial Eval  11-        5xSTS 16 40 sec,   0 UE        TUG 11 22 sec,   w/ SPC        10 meter m/s        JONES 40/56        FGA Defer        DGI Defer        mCTSIB  - FTEO (firm)  - FTEC (firm)  - FTEO (foam)  - FTEC (foam)   30 sec  30 sec          6MWT 1010 ft, SPC  1050 ft, SPC post plate

## 2021-12-01 ENCOUNTER — OFFICE VISIT (OUTPATIENT)
Dept: PHYSICAL THERAPY | Facility: CLINIC | Age: 51
End: 2021-12-01
Payer: MEDICARE

## 2021-12-01 DIAGNOSIS — G80.9 CEREBRAL PALSY, UNSPECIFIED TYPE (HCC): Primary | ICD-10-CM

## 2021-12-01 DIAGNOSIS — R26.89 BALANCE DISORDER: ICD-10-CM

## 2021-12-01 DIAGNOSIS — R26.89 OTHER ABNORMALITIES OF GAIT AND MOBILITY: ICD-10-CM

## 2021-12-01 PROCEDURE — 97112 NEUROMUSCULAR REEDUCATION: CPT

## 2021-12-06 ENCOUNTER — OFFICE VISIT (OUTPATIENT)
Dept: PHYSICAL THERAPY | Facility: CLINIC | Age: 51
End: 2021-12-06
Payer: MEDICARE

## 2021-12-06 DIAGNOSIS — G80.9 CEREBRAL PALSY, UNSPECIFIED TYPE (HCC): Primary | ICD-10-CM

## 2021-12-06 DIAGNOSIS — R26.89 BALANCE DISORDER: ICD-10-CM

## 2021-12-06 DIAGNOSIS — R26.89 OTHER ABNORMALITIES OF GAIT AND MOBILITY: ICD-10-CM

## 2021-12-06 PROCEDURE — 97112 NEUROMUSCULAR REEDUCATION: CPT

## 2021-12-08 ENCOUNTER — OFFICE VISIT (OUTPATIENT)
Dept: PHYSICAL THERAPY | Facility: CLINIC | Age: 51
End: 2021-12-08
Payer: MEDICARE

## 2021-12-08 DIAGNOSIS — G80.9 CEREBRAL PALSY, UNSPECIFIED TYPE (HCC): Primary | ICD-10-CM

## 2021-12-08 DIAGNOSIS — R26.89 OTHER ABNORMALITIES OF GAIT AND MOBILITY: ICD-10-CM

## 2021-12-08 DIAGNOSIS — R26.89 BALANCE DISORDER: ICD-10-CM

## 2021-12-08 PROCEDURE — 97112 NEUROMUSCULAR REEDUCATION: CPT

## 2021-12-08 NOTE — PROGRESS NOTES
Daily Note  IE: 11- (POC: 2022)    Insurance:  AMA/CMS Eval/ Re-eval POC expires Ty Del Angel #/ Referral # Total units  Start date  Expiration date Extension  Visit limitation? PT only or  PT+OT? Co-Insurance   Medicare  CMS 11-15 2-7-22  N/A N/A N/A N/A N/A N/A  20%                                                                 Today's date: 2021  Patient name: Geraldo Vallejo  : 1970  MRN: [id number]  Referring provider: Jasmin Russell DO  Dx:   Encounter Diagnosis     ICD-10-CM    1  Cerebral palsy, unspecified type ([facility] )  G80 9    2  Balance disorder  R26 89    3  Other abnormalities of gait and mobility  R26 89                   Subjective: Patient arrives to PT today with no new changes, complaints, or falls  Objective: See treatment diary below    NMR:  - Seated AP pelvic mobility on kavitha disc: 30 reps  - Marching in place w/ core activation: 30 reps  - Ambulation w/ tactile cue on B/L hips to improve upright posture in SOLO Step: 4 laps, 24 ft  - Resisted fwd/bwd ambulation (yellow) from SOLO Step clip on upper back: 10 reps, 5-8 ft down/back, CG  - Fwd ambulation w/ marching in SOLO Step: 4 laps, 20 ft down/back, CG      Assessment: Patient able to tolerate treatment session well today with continued core activation throughout exercise program  Fair tolerance to upright posture with noted forward lean especially as he fatigued  Improvements in posture following verbal and tactile cues  He will continue to benefit from skilled outpatient PT in order to maximize his function and improve his gait and balance  Plan: Continue per plan of care        Precautions: Cerebral Palsy, s/p R RTC repair (21), FALL RISK, no cryotherapy (not tolerated)    Phase 1 - Immediate Post-Operative Phase: 21 - 21  Phase 2 - Protection and Active Motion Phase: 21 - 21  Phase 3 - Early Strengthening Phase: 21 - 21  Phase 4 - Aggressive Rehab Phase: 21 -   See protocol attached to paper chart for further details    Outcome Measures Initial Eval  11-        5xSTS 16 40 sec,   0 UE        TUG 11 22 sec,   w/ SPC        10 meter m/s        JONES 40/56        FGA Defer        DGI Defer        mCTSIB  - FTEO (firm)  - FTEC (firm)  - FTEO (foam)  - FTEC (foam)   30 sec  30 sec          6MWT 1010 ft, SPC  1050 ft, SPC post plate

## 2021-12-13 ENCOUNTER — OFFICE VISIT (OUTPATIENT)
Dept: PHYSICAL THERAPY | Facility: CLINIC | Age: 51
End: 2021-12-13
Payer: MEDICARE

## 2021-12-13 DIAGNOSIS — R26.89 BALANCE DISORDER: ICD-10-CM

## 2021-12-13 DIAGNOSIS — G80.9 CEREBRAL PALSY, UNSPECIFIED TYPE (HCC): Primary | ICD-10-CM

## 2021-12-13 DIAGNOSIS — R26.89 OTHER ABNORMALITIES OF GAIT AND MOBILITY: ICD-10-CM

## 2021-12-13 PROCEDURE — 97112 NEUROMUSCULAR REEDUCATION: CPT

## 2021-12-15 ENCOUNTER — EVALUATION (OUTPATIENT)
Dept: PHYSICAL THERAPY | Facility: CLINIC | Age: 51
End: 2021-12-15
Payer: MEDICARE

## 2021-12-15 DIAGNOSIS — R26.89 BALANCE DISORDER: ICD-10-CM

## 2021-12-15 DIAGNOSIS — R26.89 OTHER ABNORMALITIES OF GAIT AND MOBILITY: ICD-10-CM

## 2021-12-15 DIAGNOSIS — G80.9 CEREBRAL PALSY, UNSPECIFIED TYPE (HCC): Primary | ICD-10-CM

## 2021-12-15 PROCEDURE — 97530 THERAPEUTIC ACTIVITIES: CPT

## 2021-12-15 NOTE — PROGRESS NOTES
PT Re-Evaluation /Progress Note  Today's date: 12-  Patient name: Pili Briones  : 1970  MRN: [id number]  Referring provider: Mando Burleson DO  Dx:   Encounter Diagnosis     ICD-10-CM    1  Cerebral palsy, unspecified type ([facility] [address] )  G80 9    2  Balance disorder  R26 89    3  Other abnormalities of gait and mobility  R26 89          Assessment  Assessment details: Patient is a 55 y/o Male who has been reporting to skilled outpatient PT for gait and balance deficits secondary to diagnosis of Cerebral Palsy  He has demonstrated improvements in these areas as indicated by scores associated with 5xSTS, JONES, and 6MWT  While he remain at [address] [facility] risk for falls per cut off scores taken from APTA and Rehab Measures, he demonstrated overall improved quality of movement and ability to wean UE support and encourage improved upright posture throughout  Patient demonstrated improvements in ambulation mechanics and was able to perform same distance on 6MWT as previously required following use of vibration plate indicating lasting carryover  He will continue to benefit from skilled outpatient PT in order to maximize his function and independence within his home and the community as he lives alone          Impairments: Abnormal coordination, Abnormal gait, Abnormal muscle tone, Abnormal or restricted ROM, Activity intolerance, Impaired balance, Impaired physical strength, Lacks appropriate HEP, Poor posture, Poor body mechanics, Pain with function, Safety issue, Weight-bearing intolerance and Abnormal movement  Understanding of Dx/Px/POC: Good  Prognosis: Good     Patient verbalized understanding of POC            Please contact me if you have any questions or recommendations   Thank you for the referral and the opportunity to share in [address] details: Gait and balance training  Patient would benefit from: PT Estrellita and Skilled PT  Planned modality interventions: Biofeedback, Cryotherapy, TENS and Thermotherapy: Hydrocollator Packs  Planned therapy interventions: Abdominal trunk stabilization, ADL training, Balance, Balance/WB training, Breathing training, Body mechanics training, Coordination, Functional ROM exercises, Gait training, HEP, Joint mobilization, Manual therapy, Tobin taping, Motor coordination training, Neuromuscular re-education, Patient education, Postural training, Strengthening, Stretching, Therapeutic activities, Therapeutic exercises, Therapeutic training, Transfer training and Activity modification  Frequency: 2x/wk  Duration in weeks: 12  Plan of Care beginning date: 11-  Plan of Care expiration date: 12 weeks - 2-7-2022  Treatment plan discussed with: Patient        Goals  Short Term Goals (4 weeks):    - Patient will be independent in basic HEP 2-3 weeks  - Patient will improve 5xSTS score by 2 3 seconds from 16 40 seconds to 14 10 seconds to promote improved LE functional strength needed for ADLs  - Patient will follow up with orthotist for consult about bracing to encourage improved weight bearing through appropriate aspects of feet in order to maximize safety and function     Long Term Goals (12 weeks):  - Patient will be independent in a comprehensive home exercise program  - Patient will improve JONES by 6 points from 40/56 to 46/56 to facilitate return to safe independent ambulation  - Patient will complete the FGA to address dynamic balance safety  - Patient will be able to demonstrate HT in gait without veering  - Patient will improve 6 Minute Walk Test score by 190 feet to promote improved cardiovascular endurance  - Patient will report 50% reduction in near falls in order to improve safety with functional tasks and reduce his risk for falls  - Patient will report going on walks at least 3 days per week to promote independence and improved cardiovascular endurance  - Patient will be able to ascend/descend stairs reciprocally with 1 UE assist to promote independence and safety with ADLs  - Patient will report 50% reduction in near falls when ambulating on uneven terrain        Cut off score     All date taken from APTA Neuro Section or Rehab Measures    Coll[place]/56  MDC: 6 pts  Age Norms:  61-76: M - 54   F - 55  70-79: M - 47   F - 53  80-89: M - 48   F - 50 5xSTS: Nida et al 2010  MDC: 2 3 sec  Age Norms:  60-69: 11 1 sec  70-79: 12 6 sec  80-89: 14 8 sec   TUG  MDC: 4 14 sec  Cut off score:  >13 5 sec community dwelling adults  >32 2 frail elderly  <20 I for basic transfers  >30 dependent on transfers 10 Meter Walk Test: Karel Juan Dears al 2011  MDC: 0 18 m/s  20-29: M - 1 35 m   F - 1 34 m  30-39: M - 1 43 m   F - 1 34 m  40-49: M - 1 43 m   F - 1 39 m  50-59: M - 1 43 m   F - 1 31 m  60-69: M - 1 34 m   F - 1 24 m  70-79: M - 1 26 m   F - 1 13 m  80-89: M - 0 97 m   F - 0 94 m    Household Ambulator < 0 4 m/s  Limited Community Ambulator 0 4 - 0 8 m/s  [employer] Ambulator 0 8 - 1 2 m/s  Safely cross the street > 1 2 m/s   FGA  MCID: 4 pts  Geriatrics/community < 22/30 fall risk  Geriatrics/community < 20/30 unexplained falls    DGI  MDC: vestibular - 4 pts  MDC: geriatric/community - 3 pts  Falls risk <19/24 mCTSIB  Norm: 20-60 yrs  Eyes open firm: norm sway 0 21-0 48  Eyes closed firm: norm sway 0 48-0 99  Eyes open foam: norm sway 0 38-0 71  Eyes closed foam: norm sway 0 70-2 22   6 Minute Walk Test  MDC: 190 98 ft  MCID: 164 ft    Age Norms  61-76: M - 1876 ft (571 80 m)  F - 1765 ft (537 98 m)  70-79: M - 1729 ft (527 00 m)  F - 1545 ft (470 92 m)  80-89: M - 1368 ft (416 97 m)  F - 1286 ft (391 97 m) ABC: Yoana New & Samy, 2003  <67% increased risk for falls         Subjective    History of Present Illness  - Mechanism of injury: Patient is a 55 y/o male who reports to skilled outpatient PT with complaints of frequent falls and a PMHx of Cerebral Palsy   Patient recently received PT for his shoulder following surgical repair of R rotator cuff  He returns to balance therapy for gait and balance training  No recent falls since last bout of PT   - Primary AD: SPC  - Assist level at home: Independently, has someone coming in to clean the apartment  - Decreased fine motor tasks: No        Pain  - Current pain ratin/10  - At best pain ratin/10  - At worst pain ratin/10  - Location: N/A  - Aggravating factors: N/A     Social Support  - Steps to enter house: No  - Stairs in house: No   - Lives in: 1st floor apartment  - Lives with: Alone     - Employment status: Disability  - Hand dominance: R    Treatments  - Previous treatment: PT  - Current treatment: PT  - Diagnostic Testing:         Objective      Sensation  - Light touch: Intact  - Deep pressure: Inact     Coordination  - Heel to Shin: Maintained contact, limited via weakness and mobiltiy  - Alternate Toe Taps: Slowed     Reflexes/Clonus  - Clonus: No  - Patellar DTR: 3+:  Increased     Modified Catarina  - 2     Gait  - Abnormalities: crouch gait, inconsistent step length/foot placement, decreased pelvic disassociation, absent heel strike throughout entirety of gait cycle      Outcome Measures Initial Eval  11- PN  12-       5xSTS 16 40 sec,   0 UE 14 49 sec, 0 UE       TUG 11 22 sec   w/ SPC 11 13 sec w/ SPC       10 meter  0 93 m/s       JONES 40/56 46/56       FGA Defer Defer       DGI Defer Defer       mCTSIB  - FTEO (firm)  - FTEC (firm)  - FTEO (foam)  - FTEC (foam)   30 sec  30 sec   30 sec  30 sec  30 sec  30 sec       6MWT 1010 ft, SPC  1050 ft, SPC post plate 8409 ft, SPC                              Precautions: CP, Falls  Past Medical History:   Diagnosis Date    Acid reflux     Anxiety     Arthritis     Cerebral palsy (HCC)     GERD (gastroesophageal reflux disease)     H/O ETOH abuse     Psychiatric disorder     anxiety, depression    Ulcer

## 2021-12-20 ENCOUNTER — OFFICE VISIT (OUTPATIENT)
Dept: PHYSICAL THERAPY | Facility: CLINIC | Age: 51
End: 2021-12-20
Payer: MEDICARE

## 2021-12-20 DIAGNOSIS — R26.89 OTHER ABNORMALITIES OF GAIT AND MOBILITY: ICD-10-CM

## 2021-12-20 DIAGNOSIS — R26.89 BALANCE DISORDER: ICD-10-CM

## 2021-12-20 DIAGNOSIS — G80.9 CEREBRAL PALSY, UNSPECIFIED TYPE (HCC): Primary | ICD-10-CM

## 2021-12-20 PROCEDURE — 97112 NEUROMUSCULAR REEDUCATION: CPT

## 2021-12-20 PROCEDURE — 97110 THERAPEUTIC EXERCISES: CPT

## 2021-12-22 ENCOUNTER — OFFICE VISIT (OUTPATIENT)
Dept: PHYSICAL THERAPY | Facility: CLINIC | Age: 51
End: 2021-12-22
Payer: MEDICARE

## 2021-12-22 DIAGNOSIS — G80.9 CEREBRAL PALSY, UNSPECIFIED TYPE (HCC): Primary | ICD-10-CM

## 2021-12-22 DIAGNOSIS — R26.89 BALANCE DISORDER: ICD-10-CM

## 2021-12-22 DIAGNOSIS — R26.89 OTHER ABNORMALITIES OF GAIT AND MOBILITY: ICD-10-CM

## 2021-12-22 PROCEDURE — 97112 NEUROMUSCULAR REEDUCATION: CPT

## 2021-12-27 ENCOUNTER — APPOINTMENT (OUTPATIENT)
Dept: PHYSICAL THERAPY | Facility: CLINIC | Age: 51
End: 2021-12-27
Payer: MEDICARE

## 2021-12-29 ENCOUNTER — OFFICE VISIT (OUTPATIENT)
Dept: PHYSICAL THERAPY | Facility: CLINIC | Age: 51
End: 2021-12-29
Payer: MEDICARE

## 2021-12-29 DIAGNOSIS — R26.89 BALANCE DISORDER: ICD-10-CM

## 2021-12-29 DIAGNOSIS — G80.9 CEREBRAL PALSY, UNSPECIFIED TYPE (HCC): Primary | ICD-10-CM

## 2021-12-29 DIAGNOSIS — R26.89 OTHER ABNORMALITIES OF GAIT AND MOBILITY: ICD-10-CM

## 2021-12-29 PROCEDURE — 97112 NEUROMUSCULAR REEDUCATION: CPT | Performed by: PHYSICAL THERAPIST

## 2022-01-03 ENCOUNTER — OFFICE VISIT (OUTPATIENT)
Dept: PHYSICAL THERAPY | Facility: CLINIC | Age: 52
End: 2022-01-03
Payer: MEDICARE

## 2022-01-03 DIAGNOSIS — R26.89 BALANCE DISORDER: ICD-10-CM

## 2022-01-03 DIAGNOSIS — G80.9 CEREBRAL PALSY, UNSPECIFIED TYPE (HCC): Primary | ICD-10-CM

## 2022-01-03 DIAGNOSIS — R26.89 OTHER ABNORMALITIES OF GAIT AND MOBILITY: ICD-10-CM

## 2022-01-03 PROCEDURE — 97112 NEUROMUSCULAR REEDUCATION: CPT

## 2022-01-03 NOTE — PROGRESS NOTES
Daily Note  PN: 12- (POC: 2022)    Insurance:  AMA/CMS Eval/ Re-eval POC expires Gael Crain #/ Referral # Total units  Start date  Expiration date Extension  Visit limitation? PT only or  PT+OT? Co-Insurance   Medicare  CMS 11-15 2-7-22  N/A N/A N/A N/A N/A N/A  20%    12-15                                                             Today's date: 1/3/2022  Patient name: Didier Lu  : 1970  MRN: [id number]  Referring provider: Keyla Hughes DO  Dx:   Encounter Diagnosis     ICD-10-CM    1  Cerebral palsy, unspecified type ([facility] [address] )  G80 9    2  Balance disorder  R26 89    3  Other abnormalities of gait and mobility  R26 89                   Subjective: Patient arrives to PT today noting he experienced a near fall over the weekend when he was walking down steps carrying a mirror with his mom  He stated he had to catch himself on the wall as he had to step off the step differently and "I stepped on my own foot "      Objective: See treatment diary below      NMR:  - Seated AP pelvic mobility on kavitha disc: 30 reps  - Stair negotiation carrying objects: 5 cycles, CG, SPC  - Stair negotiation carrying objects w/ TBand (yellow) around B/L knees: 5 cycles, CG, SPC  - Ambulation w/ PT manual resist TBand "X" around B/L ankles: 200 ft, no AD  - Picking up flat objects from floor (colored dots): 13 reps, CG      Assessment: Patient able to tolerate treatment session well today with focus on dynamic balance with specific functional tasks as patient reported difficulty over the weekend  When performing turning, he displayed foot crossing midline and coming in contact with other foot resulting in LoB  Utilized TBand to assist with hip abduction during gait and turns with improvements noted and fair carryover with removal  He will continue to benefit from skilled outpatient PT in order to maximize his function and improve his gait and balance  Plan: Continue per plan of care        Precautions: Cerebral Palsy, s/p R RTC repair (6/4/21), FALL RISK, no cryotherapy (not tolerated)    Phase 1 - Immediate Post-Operative Phase: 6/4/21 - 7/16/21  Phase 2 - Protection and Active Motion Phase: 7/16/21 - 8/27/21  Phase 3 - Early Strengthening Phase: 8/27/21 - 9/24/21  Phase 4 - Aggressive Rehab Phase: 9/24/21 -   See protocol attached to paper chart for further details    Outcome Measures Initial Eval  11- PN  12-       5xSTS 16 40 sec,   0 UE 14 49 sec, 0 UE       TUG 11 22 sec   w/ SPC 11 13 sec w/ SPC       10 meter  0 93 m/s       JONES 40/56 46/56       FGA Defer Defer       DGI Defer Defer       mCTSIB  - FTEO (firm)  - FTEC (firm)  - FTEO (foam)  - FTEC (foam)   30 sec  30 sec   30 sec  30 sec  30 sec  30 sec       6MWT 1010 ft, SPC  1050 ft, SPC post plate 8009 ft, SPC  2558 ft, SPC post plate

## 2022-01-05 ENCOUNTER — OFFICE VISIT (OUTPATIENT)
Dept: PHYSICAL THERAPY | Facility: CLINIC | Age: 52
End: 2022-01-05
Payer: MEDICARE

## 2022-01-05 DIAGNOSIS — R26.89 OTHER ABNORMALITIES OF GAIT AND MOBILITY: ICD-10-CM

## 2022-01-05 DIAGNOSIS — R26.89 BALANCE DISORDER: ICD-10-CM

## 2022-01-05 DIAGNOSIS — G80.9 CEREBRAL PALSY, UNSPECIFIED TYPE (HCC): Primary | ICD-10-CM

## 2022-01-05 PROCEDURE — 97112 NEUROMUSCULAR REEDUCATION: CPT

## 2022-01-05 NOTE — PROGRESS NOTES
Daily Note  PN: 12- (POC: 2022)    Insurance:  AMA/CMS Eval/ Re-eval POC expires Elysia Jackson #/ Referral # Total units  Start date  Expiration date Extension  Visit limitation? PT only or  PT+OT? Co-Insurance   Medicare  CMS 11-15 2-7-22  N/A N/A N/A N/A N/A N/A  20%    12-15                                                             Today's date: 2022  Patient name: Paul Eubanks  : 1970  MRN: [id number]  Referring provider: Millie Pompa DO  Dx:   Encounter Diagnosis     ICD-10-CM    1  Cerebral palsy, unspecified type ([facility] [address] )  G80 9    2  Balance disorder  R26 89    3  Other abnormalities of gait and mobility  R26 89                   Subjective: Patient arrives to PT with no new changes, complaints, or falls  Overall feeling pretty good today  Objective: See treatment diary below      NMR:  - Seated AP pelvic mobility on kavitha disc: 30 reps  - Anterior weight shift off wall: 30 reps, light 1 UE  - Ambulation w/ PT manual resist TBand "X" around B/L ankles: 2 sets, 200 ft, no AD  - Bwd ambulation w/ PT manual resist TBand "X" around B/L ankles: 5 laps, 10 ft      Assessment: Patient able to tolerate treatment session well today  Most difficulty with core activation and anterior weight shift off wall and required light 1 UE assist indicating remaining core weakness and pelvic mobility  Improving stability during ambulation with increased [place]  He will continue to benefit from skilled outpatient PT in order to maximize his function and improve his gait and balance  Plan: Continue per plan of care        Precautions: Cerebral Palsy, s/p R RTC repair (21), FALL RISK, no cryotherapy (not tolerated)    Phase 1 - Immediate Post-Operative Phase: 21 - 21  Phase 2 - Protection and Active Motion Phase: 21 - 21  Phase 3 - Early Strengthening Phase: 21 - 21  Phase 4 - Aggressive Rehab Phase: 21 -   See protocol attached to paper chart for further details    Outcome Measures Initial Eval  11- PN  12-       5xSTS 16 40 sec,   0 UE 14 49 sec, 0 UE       TUG 11 22 sec   w/ SPC 11 13 sec w/ SPC       10 meter  0 93 m/s       JONES 40/56 46/56       FGA Defer Defer       DGI Defer Defer       mCTSIB  - FTEO (firm)  - FTEC (firm)  - FTEO (foam)  - FTEC (foam)   30 sec  30 sec   30 sec  30 sec  30 sec  30 sec       6MWT 1010 ft, SPC  1050 ft, SPC post plate 9810 ft, SPC  4288 ft, SPC post plate

## 2022-01-10 ENCOUNTER — OFFICE VISIT (OUTPATIENT)
Dept: PHYSICAL THERAPY | Facility: CLINIC | Age: 52
End: 2022-01-10
Payer: MEDICARE

## 2022-01-10 DIAGNOSIS — G80.9 CEREBRAL PALSY, UNSPECIFIED TYPE (HCC): Primary | ICD-10-CM

## 2022-01-10 DIAGNOSIS — R26.89 BALANCE DISORDER: ICD-10-CM

## 2022-01-10 DIAGNOSIS — R26.89 OTHER ABNORMALITIES OF GAIT AND MOBILITY: ICD-10-CM

## 2022-01-10 PROCEDURE — 97112 NEUROMUSCULAR REEDUCATION: CPT

## 2022-01-10 PROCEDURE — 97116 GAIT TRAINING THERAPY: CPT

## 2022-01-10 NOTE — PROGRESS NOTES
Daily Note  PN: 12- (POC: 2022)    Insurance:  AMA/CMS Eval/ Re-eval POC expires Vicky Clayton #/ Referral # Total units  Start date  Expiration date Extension  Visit limitation? PT only or  PT+OT? Co-Insurance   Medicare  CMS 11-15 2-7-22  N/A N/A N/A N/A N/A N/A  20%    12-15                                                             Today's date: 1/10/2022  Patient name: Chele Ibarra  : 1970  MRN: [id number]  Referring provider: Gabo Grady DO  Dx:   Encounter Diagnosis     ICD-10-CM    1  Cerebral palsy, unspecified type ([facility] [address] )  G80 9    2  Balance disorder  R26 89    3  Other abnormalities of gait and mobility  R26 89                   Subjective: Patient arrives to PT with no new changes, complaints, or falls  Overall feeling pretty good today  Objective: See treatment diary below      NMR:  - Seated AP pelvic mobility on kavitha disc: 30 reps  - Vibration Plate: 10 min, level 180, static standing, mini squats, heel raises, 1 UE    Gait:  - Treadmill ambulation: 1 0 mph, 0% incline, 10 min, TB tactile cue at hips for upright posture  - Ambulation w/ PT manual resist TBand "X" around B/L ankles: 2 sets, 200 ft, no AD      Assessment: Patient able to tolerate treatment session well today with continued immediate treadmill training following vibration plate with noted improvements in gait mechanics specifically step length and foot placement  He required verbal and tactile cues in order to promote upright posture throughout with fair carryover  He will continue to benefit from skilled outpatient PT in order to maximize his function and improve his gait and balance  Plan: Continue per plan of care        Precautions: Cerebral Palsy, s/p R RTC repair (21), FALL RISK, no cryotherapy (not tolerated)    Phase 1 - Immediate Post-Operative Phase: 21 - 21  Phase 2 - Protection and Active Motion Phase: 21 - 21  Phase 3 - Early Strengthening Phase: 21 - 9/24/21  Phase 4 - Aggressive Rehab Phase: 9/24/21 -   See protocol attached to paper chart for further details    Outcome Measures Initial Eval  11- PN  12-       5xSTS 16 40 sec,   0 UE 14 49 sec, 0 UE       TUG 11 22 sec   w/ SPC 11 13 sec w/ SPC       10 meter  0 93 m/s       JONES 40/56 46/56       FGA Defer Defer       DGI Defer Defer       mCTSIB  - FTEO (firm)  - FTEC (firm)  - FTEO (foam)  - FTEC (foam)   30 sec  30 sec   30 sec  30 sec  30 sec  30 sec       6MWT 1010 ft, SPC  1050 ft, SPC post plate 0382 ft, SPC  5212 ft, SPC post plate

## 2022-01-11 ENCOUNTER — TELEMEDICINE (OUTPATIENT)
Dept: FAMILY MEDICINE CLINIC | Facility: CLINIC | Age: 52
End: 2022-01-11
Payer: MEDICARE

## 2022-01-11 DIAGNOSIS — G80.9 CEREBRAL PALSY, UNSPECIFIED TYPE (HCC): Primary | ICD-10-CM

## 2022-01-11 DIAGNOSIS — R26.2 AMBULATORY DYSFUNCTION: ICD-10-CM

## 2022-01-11 PROCEDURE — 99442 PR PHYS/QHP TELEPHONE EVALUATION 11-20 MIN: CPT | Performed by: STUDENT IN AN ORGANIZED HEALTH CARE EDUCATION/TRAINING PROGRAM

## 2022-01-11 NOTE — PROGRESS NOTES
Virtual Brief Visit    Patient is located in the following state in which I hold an active license [place]       Assessment/Plan:    Problem List Items Addressed This Visit        Nervous and Auditory    Cerebral palsy ([facility] [address] ) - Primary    Relevant Orders    Ambulatory Referral to Orthopedic Surgery      Other Visit Diagnoses     Ambulatory dysfunction        Relevant Orders    Ambulatory Referral to Orthopedic Surgery        PLAN - patient given referral to Orthopedics as requested  Notified him that someone from the orthopedic office will reach out to him shortly to arrange an appointment  In the meantime, patient should reach out to the [employer] representative he has been working with to get specifics so he is prepared for his orthopedic appointment  Subjective - Working with PT at the balance center  They introduced him to a representative from [employer] which is a particular professional orthopedic prosthetic company and they suggested an orthopedic referral for specific braces in relation to his imbalance secondary to his cerebral palsy  Patient is unsure as to why he needs the orthopedic surgeon appointment specifically  He states that this recommendation was given to him back in July and he has forgotten a lot of the details but still has the card from the [employer] rep  Recent Visits  No visits were found meeting these conditions  Showing recent visits within past 7 days and meeting all other requirements  Today's Visits  Date Type Provider Dept   01/11/22 Telemedicine DO Juaquin Berry   Showing today's visits and meeting all other requirements  Future Appointments  No visits were found meeting these conditions  Showing future appointments within next 150 days and meeting all other requirements     It was my intent to perform this visit via video technology but the patient was not able to do a video connection so the visit was completed via audio telephone only          I spent 15 minutes directly with the patient during this visit

## 2022-01-12 ENCOUNTER — OFFICE VISIT (OUTPATIENT)
Dept: PHYSICAL THERAPY | Facility: CLINIC | Age: 52
End: 2022-01-12
Payer: MEDICARE

## 2022-01-12 DIAGNOSIS — R26.89 BALANCE DISORDER: ICD-10-CM

## 2022-01-12 DIAGNOSIS — R26.89 OTHER ABNORMALITIES OF GAIT AND MOBILITY: ICD-10-CM

## 2022-01-12 DIAGNOSIS — G80.9 CEREBRAL PALSY, UNSPECIFIED TYPE (HCC): Primary | ICD-10-CM

## 2022-01-12 PROCEDURE — 97116 GAIT TRAINING THERAPY: CPT

## 2022-01-12 PROCEDURE — 97112 NEUROMUSCULAR REEDUCATION: CPT

## 2022-01-12 NOTE — PROGRESS NOTES
Daily Note  PN: 12- (POC: 2022)    Insurance:  AMA/CMS Eval/ Re-eval POC expires Meghan Simmons #/ Referral # Total units  Start date  Expiration date Extension  Visit limitation? PT only or  PT+OT? Co-Insurance   Medicare  CMS 11-15 2-7-22  N/A N/A N/A N/A N/A N/A  20%    12-15                                                             Today's date: 2022  Patient name: Christian Ferrer  : 1970  MRN: [id number]  Referring provider: Mona Carreno DO  Dx:   Encounter Diagnosis     ICD-10-CM    1  Cerebral palsy, unspecified type ([facility] [address] )  G80 9    2  Balance disorder  R26 89    3  Other abnormalities of gait and mobility  R26 89                   Subjective: Patient arrives to PT with no new changes, complaints, or falls  He noted he felt really good after leaving last session  Objective: See treatment diary below      NMR:  - Seated AP pelvic mobility on kavitha disc: 30 reps  - Vibration Plate: 10 min, level 180, static standing, mini squats, heel raises, 1 UE  - Anterior weight shift off wall: 30 reps, light 1 UE    Gait:  - Treadmill ambulation: 1 4-1 6 mph, 0% incline, 10 min, TB tactile cue at hips for upright posture      Assessment: Patient able to tolerate treatment session well with good carryover with gait mechanics on treadmill following vibration plate  He demonstrated improved upright posture and decreased compensatory anterior trunk lean and forward flexion resulting in initial reduction in hip extension that improved with increased repetitions  He will continue to benefit from skilled outpatient PT in order to maximize his function and improve his gait and balance  Plan: Continue per plan of care        Precautions: Cerebral Palsy, s/p R RTC repair (21), FALL RISK, no cryotherapy (not tolerated)    Phase 1 - Immediate Post-Operative Phase: 21 - 21  Phase 2 - Protection and Active Motion Phase: 21 - 21  Phase 3 - Early Strengthening Phase: 21 - 9/24/21  Phase 4 - Aggressive Rehab Phase: 9/24/21 -   See protocol attached to paper chart for further details    Outcome Measures Initial Eval  11- PN  12-       5xSTS 16 40 sec,   0 UE 14 49 sec, 0 UE       TUG 11 22 sec   w/ SPC 11 13 sec w/ SPC       10 meter  0 93 m/s       JONES 40/56 46/56       FGA Defer Defer       DGI Defer Defer       mCTSIB  - FTEO (firm)  - FTEC (firm)  - FTEO (foam)  - FTEC (foam)   30 sec  30 sec   30 sec  30 sec  30 sec  30 sec       6MWT 1010 ft, SPC  1050 ft, SPC post plate 0501 ft, SPC  0125 ft, SPC post plate

## 2022-01-17 ENCOUNTER — OFFICE VISIT (OUTPATIENT)
Dept: PHYSICAL THERAPY | Facility: CLINIC | Age: 52
End: 2022-01-17
Payer: MEDICARE

## 2022-01-17 DIAGNOSIS — R26.89 BALANCE DISORDER: ICD-10-CM

## 2022-01-17 DIAGNOSIS — R26.89 OTHER ABNORMALITIES OF GAIT AND MOBILITY: ICD-10-CM

## 2022-01-17 DIAGNOSIS — G80.9 CEREBRAL PALSY, UNSPECIFIED TYPE (HCC): Primary | ICD-10-CM

## 2022-01-17 PROCEDURE — 97530 THERAPEUTIC ACTIVITIES: CPT

## 2022-01-17 NOTE — PROGRESS NOTES
PT Re-Evaluation   Today's date: 2022  Patient name: Fernando Hoover  : 1970  MRN: [id number]  Referring provider: Pavan Graham DO  Dx:   Encounter Diagnosis     ICD-10-CM    1  Cerebral palsy, unspecified type ([facility] [address] )  G80 9    2  Balance disorder  R26 89    3  Other abnormalities of gait and mobility  R26 89          Assessment  Assessment details: Patient is a 54 y o  Male who has been reporting to skilled outpatient PT for gait and balance deficits secondary to diagnosis of cerebral palsy  He has demonstrated significant gains in his LE strength, balance, and gait as indicated by scores associated with 5xSTS, TUG, and JONES resulting in LOW risk for falls  He further demonstrated improvements in functional cardio capacity and gait mechanics per results of 6MWT both pre and post vibration galicia training further denoting good carryover  He continued to ambulate with SPC throughout with the following gait abnormalities: crouch gait, inconsistent step length/foot placement, decreased pelvic disassociation, and absent heel strike throughout entirety of gait cycle  He demonstrated overall improvements in pelvic mobility during ambulation with resultant increased step length and improved consistency with foot placement  He has met all set goals at this point  Patient awaiting orthotic and is to be placed on hold until he receives new bracing following completion of 1 more week of PT to establish HEP and he was in good verbal understanding and agreement          Impairments: Abnormal coordination, Abnormal gait, Abnormal muscle tone, Abnormal or restricted ROM, Activity intolerance, Impaired balance, Impaired physical strength, Lacks appropriate HEP, Poor posture, Poor body mechanics, Pain with function, Safety issue, Weight-bearing intolerance and Abnormal movement  Understanding of Dx/Px/POC: Good  Prognosis: Good    Patient verbalized understanding of POC  Please contact me if you have any questions or recommendations  Thank you for the referral and the opportunity to share in [address] [place] [address] care          Plan  Plan details: Gait and balance training  Patient would benefit from: PT Eval and Skilled PT  Planned modality interventions: Biofeedback, Cryotherapy, TENS and Thermotherapy: Hydrocollator Packs  Planned therapy interventions: Abdominal trunk stabilization, ADL training, Balance, Balance/WB training, Breathing training, Body mechanics training, Coordination, Functional ROM exercises, Gait training, HEP, Joint mobilization, Manual therapy, Tobin taping, Motor coordination training, Neuromuscular re-education, Patient education, Postural training, Strengthening, Stretching, Therapeutic activities, Therapeutic exercises, Therapeutic training, Transfer training and Activity modification  Frequency: 2x/wk  Duration in weeks: 12  Plan of Care beginning date: 11-  Plan of Care expiration date: 12 weeks - 2-7-2022  Treatment plan discussed with: Patient       Goals  Short Term Goals (4 weeks):    - Patient will be independent in basic HEP 2-3 weeks - MET  - Patient will improve 5xSTS score by 2 3 seconds from 16 40 seconds to 14 10 seconds to promote improved LE functional strength needed for ADLs - MET  - Patient will follow up with orthotist for consult about bracing to encourage improved weight bearing through appropriate aspects of feet in order to maximize safety and function - MET    Long Term Goals (12 weeks):  - Patient will be independent in a comprehensive home exercise program - MET  - Patient will improve JONES by 6 points from 40/56 to 46/56 to facilitate return to safe independent ambulation - MET  - Patient will complete the FGA to address dynamic balance safety - MET  - Patient will be able to demonstrate HT in gait without veering - MET  - Patient will improve 6 Minute Walk Test score by 190 feet to promote improved cardiovascular endurance - MET  - Patient will report 50% reduction in near falls in order to improve safety with functional tasks and reduce his risk for falls - MET  - Patient will report going on walks at least 3 days per week to promote independence and improved cardiovascular endurance - MET  - Patient will be able to ascend/descend stairs reciprocally with 1 UE assist to promote independence and safety with ADLs - MET  - Patient will report 50% reduction in near falls when ambulating on uneven terrain - MET      Cut off score    All date taken from [facility] Neuro Section or Rehab Measures      Collazo/64  MDC: 6 pts  Age Norms:  61-76: M - 54   F - 55  70-79: M - 47   F - 53  80-89: M - 48   F - 50 5xSTS: Nida et al 2010  MDC: 2 3 sec  Age Norms:  60-69: 11 1 sec  70-79: 12 6 sec  80-89: 14 8 sec   TUG  MDC: 4 14 sec  Cut off score:  >13 5 sec community dwelling adults  >32 2 frail elderly  <20 I for basic transfers  >30 dependent on transfers 10 Meter Walk Test: Kandis Mckeon and Christ rodrigues 2011  MDC: 0 18 m/s  20-29: M - 1 35 m   F - 1 34 m  30-39: M - 1 43 m   F - 1 34 m  40-49: M - 1 43 m   F - 1 39 m  50-59: M - 1 43 m   F - 1 31 m  60-69: M - 1 34 m   F - 1 24 m  70-79: M - 1 26 m   F - 1 13 m  80-89: M - 0 97 m   F - 0 94 m    Household Ambulator < 0 4 m/s  Limited Community Ambulator 0 4 - 0 8 m/s  [employer] Ambulator 0 8 - 1 2 m/s  Safely cross the street > 1 2 m/s   FGA  MCID: 4 pts  Geriatrics/community < 22/30 fall risk  Geriatrics/community < 20/30 unexplained falls    DGI  MDC: vestibular - 4 pts  MDC: geriatric/community - 3 pts  Falls risk <19/24 mCTSIB  Norm: 20-60 yrs  Eyes open firm: norm sway 0 21-0 48  Eyes closed firm: norm sway 0 48-0 99  Eyes open foam: norm sway 0 38-0 71  Eyes closed foam: norm sway 0 70-2 22   6 Minute Walk Test  MDC: 190 98 ft  MCID: 164 ft    Age Norms  60-69: M - 1876 ft (571 80 m)  F - 1765 ft (537 98 m)  70-79: M - 1729 ft (527 00 m)  F - 1545 ft (470 92 m)  80-89: M - 1368 ft (416 97 m)  F - 1286 ft (391 97 m) ABC: Anali Jacobson, 2003  <67% increased risk for falls         Subjective    History of Present Illness  - Mechanism of injury: Patient is a 3p y/o male who reports to skilled outpatient PT with complaints of frequent falls and a PMHx of Cerebral Palsy  Patient recently received PT for his shoulder following surgical repair of R rotator cuff  He returns to balance therapy for gait and balance training  No recent falls since last bout of PT   - Primary AD: SPC  - Assist level at home: Independently, has someone coming in to clean the apartment  - Decreased fine motor tasks: No      Pain  - Current pain ratin/10  - At best pain ratin/10  - At worst pain ratin/10  - Location: N/A  - Aggravating factors: N/A    Social Support  - Steps to enter house: No  - Stairs in house: No   - Lives in: 1st floor apartment  - Lives with: Alone    - Employment status: Disability  - Hand dominance: R    Treatments  - Previous treatment: PT  - Current treatment: PT  - Diagnostic Testing:       Objective     Sensation  - Light touch: Intact  - Deep pressure: Inact    Coordination  - Heel to Shin: Maintained contact, limited via weakness and mobiltiy  - Alternate Toe Taps: Slowed    Reflexes/Clonus  - Clonus: No  - Patellar DTR: 3+:  Increased    Modified Catarina  - 2    Gait  - Abnormalities: crouch gait, inconsistent step length/foot placement, decreased pelvic disassociation, absent heel strike throughout entirety of gait cycle        Outcome Measures Initial Eval  11- PN  12- PN  2022      5xSTS 16 40 sec,   0 UE 14 49 sec, 0 UE 12 32 sec, 0 UE      TUG 11 22 sec   w/ SPC 11 13 sec w/ SPC 10 75 sec w/ SPC      10 meter  0 93 m/s 1 01 m/s      JONES 40/56 46/56 48/56      FGA Defer Defer       DGI Defer Defer       mCTSIB  - FTEO (firm)  - FTEC (firm)  - FTEO (foam)  - FTEC (foam)   30 sec  30 sec   30 sec  30 sec  30 sec  30 sec 30 sec  30 sec  30 sec  30 sec      6MWT 1010 ft, SPC  1050 ft, SPC post plate 2043 ft, SPC 4626 ft, SPC  1250 ft, SPC post plate                             Precautions: Falls, CP  Past Medical History:   Diagnosis Date    Acid reflux     Anxiety     Arthritis     Cerebral palsy (HCC)     GERD (gastroesophageal reflux disease)     H/O ETOH abuse     Psychiatric disorder     anxiety, depression    Ulcer

## 2022-01-19 ENCOUNTER — OFFICE VISIT (OUTPATIENT)
Dept: PHYSICAL THERAPY | Facility: CLINIC | Age: 52
End: 2022-01-19
Payer: MEDICARE

## 2022-01-19 DIAGNOSIS — R26.89 BALANCE DISORDER: ICD-10-CM

## 2022-01-19 DIAGNOSIS — G80.9 CEREBRAL PALSY, UNSPECIFIED TYPE (HCC): Primary | ICD-10-CM

## 2022-01-19 DIAGNOSIS — R26.89 OTHER ABNORMALITIES OF GAIT AND MOBILITY: ICD-10-CM

## 2022-01-19 PROCEDURE — 97112 NEUROMUSCULAR REEDUCATION: CPT

## 2022-01-19 NOTE — PROGRESS NOTES
Daily Note  PN: 12- (POC: 2022)    Insurance:  AMA/CMS Eval/ Re-eval POC expires Roberto Mayfield #/ Referral # Total units  Start date  Expiration date Extension  Visit limitation? PT only or  PT+OT? Co-Insurance   Medicare  CMS [id number]  N/A N/A N/A N/A N/A N/A  20%    -15 2-7-22             1-22                                              Today's date: 2022  Patient name: Miguel Hernandez  : 1970  MRN: [id number]  Referring provider: Melanie Gallagher DO  Dx:   Encounter Diagnosis     ICD-10-CM    1  Cerebral palsy, unspecified type ([facility] [address] )  G80 9    2  Balance disorder  R26 89    3  Other abnormalities of gait and mobility  R26 89                   Subjective: Patient arrives to PT with no new changes, complaints, or falls  Objective: See treatment diary below      NMR:  - Vibration Plate: 10 min, level 180, static standing, mini squats, heel raises, 1 UE  - Ambulation w/ high knee marches to mitts: 2 laps, 100 ft  - Fwd ambulation w/ ball toss: 5 laps, 10 ft  - Bwd ambulation: 5 laps, 10 ft, 0 UE  - Volleyball setting w/ volleyball: 20 reps  - Volleyball setting w/ small red PBall: 20 reps  - Kicking PBall (blue): 10 reps B/L, haptic 1 UE      Assessment: Patient able to tolerate treatment session well today with good carryover of gait mechanics  Good balance throughout session requiring < 25% tactile cues  Plan to initiate complex HEP next session  He will continue to benefit from skilled outpatient PT in order to maximize his function and improve his gait and balance  Plan: Continue per plan of care        Precautions: Cerebral Palsy, s/p R RTC repair (21), FALL RISK, no cryotherapy (not tolerated)    Phase 1 - Immediate Post-Operative Phase: 21 - 21  Phase 2 - Protection and Active Motion Phase: 21 - 21  Phase 3 - Early Strengthening Phase: 21 - 21  Phase 4 - Aggressive Rehab Phase: 21 -   See protocol attached to paper chart for further details    Outcome Measures Initial Eval  11- PN  12-       5xSTS 16 40 sec,   0 UE 14 49 sec, 0 UE       TUG 11 22 sec   w/ SPC 11 13 sec w/ SPC       10 meter  0 93 m/s       JONES 40/56 46/56       FGA Defer Defer       DGI Defer Defer       mCTSIB  - FTEO (firm)  - FTEC (firm)  - FTEO (foam)  - FTEC (foam)   30 sec  30 sec   30 sec  30 sec  30 sec  30 sec       6MWT 1010 ft, SPC  1050 ft, SPC post plate 0119 ft, SPC  9102 ft, SPC post plate

## 2022-01-24 ENCOUNTER — OFFICE VISIT (OUTPATIENT)
Dept: PHYSICAL THERAPY | Facility: CLINIC | Age: 52
End: 2022-01-24
Payer: MEDICARE

## 2022-01-24 DIAGNOSIS — R26.89 OTHER ABNORMALITIES OF GAIT AND MOBILITY: ICD-10-CM

## 2022-01-24 DIAGNOSIS — R26.89 BALANCE DISORDER: ICD-10-CM

## 2022-01-24 DIAGNOSIS — G80.9 CEREBRAL PALSY, UNSPECIFIED TYPE (HCC): Primary | ICD-10-CM

## 2022-01-24 PROCEDURE — 97530 THERAPEUTIC ACTIVITIES: CPT

## 2022-01-24 NOTE — PROGRESS NOTES
Daily Note    Insurance:  AMA/CMS Eval/ Re-eval POC expires Bj Nix #/ Referral # Total units  Start date  Expiration date Extension  Visit limitation? PT only or  PT+OT? Co-Insurance   Medicare  CMS 11-15 2-7-22  N/A N/A N/A N/A N/A N/A  20%    12-15 2-7-22             1-17 2-7-22                                              Today's date: 2022  Patient name: Pili Briones  : 1970  MRN: [id number]  Referring provider: Mando Burleson DO  Dx:   Encounter Diagnosis     ICD-10-CM    1  Cerebral palsy, unspecified type ([facility] [address] )  G80 9    2  Balance disorder  R26 89    3  Other abnormalities of gait and mobility  R26 89                   Subjective: Patient arrives to PT with no new changes, complaints, or falls  Objective: See treatment diary below      TA:  - Floor to stand: 10 reps  - STS from low surface (TM): 3 reps  - Patient education: see assessment      Assessment: Patient able to tolerate treatment session well today with focus on functional tasks required at home to maximize safety  Performed floor to stand transfers with use of chair or wall for UE support to establish safest way with good proficiency  Plan to review HEP next session and discharge  Patient in good verbal agreement  Plan: Continue per plan of care        Precautions: Cerebral Palsy, s/p R RTC repair (21), FALL RISK, no cryotherapy (not tolerated)    Phase 1 - Immediate Post-Operative Phase: 21 - 21  Phase 2 - Protection and Active Motion Phase: 21 - 21  Phase 3 - Early Strengthening Phase: 21 - 21  Phase 4 - Aggressive Rehab Phase: 21 -   See protocol attached to paper chart for further details    Outcome Measures Initial Eval  11- PN  12- PN  2022      5xSTS 16 40 sec,   0 UE 14 49 sec, 0 UE 12 32 sec, 0 UE      TUG 11 22 sec   w/ SPC 11 13 sec w/ SPC 10 75 sec w/ SPC      10 meter  0 93 m/s 1 01 m/s      JONES 40/56 46/56 48/56      FGA Defer Defer  DGI Defer Defer 19/24      mCTSIB  - FTEO (firm)  - FTEC (firm)  - FTEO (foam)  - FTEC (foam)   30 sec  30 sec   30 sec  30 sec  30 sec  30 sec   30 sec  30 sec  30 sec  30 sec      6MWT 1010 ft, SPC  1050 ft, SPC post plate 3914 ft, SPC 7605 ft, SPC  1250 ft, SPC post plate

## 2022-01-26 ENCOUNTER — OFFICE VISIT (OUTPATIENT)
Dept: PHYSICAL THERAPY | Facility: CLINIC | Age: 52
End: 2022-01-26
Payer: MEDICARE

## 2022-01-26 DIAGNOSIS — R26.89 BALANCE DISORDER: ICD-10-CM

## 2022-01-26 DIAGNOSIS — G80.9 CEREBRAL PALSY, UNSPECIFIED TYPE (HCC): Primary | ICD-10-CM

## 2022-01-26 DIAGNOSIS — R26.89 OTHER ABNORMALITIES OF GAIT AND MOBILITY: ICD-10-CM

## 2022-01-26 PROCEDURE — 97530 THERAPEUTIC ACTIVITIES: CPT

## 2022-01-27 NOTE — PROGRESS NOTES
Discharge Note    Insurance:  AMA/CMS Eval/ Re-eval POC expires Catherine Weber #/ Referral # Total units  Start date  Expiration date Extension  Visit limitation? PT only or  PT+OT? Co-Insurance   Medicare  CMS 11-15 2-7-22  N/A N/A N/A N/A N/A N/A  20%    12-15 22             1-17 22                                              Today's date: 2022  Patient name: Frann Severe  : 1970  MRN: [id number]  Referring provider: Kar Beach DO  Dx:   Encounter Diagnosis     ICD-10-CM    1  Cerebral palsy, unspecified type ([facility] [address] )  G80 9    2  Balance disorder  R26 89    3  Other abnormalities of gait and mobility  R26 89                   Subjective: Patient arrives to PT with no new changes, complaints, or falls  Objective: See treatment diary below      TA:  HEP Update/Review  - Sustained prone press ups on elbows: 4 reps, 30 sec holds  - Glute bridges: 3 sets, 10 reps, 3 sec holds  - Hooklying clam shells (blue TB): 3 sets, 10 reps, 5 sec holds  - Dead bugs w/ TB resistance (yellow): 1 set, 10 reps      Assessment: Patient able to tolerate treatment session well today with focus on updating and review of HEP  He demonstrated good proficiency and good verbal understanding of HEP  Patient to be discharged today as he met all of his goals with plans to return when he receives his orthotics  Plan: Discharge       Precautions: Cerebral Palsy, s/p R RTC repair (21), FALL RISK, no cryotherapy (not tolerated)    Short Term Goals (4 weeks):    - Patient will be independent in basic HEP 2-3 weeks - MET  - Patient will improve 5xSTS score by 2 3 seconds from 16 40 seconds to 14 10 seconds to promote improved LE functional strength needed for ADLs - MET  - Patient will follow up with orthotist for consult about bracing to encourage improved weight bearing through appropriate aspects of feet in order to maximize safety and function - MET    Long Term Goals (12 weeks):  - Patient will be independent in a comprehensive home exercise program - MET  - Patient will improve JONES by 6 points from 40/56 to 46/56 to facilitate return to safe independent ambulation - MET  - Patient will complete the FGA to address dynamic balance safety - MET  - Patient will be able to demonstrate HT in gait without veering - MET  - Patient will improve 6 Minute Walk Test score by 190 feet to promote improved cardiovascular endurance - MET  - Patient will report 50% reduction in near falls in order to improve safety with functional tasks and reduce his risk for falls - MET  - Patient will report going on walks at least 3 days per week to promote independence and improved cardiovascular endurance - MET  - Patient will be able to ascend/descend stairs reciprocally with 1 UE assist to promote independence and safety with ADLs - MET  - Patient will report 50% reduction in near falls when ambulating on uneven terrain - MET    Phase 1 - Immediate Post-Operative Phase: 6/4/21 - 7/16/21  Phase 2 - Protection and Active Motion Phase: 7/16/21 - 8/27/21  Phase 3 - Early Strengthening Phase: 8/27/21 - 9/24/21  Phase 4 - Aggressive Rehab Phase: 9/24/21 -   See protocol attached to paper chart for further details    Outcome Measures Initial Eval  11- PN  12- PN  1-      5xSTS 16 40 sec,   0 UE 14 49 sec, 0 UE 12 32 sec, 0 UE      TUG 11 22 sec   w/ SPC 11 13 sec w/ SPC 10 75 sec w/ SPC      10 meter  0 93 m/s 1 01 m/s      JONES 40/56 46/56 48/56      FGA Defer Defer 19/30      DGI Defer Defer 19/24      mCTSIB  - FTEO (firm)  - FTEC (firm)  - FTEO (foam)  - FTEC (foam)   30 sec  30 sec   30 sec  30 sec  30 sec  30 sec   30 sec  30 sec  30 sec  30 sec      6MWT 1010 ft, SPC  1050 ft, SPC post plate 4647 ft, SPC 0360 ft, SPC  1250 ft, SPC post plate

## 2022-06-01 ENCOUNTER — TELEPHONE (OUTPATIENT)
Dept: GASTROENTEROLOGY | Facility: CLINIC | Age: 52
End: 2022-06-01

## 2022-06-01 NOTE — TELEPHONE ENCOUNTER
Called and left message on machine, for patient to return call to schedule follow up appointment for medication refill  I offered Friday 6/3 with Iris Mejia, or Monday 6/6, Tuesday 6/7, with Milad as well

## 2022-06-06 ENCOUNTER — TELEPHONE (OUTPATIENT)
Dept: GASTROENTEROLOGY | Facility: CLINIC | Age: 52
End: 2022-06-06

## 2022-06-06 NOTE — TELEPHONE ENCOUNTER
Spoke with patient, informed him we have to reschedule his appointment from today with Madhav Balderas, to Wednesday with Milad  He said he was in car and would call back to reschedule

## 2022-06-08 ENCOUNTER — OFFICE VISIT (OUTPATIENT)
Dept: GASTROENTEROLOGY | Facility: CLINIC | Age: 52
End: 2022-06-08
Payer: MEDICARE

## 2022-06-08 VITALS
BODY MASS INDEX: 26.14 KG/M2 | HEIGHT: 70 IN | HEART RATE: 70 BPM | TEMPERATURE: 97.9 F | WEIGHT: 182.6 LBS | SYSTOLIC BLOOD PRESSURE: 127 MMHG | DIASTOLIC BLOOD PRESSURE: 85 MMHG

## 2022-06-08 DIAGNOSIS — Z12.11 COLON CANCER SCREENING: Primary | ICD-10-CM

## 2022-06-08 DIAGNOSIS — K21.00 GASTROESOPHAGEAL REFLUX DISEASE WITH ESOPHAGITIS WITHOUT HEMORRHAGE: ICD-10-CM

## 2022-06-08 PROCEDURE — 99214 OFFICE O/P EST MOD 30 MIN: CPT | Performed by: PHYSICIAN ASSISTANT

## 2022-06-08 RX ORDER — SODIUM PICOSULFATE, MAGNESIUM OXIDE, AND ANHYDROUS CITRIC ACID 10; 3.5; 12 MG/160ML; G/160ML; G/160ML
LIQUID ORAL
Qty: 320 ML | Refills: 0 | Status: SHIPPED | OUTPATIENT
Start: 2022-06-08 | End: 2022-07-28 | Stop reason: ALTCHOICE

## 2022-06-08 RX ORDER — FAMOTIDINE 40 MG/1
40 TABLET, FILM COATED ORAL DAILY
Qty: 30 TABLET | Refills: 11 | Status: SHIPPED | OUTPATIENT
Start: 2022-06-08

## 2022-06-08 NOTE — ASSESSMENT & PLAN NOTE
Patient appears at average colorectal cancer risk, no alarm symptoms at this time, is 54years old and has never had colon cancer screening    Rule out adenomatous polyps or malignancy    -will schedule patient for colonoscopy    -procedure was explained in detail to the patient at this time including associated risks and benefits, risks including but not limited to infection, perforation and bleeding    -prescription and instructions were provided for colonoscopy prep    - preoperatively will check CBC and CMP

## 2022-06-08 NOTE — PROGRESS NOTES
Follow-up Note -  [facility]  Rema Breanakendra 1970 54 y o  male         Reason:  Follow-up; GERD, colon cancer screening    HPI:  79-year-old male with history of cerebral palsy, GERD, depression who presents for follow-up, he saw our service in January 2021 for follow-up of GERD; was controlled well on omeprazole 40 mg daily, he was advised about potentially tapering to H2 blocker therapy  He had also been recommended for colonoscopy given his age, he said he wanted to discuss with his family doctor about this  At this time, the patient says he was able to successfully taper to Pepcid, he is currently taking Pepcid 40 mg daily and reports that this has been controlling his symptoms very well, he is no longer on omeprazole  He says that without the Pepcid, within a day or 2 of missing a dose he will get significant heartburn  He otherwise denies any dysphagia, any unintentional weight loss or loss of appetite, denies any changes in his bowel habits or stool appearance  He is 7 and half years sober, denies any known history of cirrhosis or chronic liver disease, he says he also quit smoking a long time ago  Denies any CPAP or BiPAP dependence, or supplemental oxygen dependence  He still has not yet had colonoscopy  REVIEW OF SYSTEMS:      CONSTITUTIONAL: Denies any fever, chills, or rigors  Good appetite, and no recent weight loss  HEENT: No earache or tinnitus  Denies hearing loss or visual disturbances  CARDIOVASCULAR: No chest pain or palpitations  RESPIRATORY: Denies any cough, hemoptysis, shortness of breath or dyspnea on exertion  GASTROINTESTINAL: As noted in the History of Present Illness  GENITOURINARY: No problems with urination  Denies any hematuria or dysuria  NEUROLOGIC: No dizziness or vertigo, denies headaches  MUSCULOSKELETAL: Denies any muscle or joint pain  SKIN: Denies skin rashes or itching     ENDOCRINE: Denies excessive thirst  Denies intolerance to heat or cold  PSYCHOSOCIAL: Denies depression or anxiety  Denies any recent memory loss  Past Medical History:   Diagnosis Date    Acid reflux     Anxiety     Arthritis     Cerebral palsy (HCC)     GERD (gastroesophageal reflux disease)     H/O ETOH abuse     Psychiatric disorder     anxiety, depression    Ulcer       Past Surgical History:   Procedure Laterality Date    ESOPHAGOGASTRODUODENOSCOPY N/A 2016    Procedure: ESOPHAGOGASTRODUODENOSCOPY (EGD); Surgeon: Giacomo Sifuentes MD;  Location: [facility];   Service:     FOOT SURGERY Bilateral     hardware    HIP SURGERY      OTHER SURGICAL HISTORY Bilateral     lower extremity ligiment extensions-multiple    CT SHLDR ARTHROSCOP,SURG,W/ROTAT CUFF REPR Right 2021    Procedure: SHOULDER ARTHROSCOPIC ROTATOR CUFF REPAIR, SUBACROMIAL DECOMPRESSION;  Surgeon: Yulisa Bacon MD;  Location: [facility] MAIN OR;  Service: Orthopedics    UPPER GASTROINTESTINAL ENDOSCOPY       Social History     Socioeconomic History    Marital status: Single     Spouse name: Not on file    Number of children: Not on file    Years of education: Not on file    Highest education level: Not on file   Occupational History    Not on file   Tobacco Use    Smoking status: Former Smoker     Packs/day: 3 00     Years: 15 00     Pack years: 45 00     Quit date: 1994     Years since quittin 3    Smokeless tobacco: Never Used   Vaping Use    Vaping Use: Never used   Substance and Sexual Activity    Alcohol use: Not Currently     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    Drug use: Not Currently     Types: Marijuana     Comment: quit -greater than 1 year    Sexual activity: Not on file   Other Topics Concern    Not on file   Social History Narrative    Not on file     Social Determinants of Health     Financial Resource Strain: Not on file   Food Insecurity: Not on file   Transportation Needs: Not on file   Physical Activity: Not on file   Stress: Not on file Social Connections: Not on file   Intimate Partner Violence: Not on file   Housing Stability: Not on file     Family History   Problem Relation Age of Onset    No Known Problems Mother     No Known Problems Father     Cancer Maternal Grandmother     Cancer Maternal Grandfather     Cancer Maternal Aunt      Aspirin  Current Outpatient Medications   Medication Sig Dispense Refill    ergocalciferol (VITAMIN D2) 50,000 units daily   0    famotidine (PEPCID) 40 MG tablet Take 1 tablet (40 mg total) by mouth daily 30 tablet 11    FLUoxetine (PROzac) 40 MG capsule Take 40 mg by mouth every morning  3    Melatonin 1 MG CAPS Take 3 mg by mouth as needed      Multiple Vitamins-Minerals (CENTRUM ADULTS PO) Centrum      Omega-3 Fatty Acids (fish oil) 1,000 mg Take 1,000 mg by mouth 2 (two) times a day      Sod Picosulfate-Mag Ox-Cit Acd (Clenpiq) 10-3 5-12 MG-GM -GM/160ML SOLN Please administer according to instructions provided by our office 320 mL 0    gemfibrozil (LOPID) 600 mg tablet TAKE ONE TABLET BY MOUTH TWICE A DAY BEFORE MEALS (Patient not taking: Reported on 6/8/2022) 60 tablet 6    Omeprazole Magnesium (PriLOSEC) 2 5 MG PACK every 24 hours (Patient not taking: Reported on 6/8/2022)      oxyCODONE (ROXICODONE) 5 mg immediate release tablet 1 tablets every 6 hours as needed for severe shoulder pain only  (Patient not taking: Reported on 6/8/2022) 13 tablet 0     No current facility-administered medications for this visit  Blood pressure 127/85, pulse 70, temperature 97 9 °F (36 6 °C), temperature source Temporal, height 5' 10" (1 778 m), weight 82 8 kg (182 lb 9 6 oz)      PHYSICAL EXAM:      General Appearance:   Alert, cooperative, no distress, appears stated age    HEENT:   Normocephalic, atraumatic, anicteric      Neck:  Supple, symmetrical, trachea midline, no adenopathy;    thyroid: no enlargement/tenderness/nodules; no carotid  bruit or JVD    Lungs:   Clear to auscultation bilaterally; no rales, rhonchi or wheezing; respirations unlabored    Heart[de-identified]   S1 and S2 normal; regular rate and rhythm; no murmur, rub, or gallop  Abdomen:   Soft, non-tender, non-distended; normal bowel sounds; no masses, no organomegaly    Extremities: No edema, erythema, wounds, rashes   Rectal:   Deferred                      Lab Results   Component Value Date    WBC 4 66 08/24/2020    HGB 15 0 08/24/2020    HCT 45 4 08/24/2020    MCV 88 08/24/2020     08/24/2020     Lab Results   Component Value Date    CALCIUM 8 8 08/24/2020    K 3 6 08/24/2020    CO2 27 08/24/2020     08/24/2020    BUN 14 08/24/2020    CREATININE 1 09 08/24/2020     Lab Results   Component Value Date    ALT 28 08/24/2020    AST 16 08/24/2020    ALKPHOS 55 08/24/2020     Lab Results   Component Value Date    INR 1 19 (H) 08/15/2016    PROTIME 15 2 (H) 08/15/2016       No results found  ASSESSMENT & PLAN:    Colon cancer screening  Patient appears at average colorectal cancer risk, no alarm symptoms at this time, is 54years old and has never had colon cancer screening  Rule out adenomatous polyps or malignancy    -will schedule patient for colonoscopy    -procedure was explained in detail to the patient at this time including associated risks and benefits, risks including but not limited to infection, perforation and bleeding    -prescription and instructions were provided for colonoscopy prep    - preoperatively will check CBC and CMP    Gastroesophageal reflux disease with esophagitis  Symptomatically well managed with daily H2 blocker therapy, no evidence of Townsend's at the time of last EGD in 2016   No alarm symptoms at this time    -will continue Pepcid at this time    -advised patient regarding dietary lifestyle modification strategies for the mitigation of GERD    -can hold off on repeating EGD unless patient develops significant breakthrough reflux symptoms despite H2 blocker therapy, or other concerning upper GI symptomatology; will proceed with colonoscopy

## 2022-06-08 NOTE — PATIENT INSTRUCTIONS
Scheduled date of colonoscopy (as of today): 07/28/22  Physician performing colonoscopy: Smita Patterson  Location of colonoscopy: [facility]  Bowel prep reviewed with patient: CLENPIQ  Instructions reviewed with patient by: VIRGINIA  Clearances: LIANNA

## 2022-06-15 ENCOUNTER — OFFICE VISIT (OUTPATIENT)
Dept: FAMILY MEDICINE CLINIC | Facility: CLINIC | Age: 52
End: 2022-06-15
Payer: MEDICARE

## 2022-06-15 VITALS
SYSTOLIC BLOOD PRESSURE: 90 MMHG | BODY MASS INDEX: 26.11 KG/M2 | RESPIRATION RATE: 16 BRPM | WEIGHT: 182 LBS | OXYGEN SATURATION: 99 % | TEMPERATURE: 97.6 F | HEART RATE: 99 BPM | DIASTOLIC BLOOD PRESSURE: 60 MMHG

## 2022-06-15 DIAGNOSIS — G80.9 CEREBRAL PALSY, UNSPECIFIED TYPE (HCC): ICD-10-CM

## 2022-06-15 DIAGNOSIS — Z13.6 SCREENING FOR CARDIOVASCULAR CONDITION: ICD-10-CM

## 2022-06-15 DIAGNOSIS — R26.2 AMBULATORY DYSFUNCTION: ICD-10-CM

## 2022-06-15 DIAGNOSIS — Z00.00 MEDICARE ANNUAL WELLNESS VISIT, INITIAL: Primary | ICD-10-CM

## 2022-06-15 DIAGNOSIS — Z11.4 SCREENING FOR HIV (HUMAN IMMUNODEFICIENCY VIRUS): ICD-10-CM

## 2022-06-15 DIAGNOSIS — E55.9 VITAMIN D DEFICIENCY: ICD-10-CM

## 2022-06-15 DIAGNOSIS — E53.8 FOLATE DEFICIENCY: ICD-10-CM

## 2022-06-15 PROCEDURE — G0438 PPPS, INITIAL VISIT: HCPCS | Performed by: FAMILY MEDICINE
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
## 2022-06-15 NOTE — LETTER
Kim 15, 2022     Patient: Erika Atwood  YOB: 1970  Date of Visit: 6/15/2022      To Whom it May Concern:    Radha Michelle is under my professional care  Aba Cruz was seen in my office on 6/15/2022  I request Gregorio's vehicle disability placard to be renewed given his cerebral palsy severely and permanently disabling him, not allowing him to walk without the use of or assistance from a brace, cane, walker, wheelchair, or prosthetic device  Patient is also severely and permanently limited in the ability to walk because of the neurological condition as described above  If you have any questions or concerns, please don't hesitate to call           Sincerely,          Jazmin Long MD        CC: No Recipients

## 2022-06-27 ENCOUNTER — TELEPHONE (OUTPATIENT)
Dept: FAMILY MEDICINE CLINIC | Facility: CLINIC | Age: 52
End: 2022-06-27

## 2022-06-27 ENCOUNTER — PATIENT OUTREACH (OUTPATIENT)
Dept: FAMILY MEDICINE CLINIC | Facility: CLINIC | Age: 52
End: 2022-06-27

## 2022-06-27 NOTE — TELEPHONE ENCOUNTER
Patient called  Alo Night he was told by Dr Sloan Chapman someone would be following up with him to go over how to obtain leg and "foot lifts " Did inform him a referral was placed, however they are prioritized by urgency  Informed him I will send message to Complex Care Management so see if someone can check in and follow up I the next week

## 2022-06-27 NOTE — PROGRESS NOTES
Outreach to [employer]  Left detailed message requesting return call from [facility]  Mr Edelmira Parra is not in today but is expected in tomorrow

## 2022-06-29 ENCOUNTER — PATIENT OUTREACH (OUTPATIENT)
Dept: FAMILY MEDICINE CLINIC | Facility: CLINIC | Age: 52
End: 2022-06-29

## 2022-06-29 NOTE — PROGRESS NOTES
Outreach to [employer]  S/W Jimbo Cheng  Advised that they spoke with patient today     Jimbo Cheng advises that Medicare does not cover shoe lift but they cover brace at 80 percent  Confirmed patients Medicare number  Advised that patient does not have a secondary insurance  Jimbo Cheng will speak with   Giacomo Rosalio  Once it is known what should be ordered, Jimbo Cheng will reach back out to [employer]

## 2022-06-30 ENCOUNTER — PATIENT OUTREACH (OUTPATIENT)
Dept: FAMILY MEDICINE CLINIC | Facility: CLINIC | Age: 52
End: 2022-06-30

## 2022-06-30 DIAGNOSIS — G80.9 CEREBRAL PALSY, UNSPECIFIED TYPE (HCC): Primary | ICD-10-CM

## 2022-06-30 NOTE — PROGRESS NOTES
Received phone call from Luis Shabazz at [place]  Luis Shabazz is requesting order for Bilateral braces and shoe lifts  Luis Shabazz is also requesting most recent office notes and demographics  The above items faxed to number provided

## 2022-07-26 ENCOUNTER — APPOINTMENT (OUTPATIENT)
Dept: LAB | Facility: HOSPITAL | Age: 52
End: 2022-07-26
Payer: MEDICARE

## 2022-07-26 DIAGNOSIS — E53.8 FOLATE DEFICIENCY: ICD-10-CM

## 2022-07-26 DIAGNOSIS — E55.9 VITAMIN D DEFICIENCY: ICD-10-CM

## 2022-07-26 DIAGNOSIS — Z11.4 SCREENING FOR HIV (HUMAN IMMUNODEFICIENCY VIRUS): ICD-10-CM

## 2022-07-26 DIAGNOSIS — Z13.6 SCREENING FOR CARDIOVASCULAR CONDITION: ICD-10-CM

## 2022-07-26 DIAGNOSIS — Z12.11 COLON CANCER SCREENING: ICD-10-CM

## 2022-07-26 DIAGNOSIS — K21.00 GASTROESOPHAGEAL REFLUX DISEASE WITH ESOPHAGITIS WITHOUT HEMORRHAGE: ICD-10-CM

## 2022-07-26 LAB
25(OH)D3 SERPL-MCNC: 56 NG/ML (ref 30–100)
ALBUMIN SERPL BCP-MCNC: 4.2 G/DL (ref 3.5–5)
ALP SERPL-CCNC: 65 U/L (ref 46–116)
ALT SERPL W P-5'-P-CCNC: 31 U/L (ref 12–78)
ANION GAP SERPL CALCULATED.3IONS-SCNC: 12 MMOL/L (ref 4–13)
AST SERPL W P-5'-P-CCNC: 15 U/L (ref 5–45)
BASOPHILS # BLD AUTO: 0.03 THOUSANDS/ΜL (ref 0–0.1)
BASOPHILS NFR BLD AUTO: 1 % (ref 0–1)
BILIRUB SERPL-MCNC: 0.88 MG/DL (ref 0.2–1)
BUN SERPL-MCNC: 16 MG/DL (ref 5–25)
CALCIUM SERPL-MCNC: 8.7 MG/DL (ref 8.3–10.1)
CHLORIDE SERPL-SCNC: 104 MMOL/L (ref 96–108)
CHOLEST SERPL-MCNC: 231 MG/DL
CO2 SERPL-SCNC: 25 MMOL/L (ref 21–32)
CREAT SERPL-MCNC: 1.17 MG/DL (ref 0.6–1.3)
EOSINOPHIL # BLD AUTO: 0.04 THOUSAND/ΜL (ref 0–0.61)
EOSINOPHIL NFR BLD AUTO: 1 % (ref 0–6)
ERYTHROCYTE [DISTWIDTH] IN BLOOD BY AUTOMATED COUNT: 13.6 % (ref 11.6–15.1)
FOLATE SERPL-MCNC: >20 NG/ML (ref 3.1–17.5)
GFR SERPL CREATININE-BSD FRML MDRD: 72 ML/MIN/1.73SQ M
GLUCOSE P FAST SERPL-MCNC: 104 MG/DL (ref 65–99)
HCT VFR BLD AUTO: 41.5 % (ref 36.5–49.3)
HDLC SERPL-MCNC: 43 MG/DL
HGB BLD-MCNC: 13.1 G/DL (ref 12–17)
IMM GRANULOCYTES # BLD AUTO: 0.02 THOUSAND/UL (ref 0–0.2)
IMM GRANULOCYTES NFR BLD AUTO: 1 % (ref 0–2)
LDLC SERPL CALC-MCNC: 122 MG/DL (ref 0–100)
LYMPHOCYTES # BLD AUTO: 0.92 THOUSANDS/ΜL (ref 0.6–4.47)
LYMPHOCYTES NFR BLD AUTO: 21 % (ref 14–44)
MCH RBC QN AUTO: 26.1 PG (ref 26.8–34.3)
MCHC RBC AUTO-ENTMCNC: 31.6 G/DL (ref 31.4–37.4)
MCV RBC AUTO: 83 FL (ref 82–98)
MONOCYTES # BLD AUTO: 0.29 THOUSAND/ΜL (ref 0.17–1.22)
MONOCYTES NFR BLD AUTO: 7 % (ref 4–12)
NEUTROPHILS # BLD AUTO: 3.03 THOUSANDS/ΜL (ref 1.85–7.62)
NEUTS SEG NFR BLD AUTO: 69 % (ref 43–75)
NRBC BLD AUTO-RTO: 0 /100 WBCS
PLATELET # BLD AUTO: 177 THOUSANDS/UL (ref 149–390)
PMV BLD AUTO: 12.7 FL (ref 8.9–12.7)
POTASSIUM SERPL-SCNC: 3.9 MMOL/L (ref 3.5–5.3)
PROT SERPL-MCNC: 7.5 G/DL (ref 6.4–8.4)
RBC # BLD AUTO: 5.02 MILLION/UL (ref 3.88–5.62)
SODIUM SERPL-SCNC: 141 MMOL/L (ref 135–147)
TRIGL SERPL-MCNC: 328 MG/DL
WBC # BLD AUTO: 4.33 THOUSAND/UL (ref 4.31–10.16)

## 2022-07-26 PROCEDURE — 82746 ASSAY OF FOLIC ACID SERUM: CPT

## 2022-07-26 PROCEDURE — 80061 LIPID PANEL: CPT

## 2022-07-26 PROCEDURE — 36415 COLL VENOUS BLD VENIPUNCTURE: CPT

## 2022-07-26 PROCEDURE — 87389 HIV-1 AG W/HIV-1&-2 AB AG IA: CPT

## 2022-07-26 PROCEDURE — 80053 COMPREHEN METABOLIC PANEL: CPT

## 2022-07-26 PROCEDURE — 85025 COMPLETE CBC W/AUTO DIFF WBC: CPT

## 2022-07-26 PROCEDURE — 82306 VITAMIN D 25 HYDROXY: CPT

## 2022-07-28 ENCOUNTER — ANESTHESIA (OUTPATIENT)
Dept: GASTROENTEROLOGY | Facility: AMBULARY SURGERY CENTER | Age: 52
End: 2022-07-28

## 2022-07-28 ENCOUNTER — HOSPITAL ENCOUNTER (OUTPATIENT)
Dept: GASTROENTEROLOGY | Facility: AMBULARY SURGERY CENTER | Age: 52
Setting detail: OUTPATIENT SURGERY
Discharge: HOME | End: 2022-07-28
Attending: INTERNAL MEDICINE | Admitting: INTERNAL MEDICINE
Payer: MEDICARE

## 2022-07-28 ENCOUNTER — ANESTHESIA EVENT (OUTPATIENT)
Dept: GASTROENTEROLOGY | Facility: AMBULARY SURGERY CENTER | Age: 52
End: 2022-07-28

## 2022-07-28 VITALS
TEMPERATURE: 97.6 F | HEART RATE: 72 BPM | RESPIRATION RATE: 18 BRPM | OXYGEN SATURATION: 98 % | DIASTOLIC BLOOD PRESSURE: 67 MMHG | SYSTOLIC BLOOD PRESSURE: 101 MMHG

## 2022-07-28 DIAGNOSIS — Z12.11 COLON CANCER SCREENING: ICD-10-CM

## 2022-07-28 LAB — HIV 1+2 AB+HIV1 P24 AG SERPL QL IA: NORMAL

## 2022-07-28 PROCEDURE — 45385 COLONOSCOPY W/LESION REMOVAL: CPT | Performed by: INTERNAL MEDICINE

## 2022-07-28 PROCEDURE — 45380 COLONOSCOPY AND BIOPSY: CPT | Performed by: INTERNAL MEDICINE

## 2022-07-28 PROCEDURE — 88305 TISSUE EXAM BY PATHOLOGIST: CPT | Performed by: SPECIALIST

## 2022-07-28 RX ORDER — LIDOCAINE HYDROCHLORIDE 10 MG/ML
0.5 INJECTION, SOLUTION EPIDURAL; INFILTRATION; INTRACAUDAL; PERINEURAL ONCE AS NEEDED
Status: DISCONTINUED | OUTPATIENT
Start: 2022-07-28 | End: 2022-08-01 | Stop reason: HOSPADM

## 2022-07-28 RX ORDER — PROPOFOL 10 MG/ML
INJECTION, EMULSION INTRAVENOUS CONTINUOUS PRN
Status: DISCONTINUED | OUTPATIENT
Start: 2022-07-28 | End: 2022-07-28

## 2022-07-28 RX ORDER — SODIUM CHLORIDE, SODIUM LACTATE, POTASSIUM CHLORIDE, CALCIUM CHLORIDE 600; 310; 30; 20 MG/100ML; MG/100ML; MG/100ML; MG/100ML
125 INJECTION, SOLUTION INTRAVENOUS CONTINUOUS
Status: DISCONTINUED | OUTPATIENT
Start: 2022-07-28 | End: 2022-08-01 | Stop reason: HOSPADM

## 2022-07-28 RX ORDER — PROPOFOL 10 MG/ML
INJECTION, EMULSION INTRAVENOUS AS NEEDED
Status: DISCONTINUED | OUTPATIENT
Start: 2022-07-28 | End: 2022-07-28

## 2022-07-28 RX ORDER — SODIUM CHLORIDE, SODIUM LACTATE, POTASSIUM CHLORIDE, CALCIUM CHLORIDE 600; 310; 30; 20 MG/100ML; MG/100ML; MG/100ML; MG/100ML
INJECTION, SOLUTION INTRAVENOUS CONTINUOUS PRN
Status: DISCONTINUED | OUTPATIENT
Start: 2022-07-28 | End: 2022-07-28

## 2022-07-28 RX ADMIN — PROPOFOL 150 MG: 10 INJECTION, EMULSION INTRAVENOUS at 09:48

## 2022-07-28 RX ADMIN — PROPOFOL 230 MCG/KG/MIN: 10 INJECTION, EMULSION INTRAVENOUS at 09:51

## 2022-07-28 RX ADMIN — PROPOFOL 50 MG: 10 INJECTION, EMULSION INTRAVENOUS at 09:50

## 2022-07-28 RX ADMIN — SODIUM CHLORIDE, POTASSIUM CHLORIDE, SODIUM LACTATE AND CALCIUM CHLORIDE 125 ML/HR: 600; 310; 30; 20 INJECTION, SOLUTION INTRAVENOUS at 09:36

## 2022-07-28 RX ADMIN — SODIUM CHLORIDE, SODIUM LACTATE, POTASSIUM CHLORIDE, AND CALCIUM CHLORIDE: .6; .31; .03; .02 INJECTION, SOLUTION INTRAVENOUS at 09:40

## 2022-07-28 RX ADMIN — PROPOFOL 50 MG: 10 INJECTION, EMULSION INTRAVENOUS at 09:49

## 2022-07-28 NOTE — ANESTHESIA POSTPROCEDURE EVALUATION
Post-Op Assessment Note    CV Status:  Stable  Pain Score: 0    Pain management: adequate     Mental Status:  Sleepy   Hydration Status:  Stable   PONV Controlled:  None   Airway Patency:  Patent   Two or more mitigation strategies used for obstructive sleep apnea   Post Op Vitals Reviewed: Yes      Staff: CRNA         No complications documented      BP   99/60   Temp      Pulse 70   Resp 16   SpO2 99

## 2022-07-28 NOTE — H&P
History and Physical -  [facility]  Clarissa Pickard 54 y o  male MRN: [id number]                  HPI: Clarissa Pickard is a 54y o  year old male who presents for screening colonoscopy  REVIEW OF SYSTEMS: Per the HPI, and otherwise unremarkable  Historical Information   Past Medical History:   Diagnosis Date    Acid reflux     Anesthesia complication     woke up twice during anesthesia    Anxiety     Arthritis     Cerebral palsy (HCC)     GERD (gastroesophageal reflux disease)     H/O ETOH abuse     Psychiatric disorder     anxiety, depression    Ulcer      Past Surgical History:   Procedure Laterality Date    ESOPHAGOGASTRODUODENOSCOPY N/A 2016    Procedure: ESOPHAGOGASTRODUODENOSCOPY (EGD); Surgeon: Cirilo Coulter MD;  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     Social History     Substance and Sexual Activity   Drug Use Not Currently    Types: Marijuana    Comment: quit -greater than 1 year     Social History     Tobacco Use   Smoking Status Former Smoker    Packs/day: 3 00    Years: 15 00    Pack years: 45 00    Quit date: 1994    Years since quittin 5   Smokeless Tobacco Never Used     Family History   Problem Relation Age of Onset    No Known Problems Mother     No Known Problems Father     Cancer Maternal Grandmother     Cancer Maternal Grandfather     Cancer Maternal Aunt        Meds/Allergies       Current Outpatient Medications:     ergocalciferol (VITAMIN D2) 50,000 units    famotidine (PEPCID) 40 MG tablet    FLUoxetine (PROzac) 40 MG capsule    Multiple Vitamins-Minerals (CENTRUM ADULTS PO)    Omega-3 Fatty Acids (fish oil) 1,000 mg    gemfibrozil (LOPID) 600 mg tablet    Melatonin 1 MG CAPS    oxyCODONE (ROXICODONE) 5 mg immediate release tablet    Current Facility-Administered Medications:     lactated ringers infusion, 125 mL/hr, Intravenous, Continuous, 125 mL/hr at 07/28/22 0936    lidocaine (PF) (XYLOCAINE-MPF) 1 % injection 0 5 mL, 0 5 mL, Infiltration, Once PRN    Allergies   Allergen Reactions    Aspirin GI Bleeding and Vomiting     Other reaction(s): Unknown  Patient has ulcers and does not take aspirin        Objective     /78   Pulse 75   Temp 97 6 °F (36 4 °C) (Temporal)   Resp 18   SpO2 96%       PHYSICAL EXAM    Gen: NAD  Head: NCAT  CV: RRR  CHEST: Clear  ABD: soft, NT/ND  EXT: no edema      ASSESSMENT/PLAN:  This is a 54y o  year old male here for colonoscopy, and he is stable and optimized for his procedure

## 2022-07-28 NOTE — ANESTHESIA PREPROCEDURE EVALUATION
Procedure:  COLONOSCOPY    Relevant Problems   ANESTHESIA  Unclear to me if it was awareness under GA    (+) History of unintended awareness under general anesthesia      CARDIO   (+) Hyperlipidemia      ENDO (within normal limits)      GI/HEPATIC   (+) Gastroesophageal reflux disease with esophagitis      NEURO/PSYCH   (+) Anxiety   (+) H/O ETOH abuse   (+) History of unintended awareness under general anesthesia   (+) Major depressive disorder, recurrent episode, moderate (HCC)      Nervous and Auditory   (+) Cerebral palsy (HCC)      Other   (+) Ambulatory dysfunction        Physical Exam    Airway    Mallampati score: II  TM Distance: >3 FB  Neck ROM: full     Dental   lower dentures and upper dentures,     Cardiovascular      Pulmonary      Other Findings        Anesthesia Plan  ASA Score- 2     Anesthesia Type- IV sedation with anesthesia with ASA Monitors  Additional Monitors:   Airway Plan:           Plan Factors-Exercise tolerance (METS): >4 METS  Chart reviewed  EKG reviewed  Existing labs reviewed  Patient summary reviewed  Patient is not a current smoker  Induction-     Postoperative Plan-     Informed Consent- Anesthetic plan and risks discussed with patient  I personally reviewed this patient with the CRNA  Discussed and agreed on the Anesthesia Plan with the CRNA  Taj Woodward

## 2022-09-13 ENCOUNTER — OFFICE VISIT (OUTPATIENT)
Dept: FAMILY MEDICINE CLINIC | Facility: CLINIC | Age: 52
End: 2022-09-13
Payer: MEDICARE

## 2022-09-13 VITALS
RESPIRATION RATE: 16 BRPM | BODY MASS INDEX: 27.41 KG/M2 | WEIGHT: 191 LBS | OXYGEN SATURATION: 97 % | DIASTOLIC BLOOD PRESSURE: 72 MMHG | SYSTOLIC BLOOD PRESSURE: 100 MMHG

## 2022-09-13 DIAGNOSIS — L72.3 SEBACEOUS CYST: Primary | ICD-10-CM

## 2022-09-13 PROCEDURE — 99213 OFFICE O/P EST LOW 20 MIN: CPT | Performed by: SURGERY

## 2022-09-13 NOTE — PROGRESS NOTES
[facility] Office visit    Assessment/Plan:     1  Sebaceous cyst  Comments:  Cyst on back of head  Referred to surgery for I&D  Orders:  -     Ambulatory Referral to General Surgery; Future          No follow-ups on file  Subjective:   HPI  Norma Ibanez is a 54 y o  male presenting to the office for a painfull mass on the back of his head  Patient reports this has been present for about a week duration, he denies any recent trauma or injury to the area  The mass is tender when he presses on it, causes discomfort/pain when he is laying down at night  The following portions of the patient's history were reviewed and updated as appropriate: allergies, current medications, past family history, past medical history, past social history, past surgical history and problem list      Review of Systems     Objective:     /72   Resp 16   Wt 86 6 kg (191 lb)   SpO2 97%   BMI 27 41 kg/m²      Physical Exam  Vitals reviewed  Cardiovascular:      Rate and Rhythm: Normal rate and regular rhythm  Pulses: Normal pulses  Heart sounds: Normal heart sounds  Skin:     General: Skin is warm  Findings: Lesion (2cm cyst on back of scalp) present            ** Please Note: This note has been constructed using a voice recognition system **     Robina Watson MD  09/13/22  3:54 PM

## 2022-09-15 ENCOUNTER — CONSULT (OUTPATIENT)
Dept: SURGERY | Facility: CLINIC | Age: 52
End: 2022-09-15
Payer: MEDICARE

## 2022-09-15 VITALS
HEART RATE: 53 BPM | OXYGEN SATURATION: 99 % | DIASTOLIC BLOOD PRESSURE: 68 MMHG | WEIGHT: 193 LBS | TEMPERATURE: 96.6 F | SYSTOLIC BLOOD PRESSURE: 102 MMHG | BODY MASS INDEX: 27.63 KG/M2 | HEIGHT: 70 IN

## 2022-09-15 DIAGNOSIS — L02.811 SCALP ABSCESS: Primary | ICD-10-CM

## 2022-09-15 DIAGNOSIS — D23.4 DERMOID CYST OF SCALP: ICD-10-CM

## 2022-09-15 PROCEDURE — 10060 I&D ABSCESS SIMPLE/SINGLE: CPT | Performed by: SURGERY

## 2022-09-15 PROCEDURE — 99203 OFFICE O/P NEW LOW 30 MIN: CPT | Performed by: SURGERY

## 2022-09-15 RX ORDER — SULFAMETHOXAZOLE AND TRIMETHOPRIM 800; 160 MG/1; MG/1
1 TABLET ORAL EVERY 12 HOURS SCHEDULED
Qty: 14 TABLET | Refills: 0 | Status: SHIPPED | OUTPATIENT
Start: 2022-09-15 | End: 2022-09-22

## 2022-09-15 NOTE — PROGRESS NOTES
[facility] History and Physical Note:    Assessment:  Scalp cyst   Likely infected dermoid cyst     Plan:  Drained in the clinic today  Instructions given  Follow-up next week for wound check  Chief Complaint:  I am here for the lump on my head    HPI  Patient is a 62-year-old gentleman who recently saw his PCP for painful lesion on the back of his head  He states that this lesion had been there for approximately a week and half  Is tender to the touch  No history of previous infections  Patient reports worsening pain over the last 24-48 hours      PMH:  Past Medical History:   Diagnosis Date    Acid reflux     Anesthesia complication     woke up twice during anesthesia    Anxiety     Arthritis     Cerebral palsy (HCC)     GERD (gastroesophageal reflux disease)     H/O ETOH abuse     Psychiatric disorder     anxiety, depression    Ulcer        PSH:  Past Surgical History:   Procedure Laterality Date    ESOPHAGOGASTRODUODENOSCOPY N/A 5/9/2016    Procedure: ESOPHAGOGASTRODUODENOSCOPY (EGD); Surgeon: Alvaro Perez MD;  Location: [facility];   Service:     FOOT SURGERY Bilateral     hardware    HIP SURGERY      OTHER SURGICAL HISTORY Bilateral     lower extremity ligiment extensions-multiple    ID SHLDR ARTHROSCOP,SURG,W/ROTAT CUFF REPR Right 6/4/2021    Procedure: SHOULDER ARTHROSCOPIC ROTATOR CUFF REPAIR, SUBACROMIAL DECOMPRESSION;  Surgeon: Sarahi Silverman MD;  Location: [facility]  MAIN OR;  Service: Orthopedics    UPPER GASTROINTESTINAL ENDOSCOPY         Home Meds:  Current Outpatient Medications on File Prior to Visit   Medication Sig Dispense Refill    ergocalciferol (VITAMIN D2) 50,000 units daily   0    famotidine (PEPCID) 40 MG tablet Take 1 tablet (40 mg total) by mouth daily 30 tablet 11    FLUoxetine (PROzac) 40 MG capsule Take 40 mg by mouth every morning  3    gemfibrozil (LOPID) 600 mg tablet TAKE ONE TABLET BY MOUTH TWICE A DAY BEFORE MEALS 60 tablet 6    Melatonin 1 MG CAPS Take 3 mg by mouth as needed      Multiple Vitamins-Minerals (CENTRUM ADULTS PO) Centrum      Omega-3 Fatty Acids (fish oil) 1,000 mg Take 1,000 mg by mouth 2 (two) times a day      oxyCODONE (ROXICODONE) 5 mg immediate release tablet 1 tablets every 6 hours as needed for severe shoulder pain only  (Patient not taking: Reported on 2022) 13 tablet 0     No current facility-administered medications on file prior to visit  Allergies:   Allergies   Allergen Reactions    Aspirin GI Bleeding and Vomiting     Other reaction(s): Unknown  Patient has ulcers and does not take aspirin        Social Hx:  Social History     Socioeconomic History    Marital status: Single     Spouse name: Not on file    Number of children: Not on file    Years of education: Not on file    Highest education level: Not on file   Occupational History    Not on file   Tobacco Use    Smoking status: Former Smoker     Packs/day: 3 00     Years: 15 00     Pack years: 45 00     Quit date: 1994     Years since quittin 6    Smokeless tobacco: Never Used   Vaping Use    Vaping Use: Never used   Substance and Sexual Activity    Alcohol use: Not Currently     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    Drug use: Not Currently     Types: Marijuana     Comment: quit -greater than 1 year    Sexual activity: Not on file   Other Topics Concern    Not on file   Social History Narrative    Not on file     Social Determinants of Health     Financial Resource Strain: Not on file   Food Insecurity: Not on file   Transportation Needs: Not on file   Physical Activity: Not on file   Stress: Not on file   Social Connections: Not on file   Intimate Partner Violence: Not on file   Housing Stability: Not on file        Family Hx:    Family History   Problem Relation Age of Onset    No Known Problems Mother     No Known Problems Father     Cancer Maternal Grandmother     Cancer Maternal Grandfather     Cancer Maternal Aunt          Review of Systems   Constitutional: Negative for chills and fever  HENT:        See HPI   Respiratory: Negative  Cardiovascular: Negative  Gastrointestinal: Negative  Genitourinary: Negative  Musculoskeletal: Negative  Neurological: Negative  Hematological: Negative  All other systems reviewed and are negative  There were no vitals taken for this visit  Physical Exam  Vitals reviewed  Constitutional:       General: He is not in acute distress  Appearance: Normal appearance  HENT:      Head:      Comments: 2 0 cm x 1 0 cm x 1 0 cm fluctuant erythematous tender mass posterior scalp most consistent with an infected dermoid cyst  Eyes:      Pupils: Pupils are equal, round, and reactive to light  Cardiovascular:      Rate and Rhythm: Normal rate  Pulmonary:      Effort: Pulmonary effort is normal    Abdominal:      General: Abdomen is flat  Palpations: Abdomen is soft  Musculoskeletal:         General: Normal range of motion  Cervical back: Normal range of motion and neck supple  Lymphadenopathy:      Cervical: No cervical adenopathy  Skin:     General: Skin is warm and dry  Neurological:      General: No focal deficit present  Mental Status: He is alert  Incision and Drainage    Date/Time: 9/15/2022 8:53 AM  Performed by: Ana Keyes MD  Authorized by: Ana Keyes MD   Universal Protocol:  Procedure performed by:  Consent: Verbal consent obtained    Consent given by: patient  Timeout called at: 9/15/2022 8:54 AM   Patient understanding: patient states understanding of the procedure being performed  Patient consent: the patient's understanding of the procedure matches consent given  Procedure consent: procedure consent matches procedure scheduled  Relevant documents: relevant documents present and verified  Test results: test results available and properly labeled  Site marked: the operative site was marked  Radiology Images displayed and confirmed  If images not available, report reviewed: imaging studies available  Patient identity confirmed: verbally with patient      Patient location:  Clinic  Location:     Type:  Abscess    Location:  Head/neck    Head/neck location:  Scalp  Pre-procedure details:     Skin preparation:  Techni-Care  Anesthesia (see MAR for exact dosages): Anesthesia method:  Local infiltration    Local anesthetic:  Lidocaine 1% WITH epi  Procedure details:     Complexity:  Intermediate    Incision types:  Elliptical    Scalpel blade:  15    Approach:  Open    Incision depth:  Subcutaneous    Wound management:  Probed and deloculated    Drainage:  Purulent    Packing materials:  1/4 in iodoform gauze  Comments:      Quarter-inch iodoform wick placed as well as head wrap with Kerlix  Instructions given to remove wrap and wick tomorrow and shower with soap and water  Leave wound open  Follow-up next week for wound check    Bactrim double strength 1 p o  B i d  X7 days        Pertinent labs reviewed    Pertinent images and available reads personally reviewed    Pertinent notes reviewed  A red PCP note from 13 September 2022     Messi Cormier MD [id number] [facility]  [phone]

## 2022-09-22 ENCOUNTER — OFFICE VISIT (OUTPATIENT)
Dept: SURGERY | Facility: CLINIC | Age: 52
End: 2022-09-22

## 2022-09-22 VITALS — WEIGHT: 191.6 LBS | HEIGHT: 70 IN | BODY MASS INDEX: 27.43 KG/M2 | TEMPERATURE: 97.5 F

## 2022-09-22 DIAGNOSIS — L02.91 ABSCESS: Primary | ICD-10-CM

## 2022-09-22 PROCEDURE — 99024 POSTOP FOLLOW-UP VISIT: CPT | Performed by: SURGERY

## 2022-10-11 PROBLEM — Z12.11 COLON CANCER SCREENING: Status: RESOLVED | Noted: 2022-06-08 | Resolved: 2022-10-11

## 2022-11-27 ENCOUNTER — HOSPITAL ENCOUNTER (INPATIENT)
Facility: HOSPITAL | Age: 52
LOS: 1 days | Discharge: HOME | End: 2022-11-29
Attending: EMERGENCY MEDICINE | Admitting: FAMILY MEDICINE

## 2022-11-27 ENCOUNTER — APPOINTMENT (EMERGENCY)
Dept: RADIOLOGY | Facility: HOSPITAL | Age: 52
End: 2022-11-27

## 2022-11-27 DIAGNOSIS — N13.2 URETERAL STONE WITH HYDRONEPHROSIS: ICD-10-CM

## 2022-11-27 DIAGNOSIS — N17.9 AKI (ACUTE KIDNEY INJURY) (HCC): Primary | ICD-10-CM

## 2022-11-27 PROBLEM — N20.0 NEPHROLITH: Status: ACTIVE | Noted: 2022-11-27

## 2022-11-27 PROBLEM — K21.9 GERD (GASTROESOPHAGEAL REFLUX DISEASE): Status: ACTIVE | Noted: 2018-08-01

## 2022-11-27 PROBLEM — N13.30 HYDRONEPHROSIS, RIGHT: Status: ACTIVE | Noted: 2022-11-27

## 2022-11-27 PROBLEM — R05.1 ACUTE COUGH: Status: ACTIVE | Noted: 2022-11-27

## 2022-11-27 PROBLEM — F32.9 MDD (MAJOR DEPRESSIVE DISORDER): Status: ACTIVE | Noted: 2020-02-13

## 2022-11-27 LAB
ALBUMIN SERPL BCP-MCNC: 4.3 G/DL (ref 3.5–5)
ALP SERPL-CCNC: 67 U/L (ref 46–116)
ALT SERPL W P-5'-P-CCNC: 36 U/L (ref 12–78)
AMORPH URATE CRY URNS QL MICRO: ABNORMAL /HPF
ANION GAP SERPL CALCULATED.3IONS-SCNC: 13 MMOL/L (ref 4–13)
BACTERIA UR QL AUTO: ABNORMAL /HPF
BASOPHILS # BLD AUTO: 0.03 THOUSANDS/ÂΜL (ref 0–0.1)
BASOPHILS NFR BLD AUTO: 0 % (ref 0–1)
BILIRUB SERPL-MCNC: 0.91 MG/DL (ref 0.2–1)
BILIRUB UR QL STRIP: ABNORMAL
BUN SERPL-MCNC: 19 MG/DL (ref 5–25)
CALCIUM SERPL-MCNC: 9.2 MG/DL (ref 8.3–10.1)
CHLORIDE SERPL-SCNC: 106 MMOL/L (ref 96–108)
CLARITY UR: ABNORMAL
CO2 SERPL-SCNC: 22 MMOL/L (ref 21–32)
COLOR UR: ABNORMAL
CREAT SERPL-MCNC: 1.64 MG/DL (ref 0.6–1.3)
EOSINOPHIL # BLD AUTO: 0.01 THOUSAND/ÂΜL (ref 0–0.61)
EOSINOPHIL NFR BLD AUTO: 0 % (ref 0–6)
ERYTHROCYTE [DISTWIDTH] IN BLOOD BY AUTOMATED COUNT: 15.4 % (ref 11.6–15.1)
GFR SERPL CREATININE-BSD FRML MDRD: 48 ML/MIN/1.73SQ M
GLUCOSE SERPL-MCNC: 118 MG/DL (ref 65–140)
GLUCOSE UR STRIP-MCNC: NEGATIVE MG/DL
HCT VFR BLD AUTO: 42.5 % (ref 36.5–49.3)
HGB BLD-MCNC: 13.9 G/DL (ref 12–17)
HGB UR QL STRIP.AUTO: ABNORMAL
IMM GRANULOCYTES # BLD AUTO: 0.03 THOUSAND/UL (ref 0–0.2)
IMM GRANULOCYTES NFR BLD AUTO: 0 % (ref 0–2)
KETONES UR STRIP-MCNC: ABNORMAL MG/DL
LEUKOCYTE ESTERASE UR QL STRIP: NEGATIVE
LIPASE SERPL-CCNC: 100 U/L (ref 73–393)
LYMPHOCYTES # BLD AUTO: 0.62 THOUSANDS/ÂΜL (ref 0.6–4.47)
LYMPHOCYTES NFR BLD AUTO: 8 % (ref 14–44)
MCH RBC QN AUTO: 26.7 PG (ref 26.8–34.3)
MCHC RBC AUTO-ENTMCNC: 32.7 G/DL (ref 31.4–37.4)
MCV RBC AUTO: 82 FL (ref 82–98)
MONOCYTES # BLD AUTO: 0.41 THOUSAND/ÂΜL (ref 0.17–1.22)
MONOCYTES NFR BLD AUTO: 5 % (ref 4–12)
NEUTROPHILS # BLD AUTO: 6.94 THOUSANDS/ÂΜL (ref 1.85–7.62)
NEUTS SEG NFR BLD AUTO: 87 % (ref 43–75)
NITRITE UR QL STRIP: NEGATIVE
NON-SQ EPI CELLS URNS QL MICRO: ABNORMAL /HPF
NRBC BLD AUTO-RTO: 0 /100 WBCS
PH UR STRIP.AUTO: 5 [PH]
PLATELET # BLD AUTO: 172 THOUSANDS/UL (ref 149–390)
PMV BLD AUTO: 12.6 FL (ref 8.9–12.7)
POTASSIUM SERPL-SCNC: 3.9 MMOL/L (ref 3.5–5.3)
PROT SERPL-MCNC: 7.5 G/DL (ref 6.4–8.4)
PROT UR STRIP-MCNC: ABNORMAL MG/DL
RBC # BLD AUTO: 5.21 MILLION/UL (ref 3.88–5.62)
RBC #/AREA URNS AUTO: ABNORMAL /HPF
SODIUM SERPL-SCNC: 141 MMOL/L (ref 135–147)
SP GR UR STRIP.AUTO: >=1.03 (ref 1–1.03)
URATE CRY URNS QL MICRO: ABNORMAL /HPF
UROBILINOGEN UR QL STRIP.AUTO: 0.2 E.U./DL
WBC # BLD AUTO: 8.04 THOUSAND/UL (ref 4.31–10.16)
WBC #/AREA URNS AUTO: ABNORMAL /HPF

## 2022-11-27 RX ORDER — HYDROMORPHONE HCL/PF 1 MG/ML
1 SYRINGE (ML) INJECTION ONCE
Status: COMPLETED | OUTPATIENT
Start: 2022-11-27 | End: 2022-11-27

## 2022-11-27 RX ORDER — ONDANSETRON 2 MG/ML
4 INJECTION INTRAMUSCULAR; INTRAVENOUS ONCE
Status: COMPLETED | OUTPATIENT
Start: 2022-11-27 | End: 2022-11-27

## 2022-11-27 RX ORDER — TAMSULOSIN HYDROCHLORIDE 0.4 MG/1
0.4 CAPSULE ORAL
Status: DISCONTINUED | OUTPATIENT
Start: 2022-11-28 | End: 2022-11-29 | Stop reason: HOSPADM

## 2022-11-27 RX ORDER — HEPARIN SODIUM 5000 [USP'U]/ML
5000 INJECTION, SOLUTION INTRAVENOUS; SUBCUTANEOUS EVERY 8 HOURS SCHEDULED
Status: DISCONTINUED | OUTPATIENT
Start: 2022-11-27 | End: 2022-11-29 | Stop reason: HOSPADM

## 2022-11-27 RX ORDER — TAMSULOSIN HYDROCHLORIDE 0.4 MG/1
0.4 CAPSULE ORAL ONCE
Status: COMPLETED | OUTPATIENT
Start: 2022-11-27 | End: 2022-11-27

## 2022-11-27 RX ORDER — ACETAMINOPHEN 325 MG/1
650 TABLET ORAL EVERY 4 HOURS PRN
Status: DISCONTINUED | OUTPATIENT
Start: 2022-11-27 | End: 2022-11-27

## 2022-11-27 RX ORDER — FLUOXETINE HYDROCHLORIDE 20 MG/1
40 CAPSULE ORAL EVERY MORNING
Status: DISCONTINUED | OUTPATIENT
Start: 2022-11-28 | End: 2022-11-29 | Stop reason: HOSPADM

## 2022-11-27 RX ORDER — FAMOTIDINE 20 MG/1
40 TABLET, FILM COATED ORAL DAILY
Status: DISCONTINUED | OUTPATIENT
Start: 2022-11-28 | End: 2022-11-27

## 2022-11-27 RX ORDER — ONDANSETRON 2 MG/ML
4 INJECTION INTRAMUSCULAR; INTRAVENOUS EVERY 6 HOURS PRN
Status: DISCONTINUED | OUTPATIENT
Start: 2022-11-27 | End: 2022-11-29 | Stop reason: HOSPADM

## 2022-11-27 RX ORDER — ACETAMINOPHEN 325 MG/1
650 TABLET ORAL EVERY 6 HOURS SCHEDULED
Status: DISCONTINUED | OUTPATIENT
Start: 2022-11-28 | End: 2022-11-29 | Stop reason: HOSPADM

## 2022-11-27 RX ORDER — FAMOTIDINE 20 MG/1
20 TABLET, FILM COATED ORAL DAILY
Status: DISCONTINUED | OUTPATIENT
Start: 2022-11-28 | End: 2022-11-29 | Stop reason: HOSPADM

## 2022-11-27 RX ORDER — BENZONATATE 100 MG/1
100 CAPSULE ORAL 3 TIMES DAILY PRN
Status: DISCONTINUED | OUTPATIENT
Start: 2022-11-28 | End: 2022-11-29 | Stop reason: HOSPADM

## 2022-11-27 RX ORDER — SODIUM CHLORIDE 9 MG/ML
125 INJECTION, SOLUTION INTRAVENOUS CONTINUOUS
Status: DISCONTINUED | OUTPATIENT
Start: 2022-11-27 | End: 2022-11-28

## 2022-11-27 RX ORDER — SENNOSIDES 8.6 MG
1 TABLET ORAL DAILY
Status: DISCONTINUED | OUTPATIENT
Start: 2022-11-28 | End: 2022-11-29 | Stop reason: HOSPADM

## 2022-11-27 RX ORDER — LANOLIN ALCOHOL/MO/W.PET/CERES
3 CREAM (GRAM) TOPICAL
Status: DISCONTINUED | OUTPATIENT
Start: 2022-11-27 | End: 2022-11-29 | Stop reason: HOSPADM

## 2022-11-27 RX ORDER — CHLORAL HYDRATE 500 MG
1000 CAPSULE ORAL 2 TIMES DAILY
Status: DISCONTINUED | OUTPATIENT
Start: 2022-11-27 | End: 2022-11-29 | Stop reason: HOSPADM

## 2022-11-27 RX ADMIN — HYDROMORPHONE HYDROCHLORIDE 1 MG: 1 INJECTION, SOLUTION INTRAMUSCULAR; INTRAVENOUS; SUBCUTANEOUS at 22:15

## 2022-11-27 RX ADMIN — SODIUM CHLORIDE 1000 ML: 0.9 INJECTION, SOLUTION INTRAVENOUS at 20:09

## 2022-11-27 RX ADMIN — ONDANSETRON 4 MG: 2 INJECTION INTRAMUSCULAR; INTRAVENOUS at 18:55

## 2022-11-27 RX ADMIN — MORPHINE SULFATE 2 MG: 2 INJECTION, SOLUTION INTRAMUSCULAR; INTRAVENOUS at 20:53

## 2022-11-27 RX ADMIN — ONDANSETRON 4 MG: 2 INJECTION INTRAMUSCULAR; INTRAVENOUS at 20:53

## 2022-11-27 RX ADMIN — TAMSULOSIN HYDROCHLORIDE 0.4 MG: 0.4 CAPSULE ORAL at 22:13

## 2022-11-27 NOTE — Clinical Note
Case was discussed with Opal Carroll NP and the patient's admission status was agreed to be Admission Status: observation status to the service of Dr Luh Cline

## 2022-11-28 ENCOUNTER — APPOINTMENT (OUTPATIENT)
Dept: RADIOLOGY | Facility: HOSPITAL | Age: 52
End: 2022-11-28

## 2022-11-28 PROBLEM — M75.101 TEAR OF RIGHT SUPRASPINATUS TENDON: Status: RESOLVED | Noted: 2021-05-27 | Resolved: 2022-11-28

## 2022-11-28 PROBLEM — Z47.89 AFTERCARE FOLLOWING SURGERY OF THE MUSCULOSKELETAL SYSTEM: Status: RESOLVED | Noted: 2021-08-02 | Resolved: 2022-11-28

## 2022-11-28 LAB
ANION GAP SERPL CALCULATED.3IONS-SCNC: 6 MMOL/L (ref 4–13)
ANION GAP SERPL CALCULATED.3IONS-SCNC: 6 MMOL/L (ref 4–13)
BASOPHILS # BLD AUTO: 0.02 THOUSANDS/ÂΜL (ref 0–0.1)
BASOPHILS NFR BLD AUTO: 0 % (ref 0–1)
BUN SERPL-MCNC: 19 MG/DL (ref 5–25)
BUN SERPL-MCNC: 19 MG/DL (ref 5–25)
CALCIUM SERPL-MCNC: 8.4 MG/DL (ref 8.3–10.1)
CALCIUM SERPL-MCNC: 8.5 MG/DL (ref 8.3–10.1)
CHLORIDE SERPL-SCNC: 107 MMOL/L (ref 96–108)
CHLORIDE SERPL-SCNC: 111 MMOL/L (ref 96–108)
CO2 SERPL-SCNC: 24 MMOL/L (ref 21–32)
CO2 SERPL-SCNC: 25 MMOL/L (ref 21–32)
CREAT SERPL-MCNC: 1.32 MG/DL (ref 0.6–1.3)
CREAT SERPL-MCNC: 1.5 MG/DL (ref 0.6–1.3)
EOSINOPHIL # BLD AUTO: 0.01 THOUSAND/ÂΜL (ref 0–0.61)
EOSINOPHIL NFR BLD AUTO: 0 % (ref 0–6)
ERYTHROCYTE [DISTWIDTH] IN BLOOD BY AUTOMATED COUNT: 15.5 % (ref 11.6–15.1)
FLUAV RNA RESP QL NAA+PROBE: NEGATIVE
FLUBV RNA RESP QL NAA+PROBE: NEGATIVE
GFR SERPL CREATININE-BSD FRML MDRD: 53 ML/MIN/1.73SQ M
GFR SERPL CREATININE-BSD FRML MDRD: 62 ML/MIN/1.73SQ M
GLUCOSE SERPL-MCNC: 109 MG/DL (ref 65–140)
GLUCOSE SERPL-MCNC: 119 MG/DL (ref 65–140)
HCT VFR BLD AUTO: 35.9 % (ref 36.5–49.3)
HGB BLD-MCNC: 11.8 G/DL (ref 12–17)
IMM GRANULOCYTES # BLD AUTO: 0.01 THOUSAND/UL (ref 0–0.2)
IMM GRANULOCYTES NFR BLD AUTO: 0 % (ref 0–2)
LYMPHOCYTES # BLD AUTO: 0.88 THOUSANDS/ÂΜL (ref 0.6–4.47)
LYMPHOCYTES NFR BLD AUTO: 17 % (ref 14–44)
MCH RBC QN AUTO: 27.3 PG (ref 26.8–34.3)
MCHC RBC AUTO-ENTMCNC: 32.9 G/DL (ref 31.4–37.4)
MCV RBC AUTO: 83 FL (ref 82–98)
MONOCYTES # BLD AUTO: 0.45 THOUSAND/ÂΜL (ref 0.17–1.22)
MONOCYTES NFR BLD AUTO: 9 % (ref 4–12)
NEUTROPHILS # BLD AUTO: 3.88 THOUSANDS/ÂΜL (ref 1.85–7.62)
NEUTS SEG NFR BLD AUTO: 74 % (ref 43–75)
NRBC BLD AUTO-RTO: 0 /100 WBCS
PLATELET # BLD AUTO: 130 THOUSANDS/UL (ref 149–390)
PMV BLD AUTO: 12.2 FL (ref 8.9–12.7)
POTASSIUM SERPL-SCNC: 3.5 MMOL/L (ref 3.5–5.3)
POTASSIUM SERPL-SCNC: 4.1 MMOL/L (ref 3.5–5.3)
RBC # BLD AUTO: 4.33 MILLION/UL (ref 3.88–5.62)
RSV RNA RESP QL NAA+PROBE: NEGATIVE
SARS-COV-2 RNA RESP QL NAA+PROBE: NEGATIVE
SODIUM SERPL-SCNC: 137 MMOL/L (ref 135–147)
SODIUM SERPL-SCNC: 142 MMOL/L (ref 135–147)
WBC # BLD AUTO: 5.25 THOUSAND/UL (ref 4.31–10.16)

## 2022-11-28 RX ORDER — ACETAMINOPHEN 325 MG/1
650 TABLET ORAL EVERY 6 HOURS PRN
COMMUNITY
End: 2022-12-12

## 2022-11-28 RX ORDER — SODIUM CHLORIDE, SODIUM LACTATE, POTASSIUM CHLORIDE, CALCIUM CHLORIDE 600; 310; 30; 20 MG/100ML; MG/100ML; MG/100ML; MG/100ML
100 INJECTION, SOLUTION INTRAVENOUS CONTINUOUS
Status: DISCONTINUED | OUTPATIENT
Start: 2022-11-28 | End: 2022-11-29 | Stop reason: HOSPADM

## 2022-11-28 RX ADMIN — ACETAMINOPHEN 650 MG: 325 TABLET, FILM COATED ORAL at 17:35

## 2022-11-28 RX ADMIN — Medication 3 MG: at 01:32

## 2022-11-28 RX ADMIN — SENNOSIDES 8.6 MG: 8.6 TABLET, FILM COATED ORAL at 08:14

## 2022-11-28 RX ADMIN — HEPARIN SODIUM 5000 UNITS: 5000 INJECTION INTRAVENOUS; SUBCUTANEOUS at 23:20

## 2022-11-28 RX ADMIN — MORPHINE SULFATE 2 MG: 2 INJECTION, SOLUTION INTRAMUSCULAR; INTRAVENOUS at 22:16

## 2022-11-28 RX ADMIN — HEPARIN SODIUM 5000 UNITS: 5000 INJECTION INTRAVENOUS; SUBCUTANEOUS at 16:09

## 2022-11-28 RX ADMIN — OMEGA-3 FATTY ACIDS CAP 1000 MG 1000 MG: 1000 CAP at 08:14

## 2022-11-28 RX ADMIN — Medication 3 MG: at 22:09

## 2022-11-28 RX ADMIN — OMEGA-3 FATTY ACIDS CAP 1000 MG 1000 MG: 1000 CAP at 17:34

## 2022-11-28 RX ADMIN — ACETAMINOPHEN 650 MG: 325 TABLET, FILM COATED ORAL at 05:13

## 2022-11-28 RX ADMIN — Medication 1 TABLET: at 08:14

## 2022-11-28 RX ADMIN — SODIUM CHLORIDE 125 ML/HR: 0.9 INJECTION, SOLUTION INTRAVENOUS at 01:47

## 2022-11-28 RX ADMIN — FAMOTIDINE 20 MG: 20 TABLET ORAL at 08:16

## 2022-11-28 RX ADMIN — HEPARIN SODIUM 5000 UNITS: 5000 INJECTION INTRAVENOUS; SUBCUTANEOUS at 07:53

## 2022-11-28 RX ADMIN — ACETAMINOPHEN 650 MG: 325 TABLET, FILM COATED ORAL at 12:36

## 2022-11-28 RX ADMIN — HEPARIN SODIUM 5000 UNITS: 5000 INJECTION INTRAVENOUS; SUBCUTANEOUS at 01:49

## 2022-11-28 RX ADMIN — FLUOXETINE 40 MG: 20 CAPSULE ORAL at 08:14

## 2022-11-28 RX ADMIN — ACETAMINOPHEN 650 MG: 325 TABLET, FILM COATED ORAL at 23:20

## 2022-11-28 RX ADMIN — TAMSULOSIN HYDROCHLORIDE 0.4 MG: 0.4 CAPSULE ORAL at 16:09

## 2022-11-28 RX ADMIN — SODIUM CHLORIDE, POTASSIUM CHLORIDE, SODIUM LACTATE AND CALCIUM CHLORIDE 100 ML/HR: 600; 310; 30; 20 INJECTION, SOLUTION INTRAVENOUS at 22:09

## 2022-11-28 NOTE — ASSESSMENT & PLAN NOTE
New onset acute productive cough     · Covid/Flu/RSV test: all negative   · Tessalon Perles PRN  · Consider CXR if worsens or does not resolve

## 2022-11-28 NOTE — H&P
History and Physical - [address] [facility]     Patient Information: Sobia Cain 54 y o  male MRN: [id number]  Unit/Bed#: ED 03 Encounter: [id number]  Admitting Physician: Thomas Fregoso MD   PCP: Maggie Bustos DO  Date of Admission:  11/27/22     Assessment and Plan     * Hydronephrosis, right, due to ureteral calculus  Assessment & Plan  Pt presented with right flank pain 10/10, nausea, and vomiting for past 2 days  Has been taking oxycodone and Tylenol at home for pain  11/27 CT Renal Stone Study  RIGHT KIDNEY AND URETER: Right hydronephrosis secondary to a 3 mm stone at the proximal/mid right ureter at the level of L3-L4 disc space  Immediately proximal to this stone is an additional 1 mm stone  LEFT KIDNEY AND URETER: There are nonobstructing intrarenal calculi measuring on the order of 4 mm at the superior pole  No hydronephrosis or hydroureter  · IVF  · Tamsulosin   · Right sided 3mm and 1mm stones likely to pass on their own  · I/Os and Strain urine to catch stones  · Pain control with scheduled Tylenol and PRN Morphine    · Consider KUB/renal ultrasound  · Consider Urology consult if difficulty passing   · Increase fluid hydration to 2 5-3L daily on discharge  · Stone analysis to determine outpatient management for multiple recurrent stones       BHASKAR (acute kidney injury) ([facility] [address] )  Assessment & Plan  11/27 Cr 1 64 on admission (Baseline 1 0-1 2)    · IVF   · Avoid nephrotoxic pain medications  · Monitor Cr   · Likely will resolve with passing of kidney stones     Acute cough  Assessment & Plan  New onset acute productive cough     · Covid/Flu/RSV test pending   · Tessalon Perles PRN  · Consider CXR if worsens or does not resolve    MDD (major depressive disorder)  Assessment & Plan  Chronic, stable     · Continue home Fluoxetine    GERD (gastroesophageal reflux disease)  Assessment & Plan  Chronic, stable     · Continue home Famotidine     Cerebral palsy [facility])  Assessment & Plan  Pt has cerebral palsy  Not currently on any medications  [facility]         Fluids: NS 1L bolus,  ml/hr   Electrolyte repletion: Replete as needed  Nutrition:        Diet Orders   (From admission, onward)             Start     Ordered    11/27/22 2219  Diet Renal; Renal [place]; No; No  Diet effective now        References:    Nutrtion Support Algorithm Enteral vs  Parenteral   Question Answer Comment   Diet Type Renal    Renal Renal [place]    Should patient have a fluid restriction? No    Should patient have a protein modifier? No    RD to adjust diet per protocol? Yes        11/27/22 2218               VTE Prophylaxis: Heparin  Code Status: Level 1 - Full Code  Anticipated Length of Stay:  Patient will be admitted on an Observation basis with an anticipated length of stay of less than 2 midnights  Justification for Hospital Stay: Right hydronephrosis due to ureteral obstruction  Total Time for Visit, including Counseling / Coordination of Care: 45 mins  Greater than 50% of this total time spent on direct patient counseling and coordination of care  Patient Information Sharing: With the consent of Mary Bhardwaj, their loved ones Mumtaz Glass, mother) were notified today by inpatient team of the patient’s condition and current plan  All questions answered  Chief Complaint:     Chief Complaint   Patient presents with   • Vomiting     Vomiting and cough started today  Co of nausea  NO diarrhea  Co of "dark urine"  Subjective      History of Present Illness:     Mary Bhardwaj is a 54 y o  male with a pmh of GERD, MDD, and Cerebral palsy, who presents with right sided flank pain  Pt states that for about 1-2 weeks he had been having pain under his right armpit and had been taking pain medication (oxycodone) nightly with Tylenol  About 2 days ago, he stopped taking the oxycodone   Today morning, he woke up with right sided flank and lower back pain, rated 8/10 with spikes to 10/10, describes as "punch in the kidney" constant pain  He tried taking prune juice, which didn't help with pain  Pt reports his urine has been dark for the past few days and started clearing up today  At baseline, he reports only drinking 2-3 8oz glasses of water daily  He also took Oxycodone and Tylenol which reduced pain to 6/10  Pt reports that he also has pain to palpation of his right lower abdomen when he was examined in the ED  Pt reports he has had multiple kidney stones in the past, usually pass on their own, this is his first time being hospitalized for stones  Currently pain is 6/10 after receiving 2mg of morphine in the ED  Pt has also been vomiting at home  He also felt like he had a fever at home, temp in ED was 99F  Pt has a productive cough with white and clear mucus since he arrived to the ED  Pt states he does not know exactly when the cough started but notes that the phlegm production only started after receiving IV fluids in the ED  ED Course: IV Zofran 4mg x2, IV Morphine 2mg x1, IVF NS 1L bolus      Review of Systems:  Review of Systems   Constitutional: Positive for appetite change (decreased), chills and diaphoresis  Negative for fever  HENT: Positive for ear pain, postnasal drip (chronic) and tinnitus (chronic)  Negative for sinus pressure, sinus pain and sore throat  Eyes: Negative for pain, redness and itching  Respiratory: Positive for cough (productive) and shortness of breath (on exertion)  Negative for chest tightness  Cardiovascular: Negative for chest pain, palpitations and leg swelling  Gastrointestinal: Positive for nausea and vomiting  Negative for blood in stool, constipation and diarrhea  Genitourinary: Positive for decreased urine volume, difficulty urinating, dysuria and flank pain (Right side)  Negative for hematuria     Musculoskeletal: Positive for arthralgias (chronic), back pain (lower back) and neck pain (associated with chronic shoulder pain)  Negative for neck stiffness  Skin: Negative for color change, rash and wound  Neurological: Negative for dizziness, syncope, light-headedness and headaches  All other systems reviewed and are negative  Past Medical History:   Diagnosis Date   • Acid reflux    • Anesthesia complication     woke up twice during anesthesia   • Anxiety    • Arthritis    • Cerebral palsy (HCC)    • GERD (gastroesophageal reflux disease)    • H/O ETOH abuse    • Psychiatric disorder     anxiety, depression   • Ulcer      Past Surgical History:   Procedure Laterality Date   • ESOPHAGOGASTRODUODENOSCOPY N/A 5/9/2016    Procedure: ESOPHAGOGASTRODUODENOSCOPY (EGD); Surgeon: Jon Harper MD;  Location: [facility] GI [facility]; Service:    • FOOT SURGERY Bilateral     hardware   • HIP SURGERY     • OTHER SURGICAL HISTORY Bilateral     lower extremity ligiment extensions-multiple   • [place] SHLDR ARTHROSCOP,SURG,W/ROTAT CUFF REPR Right 6/4/2021    Procedure: SHOULDER ARTHROSCOPIC ROTATOR CUFF REPAIR, SUBACROMIAL DECOMPRESSION;  Surgeon: Magalis Yanez MD;  Location: [facility];  Service: Orthopedics   • UPPER GASTROINTESTINAL ENDOSCOPY       Allergies   Allergen Reactions   • Aspirin GI Bleeding and Vomiting     Other reaction(s): Unknown  Patient has ulcers and does not take aspirin      Prior to Admission Medications   Prescriptions Last Dose Informant Patient Reported? Taking?    FLUoxetine (PROzac) 40 MG capsule 11/26/2022  Yes Yes   Sig: Take 40 mg by mouth every morning   Melatonin 1 MG CAPS 11/26/2022  Yes Yes   Sig: Take 3 mg by mouth as needed   Multiple Vitamins-Minerals (CENTRUM ADULTS PO) 11/26/2022  Yes Yes   Sig: Centrum   Omega-3 Fatty Acids (fish oil) 1,000 mg 11/26/2022  Yes Yes   Sig: Take 1,000 mg by mouth 2 (two) times a day   ergocalciferol (VITAMIN D2) 50,000 units 11/26/2022  Yes Yes   Sig: daily    famotidine (PEPCID) 40 MG tablet 11/26/2022  No Yes   Sig: Take 1 tablet (40 mg total) by mouth daily   gemfibrozil (LOPID) 600 mg tablet Not Taking  No No   Sig: TAKE ONE TABLET BY MOUTH TWICE A DAY BEFORE MEALS   Patient not taking: Reported on 9/15/2022   oxyCODONE (ROXICODONE) 5 mg immediate release tablet 2022  No Yes   Si tablets every 6 hours as needed for severe shoulder pain only        Facility-Administered Medications: None     Social History     Socioeconomic History   • Marital status: Single     Spouse name: Not on file   • Number of children: Not on file   • Years of education: Not on file   • Highest education level: Not on file   Occupational History   • Not on file   Tobacco Use   • Smoking status: Former     Packs/day: 3 00     Years: 15 00     Pack years: 45 00     Types: Cigarettes     Quit date: 1994     Years since quittin 8   • Smokeless tobacco: Never   Vaping Use   • Vaping Use: Never used   Substance and Sexual Activity   • Alcohol use: Not Currently     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   • Drug use: Not Currently     Types: Marijuana     Comment: quit -greater than 1 year   • Sexual activity: Not on file   Other Topics Concern   • Not on file   Social History Narrative   • Not on file     Social Determinants of Health     Financial Resource Strain: Not on file   Food Insecurity: Not on file   Transportation Needs: Not on file   Physical Activity: Not on file   Stress: Not on file   Social Connections: Not on file   Intimate Partner Violence: Not on file   Housing Stability: Not on file     Family History   Problem Relation Age of Onset   • No Known Problems Mother    • No Known Problems Father    • Cancer Maternal Grandmother    • Cancer Maternal Grandfather    • Cancer Maternal Aunt         Patient Pre-hospital Living Situation: Lives on his own          Objective     Physical Exam:   Vitals:   Blood Pressure: 116/61 (22 2100)  Pulse: 72 (225)  Temperature: 99 °F (37 2 °C) (22 1758)  Temp Source: Oral (22 1758)  Respirations: 20 (11/27/22 2059)  Weight - Scale: 87 5 kg (193 lb) (11/27/22 1758)  SpO2: 99 % (11/27/22 2115)     Physical Exam  Constitutional:       Appearance: He is overweight  HENT:      Head: Normocephalic and atraumatic  Right Ear: External ear normal       Left Ear: External ear normal       Nose: Nose normal       Mouth/Throat:      Pharynx: Oropharynx is clear  Eyes:      General: No scleral icterus  Conjunctiva/sclera: Conjunctivae normal    Cardiovascular:      Rate and Rhythm: Normal rate and regular rhythm  Pulses: Normal pulses  Heart sounds: Normal heart sounds  Pulmonary:      Effort: Pulmonary effort is normal  No respiratory distress  Breath sounds: Normal breath sounds  Abdominal:      General: Bowel sounds are normal       Palpations: Abdomen is soft  Tenderness: There is abdominal tenderness in the right lower quadrant  There is right CVA tenderness  There is no left CVA tenderness  Musculoskeletal:      Cervical back: Normal range of motion and neck supple  Right lower leg: No edema  Left lower leg: No edema  Skin:     General: Skin is warm  Capillary Refill: Capillary refill takes less than 2 seconds  Neurological:      Mental Status: He is alert and oriented to person, place, and time  Psychiatric:         Mood and Affect: Mood normal          Behavior: Behavior normal              Lab Results: I have personally reviewed pertinent reports      Results from last 7 days   Lab Units 11/27/22  1855   WBC Thousand/uL 8 04   HEMOGLOBIN g/dL 13 9   HEMATOCRIT % 42 5   PLATELETS Thousands/uL 172   NEUTROS PCT % 87*   LYMPHS PCT % 8*   MONOS PCT % 5   EOS PCT % 0     Results from last 7 days   Lab Units 11/27/22  1855   POTASSIUM mmol/L 3 9   CHLORIDE mmol/L 106   CO2 mmol/L 22   BUN mg/dL 19   CREATININE mg/dL 1 64*   CALCIUM mg/dL 9 2   ALK PHOS U/L 67   ALT U/L 36   EGFR ml/min/1 73sq m 48                      Results from last 7 days   Lab Units 11/27/22  1900   COLOR UA  Brown   CLARITY UA  Cloudy   SPEC GRAV UA  >=1 030   PH UA  5 0   LEUKOCYTES UA  Negative   NITRITE UA  Negative   GLUCOSE UA mg/dl Negative   KETONES UA mg/dl 15 (1+)*   BILIRUBIN UA  Small*   BLOOD UA  Large*      Results from last 7 days   Lab Units 11/27/22  1900   RBC UA /hpf Innumerable*   WBC UA /hpf 1-2   EPITHELIAL CELLS WET PREP /hpf Occasional   BACTERIA UA /hpf Occasional                  Imaging: I have personally reviewed pertinent reports  CT renal stone study abdomen pelvis without contrast    Result Date: 11/27/2022  Right hydronephrosis secondary to a 3 mm stone at the proximal/mid right ureter at the level of L3-L4 disc space   Immediately proximal to this stone is an additional 1 mm stone Workstation performed: [id number]            Entire H&P was discussed with Dr Michael Berg who agreed to what is noted above     ** Please Note: This note has been constructed using a voice recognition system **     Nely Colmenares DO  11/27/22  11:02 PM

## 2022-11-28 NOTE — ED PROVIDER NOTES
History  Chief Complaint   Patient presents with   • Vomiting     Vomiting and cough started today  Co of nausea  NO diarrhea  Co of "dark urine"  Patient is a 51-year-old male with a history of cerebral palsy, that presents with complaint of right flank pain x2 days, associated with nausea and vomiting  He denies urinary symptoms  He denies radiation of pain  History provided by:  Patient   used: No    Vomiting  Severity:  Mild  Timing:  Intermittent  Quality:  Bilious material and stomach contents  Progression:  Partially resolved  Chronicity:  New  Recent urination:  Normal  Relieved by:  Nothing  Worsened by:  Nothing  Ineffective treatments:  None tried  Associated symptoms: no abdominal pain, no chills, no cough, no diarrhea and no fever        Prior to Admission Medications   Prescriptions Last Dose Informant Patient Reported? Taking? FLUoxetine (PROzac) 40 MG capsule   Yes No   Sig: Take 40 mg by mouth every morning   Melatonin 1 MG CAPS   Yes No   Sig: Take 3 mg by mouth as needed   Multiple Vitamins-Minerals (CENTRUM ADULTS PO)   Yes No   Sig: Centrum   Omega-3 Fatty Acids (fish oil) 1,000 mg   Yes No   Sig: Take 1,000 mg by mouth 2 (two) times a day   ergocalciferol (VITAMIN D2) 50,000 units   Yes No   Sig: daily    famotidine (PEPCID) 40 MG tablet   No No   Sig: Take 1 tablet (40 mg total) by mouth daily   gemfibrozil (LOPID) 600 mg tablet   No No   Sig: TAKE ONE TABLET BY MOUTH TWICE A DAY BEFORE MEALS   Patient not taking: No sig reported   oxyCODONE (ROXICODONE) 5 mg immediate release tablet   No No   Si tablets every 6 hours as needed for severe shoulder pain only     Patient not taking: No sig reported      Facility-Administered Medications: None       Past Medical History:   Diagnosis Date   • Acid reflux    • Anesthesia complication     woke up twice during anesthesia   • Anxiety    • Arthritis    • Cerebral palsy (HCC)    • GERD (gastroesophageal reflux disease)    • H/O ETOH abuse    • Psychiatric disorder     anxiety, depression   • Ulcer        Past Surgical History:   Procedure Laterality Date   • ESOPHAGOGASTRODUODENOSCOPY N/A 2016    Procedure: ESOPHAGOGASTRODUODENOSCOPY (EGD); Surgeon: Guero Russell MD;  Location: [facility] GI LAB; Service:    • FOOT SURGERY Bilateral     hardware   • HIP SURGERY     • OTHER SURGICAL HISTORY Bilateral     lower extremity ligiment extensions-multiple   • SD SHLDR ARTHROSCOP,SURG,W/ROTAT CUFF REPR Right 2021    Procedure: SHOULDER ARTHROSCOPIC ROTATOR CUFF REPAIR, SUBACROMIAL DECOMPRESSION;  Surgeon: Anna Lopez MD;  Location: [facility] MAIN OR;  Service: Orthopedics   • UPPER GASTROINTESTINAL ENDOSCOPY         Family History   Problem Relation Age of Onset   • No Known Problems Mother    • No Known Problems Father    • Cancer Maternal Grandmother    • Cancer Maternal Grandfather    • Cancer Maternal Aunt      I have reviewed and agree with the history as documented  E-Cigarette/Vaping   • E-Cigarette Use Never User      E-Cigarette/Vaping Substances   • Nicotine No    • THC No    • CBD No    • Flavoring No    • Other No    • Unknown No      Social History     Tobacco Use   • Smoking status: Former     Packs/day: 3 00     Years: 15 00     Pack years: 45 00     Types: Cigarettes     Quit date: 1994     Years since quittin 8   • Smokeless tobacco: Never   Vaping Use   • Vaping Use: Never used   Substance Use Topics   • Alcohol use: Not Currently     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   • Drug use: Not Currently     Types: Marijuana     Comment: quit -greater than 1 year       Review of Systems   Constitutional: Negative for chills and fever  Respiratory: Negative for cough, shortness of breath and wheezing  Cardiovascular: Negative for chest pain and palpitations  Gastrointestinal: Positive for vomiting  Negative for abdominal pain, constipation, diarrhea and nausea     Genitourinary: Positive for flank pain  Negative for dysuria, hematuria and urgency  Musculoskeletal: Negative for back pain  Skin: Negative for color change and rash  All other systems reviewed and are negative  Physical Exam  Physical Exam  Vitals and nursing note reviewed  Constitutional:       Appearance: He is well-developed and well-nourished  HENT:      Head: Normocephalic and atraumatic  Eyes:      Extraocular Movements: EOM normal       Pupils: Pupils are equal, round, and reactive to light  Cardiovascular:      Rate and Rhythm: Normal rate and regular rhythm  Heart sounds: Normal heart sounds  Pulmonary:      Effort: Pulmonary effort is normal       Breath sounds: Normal breath sounds  Abdominal:      General: Bowel sounds are normal  There is no distension  Palpations: Abdomen is soft  There is no mass  Tenderness: There is abdominal tenderness in the right upper quadrant  There is no right CVA tenderness, left CVA tenderness, guarding or rebound  Musculoskeletal:         General: No swelling, tenderness, deformity or signs of injury  Skin:     General: Skin is warm and dry  Capillary Refill: Capillary refill takes less than 2 seconds  Neurological:      General: No focal deficit present  Mental Status: He is alert and oriented to person, place, and time  Psychiatric:         Mood and Affect: Mood and affect normal          Behavior: Behavior normal          Thought Content:  Thought content normal          Judgment: Judgment normal          Vital Signs  ED Triage Vitals [11/27/22 1758]   Temperature Pulse Respirations Blood Pressure SpO2   99 °F (37 2 °C) 90 20 119/80 96 %      Temp Source Heart Rate Source Patient Position - Orthostatic VS BP Location FiO2 (%)   Oral Monitor -- Left arm --      Pain Score       5           Vitals:    11/27/22 1758 11/27/22 2059 11/27/22 2100 11/27/22 2115   BP: 119/80 116/61 116/61    Pulse: 90 91 82 72         Visual Acuity      ED Medications  Medications   tamsulosin (FLOMAX) capsule 0 4 mg (has no administration in time range)   sodium chloride 0 9 % bolus 1,000 mL (1,000 mL Intravenous New Bag 11/27/22 2009)   ondansetron (ZOFRAN) injection 4 mg (4 mg Intravenous Given 11/27/22 1855)   ondansetron (ZOFRAN) injection 4 mg (4 mg Intravenous Given 11/27/22 2053)   morphine injection 2 mg (2 mg Intravenous Given 11/27/22 2053)       Diagnostic Studies  Results Reviewed     Procedure Component Value Units Date/Time    Urine Microscopic [[id number]]  (Abnormal) Collected: 11/27/22 1900    Lab Status: Final result Specimen: Urine, Other Updated: 11/27/22 2000     RBC, UA Innumerable /hpf      WBC, UA 1-2 /hpf      Epithelial Cells Occasional /hpf      Bacteria, UA Occasional /hpf      AMORPH URATES Innumerable /hpf      Uric Acid Mari, UA Moderate /hpf     Comprehensive metabolic panel [[id number]]  (Abnormal) Collected: 11/27/22 1855    Lab Status: Final result Specimen: Blood from Line, Venous Updated: 11/27/22 1933     Sodium 141 mmol/L      Potassium 3 9 mmol/L      Chloride 106 mmol/L      CO2 22 mmol/L      ANION GAP 13 mmol/L      BUN 19 mg/dL      Creatinine 1 64 mg/dL      Glucose 118 mg/dL      Calcium 9 2 mg/dL      AST --     ALT 36 U/L      Alkaline Phosphatase 67 U/L      Total Protein 7 5 g/dL      Albumin 4 3 g/dL      Total Bilirubin 0 91 mg/dL      eGFR 48 ml/min/1 73sq m     Narrative:      [place] guidelines for Chronic Kidney Disease (CKD):   •  Stage 1 with normal or high GFR (GFR > 90 mL/min/1 73 square meters)  •  Stage 2 Mild CKD (GFR = 60-89 mL/min/1 73 square meters)  •  Stage 3A Moderate CKD (GFR = 45-59 mL/min/1 73 square meters)  •  Stage 3B Moderate CKD (GFR = 30-44 mL/min/1 73 square meters)  •  Stage 4 Severe CKD (GFR = 15-29 mL/min/1 73 square meters)  •  Stage 5 End Stage CKD (GFR <15 mL/min/1 73 square meters)  Note: GFR calculation is accurate only with a steady state creatinine Lipase [[id number]]  (Normal) Collected: 11/27/22 1855    Lab Status: Final result Specimen: Blood from Line, Venous Updated: 11/27/22 1933     Lipase 100 u/L     UA (URINE) with reflex to Scope [[id number]]  (Abnormal) Collected: 11/27/22 1900    Lab Status: Final result Specimen: Urine, Other Updated: 11/27/22 1921     Color, UA Sherolyn Romance     Clarity, UA Cloudy     Specific Gravity, UA >=1 030     pH, UA 5 0     Leukocytes, UA Negative     Nitrite, UA Negative     Protein, UA 30 (1+) mg/dl      Glucose, UA Negative mg/dl      Ketones, UA 15 (1+) mg/dl      Urobilinogen, UA 0 2 E U /dl      Bilirubin, UA Small     Occult Blood, UA Large    CBC and differential [[id number]]  (Abnormal) Collected: 11/27/22 1855    Lab Status: Final result Specimen: Blood from Line, Venous Updated: 11/27/22 1907     WBC 8 04 Thousand/uL      RBC 5 21 Million/uL      Hemoglobin 13 9 g/dL      Hematocrit 42 5 %      MCV 82 fL      MCH 26 7 pg      MCHC 32 7 g/dL      RDW 15 4 %      MPV 12 6 fL      Platelets 356 Thousands/uL      nRBC 0 /100 WBCs      Neutrophils Relative 87 %      Immat GRANS % 0 %      Lymphocytes Relative 8 %      Monocytes Relative 5 %      Eosinophils Relative 0 %      Basophils Relative 0 %      Neutrophils Absolute 6 94 Thousands/µL      Immature Grans Absolute 0 03 Thousand/uL      Lymphocytes Absolute 0 62 Thousands/µL      Monocytes Absolute 0 41 Thousand/µL      Eosinophils Absolute 0 01 Thousand/µL      Basophils Absolute 0 03 Thousands/µL                  CT renal stone study abdomen pelvis without contrast   Final Result by Lary Whitehead MD (11/27 2023)      Right hydronephrosis secondary to a 3 mm stone at the proximal/mid right ureter at the level of L3-L4 disc space   Immediately proximal to this stone is an additional 1 mm stone         Workstation performed: [id number]                    Procedures  Procedures         ED Course                                             MDM  Number of Diagnoses or Management Options  BHASKAR (acute kidney injury) [facility]): new and requires workup  Ureteral stone with hydronephrosis: new and requires workup     Amount and/or Complexity of Data Reviewed  Clinical lab tests: ordered and reviewed  Tests in the radiology section of CPT®: ordered and reviewed    Risk of Complications, Morbidity, and/or Mortality  Presenting problems: high  Diagnostic procedures: high  Management options: high    Patient Progress  Patient progress: stable      Disposition  Final diagnoses:   BHASKAR (acute kidney injury) ([facility] [address] )   Ureteral stone with hydronephrosis     Time reflects when diagnosis was documented in both MDM as applicable and the Disposition within this note     Time User Action Codes Description Comment    11/27/2022  8:37 PM Vivien Kay O Add [N17 9] BHASKAR (acute kidney injury) ([facility] [address] )     11/27/2022  8:37 PM Vivien Eliza Add [N13 2] Ureteral stone with hydronephrosis       ED Disposition     ED Disposition   Admit    Condition   Stable    Date/Time   Sun Nov 27, 2022  9:21 PM    Comment   Case was discussed with Dr Esteban Nam and the patient's admission status was agreed to be Admission Status: observation status to the service of Dr Esteban Nam  Follow-up Information    None         Patient's Medications   Discharge Prescriptions    No medications on file       No discharge procedures on file      PDMP Review       Value Time User    PDMP Reviewed  Yes 6/4/2021  9:53 AM Ricky Carroll PA-C          ED Provider  Electronically Signed by           Laura Stewart DO  11/27/22 3731

## 2022-11-28 NOTE — ASSESSMENT & PLAN NOTE
Pt presented with right flank pain 10/10, nausea, and vomiting for past 2 days  Has been taking oxycodone and Tylenol at home for pain  11/27 CT Renal Stone Study  RIGHT KIDNEY AND URETER: Right hydronephrosis secondary to a 3 mm stone at the proximal/mid right ureter at the level of L3-L4 disc space  Immediately proximal to this stone is an additional 1 mm stone  LEFT KIDNEY AND URETER: There are nonobstructing intrarenal calculi measuring on the order of 4 mm at the superior pole  No hydronephrosis or hydroureter  · IVF  · Tamsulosin   · Right sided 3mm and 1mm stones likely to pass on their own  · I/Os and Strain urine to catch stones  · Pain control with scheduled Tylenol and PRN Morphine    · Renal ultrasound ordered  · Urology consulted  · Continue medical expulsion therapy with Tamsulosin  · Will likely pass stones on own, BHASKAR not due to hydronephrosis   · If Cr continues to trend down, discharge home in AM with Tamsulosin and strainers  · Urology outpatient f/up in 2 weeks  · Increase fluid hydration to 2 5-3L daily on discharge  · Stone analysis to determine outpatient management for multiple recurrent stones

## 2022-11-28 NOTE — ASSESSMENT & PLAN NOTE
11/27 Cr 1 64 on admission (Baseline 1 0-1 2)  Likely 2/2 to dehydration     · IVF    · Avoid nephrotoxic pain medications  · Cr: 1 64>1 5>1 32>1 09  · Likely will resolve with passing of kidney stones

## 2022-11-28 NOTE — PROGRESS NOTES
Daily Progress Note - [address] 54 y o  male MRN: [id number]  Unit/Bed#: [address] [place] [address] Encounter: [id number]  Admitting Physician: Angella Crane MD   PCP: Emily Linares DO  Date of Admission:  11/27/2022  5:57 PM    Assessment and Plan    * Hydronephrosis, right, due to ureteral calculus  Assessment & Plan  Pt presented with right flank pain 10/10, nausea, and vomiting for past 2 days  Has been taking oxycodone and Tylenol at home for pain  11/27 CT Renal Stone Study  RIGHT KIDNEY AND URETER: Right hydronephrosis secondary to a 3 mm stone at the proximal/mid right ureter at the level of L3-L4 disc space  Immediately proximal to this stone is an additional 1 mm stone  LEFT KIDNEY AND URETER: There are nonobstructing intrarenal calculi measuring on the order of 4 mm at the superior pole  No hydronephrosis or hydroureter  · IVF  · Tamsulosin   · Right sided 3mm and 1mm stones likely to pass on their own  · I/Os and Strain urine to catch stones  · Pain control with scheduled Tylenol and PRN Morphine    · Renal ultrasound ordered  · Urology consulted  · Increase fluid hydration to 2 5-3L daily on discharge  · Stone analysis to determine outpatient management for multiple recurrent stones       Acute cough  Assessment & Plan  New onset acute productive cough     · Covid/Flu/RSV test: all negative   · Tessalon Perles PRN  · Consider CXR if worsens or does not resolve    BHASKAR (acute kidney injury) ([facility] [address] )  Assessment & Plan  11/27 Cr 1 64 on admission (Baseline 1 0-1 2)    · IVF held, Urinary retention protocol   · Avoid nephrotoxic pain medications  · Cr: 1 64>1 5   · Likely will resolve with passing of kidney stones     MDD (major depressive disorder)  Assessment & Plan  Chronic, stable     · Continue home Fluoxetine    GERD (gastroesophageal reflux disease)  Assessment & Plan  Chronic, stable     · Continue home Famotidine     Cerebral palsy [facility])  Assessment & Plan  Pt has cerebral palsy  Not currently on any medications  Follows with [facility] center for PT regularly       VTE Pharmacologic Prophylaxis:   Pharmacologic: Heparin  Mechanical VTE Prophylaxis in Place: Yes    Patient Centered Rounds: I have performed bedside rounds with nursing staff today  Discussions with Specialists or Other Care Team Provider: none    Education and Discussions with Family / Patient:   Patient Information Sharing: With the consent of Gale Hernández , their loved ones Mesha Bautista, mother) were notified today by inpatient team of the patient’s condition and current plan  All questions answered  Time Spent for Care: 30 minutes  More than 50% of total time spent on counseling and coordination of care as described above  Current Length of Stay: 1 day(s)    Current Patient Status: Inpatient   Certification Statement: The patient will continue to require additional inpatient hospital stay due to has not passed kidney stone, and waiting on urology consult    Code Status: Level 1 - Full Code    Subjective:   62-year-old male resting comfortably in bed  Currently has no chief complaints, notes that the pain medicine is alleviating all of his pain  He has not passed the stone yet, and nursing aware to catch the stone and send the stone for analysis  He denies cough, nausea, vomiting, diarrhea, constipation, joint pain  Objective     Objective:   Vitals:   Temp (24hrs), Av 5 °F (36 9 °C), Min:97 9 °F (36 6 °C), Max:99 °F (37 2 °C)    Temp:  [97 9 °F (36 6 °C)-99 °F (37 2 °C)] 97 9 °F (36 6 °C)  HR:  [70-91] 72  Resp:  [17-20] 17  BP: (103-119)/(55-80) 103/64  SpO2:  [94 %-99 %] 97 %  Body mass index is 26 57 kg/m²  Input and Output Summary (last 24 hours):        Intake/Output Summary (Last 24 hours) at 2022 1110  Last data filed at 2022 0501  Gross per 24 hour   Intake 1000 ml   Output 200 ml   Net 800 ml       Physical Exam:   Physical Exam  Constitutional:       Appearance: Normal appearance  He is normal weight  HENT:      Head: Normocephalic and atraumatic  Right Ear: External ear normal       Left Ear: External ear normal       Nose: Nose normal       Mouth/Throat:      Mouth: Mucous membranes are moist       Pharynx: Oropharynx is clear  Eyes:      Extraocular Movements: Extraocular movements intact  Cardiovascular:      Rate and Rhythm: Normal rate and regular rhythm  Pulses: Normal pulses  Heart sounds: Normal heart sounds  Pulmonary:      Effort: Pulmonary effort is normal       Breath sounds: Normal breath sounds  Abdominal:      General: Abdomen is flat  Bowel sounds are normal       Palpations: Abdomen is soft  Tenderness: There is no right CVA tenderness or left CVA tenderness  Musculoskeletal:         General: Normal range of motion  Cervical back: Normal range of motion  Skin:     General: Skin is warm  Capillary Refill: Capillary refill takes less than 2 seconds  Neurological:      General: No focal deficit present  Mental Status: He is alert and oriented to person, place, and time  Psychiatric:         Mood and Affect: Mood normal          Behavior: Behavior normal          Thought Content: Thought content normal          Judgment: Judgment normal            Additional Data:     Labs:  Results from last 7 days   Lab Units 11/28/22  0509   WBC Thousand/uL 5 25   HEMOGLOBIN g/dL 11 8*   HEMATOCRIT % 35 9*   PLATELETS Thousands/uL 130*   NEUTROS PCT % 74   LYMPHS PCT % 17   MONOS PCT % 9   EOS PCT % 0     Results from last 7 days   Lab Units 11/28/22  0509 11/27/22  1855   POTASSIUM mmol/L 4 1 3 9   CHLORIDE mmol/L 111* 106   CO2 mmol/L 25 22   BUN mg/dL 19 19   CREATININE mg/dL 1 50* 1 64*   CALCIUM mg/dL 8 5 9 2   ALK PHOS U/L  --  67   ALT U/L  --  36                   * I Have Reviewed All Lab Data Listed Above  * Additional Pertinent Lab Tests Reviewed:  All Labs For Current Hospital Admission Reviewed    Imaging:    Imaging Reports Reviewed Today Include: All imaging  Imaging Personally Reviewed by Myself Includes: All imaging    Recent Cultures (last 7 days):           Last 24 Hours Medication List:   Current Facility-Administered Medications   Medication Dose Route Frequency Provider Last Rate   • acetaminophen  650 mg Oral Q6H [facility] Kirsty Schmidt, DO     • benzonatate  100 mg Oral TID PRN Nichols Free, DO     • famotidine  20 mg Oral Daily Susannah Miller MD     • fish oil  1,000 mg Oral BID Susannah Miller MD     • FLUoxetine  40 mg Oral QAM Susannah Miller MD     • heparin (porcine)  5,000 Units Subcutaneous Q8H [facility] Karen Mims MD     • melatonin  3 mg Oral HS Susannah Miller MD     • morphine injection  2 mg Intravenous Q4H PRN Susannah Miller MD     • multivitamin-minerals  1 tablet Oral Daily Susannah Miller MD     • ondansetron  4 mg Intravenous Q6H PRN Susannah Miller MD     • senna  1 tablet Oral Daily Susannah Miller MD     • tamsulosin  0 4 mg Oral Daily With Niki Davis MD               ** Please Note: Dictation voice to text software may have been used in the creation of this document   **    José Manuel Bautista MD  11/28/22  11:10 AM

## 2022-11-28 NOTE — ASSESSMENT & PLAN NOTE
Pt has cerebral palsy  Not currently on any medications     [facility]

## 2022-11-28 NOTE — PLAN OF CARE
Problem: PAIN - ADULT  Goal: Verbalizes/displays adequate comfort level or baseline comfort level  Description: Interventions:  - Encourage patient to monitor pain and request assistance  - Assess pain using appropriate pain scale  - Administer analgesics based on type and severity of pain and evaluate response  - Implement non-pharmacological measures as appropriate and evaluate response  - Consider cultural and social influences on pain and pain management  - Notify physician/advanced practitioner if interventions unsuccessful or patient reports new pain  Outcome: Progressing     Problem: INFECTION - ADULT  Goal: Absence or prevention of progression during hospitalization  Description: INTERVENTIONS:  - Assess and monitor for signs and symptoms of infection  - Monitor lab/diagnostic results  - Monitor all insertion sites, i e  indwelling lines, tubes, and drains  - Monitor endotracheal if appropriate and nasal secretions for changes in amount and color  - [place] appropriate cooling/warming therapies per order  - Administer medications as ordered  - Instruct and encourage patient and family to use good hand hygiene technique  - Identify and instruct in appropriate isolation precautions for identified infection/condition  Outcome: Progressing

## 2022-11-28 NOTE — PLAN OF CARE
Problem: PAIN - ADULT  Goal: Verbalizes/displays adequate comfort level or baseline comfort level  Description: Interventions:  - Encourage patient to monitor pain and request assistance  - Assess pain using appropriate pain scale  - Administer analgesics based on type and severity of pain and evaluate response  - Implement non-pharmacological measures as appropriate and evaluate response  - Consider cultural and social influences on pain and pain management  - Notify physician/advanced practitioner if interventions unsuccessful or patient reports new pain  Outcome: Progressing     Problem: INFECTION - ADULT  Goal: Absence or prevention of progression during hospitalization  Description: INTERVENTIONS:  - Assess and monitor for signs and symptoms of infection  - Monitor lab/diagnostic results  - Monitor all insertion sites, i e  indwelling lines, tubes, and drains  - Monitor endotracheal if appropriate and nasal secretions for changes in amount and color  - [place] appropriate cooling/warming therapies per order  - Administer medications as ordered  - Instruct and encourage patient and family to use good hand hygiene technique  - Identify and instruct in appropriate isolation precautions for identified infection/condition  Outcome: Progressing     Problem: SAFETY ADULT  Goal: Patient will remain free of falls  Description: INTERVENTIONS:  - Educate patient/family on patient safety including physical limitations  - Instruct patient to call for assistance with activity   - Consult OT/PT to assist with strengthening/mobility   - Keep Call bell within reach  - Keep bed low and locked with side rails adjusted as appropriate  - Keep care items and personal belongings within reach  - Initiate and maintain comfort rounds  - Make Fall Risk Sign visible to staff  - Offer Toileting every   Hours, in advance of need  - Initiate/Maintain  alarm  - Obtain necessary fall risk management equipment:    - Apply yellow socks and bracelet for high fall risk patients  - Consider moving patient to room near nurses station  Outcome: Progressing  Goal: Maintain or return to baseline ADL function  Description: INTERVENTIONS:  -  Assess patient's ability to carry out ADLs; assess patient's baseline for ADL function and identify physical deficits which impact ability to perform ADLs (bathing, care of mouth/teeth, toileting, grooming, dressing, etc )  - Assess/evaluate cause of self-care deficits   - Assess range of motion  - Assess patient's mobility; develop plan if impaired  - Assess patient's need for assistive devices and provide as appropriate  - Encourage maximum independence but intervene and supervise when necessary  - Involve family in performance of ADLs  - Assess for home care needs following discharge   - Consider OT consult to assist with ADL evaluation and planning for discharge  - Provide patient education as appropriate  Outcome: Progressing  Goal: Maintains/Returns to pre admission functional level  Description: INTERVENTIONS:  - Perform BMAT or MOVE assessment daily    - Set and communicate daily mobility goal to care team and patient/family/caregiver  - Collaborate with rehabilitation services on mobility goals if consulted  - Perform Range of Motion   times a day  - Reposition patient every   hours    - Dangle patient   times a day  - Stand patient   times a day  - Ambulate patient   times a day  - Out of bed to chair   times a day   - Out of bed for meals   times a day  - Out of bed for toileting  - Record patient progress and toleration of activity level   Outcome: Progressing     Problem: DISCHARGE PLANNING  Goal: Discharge to home or other facility with appropriate resources  Description: INTERVENTIONS:  - Identify barriers to discharge w/patient and caregiver  - Arrange for needed discharge resources and transportation as appropriate  - Identify discharge learning needs (meds, wound care, etc )  - Arrange for interpretive services to assist at discharge as needed  - Refer to Case Management Department for coordinating discharge planning if the patient needs post-hospital services based on physician/advanced practitioner order or complex needs related to functional status, cognitive ability, or social support system  Outcome: Progressing     Problem: Knowledge Deficit  Goal: Patient/family/caregiver demonstrates understanding of disease process, treatment plan, medications, and discharge instructions  Description: Complete learning assessment and assess knowledge base    Interventions:  - Provide teaching at level of understanding  - Provide teaching via preferred learning methods  Outcome: Progressing     Problem: GENITOURINARY - ADULT  Goal: Maintains or returns to baseline urinary function  Description: INTERVENTIONS:  - Assess urinary function  - Encourage oral fluids to ensure adequate hydration if ordered  - Administer IV fluids as ordered to ensure adequate hydration  - Administer ordered medications as needed  - Offer frequent toileting  - Follow urinary retention protocol if ordered  Outcome: Progressing  Goal: Absence of urinary retention  Description: INTERVENTIONS:  - Assess patient’s ability to void and empty bladder  - Monitor I/O  - Bladder scan as needed  - Discuss with physician/AP medications to alleviate retention as needed  - Discuss catheterization for long term situations as appropriate  Outcome: Progressing     Problem: Potential for Falls  Goal: Patient will remain free of falls  Description: INTERVENTIONS:  - Educate patient/family on patient safety including physical limitations  - Instruct patient to call for assistance with activity   - Consult OT/PT to assist with strengthening/mobility   - Keep Call bell within reach  - Keep bed low and locked with side rails adjusted as appropriate  - Keep care items and personal belongings within reach  - Initiate and maintain comfort rounds  - Make Fall Risk Sign visible to staff  - Offer Toileting every   Hours, in advance of need  - Initiate/Maintain  alarm  - Obtain necessary fall risk management equipment:    - Apply yellow socks and bracelet for high fall risk patients  - Consider moving patient to room near nurses station  Outcome: Progressing

## 2022-11-29 VITALS
DIASTOLIC BLOOD PRESSURE: 58 MMHG | HEIGHT: 70 IN | RESPIRATION RATE: 19 BRPM | WEIGHT: 185 LBS | HEART RATE: 68 BPM | SYSTOLIC BLOOD PRESSURE: 101 MMHG | TEMPERATURE: 97.8 F | BODY MASS INDEX: 26.48 KG/M2 | OXYGEN SATURATION: 97 %

## 2022-11-29 LAB
ANION GAP SERPL CALCULATED.3IONS-SCNC: 7 MMOL/L (ref 4–13)
BUN SERPL-MCNC: 17 MG/DL (ref 5–25)
CALCIUM SERPL-MCNC: 8.6 MG/DL (ref 8.3–10.1)
CHLORIDE SERPL-SCNC: 110 MMOL/L (ref 96–108)
CO2 SERPL-SCNC: 25 MMOL/L (ref 21–32)
CREAT SERPL-MCNC: 1.09 MG/DL (ref 0.6–1.3)
GFR SERPL CREATININE-BSD FRML MDRD: 79 ML/MIN/1.73SQ M
GLUCOSE SERPL-MCNC: 96 MG/DL (ref 65–140)
POTASSIUM SERPL-SCNC: 4 MMOL/L (ref 3.5–5.3)
SODIUM SERPL-SCNC: 142 MMOL/L (ref 135–147)

## 2022-11-29 RX ORDER — TAMSULOSIN HYDROCHLORIDE 0.4 MG/1
0.4 CAPSULE ORAL
Qty: 30 CAPSULE | Refills: 0 | Status: SHIPPED | OUTPATIENT
Start: 2022-11-29

## 2022-11-29 RX ADMIN — MORPHINE SULFATE 2 MG: 2 INJECTION, SOLUTION INTRAMUSCULAR; INTRAVENOUS at 06:04

## 2022-11-29 RX ADMIN — HEPARIN SODIUM 5000 UNITS: 5000 INJECTION INTRAVENOUS; SUBCUTANEOUS at 08:21

## 2022-11-29 RX ADMIN — Medication 1 TABLET: at 08:21

## 2022-11-29 RX ADMIN — FLUOXETINE 40 MG: 20 CAPSULE ORAL at 08:21

## 2022-11-29 RX ADMIN — SODIUM CHLORIDE, POTASSIUM CHLORIDE, SODIUM LACTATE AND CALCIUM CHLORIDE 100 ML/HR: 600; 310; 30; 20 INJECTION, SOLUTION INTRAVENOUS at 08:21

## 2022-11-29 RX ADMIN — FAMOTIDINE 20 MG: 20 TABLET ORAL at 08:21

## 2022-11-29 RX ADMIN — SENNOSIDES 8.6 MG: 8.6 TABLET, FILM COATED ORAL at 08:21

## 2022-11-29 RX ADMIN — OMEGA-3 FATTY ACIDS CAP 1000 MG 1000 MG: 1000 CAP at 08:21

## 2022-11-29 RX ADMIN — ACETAMINOPHEN 650 MG: 325 TABLET, FILM COATED ORAL at 05:01

## 2022-11-29 NOTE — DISCHARGE INSTRUCTIONS
Kidney Stones   WHAT YOU NEED TO KNOW:   Kidney stones form in the urinary system when the water and waste in your urine are out of balance  When this happens, certain types of waste crystals separate from the urine  The crystals build up and form kidney stones  You may have more than one kidney stone  DISCHARGE INSTRUCTIONS:   Return to the emergency department if:   You are vomiting and it is not relieved with medicine  Call your doctor or kidney specialist if:   You have a fever  You have trouble urinating  You see blood in your urine  You have severe pain  You have any questions or concerns about your condition or care  Medicines: You may need any of the following:  NSAIDs , such as ibuprofen, help decrease swelling, pain, and fever  This medicine is available with or without a doctor's order  NSAIDs can cause stomach bleeding or kidney problems in certain people  If you take blood thinner medicine, always ask your healthcare provider if NSAIDs are safe for you  Always read the medicine label and follow directions  Acetaminophen  decreases pain and fever  It is available without a doctor's order  Ask how much to take and how often to take it  Follow directions  Read the labels of all other medicines you are using to see if they also contain acetaminophen, or ask your doctor or pharmacist  Acetaminophen can cause liver damage if not taken correctly  Do not use more than 4 grams (4,000 milligrams) total of acetaminophen in one day  Prescription pain medicine  may be given  Ask your healthcare provider how to take this medicine safely  Some prescription pain medicines contain acetaminophen  Do not take other medicines that contain acetaminophen without talking to your healthcare provider  Too much acetaminophen may cause liver damage  Prescription pain medicine may cause constipation  Ask your healthcare provider how to prevent or treat constipation       Medicines  to balance your electrolytes may be needed  Take your medicine as directed  Contact your healthcare provider if you think your medicine is not helping or if you have side effects  Tell him or her if you are allergic to any medicine  Keep a list of the medicines, vitamins, and herbs you take  Include the amounts, and when and why you take them  Bring the list or the pill bottles to follow-up visits  Carry your medicine list with you in case of an emergency  What you can do to manage kidney stones:   Drink more liquids  Your healthcare provider may tell you to drink at least 8 to 12 (eight-ounce) cups of liquids each day  This helps flush out the kidney stones when you urinate  Water is the best liquid to drink  Strain your urine every time you go to the bathroom  Urinate through a strainer or a piece of thin cloth to catch the stones  Take the stones to your healthcare provider so they can be sent to the lab for tests  This will help your healthcare providers plan the best treatment for you  Ask if you should avoid any foods  You may need to limit oxalate  Oxalate is a chemical found in some plant foods  The most common type of kidney stone is made up of crystals that contain calcium and oxalate  Your healthcare provider or dietitian may recommend that you limit oxalate if you get this type of kidney stone often  You may need to limit how much sodium (salt) or protein you eat  Ask for information about the best foods for you  Be physically active as directed  Your stones may pass more easily if you stay active  Physical activity can also help you manage your weight  Ask about the best activities for you  After you pass the kidney stones: Your healthcare provider may  order a 24-hour urine test  Results from a 24-hour urine test will help your healthcare provider plan ways to prevent more stones from forming  Your healthcare provider will give you more instructions    Follow up with your doctor or kidney specialist as directed:  Write down your questions so you remember to ask them during your visits  © Copyright [employer] 2022 Information is for End User's use only and may not be sold, redistributed or otherwise used for commercial purposes  All illustrations and images included in CareNotes® are the copyrighted property of A [employer] M [employer]  or [employer]  The above information is an  only  It is not intended as medical advice for individual conditions or treatments  Talk to your doctor, nurse or pharmacist before following any medical regimen to see if it is safe and effective for you

## 2022-11-29 NOTE — DISCHARGE SUMMARY
Discharge Summary - [facility] Medicine Residency     Patient Information: Gulshan Danielson 54 y o  male MRN: [id number]  Unit/Bed#: [address] [place] [address] Encounter: [id number]     Admitting Physician: Dennise Garber MD  Discharging Physician/Practitioner: Dennise Garber MD   PCP: Sadia Obrien DO  Admission Date:   Admission Orders (From admission, onward)     Ordered        11/28/22 1101  Inpatient Admission  Once            11/27/22 2122  Place in Observation  Once            11/27/22 2055  INPATIENT ADMISSION  Once,   Status:  Canceled                      Discharge Date: 11/29/22      Reason for Admission: BHASKAR (acute kidney injury) ([facility] [address] ) [N17 9]  Ureteral stone with hydronephrosis [N13 2]     Discharge Diagnoses:      Principal Problem:    Hydronephrosis, right, due to ureteral calculus  Active Problems:    Cerebral palsy ([facility] [address] )    GERD (gastroesophageal reflux disease)    MDD (major depressive disorder)    BHASKAR (acute kidney injury) ([facility] )    Acute cough  Resolved Problems:    * No resolved hospital problems  *       Consultations During Hospital Stay:  ·       urology     Procedures Performed:   ·       CT, ultrasound     Significant Findings / Test Results:   11/29: Cr 1 09   11/28: Cr 1 5>1 32, WBC 5 25, Hgb 11 8  11/27: Cr 1 64, WBC 8 04, Hgb 13 9; UA 1+Ketones, Large blood, 1+Protein, Small bili, Innum RBC  CT ab/pelvis w/o contrast: Right Kidney/Ureter: Hydronephrosis 2/2 to 3mm stone at proximal/mid ureter at L3/L4 with 1mm stone immediately proximal   Left Kidney/Ureter: Non-obstructing 4mm intrarenal calculi at superior pole   Ultrasound kidney and bladder:  Mild right hydronephrosis; bilateral ureteral jets detected    Stable 0 4 cm left interpolar renal calculus     Incidental Findings:   ·       none      Test Results Pending at Discharge (will require follow up):   ·       none     Outpatient Tests Requested:  ·       stone analysis (bring to lab)     Outpatient follow-up Requested:  · urology, PCP    Complications: Things to address at first visit after hospitalization   Did you passed the stone? Have you been straining all of the urine? Have brought the stone to the lab? Have you been adequately hydrating? Are you avoiding triggers such as meat and alcohol? Have you followed up with Urology? Hospital Course:      Jose Carlos Mckenzie is a 54 y o  male patient who originally presented to the hospital on 11/27/2022 due to nausea, vomiting and acute onset of right flank pain  HPI on admission, "Jose Carlos Mckenzie is a 54 y o  male with a pmh of GERD, MDD, and Cerebral palsy, who presents with right sided flank pain  Pt states that for about 1-2 weeks he had been having pain under his right armpit and had been taking pain medication (oxycodone) nightly with Tylenol  About 2 days ago, he stopped taking the oxycodone  Today morning, he woke up with right sided flank and lower back pain, rated 8/10 with spikes to 10/10, describes as "punch in the kidney" constant pain  He tried taking prune juice, which didn't help with pain  Pt reports his urine has been dark for the past few days and started clearing up today  At baseline, he reports only drinking 2-3 8oz glasses of water daily  He also took Oxycodone and Tylenol which reduced pain to 6/10  Pt reports that he also has pain to palpation of his right lower abdomen when he was examined in the ED  Pt reports he has had multiple kidney stones in the past, usually pass on their own, this is his first time being hospitalized for stones  Currently pain is 6/10 after receiving 2mg of morphine in the ED  Pt has also been vomiting at home  He also felt like he had a fever at home, temp in ED was 99F  Pt has a productive cough with white and clear mucus since he arrived to the ED   Pt states he does not know exactly when the cough started but notes that the phlegm production only started after receiving IV fluids in the ED "     His hydronephrosis & kidney stones (diagnosed via CT), and BHASKAR were treated with fluids and Tamsulosin and pain control  Urology was consulted and they noted that there's a 98% chance of passing with medical expulsion therapy, additionally, he notes that the BHASKAR is secondary to dehydration has nothing to do with hydronephrosis from 3 mm stone  With IV and oral hydration, the creatinine normalized to baseline  All other chronic conditions were treated with home regimen  Condition at Discharge: good      Discharge Day Visit / Exam:      Vitals: Blood Pressure: 98/62 (11/28/22 2318)  Pulse: 66 (11/28/22 2318)  Temperature: 98 6 °F (37 °C) (11/28/22 2318)  Temp Source: Oral (11/28/22 2318)  Respirations: 17 (11/28/22 2318)  Height: 5' 10" (177 8 cm) (11/28/22 0235)  Weight - Scale: 83 9 kg (185 lb) (11/29/22 0600)  SpO2: 96 % (11/28/22 2318)  Exam:   Physical Exam  Constitutional:       Appearance: He is normal weight  HENT:      Head: Normocephalic and atraumatic  Right Ear: External ear normal       Left Ear: External ear normal       Nose: Nose normal       Mouth/Throat:      Mouth: Mucous membranes are moist       Pharynx: Oropharynx is clear  Eyes:      Extraocular Movements: Extraocular movements intact  Cardiovascular:      Rate and Rhythm: Normal rate and regular rhythm  Pulses: Normal pulses  Heart sounds: Normal heart sounds  Pulmonary:      Effort: Pulmonary effort is normal       Breath sounds: Normal breath sounds  Abdominal:      General: Abdomen is flat  Bowel sounds are normal       Palpations: Abdomen is soft  Musculoskeletal:         General: Normal range of motion  Skin:     General: Skin is warm  Capillary Refill: Capillary refill takes less than 2 seconds  Neurological:      General: No focal deficit present  Mental Status: He is alert and oriented to person, place, and time  Mental status is at baseline              Discussion with Family:   Patient Information Sharing: With the consent of nIes Villa, their loved ones Tristen Mayorga, mother) were notified today by inpatient team of the patient’s condition and current plan  All questions answered  Discharge instructions/Information to patient and family:   See after visit summary for information provided to patient and family  Discharge Medications:     Medication List      ASK your doctor about these medications    • acetaminophen 325 mg tablet; Commonly known as: TYLENOL  • CENTRUM ADULTS PO  • ergocalciferol 50,000 units; Commonly known as: VITAMIN D2  • famotidine 40 MG tablet; Commonly known as: PEPCID; Take 1 tablet (40 mg   total) by mouth daily  • fish oil 1,000 mg  • FLUoxetine 40 MG capsule; Commonly known as: PROzac  • gemfibrozil 600 mg tablet; Commonly known as: LOPID; TAKE ONE TABLET BY   MOUTH TWICE A DAY BEFORE MEALS  • Melatonin 1 MG Caps  • oxyCODONE 5 immediate release tablet; Commonly known as: ROXICODONE; 1   tablets every 6 hours as needed for severe shoulder pain only  Disposition:   Home     Discharge Statement:  I spent 30 minutes discharging the patient  This time was spent on the day of discharge  I had direct contact with the patient on the day of discharge  Greater than 50% of the total time was spent examining patient, answering all patient questions, arranging and discussing plan of care with patient as well as directly providing post-discharge instructions  Additional time then spent on discharge activities       ** Please Note: This note has been constructed using a voice recognition system **     José Manuel Bautista MD   11/29/22  7:15 AM

## 2022-11-29 NOTE — CONSULTS
H&P Exam - Urology       Patient: Woody Escoto   : 1970 Sex: male   MRN: [id number]     CSN: [id number]      History of Present Illness   HPI:  Woody Escoto is a 54 y o  male who presents  night  with 2 day complaint of nausea vomiting and  acute onset right flank pain worsening over 24 hours found on ER CT scan to have right proximal 3 mm stone and 1 mm stone with hydronephrosis and acute renal failure patient seen at the bedside stating that since admission last night he has not had any significant pain has been straining his urine this is his 5th attack of ureteral colic and seen E a urologist in the past diagnosed with calcium oxalate stones        Review of Systems:   Constitutional:  Negative for activity change, fever, chills and diaphoresis  HENT: Negative for hearing loss and trouble swallowing  Eyes: Negative for itching and visual disturbance  Respiratory: Negative for chest tightness and shortness of breath  Cardiovascular: Negative for chest pain, edema  Gastrointestinal: Negative for abdominal distention, na abdominal pain, constipation, diarrhea, Nausea and vomiting  Genitourinary: Negative for decreased urine volume, difficulty urinating, dysuria, enuresis, frequency, hematuria and urgency  Musculoskeletal: Negative for gait problem and myalgias  Neurological: Negative for dizziness and headaches  Hematological: Does not bruise/bleed easily  Historical Information   Past Medical History:   Diagnosis Date   • Acid reflux    • Anesthesia complication     woke up twice during anesthesia   • Anxiety    • Arthritis    • Cerebral palsy (HCC)    • GERD (gastroesophageal reflux disease)    • H/O ETOH abuse    • Psychiatric disorder     anxiety, depression   • Ulcer      Past Surgical History:   Procedure Laterality Date   • ESOPHAGOGASTRODUODENOSCOPY N/A 2016    Procedure: ESOPHAGOGASTRODUODENOSCOPY (EGD);   Surgeon: Senthil High MD;  Location: [facility] Patient return my call and discussed negative gonorrhea and Chlamydia testing as well as finding mixed skin kalia in urine culture.  Vaginal pathogen swab also resulted with negative results so instructed patient to continue to take antibiotics as she is feeling slightly better and we did find white blood cells in her urinalysis in clinic.  Patient was wondering if she should get further testing done for STDs and advised her that if she wanted peace of mind she could go to the [facility] department and get further testing for syphilis, HSV and HIV through blood testing.  Also instructed her to follow-up with GYN if she is continuing to have pelvic pain as pelvic inflammatory disease and/or endometriosis could be worked up.  Patient expressed understanding and agrees to plan.       Please note that this dictation was created using voice recognition software. I have made every reasonable attempt to correct obvious errors, but I expect that there are errors of grammar and possibly content that I did not discover before finalizing the note.     [facility];   Service:    • FOOT SURGERY Bilateral     hardware   • HIP SURGERY     • OTHER SURGICAL HISTORY Bilateral     lower extremity ligiment extensions-multiple   • MN SHLDR ARTHROSCOP,SURG,W/ROTAT CUFF REPR Right 2021    Procedure: SHOULDER ARTHROSCOPIC ROTATOR CUFF REPAIR, SUBACROMIAL DECOMPRESSION;  Surgeon: Jose Alberto Latif MD;  Location: [facility] MAIN OR;  Service: Orthopedics   • UPPER GASTROINTESTINAL ENDOSCOPY       Social History   Social History     Substance and Sexual Activity   Alcohol Use Not Currently    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     Social History     Substance and Sexual Activity   Drug Use Not Currently   • Types: Marijuana    Comment: quit -greater than 1 year     Social History     Tobacco Use   Smoking Status Former   • Packs/day: 3 00   • Years: 15 00   • Pack years: 45 00   • Types: Cigarettes   • Quit date: 1994   • Years since quittin 8   • Passive exposure: Past   Smokeless Tobacco Never     Family History:   Family History   Problem Relation Age of Onset   • No Known Problems Mother    • No Known Problems Father    • Cancer Maternal Grandmother    • Cancer Maternal Grandfather    • Cancer Maternal Aunt        Meds/Allergies   Medications Prior to Admission   Medication   • acetaminophen (TYLENOL) 325 mg tablet   • ergocalciferol (VITAMIN D2) 50,000 units   • famotidine (PEPCID) 40 MG tablet   • FLUoxetine (PROzac) 40 MG capsule   • Melatonin 1 MG CAPS   • Multiple Vitamins-Minerals (CENTRUM ADULTS PO)   • Omega-3 Fatty Acids (fish oil) 1,000 mg   • oxyCODONE (ROXICODONE) 5 mg immediate release tablet   • gemfibrozil (LOPID) 600 mg tablet     Allergies   Allergen Reactions   • Aspirin GI Bleeding and Vomiting     Other reaction(s): Unknown  Patient has ulcers and does not take aspirin        Objective   Vitals: /59 (BP Location: Right arm)   Pulse 76   Temp 98 3 °F (36 8 °C) (Oral)   Resp 17   Ht 5' 10" (1 778 m)   Wt 84 kg (185 lb 3 2 oz)   SpO2 97% BMI 26 57 kg/m²     Physical Exam:  General Alert awake   Normocephalic atraumatic PERRLA  Lungs clear bilaterally  Cardiac normal S1 normal S2  Abdomen soft, flank pain none  Two normal testicles  No hernias  Rectal deferred  Extremities no edema    I/O last 24 hours: In: 1000 [IV Piggyback:1000]  Out: 500 [Urine:500]    Invasive Devices     Peripheral Intravenous Line  Duration           Peripheral IV 11/27/22 Left Antecubital 1 day                    Lab Results: CBC:   Lab Results   Component Value Date    WBC 5 25 11/28/2022    HGB 11 8 (L) 11/28/2022    HCT 35 9 (L) 11/28/2022    MCV 83 11/28/2022     (L) 11/28/2022    MCH 27 3 11/28/2022    MCHC 32 9 11/28/2022    RDW 15 5 (H) 11/28/2022    MPV 12 2 11/28/2022    NRBC 0 11/28/2022     CMP:   Lab Results   Component Value Date     11/28/2022    CO2 24 11/28/2022    BUN 19 11/28/2022    CREATININE 1 32 (H) 11/28/2022    CALCIUM 8 4 11/28/2022    AST  11/27/2022      Comment:      No result  If an AST is required for patient care, it must be ordered separately  Specimen collection should occur prior to Sulfasalazine administration due to the potential for falsely depressed results       ALT 36 11/27/2022    ALKPHOS 67 11/27/2022    EGFR 62 11/28/2022     Urinalysis:   Lab Results   Component Value Date    COLORU Brown 11/27/2022    CLARITYU Cloudy 11/27/2022    SPECGRAV >=1 030 11/27/2022    PHUR 5 0 11/27/2022    PHUR 5 5 02/15/2018    LEUKOCYTESUR Negative 11/27/2022    NITRITE Negative 11/27/2022    GLUCOSEU Negative 11/27/2022    KETONESU 15 (1+) (A) 11/27/2022    BILIRUBINUR Small (A) 11/27/2022    BLOODU Large (A) 11/27/2022     Urine Culture: No results found for: URINECX  PSA: No results found for: PSA        Assessment/ Plan:  3 mm/1 mm right proximal stone has a 98% chance of passing with medical expulsion therapy patient has no pain at this time and would like to be discharged home in attempting to pass it  Acute renal failure secondary to dehydration has nothing to do with hydronephrosis from 3 mm stone  Continue tamsulosin 0 4 mg  IV hydration  Creatinine trending down  Discharge home in the morning Will see in my office in 2 weeks for follow-up sent home on tamsulosin 0 4 mg and Renée Ma MD

## 2022-11-29 NOTE — PROGRESS NOTES
Progress Note - Urology      Patient: Sobia Cain   : 1970 Sex: male   MRN: [id number]     CSN: [id number]  Unit/Bed#: [address] [place] [address]     SUBJECTIVE:   Vs stable  Renal failure dehydratuion  No pain      Objective   Vitals: /58 (BP Location: Right arm)   Pulse 68   Temp 97 8 °F (36 6 °C) (Oral)   Resp 19   Ht 5' 10" (1 778 m)   Wt 83 9 kg (185 lb)   SpO2 97%   BMI 26 54 kg/m²     I/O last 24 hours: In: -   Out: 1150 [Urine:1150]      Physical Exam:   General Alert awake   Normocephalic atraumatic PERRLA  Lungs clear bilaterally  Cardiac normal S1 normal S2  Abdomen soft, flank pain  Extremities no edema      Lab Results: CBC:   Lab Results   Component Value Date    WBC 5 25 2022    HGB 11 8 (L) 2022    HCT 35 9 (L) 2022    MCV 83 2022     (L) 2022    MCH 27 3 2022    MCHC 32 9 2022    RDW 15 5 (H) 2022    MPV 12 2 2022    NRBC 0 2022     CMP:   Lab Results   Component Value Date     (H) 2022    CO2 25 2022    BUN 17 2022    CREATININE 1 09 2022    CALCIUM 8 6 2022    AST  2022      Comment:      No result  If an AST is required for patient care, it must be ordered separately  Specimen collection should occur prior to Sulfasalazine administration due to the potential for falsely depressed results       ALT 36 2022    ALKPHOS 67 2022    EGFR 79 2022     Urinalysis:   Lab Results   Component Value Date    COLORU Brown 2022    CLARITYU Cloudy 2022    SPECGRAV >=1 030 2022    PHUR 5 0 2022    PHUR 5 5 02/15/2018    LEUKOCYTESUR Negative 2022    NITRITE Negative 2022    GLUCOSEU Negative 2022    KETONESU 15 (1+) (A) 2022    BILIRUBINUR Small (A) 2022    BLOODU Large (A) 2022     Urine Culture: No results found for: URINECX  PSA: No results found for: PSA      Assessment/ Plan:  3mm stone no pain  D/c home on  flomax   4   Strainer  F/u in office x 2 weeks  Renal failure due to dehydration          Mary Matthews MD

## 2022-11-29 NOTE — PLAN OF CARE
Problem: PAIN - ADULT  Goal: Verbalizes/displays adequate comfort level or baseline comfort level  Description: Interventions:  - Encourage patient to monitor pain and request assistance  - Assess pain using appropriate pain scale  - Administer analgesics based on type and severity of pain and evaluate response  - Implement non-pharmacological measures as appropriate and evaluate response  - Consider cultural and social influences on pain and pain management  - Notify physician/advanced practitioner if interventions unsuccessful or patient reports new pain  Outcome: Progressing     Problem: INFECTION - ADULT  Goal: Absence or prevention of progression during hospitalization  Description: INTERVENTIONS:  - Assess and monitor for signs and symptoms of infection  - Monitor lab/diagnostic results  - Monitor all insertion sites, i e  indwelling lines, tubes, and drains  - Monitor endotracheal if appropriate and nasal secretions for changes in amount and color  - [place] appropriate cooling/warming therapies per order  - Administer medications as ordered  - Instruct and encourage patient and family to use good hand hygiene technique  - Identify and instruct in appropriate isolation precautions for identified infection/condition  Outcome: Progressing     Problem: SAFETY ADULT  Goal: Patient will remain free of falls  Description: INTERVENTIONS:  - Educate patient/family on patient safety including physical limitations  - Instruct patient to call for assistance with activity   - Consult OT/PT to assist with strengthening/mobility   - Keep Call bell within reach  - Keep bed low and locked with side rails adjusted as appropriate  - Keep care items and personal belongings within reach  - Initiate and maintain comfort rounds  - Make Fall Risk Sign visible to staff  - Apply yellow socks and bracelet for high fall risk patients  - Consider moving patient to room near nurses station  Outcome: Progressing  Goal: Maintain or return to baseline ADL function  Description: INTERVENTIONS:  -  Assess patient's ability to carry out ADLs; assess patient's baseline for ADL function and identify physical deficits which impact ability to perform ADLs (bathing, care of mouth/teeth, toileting, grooming, dressing, etc )  - Assess/evaluate cause of self-care deficits   - Assess range of motion  - Assess patient's mobility; develop plan if impaired  - Assess patient's need for assistive devices and provide as appropriate  - Encourage maximum independence but intervene and supervise when necessary  - Involve family in performance of ADLs  - Assess for home care needs following discharge   - Consider OT consult to assist with ADL evaluation and planning for discharge  - Provide patient education as appropriate  Outcome: Progressing  Goal: Maintains/Returns to pre admission functional level  Description: INTERVENTIONS:  - Perform BMAT or MOVE assessment daily    - Set and communicate daily mobility goal to care team and patient/family/caregiver     - Collaborate with rehabilitation services on mobility goals if consulted  - Out of bed for toileting  - Record patient progress and toleration of activity level   Outcome: Progressing     Problem: DISCHARGE PLANNING  Goal: Discharge to home or other facility with appropriate resources  Description: INTERVENTIONS:  - Identify barriers to discharge w/patient and caregiver  - Arrange for needed discharge resources and transportation as appropriate  - Identify discharge learning needs (meds, wound care, etc )  - Arrange for interpretive services to assist at discharge as needed  - Refer to Case Management Department for coordinating discharge planning if the patient needs post-hospital services based on physician/advanced practitioner order or complex needs related to functional status, cognitive ability, or social support system  Outcome: Progressing     Problem: Knowledge Deficit  Goal: Patient/family/caregiver demonstrates understanding of disease process, treatment plan, medications, and discharge instructions  Description: Complete learning assessment and assess knowledge base    Interventions:  - Provide teaching at level of understanding  - Provide teaching via preferred learning methods  Outcome: Progressing     Problem: GENITOURINARY - ADULT  Goal: Maintains or returns to baseline urinary function  Description: INTERVENTIONS:  - Assess urinary function  - Encourage oral fluids to ensure adequate hydration if ordered  - Administer IV fluids as ordered to ensure adequate hydration  - Administer ordered medications as needed  - Offer frequent toileting  - Follow urinary retention protocol if ordered  Outcome: Progressing  Goal: Absence of urinary retention  Description: INTERVENTIONS:  - Assess patient’s ability to void and empty bladder  - Monitor I/O  - Bladder scan as needed  - Discuss with physician/AP medications to alleviate retention as needed  - Discuss catheterization for long term situations as appropriate  Outcome: Progressing     Problem: Potential for Falls  Goal: Patient will remain free of falls  Description: INTERVENTIONS:  - Educate patient/family on patient safety including physical limitations  - Instruct patient to call for assistance with activity   - Consult OT/PT to assist with strengthening/mobility   - Keep Call bell within reach  - Keep bed low and locked with side rails adjusted as appropriate  - Keep care items and personal belongings within reach  - Initiate and maintain comfort rounds  - Make Fall Risk Sign visible to staff  - Apply yellow socks and bracelet for high fall risk patients  - Consider moving patient to room near nurses station  Outcome: Progressing     Problem: MOBILITY - ADULT  Goal: Maintain or return to baseline ADL function  Description: INTERVENTIONS:  -  Assess patient's ability to carry out ADLs; assess patient's baseline for ADL function and identify physical deficits which impact ability to perform ADLs (bathing, care of mouth/teeth, toileting, grooming, dressing, etc )  - Assess/evaluate cause of self-care deficits   - Assess range of motion  - Assess patient's mobility; develop plan if impaired  - Assess patient's need for assistive devices and provide as appropriate  - Encourage maximum independence but intervene and supervise when necessary  - Involve family in performance of ADLs  - Assess for home care needs following discharge   - Consider OT consult to assist with ADL evaluation and planning for discharge  - Provide patient education as appropriate  Outcome: Progressing  Goal: Maintains/Returns to pre admission functional level  Description: INTERVENTIONS:  - Perform BMAT or MOVE assessment daily    - Set and communicate daily mobility goal to care team and patient/family/caregiver     - Collaborate with rehabilitation services on mobility goals if consulted  - Out of bed for toileting  - Record patient progress and toleration of activity level   Outcome: Progressing

## 2022-11-29 NOTE — QUICK NOTE
11/28/22  8:10 PM    Spoke to pt's mother Ciro Goel regarding recommendations per urology and possible discharge tomorrow if creatinine continues to trend down  Mother stated that she had also already spoke to urology also  All questions answered  Will restart pt on IV hydration per urology  To be cosigned by Dr Whit Leon  It was a pleasure to be of service to [employer]  Javon Donohue MD , MSMS     [id number]

## 2022-11-29 NOTE — INCIDENTAL FINDINGS
[facility]    Date: 11/28/22  Finding: Mild Right hydronephrosis, similar to prior CT on 11/27/22; Bilateral ureteral jets detected  Stable 0 4 cm left interpolar renal calculus  Followed up with Urology during hospitalization; and has a 2 week follow up out patient

## 2022-11-30 ENCOUNTER — TRANSITIONAL CARE MANAGEMENT (OUTPATIENT)
Dept: FAMILY MEDICINE CLINIC | Facility: CLINIC | Age: 52
End: 2022-11-30

## 2022-12-08 NOTE — PROGRESS NOTES
Assessment & Plan     1  Encounter for support and coordination of transition of care    2  BHASKAR (acute kidney injury) [facility])  -     Ambulatory Referral to Urology; Future    3  Hydronephrosis, right, due to ureteral calculus  -     Ambulatory Referral to Urology; Future      Doing well following discharge with no issues with urination  Believes to have passed the stone prior to being discharged from the hospital  P  Patient has not seen urologist due to insurance and cost issues  Provided patient with new referral to Nimo Mccormick urologist   Will follow-up in 3 months  Will follow-up in 3 months with our office  Was also noted to have BHASKAR on hospitalization  Creatinine normalized prior to discharge  Subjective     Transitional Care Management Review:   Marc Grewal is a 50 y o  male here for TCM follow up  During the TCM phone call patient stated:  TCM Call     Date and time call was made  11/30/2022  3:52 PM    Hospital care reviewed  Records reviewed    Patient was hospitialized at  [address] [place] [address]    Date of Admission  11/27/22    Date of discharge  11/29/22    Diagnosis  hypernephrosis right side    Disposition  Home    Were the patients medications reviewed and updated  No    Current Symptoms  None      TCM Call     Post hospital issues  None    Should patient be enrolled in anticoag monitoring? No    Scheduled for follow up?   Yes    Did you obtain your prescribed medications  Yes    Do you need help managing your prescriptions or medications  No    Is transportation to your appointment needed  No    I have advised the patient to call PCP with any new or worsening symptoms  lino montague CMA    Living Arrangements  Alone    Are you recieving any outpatient services  No    Are you recieving home care services  No    Are you using any community resources  No    Current waiver services  No    Have you fallen in the last 12 months  No    Interperter language line needed  No    Counseling Patient    Counseling topics  Activities of daily living    Comments  spoke to the pt  on 11/30/2022 for TCm care also remind him for sched appt on 12/12/2022 @ 8 AM with Dr Edmund Matson        HPI   Patient admitted from 11/27-11/29 for right hydronephrosis with right ureteral calculus and BHASKAR  Did you passed the stone? · Has a "feeling" that he passed it but unsure  Have you been straining all of the urine? · No, believes he passed it, urine clear  Have brought the stone to the lab? · N/A  Have you been adequately hydrating? · Now he is, not always habitual  Are you avoiding triggers such as meat and alcohol? · Yes  Have you followed up with Urology? · No, does not like provider in the area due to cost and insurance issues    Review of Systems   Constitutional: Negative for chills and fever  HENT: Negative for sore throat  Respiratory: Negative for cough and shortness of breath  Cardiovascular: Negative for chest pain and palpitations  Gastrointestinal: Negative for abdominal pain, constipation, diarrhea, nausea and vomiting  Genitourinary: Negative for difficulty urinating, dysuria, flank pain and hematuria  Neurological: Negative for dizziness and headaches  Objective     /59 (BP Location: Left arm, Patient Position: Sitting, Cuff Size: Standard)   Pulse 75   Temp (!) 97 2 °F (36 2 °C) (Tympanic Core)   Resp 18   Ht 5' 10" (1 778 m)   Wt 87 5 kg (193 lb)   SpO2 97%   BMI 27 69 kg/m²      Physical Exam  Vitals and nursing note reviewed  Constitutional:       General: He is not in acute distress  Appearance: He is well-developed  HENT:      Head: Normocephalic and atraumatic  Eyes:      Conjunctiva/sclera: Conjunctivae normal    Cardiovascular:      Rate and Rhythm: Normal rate and regular rhythm  Heart sounds: No murmur heard  Pulmonary:      Effort: Pulmonary effort is normal  No respiratory distress  Breath sounds: Normal breath sounds     Abdominal: Palpations: Abdomen is soft  Tenderness: There is no abdominal tenderness  There is right CVA tenderness (mild)  There is no left CVA tenderness  Musculoskeletal:         General: No swelling  Cervical back: Neck supple  Skin:     General: Skin is warm and dry  Capillary Refill: Capillary refill takes less than 2 seconds  Neurological:      Mental Status: He is alert     Psychiatric:         Mood and Affect: Mood normal        Gina Matthew MD

## 2022-12-12 ENCOUNTER — OFFICE VISIT (OUTPATIENT)
Dept: FAMILY MEDICINE CLINIC | Facility: CLINIC | Age: 52
End: 2022-12-12

## 2022-12-12 VITALS
SYSTOLIC BLOOD PRESSURE: 115 MMHG | OXYGEN SATURATION: 97 % | DIASTOLIC BLOOD PRESSURE: 59 MMHG | WEIGHT: 193 LBS | HEIGHT: 70 IN | TEMPERATURE: 97.2 F | HEART RATE: 75 BPM | BODY MASS INDEX: 27.63 KG/M2 | RESPIRATION RATE: 18 BRPM

## 2022-12-12 DIAGNOSIS — N17.9 AKI (ACUTE KIDNEY INJURY) (HCC): ICD-10-CM

## 2022-12-12 DIAGNOSIS — N13.30 HYDRONEPHROSIS, RIGHT: ICD-10-CM

## 2022-12-12 DIAGNOSIS — Z76.89 ENCOUNTER FOR SUPPORT AND COORDINATION OF TRANSITION OF CARE: Primary | ICD-10-CM

## 2022-12-28 ENCOUNTER — OFFICE VISIT (OUTPATIENT)
Dept: FAMILY MEDICINE CLINIC | Facility: CLINIC | Age: 52
End: 2022-12-28

## 2022-12-28 VITALS
SYSTOLIC BLOOD PRESSURE: 120 MMHG | TEMPERATURE: 98.9 F | OXYGEN SATURATION: 97 % | BODY MASS INDEX: 27.23 KG/M2 | RESPIRATION RATE: 18 BRPM | HEART RATE: 82 BPM | HEIGHT: 70 IN | WEIGHT: 190.2 LBS | DIASTOLIC BLOOD PRESSURE: 68 MMHG

## 2022-12-28 DIAGNOSIS — N13.30 HYDRONEPHROSIS, RIGHT: Primary | ICD-10-CM

## 2022-12-28 DIAGNOSIS — M25.512 CHRONIC LEFT SHOULDER PAIN: ICD-10-CM

## 2022-12-28 DIAGNOSIS — G89.29 CHRONIC LEFT SHOULDER PAIN: ICD-10-CM

## 2022-12-28 PROBLEM — N17.9 AKI (ACUTE KIDNEY INJURY) (HCC): Status: RESOLVED | Noted: 2022-11-27 | Resolved: 2022-12-28

## 2022-12-28 PROBLEM — R05.1 ACUTE COUGH: Status: RESOLVED | Noted: 2022-11-27 | Resolved: 2022-12-28

## 2022-12-28 NOTE — PROGRESS NOTES
[facility] Office visit    Assessment/Plan:     1  Hydronephrosis, right, due to ureteral calculus  Assessment & Plan:  Hx of recent hospitalization for R hydronephrosis due to 3mm and 1mm utereral calculi  Patient believes he passed the stone while in the hospital   He has not been able to follow up with Urology because he is attempting to establish care with a new [facility] instead of his current urologist, Dr Mannie Lesches  Patient counseled on the importance of straining his urine and collecting any stones if he can collect them  Counseled patient on the dietary changes he can make to prevent further kidney stones  2  Chronic left shoulder pain  Assessment & Plan:  L lower neck and shoulder since before his hospitalization  in late November 2022  He rates the pain a 4/10  Pain worsens with movement and it is worst at the end of the day when he is getting up out of a chair to go to bed  The patient was taking some oxycodone for the pain but is now only taking tylenol if the pain is unbearable  Pt has been to PT for his balance and R shoulder before and is amenable to restarting PT to build strength  Physical examination demonstrated tight musculature in his L neck and shoulder  Shoulder ROM was wnl without pain  Orders:  -     Ambulatory Referral to Physical Therapy; Future        Return in about 2 months (around 2/28/2023)  Subjective:   [place]  Ryan Haywood is a 50 y o  male presents for follow up of chronic conditions  Patient notes that he believes he passed his kidney stone while he was hospitalized  Patient noted that he has his referral for a new urologist but that urologist does not start working until January  He will call them in the New Year  Patient notes that he has been decreasing his intake of red meat  He has not been straining his urine as he believes he has passed his kidney stone in the hospital but did not see it       Patient has been experiencing pain in his L lower neck and shoulder since before his hospitalization for the kidney stone in late November 2022  He rates the pain a 4/10  The pain worsens with movement and it is worst at the end of the day when he is getting up out of a chair to go to bed  The patient was taking some oxycodone for the pain but is now only taking tylenol if the pain is unbearable  Patient is interested in going back to physical therapy  He has previously went to physical therapy for balance and coordination and for therapy for his right rotator cuff  Review of Systems   Constitutional: Negative for chills and fever  HENT: Negative for ear pain and sore throat  Eyes: Negative for pain and visual disturbance  Respiratory: Negative for cough and shortness of breath  Cardiovascular: Negative for chest pain and palpitations  Gastrointestinal: Negative for abdominal pain and vomiting  Genitourinary: Negative for dysuria and hematuria  Musculoskeletal: Positive for myalgias  Negative for arthralgias and back pain  Skin: Negative for color change and rash  Neurological: Negative for seizures and syncope  Psychiatric/Behavioral: Negative  All other systems reviewed and are negative  Objective:     /68   Pulse 82   Temp 98 9 °F (37 2 °C)   Resp 18   Ht 5' 10" (1 778 m)   Wt 86 3 kg (190 lb 3 2 oz)   SpO2 97%   BMI 27 29 kg/m²      Physical Exam  Vitals and nursing note reviewed  Constitutional:       Appearance: Normal appearance  HENT:      Head: Normocephalic and atraumatic  Cardiovascular:      Rate and Rhythm: Normal rate and regular rhythm  Pulses: Normal pulses  Heart sounds: Normal heart sounds  Pulmonary:      Effort: Pulmonary effort is normal       Breath sounds: Normal breath sounds  Abdominal:      General: Abdomen is flat  Bowel sounds are normal       Palpations: Abdomen is soft  Tenderness: There is no right CVA tenderness or left CVA tenderness  Musculoskeletal:         General: No swelling or tenderness  Normal range of motion  Cervical back: Normal range of motion and neck supple  No rigidity or tenderness  Comments: L shoulder active and passive ROM wnl  No tenderness to palpation  No pain with ROM  Skin:     General: Skin is warm and dry  Neurological:      General: No focal deficit present  Mental Status: He is alert and oriented to person, place, and time        Comments: Patient has ambulatory dysfunction secondary to cerebral palsy and uses a cane with his left arm to ambulate   Psychiatric:         Mood and Affect: Mood normal          Behavior: Behavior normal         ** Please Note: This note has been constructed using a voice recognition system **     Simone Funez MD  12/28/22  6:17 PM

## 2022-12-28 NOTE — ASSESSMENT & PLAN NOTE
L lower neck and shoulder since before his hospitalization  in late November 2022  He rates the pain a 4/10  Pain worsens with movement and it is worst at the end of the day when he is getting up out of a chair to go to bed  The patient was taking some oxycodone for the pain but is now only taking tylenol if the pain is unbearable  Pt has been to PT for his balance and R shoulder before and is amenable to restarting PT to build strength  Physical examination demonstrated tight musculature in his L neck and shoulder  Shoulder ROM was wnl without pain

## 2022-12-28 NOTE — ASSESSMENT & PLAN NOTE
Hx of recent hospitalization for R hydronephrosis due to 3mm and 1mm utereral calculi  Patient believes he passed the stone while in the hospital   He has not been able to follow up with Urology because he is attempting to establish care with a new [facility] instead of his current urologist, Dr Radha Ayoub  Patient counseled on the importance of straining his urine and collecting any stones if he can collect them  Counseled patient on the dietary changes he can make to prevent further kidney stones

## 2023-01-04 ENCOUNTER — EVALUATION (OUTPATIENT)
Dept: PHYSICAL THERAPY | Facility: CLINIC | Age: 53
End: 2023-01-04

## 2023-01-04 DIAGNOSIS — M25.512 CHRONIC LEFT SHOULDER PAIN: Primary | ICD-10-CM

## 2023-01-04 DIAGNOSIS — M54.12 CERVICAL RADICULOPATHY: ICD-10-CM

## 2023-01-04 DIAGNOSIS — G89.29 CHRONIC LEFT SHOULDER PAIN: Primary | ICD-10-CM

## 2023-01-04 NOTE — LETTER
2023    [address]  [address] [facility] [address]    Patient: Christian Ferrer   YOB: 1970   Date of Visit: 2023     Encounter Diagnosis     ICD-10-CM    1  Chronic left shoulder pain  M25 512 Ambulatory Referral to Physical Therapy    G89 29       2  Cervical radiculopathy  M54 12           Dear Dr Phillip Mendez:    Thank you for your recent referral of Christian Ferrer  Please review the attached evaluation summary from Gregorio's recent visit  Please verify that you agree with the plan of care by signing the attached order  If you have any questions or concerns, please do not hesitate to call  I sincerely appreciate the opportunity to share in the care of one of your patients and hope to have another opportunity to work with you in the near future  Sincerely,    Zoila Armas, PT      Referring Provider:      I certify that I have read the below Plan of Care and certify the need for these services furnished under this plan of treatment while under my care  Anastasiia Coleman MD  [address] [place] [id number]  [facility] [facility] [place]                                                                              PT Evaluation   Today's date: 2023  Patient name: Christian Ferrer  : 1970  MRN: [id number]  Referring provider: Eryn Jones  Dx:   Encounter Diagnosis     ICD-10-CM    1  Chronic left shoulder pain  M25 512 Ambulatory Referral to Physical Therapy    G89 29       2  Cervical radiculopathy  M54 12                 Assessment  Assessment details: Patient is a 50 y o  male who presents with referring diagnosis of chronic left shoulder pain  Patient's greatest concern is future ill health and wanting to prevent it      Primary movement impairment diagnosis of cervical radiculopathy resulting in pathoanatomical symptoms of pain, restricted range of motion, muscular power/coordination deficits, neural tension, diminished triceps reflex, and functional limitations  The aforementioned impairments have limited the patient's ability to  objects, reach over head, lift, and turn his head without pain  No further referral appears necessary at this time based upon examination results    Patient education performed during today's session included: HEP consisting of mid range c/s extension    Primary movement impairments:  1) Nerve entrapment  2) Multi-joint hypomobility (cervical spine, L shld)    Etiological factors include: none recalled by patient        Impairments: Abnormal coordination, Abnormal muscle tone, Abnormal or restricted ROM, Activity intolerance, Impaired balance, Impaired physical strength, Lacks appropriate HEP, Poor posture, Poor body mechanics, Pain with function, Weight-bearing intolerance, Scapular dyskinesis, Abnormal movement and Abnormal muscle firing  Understanding of Dx/Px/POC: Good  Prognosis: Good   Positive prognostic factors: motivation for improvement   Negative prognostic factors: comorbid conditions    Patient verbalized understanding of POC  Please contact me if you have any questions or recommendations  Thank you for the referral and the opportunity to share in [address] [place] [address] care          Plan  Patient would benefit from: PT Eval and Skilled PT  Planned modality interventions: Biofeedback, Cryotherapy, TENS, Thermotherapy: Hydrocollator Packs and Traction  Planned therapy interventions: Abdominal trunk stabilization, ADL training, Balance/WB training, Body mechanics training, Coordination, Functional ROM exercises, HEP, Joint mobilization, Manual therapy, Tobin taping, Motor coordination training, Neuromuscular re-education, Patient education, Postural training, Strengthening, Stretching, Therapeutic activities, Therapeutic exercises, Transfer training and Work reintegration  Frequency: 2x/week  Duration in weeks: 10  Plan of Care beginning date: 2023  Plan of Care expiration date: 10 weeks - 3/15/2023  Treatment plan discussed with: Patient       Goals  Short Term Goals (5 weeks):    - Patient will be independent in basic HEP 2-3 weeks  - Patient will report >50% reduction in pain  - Patient will demonstrate >1/3 improvement in MMT grade as applicable  - Patient will demo pain free shld ROM    Long Term Goals (10 weeks):  - Patient will be independent in a comprehensive home exercise program  - Patient FOTO score will improve to >64/100  - Patient will self-report >75% improvement in function  - Patient will demo equal neural tension bilaterally  - Patient will report no pain with pushing up out of chair      Subjective    History of Present Illness  - Mechanism of injury: Patient reports to physical therapy with cervical spine and left shoulder pain that began in mid-2022  States pain is located in posterior arm with occasional tingling into hand (mainly fingers, unable to specify specific digits)  "It feels like there's no muscle here anymore" pointing to posterior arm  "I think I've been just using my cane too much and that's how this started " Denies dropping objects, although notes mild weakness  Reports increased frequency of headaches since onset of pain  Denies tinnitus, visual changes  "It's really bad at the end of the day when I get up out of a chair and I just feel like there's no strength there "    Patient is right hand dominant   - Primary AD: SPC secondary to CP    Pain  - Current pain ratin/10  - At best pain ratin/10  - At worst pain ratin/10      Objective      Sensation  - Light touch: grossly intact and equal bilaterally  - Reflexes:   Left: C5: 2+  C6: 2+  C7: 1+   Right: C5: 2+  C6: 2+  C7: 2+   - Upper Motor Neuron Signs: negative inverted supinator, moon    Active Range of Motion  Cervical Spine  Flexion:   Moderately limited  with pain  Extension:  Moderately limited  with pain  Lateral Flexion - Left:  Severely limited  with pain  Lateral Flexion - Right:  Severely limited  with pain  Rotation - Left:  Moderately limited  with pain  Rotation - Right:  Moderately limited  with pain      Shoulder   LEFT  RIGHT  - Flexion: 165 *p  175  - Abduction: 165 *p  175  - ER:  T2  T2    Mechanical Assessment  In Standing:  - Retraction: Peripheralized and Worse  - Protrusion: Peripheralized and Worse  - Cervical Extension: Centralizing, no worse    Mobility Assessment  - Cervical Spine: hypomobility noted at CTB, L > R    UE MMT  LEFT  RIGHT  - Shoulder Shru-/5  4+/5  - Shoulder Abduction:  4/5  4+/5  - Shoulder Flexion:   3+/5  4+/5  - Shoulder ER:  4/5  4/5  - Shoulder IR:   4/5  4/5  - Elbow Flexion:  4/5  4+/5  - Elbow Extension:  3+/5  4+/5    - Thumb Extension:  4-/5  4+/5  - Finger Adduction:   4/5  4+/5    Palpation  (+) TTP of anterior shoulder, triceps, left cervical spine    Diagnostic Tests Performed  Positive: Distraction, Spurling, ULTTA  Negative: Active Compression ([place]) and Neer    Cervical Radiculopathy Cluster (distraction, spurling, ULTT, restricted c/s rotation):  positive              Insurance:  AMA/CMS Eval/ Re-eval POC expires Griffin Carr #/ Referral # Total visits  Start date  Expiration date Extension  Visit limitation? PT only or  PT+OT?  Co-Insurance   MCR: CMS 1/4/23 3/15/23 47 -- -- -- -- -- -- -- 20% co-insurance after $226 ded met; applied for tomas care                                                                   Date 2023        Visit Number IE        Manual         C7 upglide HVLA bilat x1 each        Cervical distraction         Lower cervical gapping                           TherEx         Cerv ext Rev, HEP        Rows         Shld ext         T/S ext in chair                                                               Neuro Re-Ed         Scap retract Rev, HEP                                                     TherAct         Patient education                                             Gait Training                                    Modalities         CP           Precautions:   Past Medical History:   Diagnosis Date   • Acid reflux    • Anesthesia complication     woke up twice during anesthesia   • Anxiety    • Arthritis    • Cerebral palsy (HCC)    • GERD (gastroesophageal reflux disease)    • H/O ETOH abuse    • Psychiatric disorder     anxiety, depression   • Ulcer

## 2023-01-04 NOTE — PROGRESS NOTES
PT Evaluation   Today's date: 2023  Patient name: Renaldo Montenegro  : 1970  MRN: [id number]  Referring provider: Bryce Crawford  Dx:   Encounter Diagnosis     ICD-10-CM    1  Chronic left shoulder pain  M25 512 Ambulatory Referral to Physical Therapy    G89 29       2  Cervical radiculopathy  M54 12                 Assessment  Assessment details: Patient is a 50 y o  male who presents with referring diagnosis of chronic left shoulder pain  Patient's greatest concern is future ill health and wanting to prevent it  Primary movement impairment diagnosis of cervical radiculopathy resulting in pathoanatomical symptoms of pain, restricted range of motion, muscular power/coordination deficits, neural tension, diminished triceps reflex, and functional limitations  The aforementioned impairments have limited the patient's ability to  objects, reach over head, lift, and turn his head without pain  No further referral appears necessary at this time based upon examination results    Patient education performed during today's session included: HEP consisting of mid range c/s extension    Primary movement impairments:  1) Nerve entrapment  2) Multi-joint hypomobility (cervical spine, L shld)    Etiological factors include: none recalled by patient        Impairments: Abnormal coordination, Abnormal muscle tone, Abnormal or restricted ROM, Activity intolerance, Impaired balance, Impaired physical strength, Lacks appropriate HEP, Poor posture, Poor body mechanics, Pain with function, Weight-bearing intolerance, Scapular dyskinesis, Abnormal movement and Abnormal muscle firing  Understanding of Dx/Px/POC: Good  Prognosis: Good   Positive prognostic factors: motivation for improvement   Negative prognostic factors: comorbid conditions    Patient verbalized understanding of POC           Please contact me if you have any questions or recommendations  Thank you for the referral and the opportunity to share in [address] [place] [address] care  Plan  Patient would benefit from: PT Eval and Skilled PT  Planned modality interventions: Biofeedback, Cryotherapy, TENS, Thermotherapy: Hydrocollator Packs and Traction  Planned therapy interventions: Abdominal trunk stabilization, ADL training, Balance/WB training, Body mechanics training, Coordination, Functional ROM exercises, HEP, Joint mobilization, Manual therapy, Tobin taping, Motor coordination training, Neuromuscular re-education, Patient education, Postural training, Strengthening, Stretching, Therapeutic activities, Therapeutic exercises, Transfer training and Work reintegration  Frequency: 2x/week  Duration in weeks: 10  Plan of Care beginning date: 1/4/2023  Plan of Care expiration date: 10 weeks - 3/15/2023  Treatment plan discussed with: Patient       Goals  Short Term Goals (5 weeks):    - Patient will be independent in basic HEP 2-3 weeks  - Patient will report >50% reduction in pain  - Patient will demonstrate >1/3 improvement in MMT grade as applicable  - Patient will demo pain free shld ROM    Long Term Goals (10 weeks):  - Patient will be independent in a comprehensive home exercise program  - Patient FOTO score will improve to >64/100  - Patient will self-report >75% improvement in function  - Patient will demo equal neural tension bilaterally  - Patient will report no pain with pushing up out of chair      Subjective    History of Present Illness  - Mechanism of injury: Patient reports to physical therapy with cervical spine and left shoulder pain that began in mid-November 2022  States pain is located in posterior arm with occasional tingling into hand (mainly fingers, unable to specify specific digits)   "It feels like there's no muscle here anymore" pointing to posterior arm  "I think I've been just using my cane too much and that's how this started " Denies dropping objects, although notes mild weakness  Reports increased frequency of headaches since onset of pain  Denies tinnitus, visual changes  "It's really bad at the end of the day when I get up out of a chair and I just feel like there's no strength there "    Patient is right hand dominant   - Primary AD: SPC secondary to CP    Pain  - Current pain ratin/10  - At best pain ratin/10  - At worst pain ratin/10      Objective      Sensation  - Light touch: grossly intact and equal bilaterally  - Reflexes:   Left: C5: 2+  C6: 2+  C7: 1+   Right: C5: 2+  C6: 2+  C7: 2+   - Upper Motor Neuron Signs: negative inverted supinator, moon    Active Range of Motion  Cervical Spine  Flexion: Moderately limited  with pain  Extension:  Moderately limited  with pain  Lateral Flexion - Left:  Severely limited  with pain  Lateral Flexion - Right:  Severely limited  with pain  Rotation - Left:  Moderately limited  with pain  Rotation - Right:  Moderately limited  with pain      Shoulder   LEFT  RIGHT  - Flexion: 165 *p  175  - Abduction: 165 *p  175  - ER:  T2  T2    Mechanical Assessment  In Standing:  - Retraction: Peripheralized and Worse  - Protrusion: Peripheralized and Worse  - Cervical Extension: Centralizing, no worse    Mobility Assessment  - Cervical Spine: hypomobility noted at CTB, L > R    UE MMT  LEFT  RIGHT  - Shoulder Shru-/5  4+/5  - Shoulder Abduction:  4/5  4+/5  - Shoulder Flexion:   3+/5  4+/5  - Shoulder ER:  4/5  4/5  - Shoulder IR:   4/5  4/5  - Elbow Flexion:  4/5  4+/5  - Elbow Extension:  3+/5  4+/5    - Thumb Extension:  4-/5  4+/5  - Finger Adduction:   4/5  4+/5    Palpation  (+) TTP of anterior shoulder, triceps, left cervical spine    Diagnostic Tests Performed  Positive: Distraction, Spurling, ULTTA  Negative:  Active Compression ([place]) and Neer    Cervical Radiculopathy Cluster (distraction, spurling, ULTT, restricted c/s rotation): 4/4 positive               Insurance:  AMA/CMS Eval/ Re-eval POC expires Penelope Said #/ Referral # Total visits  Start date  Expiration date Extension  Visit limitation? PT only or  PT+OT?  Co-Insurance   MCR: CMS 1/4/23 3/15/23 47 -- -- -- -- -- -- -- 20% co-insurance after $226 ded met; applied for tomas care                                                                   Date 1/4/2023        Visit Number IE        Manual         C7 upglide HVLA bilat x1 each        Cervical distraction         Lower cervical gapping                           TherEx         Cerv ext Rev, HEP        Rows         Shld ext         T/S ext in chair                                                               Neuro Re-Ed         Scap retract Rev, HEP                                                     TherAct         Patient education                                             Gait Training                                    Modalities         CP           Precautions:   Past Medical History:   Diagnosis Date   • Acid reflux    • Anesthesia complication     woke up twice during anesthesia   • Anxiety    • Arthritis    • Cerebral palsy (HCC)    • GERD (gastroesophageal reflux disease)    • H/O ETOH abuse    • Psychiatric disorder     anxiety, depression   • Ulcer

## 2023-01-05 ENCOUNTER — OFFICE VISIT (OUTPATIENT)
Dept: PHYSICAL THERAPY | Facility: CLINIC | Age: 53
End: 2023-01-05

## 2023-01-05 DIAGNOSIS — M25.512 CHRONIC LEFT SHOULDER PAIN: Primary | ICD-10-CM

## 2023-01-05 DIAGNOSIS — M54.12 CERVICAL RADICULOPATHY: ICD-10-CM

## 2023-01-05 DIAGNOSIS — G89.29 CHRONIC LEFT SHOULDER PAIN: Primary | ICD-10-CM

## 2023-01-05 NOTE — PROGRESS NOTES
Daily Note     Today's date: 2023  Patient name: Kenn Lennox  : 1970  MRN: [id number]  Referring provider: Erick Burr  Dx:   Encounter Diagnosis     ICD-10-CM    1  Chronic left shoulder pain  M25 512     G89 29       2  Cervical radiculopathy  M54 12           Start Time: 1330  Stop Time: 1415  Total time in clinic (min): 45 minutes    Subjective: "I have more pain when I'm sitting down and relaxing; it's much better when I'm moving around " Reports increase in frequency of sx into hand w/ HEP following previous session; states he stopped agg activities (c/s ext, scap retract)  Objective: See treatment diary below    Mechanical Exam:  - Cervical L SB: no effect, no worse  - Slouch overcorrect: better, no worse  - Thoracic extension: no effect - sig limitation in motion in seated position  - Seated T/S SNAGs: better, centralized    MT:  - Lower L C/S gapping, gr II-IV, 3x30  - Prone rotational HVLA to CTJ, x2 bilat  - Prone HVLA to mid thoracic spine, x3      Assessment: Tolerated treatment fair  Patient with moderate irritability of symptoms throughout today's session, usually with initial reduction in sx to hand before return within one minute  Patient with greatest response to thoracic extension SNAGs; HEP updated to reflect and pt encouraged to perform at frequency of 6x/day due to irritability of symptoms, handout provided and pt expressed good understanding/agreement  Patient will be closely monitored in future sessions to determine need for additional referral to Pain Management for possible underlying chemical component of pain  Patient will continue to benefit from skilled PT services to address mechanical dysfunction and symptom irritability  Plan: Continue per plan of care  Insurance:  AMA/CMS Eval/ Re-eval POC expires Jackie Denita #/ Referral # Total visits  Start date  Expiration date Extension  Visit limitation? PT only or  PT+OT?  Co-Insurance   MCR: CMS 1/4/23 3/15/23 47 -- -- -- -- -- -- -- 20% co-insurance after $226 ded met; applied for tomas care                                                                   Date 1/4/2023 1/5/23       Visit Number IE 2       Manual         C7 upglide HVLA bilat x1 each        Cervical distraction         Lower cervical gapping           MT as noted above, 10 min total                TherEx         Cerv ext Rev, HEP Mechanical exam as noted above, 35 min       Rows         Shld ext         T/S ext in chair                                                               Neuro Re-Ed         Scap retract Rev, HEP                                                     TherAct         Patient education                                             Gait Training                                    Modalities         CP           Precautions:   Past Medical History:   Diagnosis Date   • Acid reflux    • Anesthesia complication     woke up twice during anesthesia   • Anxiety    • Arthritis    • Cerebral palsy (HCC)    • GERD (gastroesophageal reflux disease)    • H/O ETOH abuse    • Psychiatric disorder     anxiety, depression   • Ulcer

## 2023-01-09 ENCOUNTER — OFFICE VISIT (OUTPATIENT)
Dept: PHYSICAL THERAPY | Facility: CLINIC | Age: 53
End: 2023-01-09

## 2023-01-09 DIAGNOSIS — M54.12 CERVICAL RADICULOPATHY: ICD-10-CM

## 2023-01-09 DIAGNOSIS — M25.512 CHRONIC LEFT SHOULDER PAIN: Primary | ICD-10-CM

## 2023-01-09 DIAGNOSIS — G89.29 CHRONIC LEFT SHOULDER PAIN: Primary | ICD-10-CM

## 2023-01-09 NOTE — PROGRESS NOTES
Daily Note     Today's date: 2023  Patient name: Renaldo Montenegro  : 1970  MRN: [id number]  Referring provider: Bryce Crawford  Dx:   Encounter Diagnosis     ICD-10-CM    1  Chronic left shoulder pain  M25 512     G89 29       2  Cervical radiculopathy  M54 12           Start Time:   Stop Time: 1535  Total time in clinic (min): 40 minutes    Subjective: Patient reports experiencing significant headache in occipital region over the last few days  "I'm not sure if this has anything to do with my arm, but it's been pretty bad " Patient states his arm remains relatively the same since start of care  Objective: See treatment diary below      Assessment: Tolerated treatment fair  Patient continues to demonstrate high irritability symptoms with relief only provided in short duration throughout today’s session  Greatest relief provided with gapping to left side of lower cervical spine, with absolution of radicular symptoms into left-hand  Initiated DNF chin tucks, which were clayton well with no change in symptoms  Due to continue irritability of symptoms, patient was provided with referral to pain management during today’s session to address possible underlying chemical component to his pain  Patient will continue with physical therapy in interim to mitigate symptoms and address mechanical component of pain  Patient agreeable to aforementioned updates to plan of care  Patient to continue with thoracic extension snags for home exercise program until next visit  Patient will continue to benefit from skilled PT to improve functional mobility and symptom irritability  Plan: Continue per plan of care  Insurance:  AMA/CMS Eval/ Re-eval POC expires Jeffery Meeks #/ Referral # Total visits  Start date  Expiration date Extension  Visit limitation? PT only or  PT+OT?  Co-Insurance   MCR: CMS 1/4/23 3/15/23 47 -- -- -- -- -- -- -- 20% co-insurance after $226 ded met; applied for [facility] Date 1/4/2023 1/5/23 1/9/23      Visit Number IE 2 3      Manual         C7 upglide HVLA bilat x1 each        Cervical distraction   Upper cervical flexion str, 20" x5      Lower cervical gapping   R to L, 10" holds, x20        MT as noted above, 10 min total                TherEx         Cerv ext Rev, HEP Mechanical exam as noted above, 35 min Supine chin tucks, 5" 2x10      Rows         Shld ext         T/S ext in chair   Seated w/ PT OP, x10                                                            Neuro Re-Ed         Scap retract Rev, HEP                                                     TherAct         Patient education   Pt edu, HEP, PT POC 15 min                                          Gait Training                                    Modalities         CP           Precautions:   Past Medical History:   Diagnosis Date   • Acid reflux    • Anesthesia complication     woke up twice during anesthesia   • Anxiety    • Arthritis    • Cerebral palsy (HCC)    • GERD (gastroesophageal reflux disease)    • H/O ETOH abuse    • Psychiatric disorder     anxiety, depression   • Ulcer

## 2023-01-16 ENCOUNTER — OFFICE VISIT (OUTPATIENT)
Dept: PHYSICAL THERAPY | Facility: CLINIC | Age: 53
End: 2023-01-16

## 2023-01-16 DIAGNOSIS — M25.512 CHRONIC LEFT SHOULDER PAIN: Primary | ICD-10-CM

## 2023-01-16 DIAGNOSIS — G89.29 CHRONIC LEFT SHOULDER PAIN: Primary | ICD-10-CM

## 2023-01-16 DIAGNOSIS — M54.12 CERVICAL RADICULOPATHY: ICD-10-CM

## 2023-01-16 NOTE — PROGRESS NOTES
Daily Note     Today's date: 2023  Patient name: Jo Philip  : 1970  MRN: [id number]  Referring provider: Kendall Carl  Dx:   Encounter Diagnosis     ICD-10-CM    1  Chronic left shoulder pain  M25 512     G89 29       2  Cervical radiculopathy  M54 12           Start Time: 1500  Stop Time: 1543  Total time in clinic (min): 43 minutes    Subjective: Patient reports reduction in intensity of pain since previous session after using his vibrating bed  States that he has been able to reinitiate cervical extension with improving tolerance  Objective: See treatment diary below      Assessment: Tolerated treatment well  Initiated mechanical traction during today's session due to cont irritability of symptom; patient educated on purpose and precautions prior and verbalized good understanding/agreement to trial  Numbness/tingling into L UE abolished, although sensation of heaviness remained post  Able to perform cervical extension to strong tolerance; cerv retract cont to peripheralize sx  Patient encouraged to cont w/ t/s ext snag and c/s ext snag for HEP; pt agreeable  Improvement noted in elbow extension MMT [facility] to [facility]  Patient will continue to benefit from skilled PT to improve functional mobility and symptom irritability  Plan: Continue per plan of care  Insurance:  A/CMS Eval/ Re-eval POC expires Jazz Bidding #/ Referral # Total visits  Start date  Expiration date Extension  Visit limitation? PT only or  PT+OT?  Co-Insurance   MCR: CMS 1/4/23 3/15/23 47 -- -- -- -- -- -- -- 20% co-insurance after $226 ded met; applied for [employer]                                                                   Date 2023     Visit Number IE 2 3 4     Manual         C7 upglide HVLA bilat x1 each        Cervical distraction   Upper cervical flexion str, 20" x5      Lower cervical gapping   R to L, 10" holds, x20        MT as noted above, 10 min total TherEx         Cerv ext Rev, HEP Mechanical exam as noted above, 35 min Supine chin tucks, 5" 2x10 T/S SNAGs 2x10    CTJ SNAGs, 2x10     Rows         Shld ext         T/S ext in chair   Seated w/ PT OP, x10                                                            Neuro Re-Ed         Scap retract Rev, HEP                                                     TherAct         Patient education   Pt edu, HEP, PT POC 15 min Pt edu, HEP 10 min         Mechanical traction:  12# 3 min  2 min deflate  17# 5 min  2 min deflate  20# 5 min  2 min deflate    + pt edu, purpose, precautions                                Gait Training                                    Modalities         CP           Precautions:   Past Medical History:   Diagnosis Date   • Acid reflux    • Anesthesia complication     woke up twice during anesthesia   • Anxiety    • Arthritis    • Cerebral palsy (HCC)    • GERD (gastroesophageal reflux disease)    • H/O ETOH abuse    • Psychiatric disorder     anxiety, depression   • Ulcer

## 2023-01-19 ENCOUNTER — OFFICE VISIT (OUTPATIENT)
Dept: PHYSICAL THERAPY | Facility: CLINIC | Age: 53
End: 2023-01-19

## 2023-01-19 DIAGNOSIS — M54.12 CERVICAL RADICULOPATHY: ICD-10-CM

## 2023-01-19 DIAGNOSIS — M25.512 CHRONIC LEFT SHOULDER PAIN: Primary | ICD-10-CM

## 2023-01-19 DIAGNOSIS — G89.29 CHRONIC LEFT SHOULDER PAIN: Primary | ICD-10-CM

## 2023-01-19 NOTE — PROGRESS NOTES
Daily Note     Today's date: 2023  Patient name: Umair Yo  : 1970  MRN: [id number]  Referring provider: Levon Concepcion  Dx:   Encounter Diagnosis     ICD-10-CM    1  Chronic left shoulder pain  M25 512     G89 29       2  Cervical radiculopathy  M54 12           Start Time:   Stop Time: 1500  Total time in clinic (min): 45 minutes    Subjective: Patient reports overall improvement in frequency of tingling since starting PT, only occurring about 2x/day  States tingling cont to get aggravated with pushing up on arm and reaching arms forward  Objective: See treatment diary below      Assessment: Tolerated treatment well  Patient with high levels of neural tension with initiation of median nerve glides during today's session  Initiated chin tucks to strong tolerance  Patient with mild discomfort into wrist (reported as stiffness) and mild heaviness through left UE, but no tingling post session  Triceps strength improved to 4-/5 since [facility]  Patient will continue to benefit from skilled PT to improve functional mobility and symptom irritability  Plan: Continue per plan of care  Insurance:  [place]/CMS Eval/ Re-eval POC expires Kelly Escalera #/ Referral # Total visits  Start date  Expiration date Extension  Visit limitation? PT only or  PT+OT?  Co-Insurance   MCR: CMS 1/4/23 3/15/23 47 -- -- -- -- -- -- -- 20% co-insurance after $226 ded met; applied for [employer]                                                                   Date 2023    Visit Number IE 2 3 4 5 - foto    Manual         C7 upglide HVLA bilat x1 each    Inferior gr II-III mobs to 1st rib on left    Cervical distraction   Upper cervical flexion str, 20" x5      Lower cervical gapping   R to L, 10" holds, x20        MT as noted above, 10 min total                TherEx         Cerv ext Rev, HEP Mechanical exam as noted above, 35 min Supine chin tucks, 5" 2x10 T/S SNAGs 2x10    CTJ SNAGs, 2x10 T/S SNAGs 2x10    CTJ SNAGs, 2x10    Rows         Shld ext         T/S ext in chair   Seated w/ PT OP, x10                                                            Neuro Re-Ed         Scap retract Rev, HEP             Median nerve glides, manual, 2x10    Chin tuck      5" x10                               TherAct         Patient education   Pt edu, HEP, PT POC 15 min Pt edu, HEP 10 min         Mechanical traction:  12# 3 min  2 min deflate  17# 5 min  2 min deflate  20# 5 min  2 min deflate    + pt edu, purpose, precautions Mechanical traction:  18# 5 min  2 minute deflate  20# 5 min  2 minute deflate    + pt edu, monitoring symptoms t/o                               Gait Training                                    Modalities         CP           Precautions:   Past Medical History:   Diagnosis Date   • Acid reflux    • Anesthesia complication     woke up twice during anesthesia   • Anxiety    • Arthritis    • Cerebral palsy (HCC)    • GERD (gastroesophageal reflux disease)    • H/O ETOH abuse    • Psychiatric disorder     anxiety, depression   • Ulcer

## 2023-01-23 ENCOUNTER — OFFICE VISIT (OUTPATIENT)
Dept: PHYSICAL THERAPY | Facility: CLINIC | Age: 53
End: 2023-01-23

## 2023-01-23 DIAGNOSIS — M54.12 CERVICAL RADICULOPATHY: ICD-10-CM

## 2023-01-23 DIAGNOSIS — G89.29 CHRONIC LEFT SHOULDER PAIN: Primary | ICD-10-CM

## 2023-01-23 DIAGNOSIS — M25.512 CHRONIC LEFT SHOULDER PAIN: Primary | ICD-10-CM

## 2023-01-23 NOTE — PROGRESS NOTES
Daily Note     Today's date: 2023  Patient name: Rubi Kamara  : 1970  MRN: [id number]  Referring provider: Sherif George  Dx:   Encounter Diagnosis     ICD-10-CM    1  Chronic left shoulder pain  M25 512     G89 29       2  Cervical radiculopathy  M54 12           Start Time: 1500  Stop Time: 1545  Total time in clinic (min): 45 minutes    Subjective: Patient reports improvement in frequency of numbness/tingling into left hand since starting physical therapy  Denies heaviness into limb since previous session  States symptoms continue to be brought on following cessation of activity  Objective: See treatment diary below      Assessment: Tolerated treatment well  Patient tolerated mechanical traction well without increase in symptoms throughout session  Initiated ulnar nerve glides during today's session, which were clayton well; HEP updated to reflect and pt encouraged to perform t/s ext before and after to mitigate symptom irritability  Patient will continue to benefit from skilled PT to improve functional mobility and activity tolerance  Plan: Continue per plan of care  Insurance:  A/CMS Eval/ Re-eval POC expires Windy Gong #/ Referral # Total visits  Start date  Expiration date Extension  Visit limitation? PT only or  PT+OT?  Co-Insurance   MCR: CMS 1/4/23 3/15/23 47 -- -- -- -- -- -- -- 20% co-insurance after $226 ded met; applied for [employer] [facility]                                                                   Date 2023   Visit Number IE 2 3 4 5 - foto 6   Manual         C7 upglide HVLA bilat x1 each    Inferior gr II-III mobs to 1st rib on left 1st rib inf, gr III-IV on left, 5x30 each   Cervical distraction   Upper cervical flexion str, 20" x5      Lower cervical gapping   R to L, 10" holds, x20   Upglides t/o lower c/s, gr II-III, 3x30 each     MT as noted above, 10 min total                TherEx         Cerv ext Rev, HEP Mechanical exam as noted above, 35 min Supine chin tucks, 5" 2x10 T/S SNAGs 2x10    CTJ SNAGs, 2x10 T/S SNAGs 2x10    CTJ SNAGs, 2x10 rev   Rows         Shld ext         T/S ext in chair   Seated w/ PT OP, x10                                                            Neuro Re-Ed         Scap retract Rev, HEP             Median nerve glides, manual, 2x10 Median nerve glides, manual, 3x10    Ulnar nerve glides, manual, 3x10    Ulnar nerve glides, self, with edu for HEP   Chin tuck      5" x10 5" 2x15                              TherAct         Patient education   Pt edu, HEP, PT POC 15 min Pt edu, HEP 10 min         Mechanical traction:  12# 3 min  2 min deflate  17# 5 min  2 min deflate  20# 5 min  2 min deflate    + pt edu, purpose, precautions Mechanical traction:  18# 5 min  2 minute deflate  20# 5 min  2 minute deflate    + pt edu, monitoring symptoms t/o Mechanical traction:   20# 5 min  2 minute deflate  20# 5 min  2 minute deflate                                Gait Training                                    Modalities         CP           Precautions:   Past Medical History:   Diagnosis Date   • Acid reflux    • Anesthesia complication     woke up twice during anesthesia   • Anxiety    • Arthritis    • Cerebral palsy (HCC)    • GERD (gastroesophageal reflux disease)    • H/O ETOH abuse    • Psychiatric disorder     anxiety, depression   • Ulcer

## 2023-01-26 ENCOUNTER — OFFICE VISIT (OUTPATIENT)
Dept: PHYSICAL THERAPY | Facility: CLINIC | Age: 53
End: 2023-01-26

## 2023-01-26 DIAGNOSIS — M54.12 CERVICAL RADICULOPATHY: ICD-10-CM

## 2023-01-26 DIAGNOSIS — G89.29 CHRONIC LEFT SHOULDER PAIN: Primary | ICD-10-CM

## 2023-01-26 DIAGNOSIS — M25.512 CHRONIC LEFT SHOULDER PAIN: Primary | ICD-10-CM

## 2023-01-26 NOTE — PROGRESS NOTES
Daily Note     Today's date: 2023  Patient name: Umair Yo  : 1970  MRN: [id number]  Referring provider: Levon Concepcion  Dx:   Encounter Diagnosis     ICD-10-CM    1  Chronic left shoulder pain  M25 512     G89 29       2  Cervical radiculopathy  M54 12                      Subjective: Pt reports he may have slept "funny" last night  He had a "pop" in L wrist this morning but does not report any adverse effects as a result  He overall reports he is drastically improving  Symptoms remain present but very mild compared to their initial levels  He notices an increase in his symptoms when reaching back to push himself up and into his recliner chair  Objective: See treatment diary below      Assessment: Tolerated treatment well  Patient demo overall good porgress and generally demo good understanding of est POC  Pt demo god prgnosis for further LTG accomplishment  Pt demo nil adverse affects with today's session, able to reduce distal symptoms with manual strategies aimed at lower cervicasl spine  Plan: Pt will follow up with primary Pt next session  Insurance:  [place]/CMS Eval/ Re-eval POC expires Kelly Escalera #/ Referral # Total visits  Start date  Expiration date Extension  Visit limitation? PT only or  PT+OT?  Co-Insurance   MCR: CMS 1/4/23 3/15/23 47 -- -- -- -- -- -- -- 20% co-insurance after $226 ded met; applied for [employer] [facility]                                                                   Date 2023   Visit Number IE 2 3 4 5 - foto 6 7   Manual          C7 upglide HVLA bilat x1 each    Inferior gr II-III mobs to 1st rib on left 1st rib inf, gr III-IV on left, 5x30 each 1st rib inf, gr III-IV on left, 5x30 each   Cervical distraction   Upper cervical flexion str, 20" x5       Lower cervical gapping   R to L, 10" holds, x20   Upglides t/o lower c/s, gr II-III, 3x30 each Upglides t/o lower c/s, gr II-III, 3x30 each MT as noted above, 10 min total                  TherEx          Cerv ext Rev, HEP Mechanical exam as noted above, 35 min Supine chin tucks, 5" 2x10 T/S SNAGs 2x10    CTJ SNAGs, 2x10 T/S SNAGs 2x10    CTJ SNAGs, 2x10 rev Rev   Rows          Shld ext          T/S ext in chair   Seated w/ PT OP, x10                                                                   Neuro Re-Ed          Scap retract Rev, HEP              Median nerve glides, manual, 2x10 Median nerve glides, manual, 3x10    Ulnar nerve glides, manual, 3x10    Ulnar nerve glides, self, with edu for HEP Median nerve glides, manual, 3x10    Ulnar nerve glides, manual, 3x10   Chin tuck      5" x10 5" 2x15 2x15                                 TherAct          Patient education   Pt edu, HEP, PT POC 15 min Pt edu, HEP 10 min   HEP rev       Mechanical traction:  12# 3 min  2 min deflate  17# 5 min  2 min deflate  20# 5 min  2 min deflate    + pt edu, purpose, precautions Mechanical traction:  18# 5 min  2 minute deflate  20# 5 min  2 minute deflate    + pt edu, monitoring symptoms t/o Mechanical traction:   20# 5 min  2 minute deflate  20# 5 min  2 minute deflate   Mechanical traction:   20# 5 min  2 minute deflate  20# 5 min  2 minute deflate                                 Gait Training                                        Modalities          CP            Precautions:   Past Medical History:   Diagnosis Date   • Acid reflux    • Anesthesia complication     woke up twice during anesthesia   • Anxiety    • Arthritis    • Cerebral palsy (HCC)    • GERD (gastroesophageal reflux disease)    • H/O ETOH abuse    • Psychiatric disorder     anxiety, depression   • Ulcer

## 2023-01-30 ENCOUNTER — OFFICE VISIT (OUTPATIENT)
Dept: PHYSICAL THERAPY | Facility: CLINIC | Age: 53
End: 2023-01-30

## 2023-01-30 DIAGNOSIS — M25.512 CHRONIC LEFT SHOULDER PAIN: Primary | ICD-10-CM

## 2023-01-30 DIAGNOSIS — G89.29 CHRONIC LEFT SHOULDER PAIN: Primary | ICD-10-CM

## 2023-01-30 DIAGNOSIS — M54.12 CERVICAL RADICULOPATHY: ICD-10-CM

## 2023-01-30 NOTE — PROGRESS NOTES
Daily Note     Today's date: 2023  Patient name: Buck Mcguire  : 1970  MRN: [id number]  Referring provider: Marilia Hudson  Dx:   Encounter Diagnosis     ICD-10-CM    1  Chronic left shoulder pain  M25 512     G89 29       2  Cervical radiculopathy  M54 12           Start Time: 1500  Stop Time: 1543  Total time in clinic (min): 43 minutes    Subjective: Patient reports overall improvement in numbness/tingling into L UE since start of care, although he reports continued occasional heaviness  States he has been getting pain into left wrist and first dorsal webspace  States that this increases with pressure  States he has follow up with PCP scheduled for 23  Objective: See treatment diary below      Assessment: Tolerated treatment well  Patient encouraged to discuss lingering thumb and wrist pain with PCP and/or Pain Management at upcoming appointments for concern of possible underlying pathology and/or nerve entrapment; verbalized good understanding/agreement  Patient cont to demo peripheralization of sx with retraction indicating dysfunction of mobility in mid range extension  Initiated triceps extension to mitigate underlying atrophy on left, clayton well  Patient will continue to benefit from skilled PT to improve functional mobility and activity tolerance  Plan: Continue per plan of care  Insurance:  A/CMS Eval/ Re-eval POC expires [facility]  #/ Referral # Total visits  Start date  Expiration date Extension  Visit limitation? PT only or  PT+OT?  Co-Insurance   MCR: CMS 1/4/23 3/15/23 47 -- -- -- -- -- -- -- 20% co-insurance after $226 ded met; applied for [employer]                                                                   Date 23   Visit Number 3 4 5 - foto 6 7 8   Manual         C7 upglide HVLA bilat   Inferior gr II-III mobs to 1st rib on left 1st rib inf, gr III-IV on left, 5x30 each 1st rib inf, gr III-IV on left, 5x30 each    Cervical distraction Upper cervical flexion str, 20" x5        Lower cervical gapping R to L, 10" holds, x20   Upglides t/o lower c/s, gr II-III, 3x30 each Upglides t/o lower c/s, gr II-III, 3x30 each                      TherEx         Cerv ext Supine chin tucks, 5" 2x10 T/S SNAGs 2x10    CTJ SNAGs, 2x10 T/S SNAGs 2x10    CTJ SNAGs, 2x10 rev Rev Cervical retraction: x10 not clayton   Rows         Shld ext         T/S ext in chair Seated w/ PT OP, x10     Seated t/s ext over chair: 5" x30    C7 SNAG into ext: 5" x15   Elbow extension      GTB, 2x15                                                Neuro Re-Ed         Scap retract            Median nerve glides, manual, 2x10 Median nerve glides, manual, 3x10    Ulnar nerve glides, manual, 3x10    Ulnar nerve glides, self, with edu for HEP Median nerve glides, manual, 3x10    Ulnar nerve glides, manual, 3x10 Median/ulnar/radial x15 each   Chin tuck    5" x10 5" 2x15 2x15 5" x30                              TherAct         Patient education Pt edu, HEP, PT POC 15 min Pt edu, HEP 10 min   HEP rev      Mechanical traction:  12# 3 min  2 min deflate  17# 5 min  2 min deflate  20# 5 min  2 min deflate    + pt edu, purpose, precautions Mechanical traction:  18# 5 min  2 minute deflate  20# 5 min  2 minute deflate    + pt edu, monitoring symptoms t/o Mechanical traction:   20# 5 min  2 minute deflate  20# 5 min  2 minute deflate   Mechanical traction:   20# 5 min  2 minute deflate  20# 5 min  2 minute deflate Mechanical traction: 22# 8 min  2 min deflate    Pt edu, PT POC 10 min                              Gait Training                                    Modalities         CP           Precautions:   Past Medical History:   Diagnosis Date   • Acid reflux    • Anesthesia complication     woke up twice during anesthesia   • Anxiety    • Arthritis    • Cerebral palsy (HCC)    • GERD (gastroesophageal reflux disease)    • H/O ETOH abuse    • Psychiatric disorder anxiety, depression   • Ulcer

## 2023-02-02 ENCOUNTER — OFFICE VISIT (OUTPATIENT)
Dept: PHYSICAL THERAPY | Facility: CLINIC | Age: 53
End: 2023-02-02

## 2023-02-02 ENCOUNTER — OFFICE VISIT (OUTPATIENT)
Dept: FAMILY MEDICINE CLINIC | Facility: CLINIC | Age: 53
End: 2023-02-02

## 2023-02-02 VITALS
RESPIRATION RATE: 21 BRPM | SYSTOLIC BLOOD PRESSURE: 100 MMHG | TEMPERATURE: 97.6 F | WEIGHT: 190.56 LBS | HEIGHT: 70 IN | DIASTOLIC BLOOD PRESSURE: 64 MMHG | BODY MASS INDEX: 27.28 KG/M2 | OXYGEN SATURATION: 99 % | HEART RATE: 85 BPM

## 2023-02-02 DIAGNOSIS — M54.12 CERVICAL RADICULOPATHY: ICD-10-CM

## 2023-02-02 DIAGNOSIS — G89.29 CHRONIC LEFT SHOULDER PAIN: Primary | ICD-10-CM

## 2023-02-02 DIAGNOSIS — M25.512 CHRONIC LEFT SHOULDER PAIN: Primary | ICD-10-CM

## 2023-02-02 NOTE — ASSESSMENT & PLAN NOTE
Has been seeing physical therapy with marked improvement   Has upcoming appt with pain management Dr Pavan Larry scheduled   Came to office today for reassurance due to being worried about potential nerve involvement and wants to know if imaging is required     · Provided reassurance   · Continue going to PT as scheduled  · Keep appointment with pain management   · Pt is considering going to a different pain management physician that his family uses   · Advised patient to check insurance coverage and call [facility] office if new referral is required   · OTC topical meds as needed for pain relief - avoid anything containing aspirin or salicylates due to allergy   · Discussed proper height and use of cane   · Follow up as needed if symptoms worsen or fail to improve over time

## 2023-02-02 NOTE — PROGRESS NOTES
[facility] Office visit    Assessment/Plan:     1  Chronic left shoulder pain  Assessment & Plan:  Has been seeing physical therapy with marked improvement   Has upcoming appt with pain management Dr Margaret Morataya scheduled   Came to office today for reassurance due to being worried about potential nerve involvement and wants to know if imaging is required     · Provided reassurance   · Continue going to PT as scheduled  · Keep appointment with pain management   · Pt is considering going to a different pain management physician that his family uses   · Advised patient to check insurance coverage and call [facility] office if new referral is required   · OTC topical meds as needed for pain relief - avoid anything containing aspirin or salicylates due to allergy   · Discussed proper height and use of cane   · Follow up as needed if symptoms worsen or fail to improve over time           Return in about 5 months (around 7/2/2023) for Medicare , stanley VILLAR/Roxana ONLY  Subjective:   KINGSTON Vail is a 50 y o  male who is here today for chronic left shoulder pain  He has been seeing physical therapy for the pain which he reports has been helping  He reports tingling on the left hand when he is still for an extended period of time  He also uses a cane to help with walking that he uses in his left hand  Patient does have diminished range of motion and strength on the left upper extremity  Patient reports improvement in frequency of numbness/tingling into left hand since starting physical therapy  States symptoms continue to be brought on following cessation of activity  He is not taking any medications for the pain  He used to take Oxycodone but stopped after having kidney stones in Nov 2022  The pain does not wake him up from sleep, and physical therapy has greatly reduced pain aside from intermittent numbness and tingling  He is worried about nerve involvement and wants to know if imaging is required  Review of Systems   Constitutional: Negative for activity change, chills, fatigue and fever  Musculoskeletal: Negative for arthralgias, joint swelling and myalgias  Neurological: Positive for numbness  Negative for weakness and headaches  All other systems reviewed and are negative  Objective:     /64 (BP Location: Left arm, Patient Position: Sitting, Cuff Size: Standard)   Pulse 85   Temp 97 6 °F (36 4 °C) (Temporal)   Resp 21   Ht 5' 10" (1 778 m)   Wt 86 4 kg (190 lb 9 oz)   SpO2 99%   BMI 27 34 kg/m²      Physical Exam  Constitutional:       Appearance: He is overweight  HENT:      Head: Normocephalic and atraumatic  Eyes:      General: No scleral icterus  Conjunctiva/sclera: Conjunctivae normal    Cardiovascular:      Rate and Rhythm: Normal rate and regular rhythm  Heart sounds: Normal heart sounds  No murmur heard  Pulmonary:      Effort: Pulmonary effort is normal  No respiratory distress  Breath sounds: Normal breath sounds  Abdominal:      General: Bowel sounds are normal       Palpations: Abdomen is soft  Tenderness: There is no abdominal tenderness  Musculoskeletal:         General: Normal range of motion  Right shoulder: Normal  No swelling  Normal range of motion  Normal strength  Left shoulder: Normal  No swelling  Normal range of motion  Normal strength  Right lower leg: No edema  Left lower leg: No edema  Skin:     General: Skin is warm  Neurological:      Mental Status: He is alert and oriented to person, place, and time     Psychiatric:         Mood and Affect: Mood normal          Behavior: Behavior normal           ** Please Note: This note has been constructed using a voice recognition system Jaun Chacon DO  02/02/23  5:39 PM

## 2023-02-02 NOTE — PROGRESS NOTES
Daily Note     Today's date: 2023  Patient name: Gonzalez Walter  : 1970  MRN: [id number]  Referring provider: Raúl Angulo  Dx:   Encounter Diagnosis     ICD-10-CM    1  Chronic left shoulder pain  M25 512     G89 29       2  Cervical radiculopathy  M54 12           Start Time: 4881  Stop Time: 1445  Total time in clinic (min): 30 minutes    Subjective: Patient reports occasional pain into L UE since previous session  States he has noticed correlation between how he holds his phone and pain into hand  Objective: See treatment diary below      Assessment: Tolerated treatment well  Patient with cont improvement in triceps strength since start of care  Initiated central sliders to assist with additional mobilization of nerve entrapment  Patient will continue to benefit from skilled PT to improve functional mobility and activity tolerance  Plan: Continue per plan of care  Insurance:  A/CMS Eval/ Re-eval POC expires Julieta Lock #/ Referral # Total visits  Start date  Expiration date Extension  Visit limitation? PT only or  PT+OT?  Co-Insurance   MCR: CMS 1/4/23 3/15/23 47 -- -- -- -- -- -- -- 20% co-insurance after $226 ded met; applied for tomas care                                                                   Date 23   Visit Number 4 [id number] - foto 6 7 [phone] 9   Manual         C7 upglide HVLA bilat  Inferior gr II-III mobs to 1st rib on left 1st rib inf, gr III-IV on left, 5x30 each 1st rib inf, gr III-IV on left, 5x30 each     Cervical distraction      Central sliders w/ median nerve tension, 5x30   Lower cervical gapping   Upglides t/o lower c/s, gr II-III, 3x30 each Upglides t/o lower c/s, gr II-III, 3x30 each                       TherEx         Cerv ext T/S SNAGs 2x10    CTJ SNAGs, 2x10 T/S SNAGs 2x10    CTJ SNAGs, 2x10 rev Rev Cervical retraction: x10 not clayton    Rows         Shld ext         T/S ext in chair     Seated t/s ext over chair: 5" x30    C7 SNAG into ext: 5" x15    Elbow extension     GTB, 2x15 GTB, 3x15                                                Neuro Re-Ed         Scap retract           Median nerve glides, manual, 2x10 Median nerve glides, manual, 3x10    Ulnar nerve glides, manual, 3x10    Ulnar nerve glides, self, with edu for HEP Median nerve glides, manual, 3x10    Ulnar nerve glides, manual, 3x10 Median/ulnar/radial x15 each    Chin tuck   5" x10 5" 2x15 2x15 5" x30 5" x30                              TherAct         Patient education Pt edu, HEP 10 min   HEP rev      Mechanical traction:  12# 3 min  2 min deflate  17# 5 min  2 min deflate  20# 5 min  2 min deflate    + pt edu, purpose, precautions Mechanical traction:  18# 5 min  2 minute deflate  20# 5 min  2 minute deflate    + pt edu, monitoring symptoms t/o Mechanical traction:   20# 5 min  2 minute deflate  20# 5 min  2 minute deflate   Mechanical traction:   20# 5 min  2 minute deflate  20# 5 min  2 minute deflate Mechanical traction: 22# 8 min  2 min deflate    Pt edu, PT POC 10 min Mechanical traction: 25# 8 min  2 min deflate                              Gait Training                                    Modalities         CP           Precautions:   Past Medical History:   Diagnosis Date   • Acid reflux    • Anesthesia complication     woke up twice during anesthesia   • Anxiety    • Arthritis    • Cerebral palsy (HCC)    • GERD (gastroesophageal reflux disease)    • H/O ETOH abuse    • Psychiatric disorder     anxiety, depression   • Ulcer

## 2023-02-06 ENCOUNTER — EVALUATION (OUTPATIENT)
Dept: PHYSICAL THERAPY | Facility: CLINIC | Age: 53
End: 2023-02-06

## 2023-02-06 DIAGNOSIS — M25.512 CHRONIC LEFT SHOULDER PAIN: Primary | ICD-10-CM

## 2023-02-06 DIAGNOSIS — M54.12 CERVICAL RADICULOPATHY: ICD-10-CM

## 2023-02-06 DIAGNOSIS — G89.29 CHRONIC LEFT SHOULDER PAIN: Primary | ICD-10-CM

## 2023-02-06 NOTE — PROGRESS NOTES
PT Re-Evaluation   Today's date: 2023  Patient name: Lona Novoa  : 1970  MRN: [id number]  Referring provider: Osmel Garcia  Dx:   Encounter Diagnosis     ICD-10-CM    1  Chronic left shoulder pain  M25 512     G89 29       2  Cervical radiculopathy  M54 12                 Assessment  Assessment details: Patient is a 50 y o  male who presents with referring diagnosis of chronic left shoulder pain  Patient's greatest concern is future ill health and wanting to prevent it  As of re-eval on 2023, patient has made gains in strength, range of motion, and functional mobility  He has demonstrated improved tolerance to overhead activity, as well as improvement in symptom irritability  Despite these gains, the patient remains below his functional baseline and continues to demonstrate weakness about R UE  As the patient is progressing well towards previously set goals, he remains a strong candidate to continue to benefit from skilled PT services  PT POC and HEP were updated during today's session and patient expressed no questions/concerns with continuation of care       Patient education performed during today's session included: HEP consisting of mid range c/s extension    Primary movement impairments:  1) Nerve entrapment  2) Multi-joint hypomobility (cervical spine, L shld)    Etiological factors include: none recalled by patient        Impairments: Abnormal coordination, Abnormal muscle tone, Abnormal or restricted ROM, Activity intolerance, Impaired balance, Impaired physical strength, Lacks appropriate HEP, Poor posture, Poor body mechanics, Pain with function, Weight-bearing intolerance, Scapular dyskinesis, Abnormal movement and Abnormal muscle firing  Understanding of Dx/Px/POC: Good  Prognosis: Good   Positive prognostic factors: motivation for improvement   Negative prognostic factors: comorbid conditions    Patient verbalized understanding of POC  Please contact me if you have any questions or recommendations  Thank you for the referral and the opportunity to share in [phone] care  Plan  Patient would benefit from: PT Eval and Skilled PT  Planned modality interventions: Biofeedback, Cryotherapy, TENS, Thermotherapy: Hydrocollator Packs and Traction  Planned therapy interventions: Abdominal trunk stabilization, ADL training, Balance/WB training, Body mechanics training, Coordination, Functional ROM exercises, HEP, Joint mobilization, Manual therapy, Tobin taping, Motor coordination training, Neuromuscular re-education, Patient education, Postural training, Strengthening, Stretching, Therapeutic activities, Therapeutic exercises, Transfer training and Work reintegration  Frequency: 2x/week  Duration in weeks: 10  Plan of Care beginning date: 1/4/2023 (continued at re-eval on 2/6/23)  Plan of Care expiration date: 10 weeks - 3/15/2023  Treatment plan discussed with: Patient       Goals  Short Term Goals (5 weeks):    - Patient will be independent in basic HEP 2-3 weeks - MET  - Patient will report >50% reduction in pain - MET  - Patient will demonstrate >1/3 improvement in MMT grade as applicable - PROGRESSED  - Patient will demo pain free shld ROM - PROGRESSED    Long Term Goals (10 weeks):  - Patient will be independent in a comprehensive home exercise program - PROGRESSED  - Patient FOTO score will improve to >64/100 - PROGRESSED  - Patient will self-report >75% improvement in function - MET  - Patient will demo equal neural tension bilaterally - PROGRESSED  - Patient will report no pain with pushing up out of chair - PROGRESSED      Subjective    History of Present Illness  - Mechanism of injury: Patient reports to physical therapy with cervical spine and left shoulder pain that began in mid-November 2022   States pain is located in posterior arm with occasional tingling into hand (mainly fingers, unable to specify specific digits)  "It feels like there's no muscle here anymore" pointing to posterior arm  "I think I've been just using my cane too much and that's how this started " Denies dropping objects, although notes mild weakness  Reports increased frequency of headaches since onset of pain  Denies tinnitus, visual changes  "It's really bad at the end of the day when I get up out of a chair and I just feel like there's no strength there "    Patient is right hand dominant   - Primary AD: [facility]PC secondary to CP    As of re-eval on 2023, patient reports 80% improvement in pain and function since starting physical therapy  He states that he continues to experience reproduction of symptoms with sitting with arms supported out to the side  He states numbness is produced into lateral aspect of arm and into pinky  States he experiences occasional discomfort/heaviness into thumb  He reports improvement in ability to push up off of surfaces compared to start of care; "its not completely better but it's better than it was "    Pain  - Current pain ratin/10 (23: 0 5/10)  - At best pain ratin/10  - At worst pain ratin/10 (23: 3/10)      Objective      Sensation - grossly similar, including 1+ C7 DTR at re-eval on 23  - Light touch: grossly intact and equal bilaterally  - Reflexes:   Left: C5: 2+  C6: 2+  C7: 1+   Right: C5: 2+  C6: 2+  C7: 2+   - Upper Motor Neuron Signs: negative inverted supinator, moon    Active Range of Motion  Cervical Spine  Flexion:   Moderately limited  with pain (23: moderately limited without pain)  Extension:  Moderately limited  with pain (23: moderately limited without pain)  Lateral Flexion - Left:  Severely limited  with pain (23: moderately limited with pain)  Lateral Flexion - Right:  Severely limited  with pain (23: moderately limited with pain)  Rotation - Left:  Moderately limited  with pain (23: moderately limited with pain)  Rotation - Right:  Moderately limited  with pain (23: moderately limited with pain)      Shoulder   LEFT    RIGHT  - Flexion: 165 *p (23: 175)   175  - Abduction: 165 *p (23: 170)  175  - ER:  T2 (23: T2)  T2    Mechanical Assessment  In Standing:  - Retraction: Peripheralized and Worse  - Protrusion: Peripheralized and Worse  - Cervical Extension: Centralizing, no worse    Mobility Assessment  - Cervical Spine: hypomobility noted at CTB, L > R  (23: improved, near equal mobility at CTJ)    UE MMT  LEFT    RIGHT  - Shoulder Shru-/5 (23: 4/5)  4+/5  - Shoulder Abduction:   4/5 (23: 4/5)  4+/5  - Shoulder Flexion:   3+/5 (23: 4/5)  4+/5  - Shoulder ER:   4/5 (23: 4/5)  4/5  - Shoulder IR:   4/5 (23: 4/5)  4/5  - Elbow Flexion:  4/5 (23: 4/5)  4+/5  - Elbow Extension:  3+/5 (23: 4/5)  4+/5    - Thumb Extension:  4-/5 (23: 4/5)  4+/5  - Finger Adduction:   4/5 (23: 4/5)  4+/5    Palpation  (+) TTP of anterior shoulder, triceps, left cervical spine    Diagnostic Tests Performed  Positive: Distraction, Spurling, ULTTA  Negative: Active Compression ([place]) and Neer    Cervical Radiculopathy Cluster (distraction, spurling, ULTT, restricted c/s rotation): 4/ positive  (23: negative distraction, spurling; positive ULTT, restricted c/s rot)             Insurance:  A/CMS Eval/ Re-eval POC expires Cj Moreno #/ Referral # Total visits  Start date  Expiration date Extension  Visit limitation? PT only or  PT+OT?  Co-Insurance   MCR: CMS 1/4/23 3/15/23 47 -- -- -- -- -- -- -- 20% co-insurance after $226 ded met; applied for tomas care                                                                   Date 23   Visit Number 5 - foto 6 [phone] 8 [phone] 10   Manual         C7 upglide HVLA bilat Inferior gr II-III mobs to 1st rib on left 1st rib inf, gr III-IV on left, 5x30 each 1st rib inf, gr III-IV on left, 5x30 each      Cervical distraction     Central sliders w/ median nerve tension, 5x30 Central sliders w/ ulnar nerve tension, 5x30   Lower cervical gapping  Upglides t/o lower c/s, gr II-III, 3x30 each Upglides t/o lower c/s, gr II-III, 3x30 each                        TherEx         Cerv ext T/S SNAGs 2x10    CTJ SNAGs, 2x10 rev Rev Cervical retraction: x10 not clayton     Rows      GTB, 3x10   Shld ext      GTB, 3x10   T/S ext in chair    Seated t/s ext over chair: 5" x30    C7 SNAG into ext: 5" x15     Elbow extension    GTB, 2x15 GTB, 3x15 GTB, 3x15                                                Neuro Re-Ed         Scap retract          Median nerve glides, manual, 2x10 Median nerve glides, manual, 3x10    Ulnar nerve glides, manual, 3x10    Ulnar nerve glides, self, with edu for HEP Median nerve glides, manual, 3x10    Ulnar nerve glides, manual, 3x10 Median/ulnar/radial x15 each     Chin tuck  5" x10 5" 2x15 2x15 5" x30 5" x30 5" x30                              TherAct         Patient education   HEP rev   Re-eval, PT POC, HEP 15 min    Mechanical traction:  18# 5 min  2 minute deflate  20# 5 min  2 minute deflate    + pt edu, monitoring symptoms t/o Mechanical traction:   20# 5 min  2 minute deflate  20# 5 min  2 minute deflate   Mechanical traction:   20# 5 min  2 minute deflate  20# 5 min  2 minute deflate Mechanical traction: 22# 8 min  2 min deflate    Pt edu, PT POC 10 min Mechanical traction: 25# 8 min  2 min deflate                               Gait Training                                    Modalities         CP             Precautions:   Past Medical History:   Diagnosis Date   • Acid reflux    • Anesthesia complication     woke up twice during anesthesia   • Anxiety    • Arthritis    • Cerebral palsy (HCC)    • GERD (gastroesophageal reflux disease)    • H/O ETOH abuse    • Psychiatric disorder     anxiety, depression   • Ulcer

## 2023-02-06 NOTE — LETTER
2023    [address]  [address] [facility] [address]    Patient: Geoff Nava   YOB: 1970   Date of Visit: 2023     Encounter Diagnosis     ICD-10-CM    1  Chronic left shoulder pain  M25 512     G89 29       2  Cervical radiculopathy  M54 12           Dear Dr Naima Naqvi:    Thank you for your recent referral of Geoff Nava  Please review the attached evaluation summary from Gregorio's recent visit  Please verify that you agree with the plan of care by signing the attached order  If you have any questions or concerns, please do not hesitate to call  I sincerely appreciate the opportunity to share in the care of one of your patients and hope to have another opportunity to work with you in the near future  Sincerely,    Gaurav Man, PT      Referring Provider:      I certify that I have read the below Plan of Care and certify the need for these services furnished under this plan of treatment while under my care  Yusef Pool MD  [address] [place] [id number]  [place]                                                                              PT Re-Evaluation   Today's date: 2023  Patient name: Geoff Nava  : 1970  MRN: [id number]  Referring provider: Florentino Blanco  Dx:   Encounter Diagnosis     ICD-10-CM    1  Chronic left shoulder pain  M25 512     G89 29       2  Cervical radiculopathy  M54 12                 Assessment  Assessment details: Patient is a 50 y o  male who presents with referring diagnosis of chronic left shoulder pain  Patient's greatest concern is future ill health and wanting to prevent it  As of re-eval on 2023, patient has made gains in strength, range of motion, and functional mobility  He has demonstrated improved tolerance to overhead activity, as well as improvement in symptom irritability  Despite these gains, the patient remains below his functional baseline and continues to demonstrate weakness about R UE  As the patient is progressing well towards previously set goals, he remains a strong candidate to continue to benefit from skilled PT services  PT POC and HEP were updated during today's session and patient expressed no questions/concerns with continuation of care  Patient education performed during today's session included: HEP consisting of mid range c/s extension    Primary movement impairments:  1) Nerve entrapment  2) Multi-joint hypomobility (cervical spine, L shld)    Etiological factors include: none recalled by patient        Impairments: Abnormal coordination, Abnormal muscle tone, Abnormal or restricted ROM, Activity intolerance, Impaired balance, Impaired physical strength, Lacks appropriate HEP, Poor posture, Poor body mechanics, Pain with function, Weight-bearing intolerance, Scapular dyskinesis, Abnormal movement and Abnormal muscle firing  Understanding of Dx/Px/POC: Good  Prognosis: Good   Positive prognostic factors: motivation for improvement   Negative prognostic factors: comorbid conditions    Patient verbalized understanding of POC  Please contact me if you have any questions or recommendations  Thank you for the referral and the opportunity to share in [address] [place] [address] care          Plan  Patient would benefit from: PT Eval and Skilled PT  Planned modality interventions: Biofeedback, Cryotherapy, TENS, Thermotherapy: Hydrocollator Packs and Traction  Planned therapy interventions: Abdominal trunk stabilization, ADL training, Balance/WB training, Body mechanics training, Coordination, Functional ROM exercises, HEP, Joint mobilization, Manual therapy, Tobin taping, Motor coordination training, Neuromuscular re-education, Patient education, Postural training, Strengthening, Stretching, Therapeutic activities, Therapeutic exercises, Transfer training and Work reintegration  Frequency: 2x/week  Duration in weeks: 10  Plan of Care beginning date: 1/4/2023 (continued at re-eval on 2/6/23)  Plan of Care expiration date: 10 weeks - 3/15/2023  Treatment plan discussed with: Patient       Goals  Short Term Goals (5 weeks):    - Patient will be independent in basic HEP 2-3 weeks - MET  - Patient will report >50% reduction in pain - MET  - Patient will demonstrate >1/3 improvement in MMT grade as applicable - PROGRESSED  - Patient will demo pain free shld ROM - PROGRESSED    Long Term Goals (10 weeks):  - Patient will be independent in a comprehensive home exercise program - PROGRESSED  - Patient FOTO score will improve to >64/100 - PROGRESSED  - Patient will self-report >75% improvement in function - MET  - Patient will demo equal neural tension bilaterally - PROGRESSED  - Patient will report no pain with pushing up out of chair - PROGRESSED      Subjective    History of Present Illness  - Mechanism of injury: Patient reports to physical therapy with cervical spine and left shoulder pain that began in mid-November 2022  States pain is located in posterior arm with occasional tingling into hand (mainly fingers, unable to specify specific digits)  "It feels like there's no muscle here anymore" pointing to posterior arm  "I think I've been just using my cane too much and that's how this started " Denies dropping objects, although notes mild weakness  Reports increased frequency of headaches since onset of pain  Denies tinnitus, visual changes  "It's really bad at the end of the day when I get up out of a chair and I just feel like there's no strength there "    Patient is right hand dominant   - Primary AD: SPC secondary to CP    As of re-eval on 2/6/2023, patient reports 80% improvement in pain and function since starting physical therapy  He states that he continues to experience reproduction of symptoms with sitting with arms supported out to the side   He states numbness is produced into lateral aspect of arm and into pinky  States he experiences occasional discomfort/heaviness into thumb  He reports improvement in ability to push up off of surfaces compared to start of care; "its not completely better but it's better than it was "    Pain  - Current pain ratin/10 (23: 0 5/10)  - At best pain ratin/10  - At worst pain ratin/10 (23: 3/10)      Objective      Sensation - grossly similar, including 1+ C7 DTR at re-eval on 23  - Light touch: grossly intact and equal bilaterally  - Reflexes:   Left: C5: 2+  C6: 2+  C7: 1+   Right: C5: 2+  C6: 2+  C7: 2+   - Upper Motor Neuron Signs: negative inverted supinator, moon    Active Range of Motion  Cervical Spine  Flexion:   Moderately limited  with pain (23: moderately limited without pain)  Extension:  Moderately limited  with pain (23: moderately limited without pain)  Lateral Flexion - Left:  Severely limited  with pain (23: moderately limited with pain)  Lateral Flexion - Right:  Severely limited  with pain (23: moderately limited with pain)  Rotation - Left:  Moderately limited  with pain (23: moderately limited with pain)  Rotation - Right:  Moderately limited  with pain (23: moderately limited with pain)      Shoulder   LEFT    RIGHT  - Flexion: 165 *p (23: 175)   175  - Abduction: 165 *p (23: 170)  175  - ER:  T2 (23: T2)  T2    Mechanical Assessment  In Standing:  - Retraction: Peripheralized and Worse  - Protrusion: Peripheralized and Worse  - Cervical Extension: Centralizing, no worse    Mobility Assessment  - Cervical Spine: hypomobility noted at CTB, L > R  (23: improved, near equal mobility at CTJ)    UE MMT  LEFT    RIGHT  - Shoulder Shru-/5 (23: 4/5)  4+/5  - Shoulder Abduction:   4/5 (23: 4/5)  4+/5  - Shoulder Flexion:   3+/5 (23: 4/5)  4+/5  - Shoulder ER:   4/5 (23: 4/5)  4/5  - Shoulder IR:    (23: )    - Elbow Flexion:   (2/6/23: 4/5)  4+/5  - Elbow Extension:  3+/5 (2/6/23: 4/5)  4+/5    - Thumb Extension:  4-/5 (2/6/23: 4/5)  4+/5  - Finger Adduction:   4/5 (2/6/23: 4/5)  4+/5    Palpation  (+) TTP of anterior shoulder, triceps, left cervical spine    Diagnostic Tests Performed  Positive: Distraction, Spurling, ULTTA  Negative: Active Compression ([place]) and Neer    Cervical Radiculopathy Cluster (distraction, spurling, ULTT, restricted c/s rotation): 4/4 positive  (2/6/23: negative distraction, spurling; positive ULTT, restricted c/s rot)            Insurance:  A/CMS Eval/ Re-eval POC expires Cj Moreno #/ Referral # Total visits  Start date  Expiration date Extension  Visit limitation? PT only or  PT+OT?  Co-Insurance   MCR: CMS 1/4/23 3/15/23 47 -- -- -- -- -- -- -- 20% co-insurance after $226 ded met; applied for tomas care                                                                   Date 1/19/23 1/23/23 1/26/23 1/30/23 2/2/23 2/6/23   Visit Number 5 - foto [phone]   Manual         C7 upglide HVLA bilat Inferior gr II-III mobs to 1st rib on left 1st rib inf, gr III-IV on left, 5x30 each 1st rib inf, gr III-IV on left, 5x30 each      Cervical distraction     Central sliders w/ median nerve tension, 5x30 Central sliders w/ ulnar nerve tension, 5x30   Lower cervical gapping  Upglides t/o lower c/s, gr II-III, 3x30 each Upglides t/o lower c/s, gr II-III, 3x30 each                        TherEx         Cerv ext T/S SNAGs 2x10    CTJ SNAGs, 2x10 rev Rev Cervical retraction: x10 not clayton     Rows      GTB, 3x10   Shld ext      GTB, 3x10   T/S ext in chair    Seated t/s ext over chair: 5" x30    C7 SNAG into ext: 5" x15     Elbow extension    GTB, 2x15 GTB, 3x15 GTB, 3x15                                                Neuro Re-Ed         Scap retract          Median nerve glides, manual, 2x10 Median nerve glides, manual, 3x10    Ulnar nerve glides, manual, 3x10    Ulnar nerve glides, self, with edu for HEP Median nerve glides, manual, 3x10    Ulnar nerve glides, manual, 3x10 Median/ulnar/radial x15 each     Chin tuck  5" x10 5" 2x15 2x15 5" x30 5" x30 5" x30                              TherAct         Patient education   HEP rev   Re-eval, PT POC, HEP 15 min    Mechanical traction:  18# 5 min  2 minute deflate  20# 5 min  2 minute deflate    + pt edu, monitoring symptoms t/o Mechanical traction:   20# 5 min  2 minute deflate  20# 5 min  2 minute deflate   Mechanical traction:   20# 5 min  2 minute deflate  20# 5 min  2 minute deflate Mechanical traction: 22# 8 min  2 min deflate    Pt edu, PT POC 10 min Mechanical traction: 25# 8 min  2 min deflate                               Gait Training                                    Modalities         CP             Precautions:   Past Medical History:   Diagnosis Date   • Acid reflux    • Anesthesia complication     woke up twice during anesthesia   • Anxiety    • Arthritis    • Cerebral palsy (HCC)    • GERD (gastroesophageal reflux disease)    • H/O ETOH abuse    • Psychiatric disorder     anxiety, depression   • Ulcer

## 2023-02-07 ENCOUNTER — TELEPHONE (OUTPATIENT)
Dept: UROLOGY | Facility: AMBULATORY SURGERY CENTER | Age: 53
End: 2023-02-07

## 2023-02-07 NOTE — TELEPHONE ENCOUNTER
Please Triage  New Patient    What is the reason for the patient’s appointment? N17 9 (ICD-10-CM) - BHASKAR (acute kidney injury) ([facility] [address] )   N13 30 (ICD-10-CM) - Hydronephrosis, right     Pt has a history of kidney stones  Pt has a stone in his left kidney and it is hurting at this time  He states he is not having problems urinating  What office location does the patient prefer? [place]    Imaging/Lab Results:    Do we accept the patient's insurance or is the patient Self-Pay? Insurance Provider: medicare   Plan Type/Number:  Member ID#: Has the patient had any previous Urologist(s)? stephon  Patient hasn't seen him for along time    Have patient records been requested? If not are records showing in Epic:      Has the patient had any outside testing done? Does the patient have a personal history of cancer?

## 2023-02-09 ENCOUNTER — OFFICE VISIT (OUTPATIENT)
Dept: PHYSICAL THERAPY | Facility: CLINIC | Age: 53
End: 2023-02-09

## 2023-02-09 DIAGNOSIS — M54.12 CERVICAL RADICULOPATHY: ICD-10-CM

## 2023-02-09 DIAGNOSIS — M25.512 CHRONIC LEFT SHOULDER PAIN: Primary | ICD-10-CM

## 2023-02-09 DIAGNOSIS — G89.29 CHRONIC LEFT SHOULDER PAIN: Primary | ICD-10-CM

## 2023-02-09 NOTE — PROGRESS NOTES
Daily Note     Today's date: 2023  Patient name: Lucian Carmen  : 1970  MRN: [id number]  Referring provider: Leah Duggan  Dx:   Encounter Diagnosis     ICD-10-CM    1  Chronic left shoulder pain  M25 512     G89 29       2  Cervical radiculopathy  M54 12           Start Time: 2544  Stop Time: 1455  Total time in clinic (min): 40 minutes    Subjective: Patient states his L UE symptoms have been "barely noticeable" since previous session, although he has been experiencing cracking and stiffness into his neck  Objective: See treatment diary below      Assessment: Tolerated treatment well  Initiated wrist mobs to address lingering wrist discomfort/cracking; patient w/ reduction in soreness post  Patient demo improving tolerance to strengthening activities during today's session, particularly triceps extension  Initiated open books, which were tolerated well w/ near equal mobility bilaterally  Patient will continue to benefit from skilled PT to improve from skilled PT to improve functional mobility and activity tolerance  Plan: Continue per plan of care  Insurance:  [place]/CMS Eval/ Re-eval POC expires [facility] #/ Referral # Total visits  Start date  Expiration date Extension  Visit limitation? PT only or  PT+OT?  Co-Insurance   MCR: CMS 1/4/23 3/15/23 47 -- -- -- -- -- -- -- 20% co-insurance after $226 ded met; applied for tomas care                                                                   Date 23   Visit Number [phone]   Manual         C7 upglide HVLA bilat 1st rib inf, gr III-IV on left, 5x30 each 1st rib inf, gr III-IV on left, 5x30 each    Wrist mobs, all planes, gr II-III   Cervical distraction    Central sliders w/ median nerve tension, 5x30 Central sliders w/ ulnar nerve tension, 5x30    Lower cervical gapping Upglides t/o lower c/s, gr II-III, 3x30 each Upglides t/o lower c/s, gr II-III, 3x30 each    STM/TPR B upper quarter 10 min                     TherEx         Cerv ext rev Rev Cervical retraction: x10 not clayton      Rows     GTB, 3x10 GTB, 3x15   Shld ext     GTB, 3x10 GTB, 3x15   T/S ext in chair   Seated t/s ext over chair: 5" x30    C7 SNAG into ext: 5" x15      Elbow extension   GTB, 2x15 GTB, 3x15 GTB, 3x15 GTB, 3x15   Open books      5" x20 each                                       Neuro Re-Ed         Scap retract          Median nerve glides, manual, 3x10    Ulnar nerve glides, manual, 3x10    Ulnar nerve glides, self, with edu for HEP Median nerve glides, manual, 3x10    Ulnar nerve glides, manual, 3x10 Median/ulnar/radial x15 each      Chin tuck  5" 2x15 2x15 5" x30 5" x30 5" x30 10" x30                              TherAct         Patient education  HEP rev   Re-eval, PT POC, HEP 15 min     Mechanical traction:   20# 5 min  2 minute deflate  20# 5 min  2 minute deflate   Mechanical traction:   20# 5 min  2 minute deflate  20# 5 min  2 minute deflate Mechanical traction: 22# 8 min  2 min deflate    Pt edu, PT POC 10 min Mechanical traction: 25# 8 min  2 min deflate                                Gait Training                                    Modalities         CP             Precautions:   Past Medical History:   Diagnosis Date   • Acid reflux    • Anesthesia complication     woke up twice during anesthesia   • Anxiety    • Arthritis    • Cerebral palsy (HCC)    • GERD (gastroesophageal reflux disease)    • H/O ETOH abuse    • Psychiatric disorder     anxiety, depression   • Ulcer

## 2023-02-13 ENCOUNTER — APPOINTMENT (OUTPATIENT)
Dept: PHYSICAL THERAPY | Facility: CLINIC | Age: 53
End: 2023-02-13

## 2023-02-14 NOTE — PROGRESS NOTES
Office Visit- Urology  Hipolito Vail 1970 MRN: [id number]      Assessment/Discussion/Plan    50 y o  male managed by NEW PATIENT    1  Nephrolithiasis     -See HPI below  -Uncertain if patient passed previous kidney stones and whether his new flank/abdominal pain on the left side is due to kidney stones  --CT renal protocol as discussed below will be able to evaluate for presence of kidney stones/persistent hydronephrosis that would warrant surgical intervention  - I will also order a metabolic work-up for kidney stones to see if there is any metabolic factors that we can intervene on to prevent further formation of kidney stones   -discussed with patient general guidelines on how to prevent kidney stones - patient hand out material also given  2  Prostate Cancer Screening    -Family history of prostate cancer in grandfather who was diagnosed in the 76s and  from prostate cancer  -Discussed AUA recommendations for average risk men for prostate cancer  Due to concern for lack of follow-up and patient concerned about prostate cancer will screen today  -FRANKO performed today without concern for prostate cancer  -We will obtain a PSA  Instructed patient to wait 2 weeks before obtainment due to FRANKO today  3   BPH with LUTS    -Patient endorses symptoms of postvoid dribbling  -Discussed bulbar urethral massage  -Discussed initiation of pharmacotherapy but patient wishes to hold off for now  -Reevaluate at follow-up    4  Gross hematuria  - repeated episodes of gross hematuria may be due to potential passage of kidney stones but patient is high risk given his history of tobacco use and thus I will initiate a full work-up of gross hematuria to evaluate for the presence of any potential malignancy  -He does have a 45-year pack history-quit in   - I will obtain a CT renal protocol for further evaluation  BMP to evaluate for kidney function    Discussed with patient about options of having cystoscopy under anesthesia at [facility] for possible treatment of any potential bladder lesion visualized or regular " outpatient cystoscopy" at [facility] without anesthesia with possible need for secondary procedure if abnormality noted  Wishes to get cystoscopy without anesthesia  Urine cytology at time of cystoscopy  -Patient will follow-up with Dr Luis Manuel Philippe at time of cystoscopy    Chief Complaint:   Phyllis Zamora is a 50 y o  male presenting to the office for an initial visit to evaluate for nephrolithiasis  Subjective    Patient is a 56-year-old male with a past medical history significant for cerebral palsy and history of alcohol abuse who presents to the office today to establish care  He was referred by his PCP due to an episode in November 2022 in which he was having flank pain  A CT stone study demonstrated an obstructing right ureteral stone with resultant right hydronephrosis  He presented to the emergency room on 11/28 and was seen in consultation by Dr Vivi Lord  He was initiated on medical expulsive therapy on Flomax and was discharged home  Patient was unable to follow with Dr Vivi Lord afterwards due to insurance issues but per documentation by PCP patient believes that he did pass his stone prior to his discharge from the hospital   PCP sent referral for today's visit  In the interim since that hospitalization patient believes that he has passed those left-sided stones as he has been prain free on  as he did not see or collect the stone  Recently he has been experiencing left-sided flank pain/left-sided abdominal pain that has been happening Neng intermittently but seems to be related to eating  Over the weekend he was having severe pain to the point of 8 out of 10 pain he notes that Sunday night/Monday morning he noticed  he had a change in urination  That he feels signifies stone passage    Prior he was having a very weak urinary stream but after this sensation, his urine stream has improved to his normal baseline  He also endorses that he saw a streaks of blood within the urine has happened numerous times over the past couple months, recently this past weekend when he thought that he was passing a stone     Patient did have chills over the weekend but not currently  Denies subjective fever or dysuria  Current pain is rated about a 2  ROS:   Review of Systems   Constitutional: Negative  Negative for chills, fatigue and fever  HENT: Negative  Eyes: Negative  Respiratory: Negative  Negative for shortness of breath  Cardiovascular: Negative for chest pain  Gastrointestinal: Negative  Endocrine: Negative  Genitourinary: Positive for flank pain and hematuria  Negative for dysuria, frequency and urgency  Allergic/Immunologic: Negative  Neurological: Negative  Psychiatric/Behavioral: Negative  Past Medical History  Past Medical History:   Diagnosis Date   • Acid reflux    • Anesthesia complication     woke up twice during anesthesia   • Anxiety    • Arthritis    • Cerebral palsy (HCC)    • GERD (gastroesophageal reflux disease)    • H/O ETOH abuse    • Psychiatric disorder     anxiety, depression   • Ulcer        Past Surgical History  Past Surgical History:   Procedure Laterality Date   • ESOPHAGOGASTRODUODENOSCOPY N/A 5/9/2016    Procedure: ESOPHAGOGASTRODUODENOSCOPY (EGD); Surgeon: Sherry Wilson MD;  Location: [facility] GI LAB;   Service:    • FOOT SURGERY Bilateral     hardware   • HIP SURGERY     • OTHER SURGICAL HISTORY Bilateral     lower extremity ligiment extensions-multiple   • LA SURGICAL ARTHROSCOPY SHOULDER W/ROTATOR CUFF RPR Right 6/4/2021    Procedure: SHOULDER ARTHROSCOPIC ROTATOR CUFF REPAIR, SUBACROMIAL DECOMPRESSION;  Surgeon: Shirley Zheng MD;  Location: [facility] MAIN OR;  Service: Orthopedics   • UPPER GASTROINTESTINAL ENDOSCOPY         Past Family History  Family History   Problem Relation Age of Onset   • No Known Problems Mother    • No Known Problems Father    • Cancer Maternal Grandmother    • Cancer Maternal Grandfather    • Cancer Maternal Aunt        Past Social history  Social History     Socioeconomic History   • Marital status: Single     Spouse name: Not on file   • Number of children: Not on file   • Years of education: Not on file   • Highest education level: Not on file   Occupational History   • Not on file   Tobacco Use   • Smoking status: Former     Packs/day: 3 00     Years: 15 00     Pack years: 45 00     Types: Cigarettes     Quit date: 1994     Years since quittin 0     Passive exposure: Past   • Smokeless tobacco: Never   Vaping Use   • Vaping Use: Never used   Substance and Sexual Activity   • Alcohol use: Not Currently     Comment: 2015 quit   • Drug use: Yes     Types: Marijuana     Comment: Spends $100 a month   • Sexual activity: Not on file   Other Topics Concern   • Not on file   Social History Narrative   • Not on file     Social Determinants of Health     Financial Resource Strain: Not on file   Food Insecurity: Not on file   Transportation Needs: Not on file   Physical Activity: Not on file   Stress: Not on file   Social Connections: Not on file   Intimate Partner Violence: Not on file   Housing Stability: Not on file       Current Medications  Current Outpatient Medications   Medication Sig Dispense Refill   • ergocalciferol (VITAMIN D2) 50,000 units daily   0   • famotidine (PEPCID) 40 MG tablet Take 1 tablet (40 mg total) by mouth daily 30 tablet 11   • FLUoxetine (PROzac) 40 MG capsule Take 40 mg by mouth every morning  3   • Multiple Vitamins-Minerals (CENTRUM ADULTS PO) Centrum     • Omega-3 Fatty Acids (fish oil) 1,000 mg Take 1,000 mg by mouth 2 (two) times a day       No current facility-administered medications for this visit         Allergies  Allergies   Allergen Reactions   • Aspirin GI Bleeding and Vomiting     Other reaction(s): Unknown  Patient has ulcers and does not take aspirin OBJECTIVE    Vitals   Vitals:    02/15/23 0806   BP: 120/62   BP Location: Left arm   Patient Position: Sitting   Cuff Size: Large   Pulse: (!) 117   Resp: 16   SpO2: 99%   Weight: 85 5 kg (188 lb 9 6 oz)   Height: 5' 10" (1 778 m)       Physical Exam  Vitals reviewed  Constitutional:       Appearance: Normal appearance  He is normal weight  HENT:      Head: Normocephalic and atraumatic  Pulmonary:      Effort: Pulmonary effort is normal  No respiratory distress  Abdominal:      Tenderness: There is no right CVA tenderness or left CVA tenderness  Genitourinary:     Comments: Prostate does not feel enlarged  nontender  No nodules or asymmetry noted  Musculoskeletal:         General: Normal range of motion  Skin:     General: Skin is warm and dry  Neurological:      General: No focal deficit present  Mental Status: He is alert  Psychiatric:         Mood and Affect: Mood normal          Behavior: Behavior normal          Thought Content: Thought content normal           Labs:     Lab Results   Component Value Date    CREATININE 1 15 02/16/2023      Lab Results   Component Value Date    HGBA1C 5 3 02/15/2018     Lab Results   Component Value Date    CALCIUM 8 6 02/16/2023    K 3 8 02/16/2023    CO2 26 02/16/2023     (H) 02/16/2023    BUN 9 02/16/2023    CREATININE 1 15 02/16/2023       I have personally reviewed all pertinent lab results and reviewed with patient    Imaging     CT stone study 11/27/2022    URINARY TRACT FINDINGS:     RIGHT KIDNEY AND URETER:  Right hydronephrosis secondary to a 3 mm stone at the proximal/mid right ureter at the level of L3-L4 disc space  Immediately proximal to this stone is an additional 1 mm stone         LEFT KIDNEY AND URETER: There are nonobstructing intrarenal calculi measuring on the order of 4 mm at the superior pole  No hydronephrosis or hydroureter      URINARY BLADDER:  Unremarkable         ADDITIONAL FINDINGS:     LOWER CHEST:  No clinically significant abnormality identified in the visualized lower chest      SOLID VISCERA: Limited low radiation dose noncontrast CT evaluation demonstrates no clinically significant abnormality of the imaged portions of the liver, spleen, pancreas, or adrenal glands        GALLBLADDER/BILIARY TREE:  No calcified gallstones  No pericholecystic inflammatory change  No biliary dilatation      STOMACH AND BOWEL:  Unremarkable      APPENDIX:  No findings to suggest appendicitis      ABDOMINOPELVIC CAVITY:  No ascites  No pneumoperitoneum  No lymphadenopathy      REPRODUCTIVE ORGANS:  Unremarkable for patient's age      ABDOMINAL WALL/INGUINAL REGIONS:  Unremarkable      OSSEOUS STRUCTURES:  No acute fracture or destructive osseous lesion  Pin fixation of the right proximal femur      IMPRESSION:     Right hydronephrosis secondary to a 3 mm stone at the proximal/mid right ureter at the level of L3-L4 disc space  Immediately proximal to this stone is an additional 1 mm stone    Chichi Quiñonez PA-C  Date: 2/17/2023 Time: 8:45 AM  [facility]    This note was written using fluency dictation software  Please excuse any resulting minor grammatical errors

## 2023-02-15 ENCOUNTER — OFFICE VISIT (OUTPATIENT)
Dept: UROLOGY | Facility: CLINIC | Age: 53
End: 2023-02-15

## 2023-02-15 VITALS
HEART RATE: 117 BPM | SYSTOLIC BLOOD PRESSURE: 120 MMHG | DIASTOLIC BLOOD PRESSURE: 62 MMHG | RESPIRATION RATE: 16 BRPM | BODY MASS INDEX: 27 KG/M2 | HEIGHT: 70 IN | WEIGHT: 188.6 LBS | OXYGEN SATURATION: 99 %

## 2023-02-15 DIAGNOSIS — R31.0 GROSS HEMATURIA: ICD-10-CM

## 2023-02-15 DIAGNOSIS — N17.9 AKI (ACUTE KIDNEY INJURY) (HCC): ICD-10-CM

## 2023-02-15 DIAGNOSIS — N13.30 HYDRONEPHROSIS, RIGHT: ICD-10-CM

## 2023-02-15 DIAGNOSIS — N20.0 NEPHROLITHIASIS: ICD-10-CM

## 2023-02-15 DIAGNOSIS — Z12.5 PROSTATE CANCER SCREENING: Primary | ICD-10-CM

## 2023-02-15 LAB
BACTERIA UR QL AUTO: ABNORMAL /HPF
BILIRUB UR QL STRIP: NEGATIVE
CAOX CRY URNS QL MICRO: ABNORMAL /HPF
CLARITY UR: CLEAR
COLOR UR: YELLOW
GLUCOSE UR STRIP-MCNC: NEGATIVE MG/DL
HGB UR QL STRIP.AUTO: NEGATIVE
HYALINE CASTS #/AREA URNS LPF: ABNORMAL /LPF
KETONES UR STRIP-MCNC: NEGATIVE MG/DL
LEUKOCYTE ESTERASE UR QL STRIP: NEGATIVE
MUCOUS THREADS UR QL AUTO: ABNORMAL
NITRITE UR QL STRIP: NEGATIVE
NON-SQ EPI CELLS URNS QL MICRO: ABNORMAL /HPF
PH UR STRIP.AUTO: 6 [PH]
PROT UR STRIP-MCNC: ABNORMAL MG/DL
RBC #/AREA URNS AUTO: ABNORMAL /HPF
SL AMB  POCT GLUCOSE, UA: NORMAL
SL AMB LEUKOCYTE ESTERASE,UA: NORMAL
SL AMB POCT BILIRUBIN,UA: NORMAL
SL AMB POCT BLOOD,UA: NORMAL
SL AMB POCT CLARITY,UA: CLEAR
SL AMB POCT COLOR,UA: YELLOW
SL AMB POCT KETONES,UA: NORMAL
SL AMB POCT NITRITE,UA: NORMAL
SL AMB POCT PH,UA: 5
SL AMB POCT SPECIFIC GRAVITY,UA: 1025
SL AMB POCT URINE PROTEIN: NORMAL
SL AMB POCT UROBILINOGEN: 0.2
SP GR UR STRIP.AUTO: 1.03 (ref 1–1.03)
UROBILINOGEN UR STRIP-ACNC: <2 MG/DL
WBC #/AREA URNS AUTO: ABNORMAL /HPF

## 2023-02-15 NOTE — PATIENT INSTRUCTIONS
Tips that may be helpful in preventing Calcium Oxalate Stones     Your stone analysis showed that your kidney stone was an calcium oxalate kidney stone which is the most common type of kidney stones  Here are some lifestyle modifications which may be helpful to prevent reoccurrence of calcium oxalate stones  Maintain adequate dietary calcium  It is not suggested to start a low calcium diet  It is generally recommended to consume calcium rich foods such as diary foods to achieve an intake of 800-1000 mg/day  We do not routinely suggest calcium supplements to increase calcium intake as there may be a slight increase risk of recurrence of calcium oxalate stones  If you take calcium supplements for a medical condition such as osteoporosis, continue to do so  If you are not taking a supplement which contains calcium for a medical reason such as osteoporosis, trying to increase your calcium through dietary means instead of supplements may prove to be helpful for stone prevention  Try to reduce nondairy animal protein intake     Be mindful of oxalate rich foods such as spinach, rhubarbs, potatoes, peanuts, almonds, and cashews  Along with your paperwork is a more comprehensive list of oxalate rich foods  However, many of these foods are healthy for you so it is not recommended to completely limit these foods in your diet  Another tactic you can attempt is to eat calcium rich foods and oxalate rich foods at the same time so that the calcium and oxalate binds in the intestines (and thus will be too big to be absorbed and will pass with your bowel movements) and not in the kidneys      Avoid high dose Vitamin C supplementation as studies have shown that it increases the risk of calcium oxalate stones     Limit sucrose and fructose intake as sucrose and fructose are associated with increased risk of stone formation

## 2023-02-16 ENCOUNTER — OFFICE VISIT (OUTPATIENT)
Dept: PHYSICAL THERAPY | Facility: CLINIC | Age: 53
End: 2023-02-16

## 2023-02-16 ENCOUNTER — TELEPHONE (OUTPATIENT)
Dept: OTHER | Facility: HOSPITAL | Age: 53
End: 2023-02-16

## 2023-02-16 ENCOUNTER — HOSPITAL ENCOUNTER (OUTPATIENT)
Dept: RADIOLOGY | Facility: HOSPITAL | Age: 53
End: 2023-02-16

## 2023-02-16 ENCOUNTER — APPOINTMENT (OUTPATIENT)
Dept: LAB | Facility: HOSPITAL | Age: 53
End: 2023-02-16

## 2023-02-16 DIAGNOSIS — N20.0 NEPHROLITHIASIS: ICD-10-CM

## 2023-02-16 DIAGNOSIS — M25.512 CHRONIC LEFT SHOULDER PAIN: Primary | ICD-10-CM

## 2023-02-16 DIAGNOSIS — M54.12 CERVICAL RADICULOPATHY: ICD-10-CM

## 2023-02-16 DIAGNOSIS — Z12.5 PROSTATE CANCER SCREENING: ICD-10-CM

## 2023-02-16 DIAGNOSIS — G89.29 CHRONIC LEFT SHOULDER PAIN: Primary | ICD-10-CM

## 2023-02-16 LAB
25(OH)D3 SERPL-MCNC: 55.1 NG/ML (ref 30–100)
ANION GAP SERPL CALCULATED.3IONS-SCNC: 4 MMOL/L (ref 4–13)
BUN SERPL-MCNC: 9 MG/DL (ref 5–25)
CALCIUM SERPL-MCNC: 8.6 MG/DL (ref 8.3–10.1)
CHLORIDE SERPL-SCNC: 111 MMOL/L (ref 96–108)
CO2 SERPL-SCNC: 26 MMOL/L (ref 21–32)
CREAT SERPL-MCNC: 1.15 MG/DL (ref 0.6–1.3)
GFR SERPL CREATININE-BSD FRML MDRD: 73 ML/MIN/1.73SQ M
GLUCOSE P FAST SERPL-MCNC: 115 MG/DL (ref 65–99)
MAGNESIUM SERPL-MCNC: 2.2 MG/DL (ref 1.6–2.6)
PHOSPHATE SERPL-MCNC: 3 MG/DL (ref 2.7–4.5)
POTASSIUM SERPL-SCNC: 3.8 MMOL/L (ref 3.5–5.3)
PSA SERPL-MCNC: 1 NG/ML (ref 0–4)
PTH-INTACT SERPL-MCNC: 73.5 PG/ML (ref 18.4–80.1)
SODIUM SERPL-SCNC: 141 MMOL/L (ref 135–147)

## 2023-02-16 NOTE — PROGRESS NOTES
Daily Note     Today's date: 2023  Patient name: Nayely Ceballos  : 1970  MRN: [id number]  Referring provider: Morris Morris  Dx:   Encounter Diagnosis     ICD-10-CM    1  Chronic left shoulder pain  M25 512     G89 29       2  Cervical radiculopathy  M54 12           Start Time: 1500  Stop Time: 1545  Total time in clinic (min): 45 minutes    Subjective: Patient reports no symptoms extending into left arm recently at this time  Reports that he is pleased with his progress thus far  Objective: See treatment diary below    Assessment: Tolerated treatment well  Patient reported slight neck discomfort after triceps extensions  Fatigue reported with addition of wall push up exercise  Added strengthening based exercises to continue to build UE strength  Cueing needed with wall push up exercise to avoid substitution of shoulder for triceps strength  Plan: Continue per plan of care  Insurance:  [place]/CMS Eval/ Re-eval POC expires Jose Founds #/ Referral # Total visits  Start date  Expiration date Extension  Visit limitation? PT only or  PT+OT? Co-Insurance   MCR: CMS 1/4/23 3/15/23 47 -- -- -- -- -- -- -- 20% co-insurance after $226 ded met; applied for tomas care                   Date 23   Visit Number [phone] 11 12   Manual        C7 upglide HVLA bilat    Wrist mobs, all planes, gr II-III Wrist mobs, all planes, gr II-III   Cervical distraction  Central sliders w/ median nerve tension, 5x30 Central sliders w/ ulnar nerve tension, 5x30     Lower cervical gapping    STM/TPR B upper quarter 10 min STM 3'                   TherEx        Cerv ext Cervical retraction: x10 not clayton       Rows   GTB, 3x10 GTB, 3x15 3*15, GTB   Shld ext   GTB, 3x10 GTB, 3x15 3*15 GTB   T/S ext in chair Seated t/s ext over chair: 5" x30    C7 SNAG into ext: 5" x15       Elbow extension GTB, 2x15 GTB, 3x15 GTB, 3x15 GTB, 3x15 Blue TB 3*15   Open books    5" x20 each 5''x 20 ea  Wall push up     2*10           Neuro Re-Ed         Median/ulnar/radial x15 each       Chin tuck  5" x30 5" x30 5" x30 10" x30 3*10, 10"                   TherAct        Patient education   Re-eval, PT POC, HEP 15 min      Mechanical traction: 22# 8 min  2 min deflate    Pt edu, PT POC 10 min Mechanical traction: 25# 8 min  2 min deflate                      Modalities        CP            Precautions:   Past Medical History:   Diagnosis Date   • Acid reflux    • Anesthesia complication     woke up twice during anesthesia   • Anxiety    • Arthritis    • Cerebral palsy (HCC)    • GERD (gastroesophageal reflux disease)    • H/O ETOH abuse    • Psychiatric disorder     anxiety, depression   • Ulcer

## 2023-02-16 NOTE — TELEPHONE ENCOUNTER
I personally called patient today to go over results of blood test as well as to explain my decision for change in the plan for work-up of kidney stones in light of repeated episodes of gross hematuria  Patient is scheduled for a CT renal protocol and this will evaluate both for nephrolithiasis as well as gross hematuria  I will also place a case request for cystoscopy  He should only get performed at [facility]  Discussed the options of having it performed under anesthesia and not having under anesthesia  Patient opts to have cystoscopy without anesthesia  Can plan to have cytology at that point    Questions answered to patient's satisfaction

## 2023-02-17 ENCOUNTER — APPOINTMENT (OUTPATIENT)
Dept: LAB | Facility: HOSPITAL | Age: 53
End: 2023-02-17

## 2023-02-20 ENCOUNTER — OFFICE VISIT (OUTPATIENT)
Dept: PHYSICAL THERAPY | Facility: CLINIC | Age: 53
End: 2023-02-20

## 2023-02-20 DIAGNOSIS — M54.12 CERVICAL RADICULOPATHY: ICD-10-CM

## 2023-02-20 DIAGNOSIS — M25.512 CHRONIC LEFT SHOULDER PAIN: Primary | ICD-10-CM

## 2023-02-20 DIAGNOSIS — G89.29 CHRONIC LEFT SHOULDER PAIN: Primary | ICD-10-CM

## 2023-02-20 NOTE — PROGRESS NOTES
Daily Note     Today's date: 2023  Patient name: Kelly Gong  : 1970  MRN: [id number]  Referring provider: Quiana Moore  Dx:   Encounter Diagnosis     ICD-10-CM    1  Chronic left shoulder pain  M25 512     G89 29       2  Cervical radiculopathy  M54 12                      Subjective: Patient reports to PT session stating he has no pain today  He is going to call to cancel his pain management appointment that is scheduled for tomorrow  Objective: See treatment diary below    Assessment: Tolerated treatment well  Reported increased fatigue with chin tucks, but able to complete all repetitions  Required visual demonstration and verbal cues for several strengthening exercises, particularly to differentiate between shoulder extensions and tricep extensions  Difficulty with independently counting repetitions of exercises  Plan: Continue per plan of care  Insurance:  A/CMS Eval/ Re-eval POC expires [facility] #/ Referral # Total visits  Start date  Expiration date Extension  Visit limitation? PT only or  PT+OT?  Co-Insurance   MCR: CMS 1/4/23 3/15/23 47 -- -- -- -- -- -- -- 20% co-insurance after $226 ded met; applied for tomas care                   Date 23   Visit Number [phone] 11 12 13   Manual         C7 upglide HVLA bilat    Wrist mobs, all planes, gr II-III Wrist mobs, all planes, gr II-III    Cervical distraction  Central sliders w/ median nerve tension, 5x30 Central sliders w/ ulnar nerve tension, 5x30      Lower cervical gapping    STM/TPR B upper quarter 10 min STM 3' Cervical distraction 3'                     TherEx         Cerv ext Cervical retraction: x10 not clayton        Rows   GTB, 3x10 GTB, 3x15 3*15, GTB 3*15, GTB   Shld ext   GTB, 3x10 GTB, 3x15 3*15 GTB 3*15 GTB   T/S ext in chair Seated t/s ext over chair: 5" x30    C7 SNAG into ext: 5" x15     Seated t/s extension with green strap 5" x 30    C7 SNAG into ext: 5" x 15   Elbow extension GTB, 2x15 GTB, 3x15 GTB, 3x15 GTB, 3x15 Blue TB 3*15 Blue TB 3*15   Open books    5" x20 each 5''x 20 ea  5''x 20 ea     Wall push up     2*10 2*10            Neuro Re-Ed          Median/ulnar/radial x15 each        Chin tuck  5" x30 5" x30 5" x30 10" x30 3*10, 10" 3*10, 10"                     TherAct         Patient education   Re-eval, PT POC, HEP 15 min       Mechanical traction: 22# 8 min  2 min deflate    Pt edu, PT POC 10 min Mechanical traction: 25# 8 min  2 min deflate                         Modalities         CP             Precautions:   Past Medical History:   Diagnosis Date   • Acid reflux    • Anesthesia complication     woke up twice during anesthesia   • Anxiety    • Arthritis    • Cerebral palsy (HCC)    • GERD (gastroesophageal reflux disease)    • H/O ETOH abuse    • Psychiatric disorder     anxiety, depression   • Ulcer

## 2023-02-22 ENCOUNTER — HOSPITAL ENCOUNTER (OUTPATIENT)
Dept: RADIOLOGY | Facility: HOSPITAL | Age: 53
Discharge: HOME | End: 2023-02-22

## 2023-02-22 DIAGNOSIS — N20.0 NEPHROLITHIASIS: ICD-10-CM

## 2023-02-22 DIAGNOSIS — R31.0 GROSS HEMATURIA: ICD-10-CM

## 2023-02-22 RX ADMIN — IOHEXOL 100 ML: 300 INJECTION, SOLUTION INTRAVENOUS at 10:04

## 2023-02-23 ENCOUNTER — OFFICE VISIT (OUTPATIENT)
Dept: PHYSICAL THERAPY | Facility: CLINIC | Age: 53
End: 2023-02-23

## 2023-02-23 DIAGNOSIS — G89.29 CHRONIC LEFT SHOULDER PAIN: Primary | ICD-10-CM

## 2023-02-23 DIAGNOSIS — M54.12 CERVICAL RADICULOPATHY: ICD-10-CM

## 2023-02-23 DIAGNOSIS — M25.512 CHRONIC LEFT SHOULDER PAIN: Primary | ICD-10-CM

## 2023-02-23 NOTE — PROGRESS NOTES
Daily Note     Today's date: 2023  Patient name: Yamile Patel  : 1970  MRN: [id number]  Referring provider: Henrique Lizama  Dx:   Encounter Diagnosis     ICD-10-CM    1  Chronic left shoulder pain  M25 512     G89 29       2  Cervical radiculopathy  M54 12                      Subjective: Pt is overall pleased with his general recovery and is not experiencing pain today  Pt reports he has occass episodes of wrist cracking episodes when he is resting but otherwise is generally doing very well  Pt feels the neck stretching is the most effective for him      Objective: See treatment diary below      Assessment: Tolerated treatment well  Patient demo overall great progress and while he does require moderate cues to remind him of proper technique, he demo good carryover afterward  Plan: Continue per plan of care  Insurance:  [place]/CMS Eval/ Re-eval POC expires Quiana Issa #/ Referral # Total visits  Start date  Expiration date Extension  Visit limitation? PT only or  PT+OT?  Co-Insurance   MCR: CMS 1/4/23 3/15/23 47 -- -- -- -- -- -- -- 20% co-insurance after $226 ded met; applied for [facility] care                   Date 23   Visit Number [phone] 11 12 13 14   Manual          C7 upglide HVLA bilat    Wrist mobs, all planes, gr II-III Wrist mobs, all planes, gr II-III  Wrist mobs, all planes, gr II-III   Cervical distraction  Central sliders w/ median nerve tension, 5x30 Central sliders w/ ulnar nerve tension, 5x30       Lower cervical gapping    STM/TPR B upper quarter 10 min STM 3' Cervical distraction 3' 10'                        TherEx          Cerv ext Cervical retraction: x10 not clayton         Rows   GTB, 3x10 GTB, 3x15 3*15, GTB 3*15, GTB 3x15   Shld ext   GTB, 3x10 GTB, 3x15 3*15 GTB 3*15 GTB 3x15   T/S ext in chair Seated t/s ext over chair: 5" x30    C7 SNAG into ext: 5" x15     Seated t/s extension with green strap 5" x 30    C7 SNAG into ext: 5" x 15 Seated t/s extension with green strap 5" x 30    C7 SNAG into ext: 5" x 15   Elbow extension GTB, 2x15 GTB, 3x15 GTB, 3x15 GTB, 3x15 Blue TB 3*15 Blue TB 3*15 3x15   Open books    5" x20 each 5''x 20 ea  5''x 20 ea      Wall push up     2*10 2*10 2x10             Neuro Re-Ed           Median/ulnar/radial x15 each         Chin tuck  5" x30 5" x30 5" x30 10" x30 3*10, 10" 3*10, 10" 3x10                       TherAct          Patient education   Re-eval, PT POC, HEP 15 min    Rev    Mechanical traction: 22# 8 min  2 min deflate    Pt edu, PT POC 10 min Mechanical traction: 25# 8 min  2 min deflate                            Modalities          CP              Precautions:   Past Medical History:   Diagnosis Date   • Acid reflux    • Anesthesia complication     woke up twice during anesthesia   • Anxiety    • Arthritis    • Cerebral palsy (HCC)    • GERD (gastroesophageal reflux disease)    • H/O ETOH abuse    • Psychiatric disorder     anxiety, depression   • Ulcer

## 2023-02-27 ENCOUNTER — OFFICE VISIT (OUTPATIENT)
Dept: PHYSICAL THERAPY | Facility: CLINIC | Age: 53
End: 2023-02-27

## 2023-02-27 DIAGNOSIS — G89.29 CHRONIC LEFT SHOULDER PAIN: Primary | ICD-10-CM

## 2023-02-27 DIAGNOSIS — M25.512 CHRONIC LEFT SHOULDER PAIN: Primary | ICD-10-CM

## 2023-02-27 DIAGNOSIS — M54.12 CERVICAL RADICULOPATHY: ICD-10-CM

## 2023-02-27 LAB
AMMONIA 24H UR-MRATE: 14 MEQ/24 HR
AMMONIA UR-SCNC: ABNORMAL UG/DL
CA H2 PHOS DIHYD CRY URNS QL MICRO: 1.3 RATIO (ref 0–3)
CALCIUM 24H UR-MCNC: 20.8 MG/DL
CALCIUM 24H UR-MRATE: 166.4 MG/24 HR (ref 0–320)
CHLORIDE 24H UR-SCNC: 169 MMOL/L
CHLORIDE 24H UR-SRATE: 135 MMOL/24 HR (ref 52–264)
CITRATE 24H UR-MCNC: 418 MG/L
CITRATE 24H UR-MRATE: 334 MG/24 HR (ref 320–1240)
COM CRY STONE QL IR: 9.37 RATIO (ref 0–6)
CREAT 24H UR-MCNC: 149.8 MG/DL
CREAT 24H UR-MRATE: 1198.4 MG/24 HR (ref 1000–2000)
CYSTINE 24H UR-MCNC: 12.88 MG/L
CYSTINE 24H UR-MRATE: 10.3 MG/24 HR (ref 2.1–58)
MAGNESIUM 24H UR-MRATE: 42 MG/24 HR (ref 12–293)
MAGNESIUM UR-MCNC: 5.2 MG/DL
NA URATE CRY STONE QL IR: 5.87 RATIO (ref 0–4)
OSMOLALITY UR: 682 MOSMOL/KG (ref 300–900)
OXALATE 24H UR-MRATE: 16 MG/24 HR (ref 7–44)
OXALATE UR-MCNC: 20 MG/L
PH 24H UR: 5.4 [PH] (ref 4.5–8)
PHOSPHATE 24H UR-MCNC: 82.6 MG/DL
PHOSPHATE 24H UR-MRATE: 660.8 MG/24 HR (ref 390–1425)
PLEASE NOTE (STONE RISK): ABNORMAL
POTASSIUM 24H UR-SCNC: 30.2 MMOL/24 HR (ref 20–116)
POTASSIUM UR-SCNC: 37.8 MMOL/L
PRESERVED URINE: 800 ML/24 HR (ref 800–1800)
SODIUM 24H UR-SCNC: 170 MMOL/L
SODIUM 24H UR-SRATE: 136 MMOL/24 HR (ref 58–337)
SPECIMEN VOL 24H UR: 800 ML/24 HR (ref 800–1800)
SULFATE 24H UR-MCNC: 4 MEQ/24 HR (ref 0–30)
SULFATE UR-MCNC: 5 MEQ/L
TRI-PHOS CRY STONE MICRO: 0.01 RATIO (ref 0–1)
URATE 24H UR-MCNC: 50.3 MG/DL
URATE 24H UR-MRATE: 402 MG/24 HR (ref 197–1079)
URATE DIHYD CRY STONE QL IR: 5.34 RATIO (ref 0–1.2)

## 2023-02-27 NOTE — PROGRESS NOTES
Daily Note     Today's date: 2023  Patient name: Umair Yo  : 1970  MRN: [id number]  Referring provider: Levon Concepcion  Dx:   Encounter Diagnosis     ICD-10-CM    1  Chronic left shoulder pain  M25 512     G89 29       2  Cervical radiculopathy  M54 12           Start Time: 1500  Stop Time: 1543  Total time in clinic (min): 43 minutes    Subjective: Patient reports having a rough weekend due to increased in bilateral shoulder and left wrist pain, which he attributed to stress  States he has been frustrated with his mental health providers and may consider looking for new providers in the area in the near future  Objective: See treatment diary below      Assessment: Tolerated treatment well  Heavily discuss PT plan of care with patient during today’s session following report of increase bilateral shoulder pain due to stress over the weekend  Discussed initiation of pain neuroscience education topics via physical therapy, as well as CBT principles via occupational therapy in future sessions, patient agreeable  Patient demonstrated improving tolerance to DNF activities during today’s session  Tricep strength near equal bilaterally  Patient will continue to benefit from skilled PT to improve functional mobility activity tolerance  Plan: Continue per plan of care  Insurance:  A/CMS Eval/ Re-eval POC expires Kelly Escalera #/ Referral # Total visits  Start date  Expiration date Extension  Visit limitation? PT only or  PT+OT?  Co-Insurance   MCR: CMS 1/4/23 3/15/23 47 -- -- -- -- -- -- -- 20% co-insurance after $226 ded met; applied for tomas care                   Date 23   Visit Number 10 11 12 13 14 15   Manual         C7 upglide HVLA bilat  Wrist mobs, all planes, gr II-III Wrist mobs, all planes, gr II-III  Wrist mobs, all planes, gr II-III    Cervical distraction Central sliders w/ ulnar nerve tension, 5x30        Lower cervical gapping  STM/TPR B upper quarter 10 min STM 3' Cervical distraction 3' 10'                       TherEx         Cerv ext         Rows GTB, 3x10 GTB, 3x15 3*15, GTB 3*15, GTB 3x15    Shld ext GTB, 3x10 GTB, 3x15 3*15 GTB 3*15 GTB 3x15    T/S ext in chair    Seated t/s extension with green strap 5" x 30    C7 SNAG into ext: 5" x 15 Seated t/s extension with green strap 5" x 30    C7 SNAG into ext: 5" x 15    Elbow extension GTB, 3x15 GTB, 3x15 Blue TB 3*15 Blue TB 3*15 3x15    Open books  5" x20 each 5''x 20 ea  5''x 20 ea    5" x20 each   Wall push up   2*10 2*10 2x10             Neuro Re-Ed                  Chin tuck  5" x30 10" x30 3*10, 10" 3*10, 10" 3x10 10" x30                     TherAct         Patient education Re-eval, PT POC, HEP 15 min    Rev Pt edu, PT POC 30 min                              Modalities         CP             Precautions:   Past Medical History:   Diagnosis Date   • Acid reflux    • Anesthesia complication     woke up twice during anesthesia   • Anxiety    • Arthritis    • Cerebral palsy (HCC)    • GERD (gastroesophageal reflux disease)    • H/O ETOH abuse    • Psychiatric disorder     anxiety, depression   • Ulcer

## 2023-03-02 ENCOUNTER — APPOINTMENT (OUTPATIENT)
Dept: PHYSICAL THERAPY | Facility: CLINIC | Age: 53
End: 2023-03-02

## 2023-03-06 ENCOUNTER — EVALUATION (OUTPATIENT)
Dept: OCCUPATIONAL THERAPY | Facility: CLINIC | Age: 53
End: 2023-03-06

## 2023-03-06 ENCOUNTER — OFFICE VISIT (OUTPATIENT)
Dept: PHYSICAL THERAPY | Facility: CLINIC | Age: 53
End: 2023-03-06

## 2023-03-06 DIAGNOSIS — M25.512 CHRONIC LEFT SHOULDER PAIN: Primary | ICD-10-CM

## 2023-03-06 DIAGNOSIS — G80.9 CEREBRAL PALSY, UNSPECIFIED TYPE (HCC): ICD-10-CM

## 2023-03-06 DIAGNOSIS — G89.29 CHRONIC LEFT SHOULDER PAIN: Primary | ICD-10-CM

## 2023-03-06 DIAGNOSIS — M54.12 CERVICAL RADICULOPATHY: ICD-10-CM

## 2023-03-06 NOTE — PROGRESS NOTES
OCCUPATIONAL THERAPY INITIAL EVALUATION:      3/6/2023  [facility]  1970  [id number]  Jeff Trujillo NP   Diagnosis ICD-10-CM Associated Orders   1  Chronic left shoulder pain  M25 512     G89 29       2  Cerebral palsy, unspecified type ([facility] [place] [address] )  G80 9             Skilled Analysis/Plan:  Pt is a 50year old male with history of cerebral palsy, chronic L shoulder pain, MDD, and reports OCD  Pt referred to occupational therapy for participation in pain neuroscience education (PNE) group with focus on education and training in lifestyle and cognitive behavioral strategies for pain management in order to increase ability to participate in meaningful activities and improve QOL  During the COPM pt reported that he he does not have any problems with occupational performance, however admitted that there are days that he doesn't do anything because he overdid it the day before  Responses of the Pain Interference Short Form 6a indicate pain interferes "a little bit" to "quite a bit" on all activities rated (household chores and work around the home omitted as pt has a )  Recommend participation in PNE group 1x/wk for 8 weeks and pt in agreement with POC  All questions answered  Pt educated on co-pay for eval/treatment sessions and indicated understanding  PLAN OF CARE START:3/6/2023  PLAN OF CARE END: 6/6/2023  FREQUENCY: 1x/wk    SUBJECTIVE: "I've never learned how to take it easy   at some point I'll know that tomorrow I'm going to pay for this "    Occupational Profile:  Pt reports PT has been going really well  He was being treated for weakness and L shoulder pain, but started having pain in L wrist and thenar area again  Pt states that he tends to sleep until late morning, gets up by 11, and stays up later at night  He states there are days that he sits most of the time, unless he needs to get food/go to the bathroom  On other days he does all of his appointments/errands   He's not working- is on disability  He drives  Lives alone, no pets  PAIN:  At rest  1/10  After activity (at worst)  8/10       OBJECTIVE:  COPM- interview for COPM completed, however pt denied occupational performance problems  Pt indicated that because he has always had CP he has figured out ways of accomplishing the activities he needs to do  Pt did state that if he's having a bad day and feels like he can't safely manage community mobility he will cancel his appointments/plans for the day  Pain Interference- Short Form 6a  1  Day to day activities: a little bit  2  Work around the home: n/a  3  Participation in social activities: a little bit  4  Household chores: n/a  5  Things you do for fun: somewhat  6  Enjoyment of social activities: quite a bit    Goals (8 weeks):  -Pt will report a lower pain interference score on at least 1/4 items rated in order to improve participation in meaningful activities  -Pt will report good carryover of lifestyle modifications related to chronic pain management in order to improve QOL  -Pt will report good carryover of CBT/mindfulness strategies related to chronic pain management in order to improve QOL

## 2023-03-06 NOTE — PROGRESS NOTES
Daily Note     Today's date: 3/6/2023  Patient name: Jabier Elias  : 1970  MRN: [id number]  Referring provider: Cheryl Zuñiga  Dx:   Encounter Diagnosis     ICD-10-CM    1  Chronic left shoulder pain  M25 512     G89 29       2  Cervical radiculopathy  M54 12           Start Time: 1505  Stop Time: 1550  Total time in clinic (min): 45 minutes    Subjective: Patient states he had a brief moment of dizziness earlier today with transition to standing, requiring immediate return to sitting position  He reports significant improvement in shoulder pain since start of care, although he continues to experience pain into left wrist and thumb  Objective: See treatment diary below  Blood pressure in session: 100/78    Pain Catastrophizing Scale: 37/52  NPQ:     (+) 1st CMC grind test  Mild restriction noted in wrist extension ROM on left compared to right  Mild deficits in thumb adduction strength on left  Mild deficits in triceps strength on left      Assessment: Tolerated treatment fair  Patient with bout of dizziness/feeling off balance during wall push ups requiring patient to sit and rest  Vitals assessed and BP low compared to patient's reported usual  Symptoms resolved with sitting  Patient educated on hydration status, verbalized good understanding/agreement  Lingering L thumb pain likely due to underlying arthritis based upon exam findings with weakness involved  Patient will continue to benefit from skilled PT to improve knowledge of pain and activity tolerance  Plan: Continue per plan of care  Insurance:  AMA/CMS Eval/ Re-eval POC expires Desire Johnson #/ Referral # Total visits  Start date  Expiration date Extension  Visit limitation? PT only or  PT+OT?  Co-Insurance   MCR: CMS 1/4/23 3/15/23 47 -- -- -- -- -- -- -- 20% co-insurance after $226 ded met; applied for tomas care                   Date 2/9/23 2/16/23 2023 2/23/23 2/27/23 3/6/23   Visit Number 20 30 01 28 95 41 Manual         C7 upglide HVLA bilat Wrist mobs, all planes, gr II-III Wrist mobs, all planes, gr II-III  Wrist mobs, all planes, gr II-III     Cervical distraction         Lower cervical gapping STM/TPR B upper quarter 10 min STM 3' Cervical distraction 3' 10'                        TherEx         Cerv ext         Rows GTB, 3x15 3*15, GTB 3*15, GTB 3x15     Shld ext GTB, 3x15 3*15 GTB 3*15 GTB 3x15     T/S ext in chair   Seated t/s extension with green strap 5" x 30    C7 SNAG into ext: 5" x 15 Seated t/s extension with green strap 5" x 30    C7 SNAG into ext: 5" x 15     Elbow extension GTB, 3x15 Blue TB 3*15 Blue TB 3*15 3x15  7 5# cc, 3x10   Open books 5" x20 each 5''x 20 ea  5''x 20 ea    5" x20 each    Wall push up  2*10 2*10 2x10  2x10 *additional reps stopped due to dizziness/off balance sensation            Neuro Re-Ed         Web gripper for thumb flex/add      At PIP, DIP, 5" x30 each   Chin tuck  10" x30 3*10, 10" 3*10, 10" 3x10 10" x30                      TherAct         Patient education    Rev Pt edu, PT POC 30 min Assessment of left wrist, thumb, monitor blood pressure, surveys 30 min                              Modalities         CP             Precautions:   Past Medical History:   Diagnosis Date   • Acid reflux    • Anesthesia complication     woke up twice during anesthesia   • Anxiety    • Arthritis    • Cerebral palsy (HCC)    • GERD (gastroesophageal reflux disease)    • H/O ETOH abuse    • Psychiatric disorder     anxiety, depression   • Ulcer

## 2023-03-08 ENCOUNTER — OFFICE VISIT (OUTPATIENT)
Dept: OCCUPATIONAL THERAPY | Facility: CLINIC | Age: 53
End: 2023-03-08

## 2023-03-08 ENCOUNTER — OFFICE VISIT (OUTPATIENT)
Dept: PHYSICAL THERAPY | Facility: CLINIC | Age: 53
End: 2023-03-08

## 2023-03-08 DIAGNOSIS — M25.512 CHRONIC LEFT SHOULDER PAIN: Primary | ICD-10-CM

## 2023-03-08 DIAGNOSIS — M54.12 CERVICAL RADICULOPATHY: ICD-10-CM

## 2023-03-08 DIAGNOSIS — G89.29 CHRONIC LEFT SHOULDER PAIN: Primary | ICD-10-CM

## 2023-03-08 DIAGNOSIS — G80.9 CEREBRAL PALSY, UNSPECIFIED TYPE (HCC): ICD-10-CM

## 2023-03-08 NOTE — PROGRESS NOTES
Daily Note     Today's date: 3/8/2023  Patient name: Jamari Burr  : 1970  MRN: [id number]  Referring provider: Sol Hu  Dx:   Encounter Diagnosis     ICD-10-CM    1  Chronic left shoulder pain  M25 512     G89 29       2  Cervical radiculopathy  M54 12           Start Time: 1500  Stop Time: 1544  Total time in clinic (min): 44 minutes    Subjective: Patient demo strong motivation to participate in today's session  Reports mild wrist/hand pain at start of session  Objective: See treatment diary below    Neuro Re-Ed:  Patient was educated regarding sympathetic nervous system topics as they pertain to pain, including stress biology, fight or flight response, the role of adrenaline and cortisol in pain, the body's immune response and multiple output mechanisms  Metaphors were used to promote deep learning, and the role of self-care techniques useful in claming the sympathetic nervous system were addressed  Reviewed topics of comorbid conditions, as well as self-care, including topics of hypertension, DDD, fibromyalgia, diabetes, and smoking, as applicable  Patient able to self-identify factors that can be considered inflammatory and possible contributions to pain  TherEx:  Week 5 - Generalized Strengthening and Fine Motor  Circuit 1 (1 round, 2 min each)  - Week 5 - Bananagram letters on floor and making word on elevated surface  - Week 5 - Flipping cards over until at specific number and do associated exercise (with 3# DB)  - Week 5 - Wall sit passing pegs down the line and placing in peg board    Circuit 2 (1 round, 2 min each)  - Week 5 - Ambulation w/ rotating pinch  on dots(with 3# DB)  - Week 5 - [place] twists w/ picking up/placing tape on either side (with 3# DB)      Assessment: Tolerated treatment well  Patient introduced to the topic of pain neuroscience education and that improving knowledge of how pain works promotes improved recovery and rehab   Current knowledge and understanding of patient on pain related topics was explored to create baseline  Patient challenged with increasing speed of activities as noted above  Patient with fatigue post session  Patient will continue to benefit from skilled PT to improve functional mobility and activity tolerance  This session performed within a 90 minute group session with PT/OT  - PT focus: pain neuroscience education, therapeutic activities/exercises  - OT focus: addressing cognitive distortions      Plan: Continue per plan of care  Insurance:  AMA/CMS Eval/ Re-eval POC expires Jhon Wilkes #/ Referral # Total visits  Start date  Expiration date Extension  Visit limitation? PT only or  PT+OT? Co-Insurance   MCR: CMS 1/4/23 3/15/23 47 -- -- -- -- -- -- -- 20% co-insurance after $226 ded met; applied for tomas care                   Date 2/16/23 2/20/2023 2/23/23 2/27/23 3/6/23 3/8/23   Visit Number 12 13 14 15 16 17   Manual      Pain Group   C7 upglide HVLA bilat Wrist mobs, all planes, gr II-III  Wrist mobs, all planes, gr II-III      Cervical distraction         Lower cervical gapping STM 3' Cervical distraction 3' 10'                         TherEx         Cerv ext         Rows 3*15, GTB 3*15, GTB 3x15      Shld ext 3*15 GTB 3*15 GTB 3x15      T/S ext in chair  Seated t/s extension with green strap 5" x 30    C7 SNAG into ext: 5" x 15 Seated t/s extension with green strap 5" x 30    C7 SNAG into ext: 5" x 15      Elbow extension Blue TB 3*15 Blue TB 3*15 3x15  7 5# cc, 3x10    Open books 5''x 20 ea  5''x 20 ea    5" x20 each     Wall push up 2*10 2*10 2x10  2x10 *additional reps stopped due to dizziness/off balance sensation             Neuro Re-Ed         Web gripper for thumb flex/add     At PIP, DIP, 5" x30 each    Chin tuck  3*10, 10" 3*10, 10" 3x10 10" x30                       TherAct         Patient education   Rev Pt edu, PT POC 30 min Assessment of left wrist, thumb, monitor blood pressure, surveys 30 min Modalities         CP             Precautions:   Past Medical History:   Diagnosis Date   • Acid reflux    • Anesthesia complication     woke up twice during anesthesia   • Anxiety    • Arthritis    • Cerebral palsy (HCC)    • GERD (gastroesophageal reflux disease)    • H/O ETOH abuse    • Psychiatric disorder     anxiety, depression   • Ulcer

## 2023-03-08 NOTE — PROGRESS NOTES
Daily Note     Today's date: 3/8/2023  Patient name: Jabier Elisa  : 1970  MRN: [id number]  Referring provider: Olivia Carr NP  Dx:   Encounter Diagnoses   Name Primary? • Chronic left shoulder pain Yes   • Cerebral palsy, unspecified type ([facility] )        Start Time: 1545  Stop Time: 1630  Total time in clinic (min): 45 minutes    Precautions: falls  Visit 2   PN due visit 8  POC valid 23    Subjective: Pt reports no changes since last session      Objective: See treatment below  This session performed within a 90 minute group session with PT/OT  - PT focus: pain neuroscience education, therapeutic activities/exercises  - OT focus: mindfulness-based and CBT strategies to address chronic pain management    TA:  Pt education on the following:  -Review of ANTs and types of cognitive distortions  -When ANTs/cognitive distortions are more likely to occur  -Recognizing cognitive distortions in order to start working toward more productive thought processes  -Identifying and implementing positive coping statements  -Handout with sleep hygiene principles provided    Week 5 - Generalized Strengthening and Fine Motor  Circuit 1 (1 round, 2 min each)  - Week 5 - Bananagram letters on floor and making word on elevated surface  - Week 5 - Flipping cards over until at specific number and do associated exercise (with 3# DB)  - Week 5 - Wall sit passing pegs down the line and placing in peg board    Circuit 2 (1 round, 2 min each)  - Week 5 - Ambulation w/ rotating pinch  on dots(with 3# DB)  - Week 5 - [place] twists w/ picking up/placing tape on either side (with 3# DB)      Assessment: Tolerated treatment well  Pt reported good understanding of CBT concepts during session  Pt would benefit from continued OT services to address chronic pain management strategies for improved QOL  Plan: Continued skilled OT per POC

## 2023-03-20 ENCOUNTER — OFFICE VISIT (OUTPATIENT)
Dept: PHYSICAL THERAPY | Facility: CLINIC | Age: 53
End: 2023-03-20

## 2023-03-20 ENCOUNTER — TELEPHONE (OUTPATIENT)
Dept: UROLOGY | Facility: CLINIC | Age: 53
End: 2023-03-20

## 2023-03-20 DIAGNOSIS — M25.512 CHRONIC LEFT SHOULDER PAIN: ICD-10-CM

## 2023-03-20 DIAGNOSIS — M54.12 CERVICAL RADICULOPATHY: ICD-10-CM

## 2023-03-20 DIAGNOSIS — G89.29 CHRONIC LEFT SHOULDER PAIN: ICD-10-CM

## 2023-03-20 DIAGNOSIS — M54.50 ACUTE LEFT-SIDED LOW BACK PAIN WITHOUT SCIATICA: Primary | ICD-10-CM

## 2023-03-20 NOTE — LETTER
2023    [address]  [address] [address]    Patient: Bartolo Staton   YOB: 1970   Date of Visit: 3/20/2023     Encounter Diagnosis     ICD-10-CM    1  Acute left-sided low back pain without sciatica  M54 50       2  Chronic left shoulder pain  M25 512     G89 29       3  Cervical radiculopathy  M54 12           Dear Dr Cony Larson:    Thank you for your recent referral of Bartolo Staton  Please review the attached evaluation summary from Gregorio's recent visit  Please verify that you agree with the plan of care by signing the attached order  If you have any questions or concerns, please do not hesitate to call  I sincerely appreciate the opportunity to share in the care of one of your patients and hope to have another opportunity to work with you in the near future  Sincerely,    Jr Hill PT      Referring Provider:      I certify that I have read the below Plan of Care and certify the need for these services furnished under this plan of treatment while under my care  Emelyn Marie MD  [address]  [address] [address]  [facility] [facility] [address]          Re-Evaluation     Today's date: 3/20/2023  Patient name: Bartolo Staton  : 1970  MRN: [id number]  Referring provider: Gladis Boles  Dx:   Encounter Diagnosis     ICD-10-CM    1  Acute left-sided low back pain without sciatica  M54 50       2  Chronic left shoulder pain  M25 512     G89 29       3  Cervical radiculopathy  M54 12                      Subjective: Patient reports to physical therapy session with onset of left sided low back pain that began three days ago  States this pain is unlike the pain he was experiencing with his kidney stones  Believes trying to rise out of his broken recliner requiring increased momentum may have been contributing factor to the onset of his pain   States he has been experiencing significant difficulty performing transfers over the last 3 days, including sit <> stand and supine <> sit  Denies hematuria, dysuria, urinary retention  Goals  Short Term Goals (5 weeks):    - Patient will be independent in basic HEP 2-3 weeks - MET  - Patient will report >50% reduction in pain - MET  - Patient will demonstrate >1/3 improvement in MMT grade as applicable - PROGRESSED  - Patient will demo pain free shld ROM - MET    Long Term Goals (10 weeks):  - Patient will be independent in a comprehensive home exercise program - MET  - Patient FOTO score will improve to >64/100 - MET  - Patient will self-report >75% improvement in function - MET  - Patient will demo equal neural tension bilaterally - MET  - Patient will report no pain with pushing up out of chair - MET    Goals updated at re-eval on 3/20/23, set for 6 weeks  - Patient will demo equal triceps strength bilaterally  - Patient will deny pain with transfers  - Patient will demo full return to lumbar flexion ROM for ADLs      Objective: See treatment diary below    Negative bump test over B flank  TTP over L SIJ, L L4/5    Lumbar Spine ROM  - Flexion: max restriction with pain  - Extension: max restriction with onset of spasticity into L LE that subsided with return to neutral    PAIVM  - Hypomobility noted at right L4/5 in both sitting and R SL    Diagnostic Testing  - (+) Sacral thrust  - (-) Quadrant  - (-) SI distraction  - (+) SI compression      Assessment: Patient requests to undergo evaluation of lumbar spine due to irritability of symptoms and interference in ability to perform ADLs  Patient presents with primary movement impairment of acute low back pain with mobility dysfunction, resulting in pathoanatomical symptoms of pain, restricted range of motion, muscular power/coordination deficits, and functional limitations   The aforementioned impairments have limited the patients ability to perform ADLs and perform transfers without pain  PT POC and HEP were updated during today's session and will include both lumbar spine and cervical spine due to lingering strength deficits  Patient expressed no questions/concerns with updates made to PT POC  Plan: Continue per plan of care  Assess response of lumbar spine to today's session  Insurance:  A/CMS Eval/ Re-eval POC expires Meghan Simmons #/ Referral # Total visits  Start date  Expiration date Extension  Visit limitation? PT only or  PT+OT? Co-Insurance   MCR: CMS 1/4/23 3/15/23 47 -- -- -- -- -- -- -- 20% co-insurance after $226 ded met; applied for tomas care                   Date 2/20/2023 2/23/23 2/27/23 3/6/23 3/8/23 3/20/23   Visit Number 13 14 15 16 17 18   Manual     Pain Group    C7 upglide HVLA bilat  Wrist mobs, all planes, gr II-III       Cervical distraction         Lower cervical gapping Cervical distraction 3' 10'              L4/5 gapping in R SL         L SI inferior HVLA x3   TherEx         Cerv ext         Rows 3*15, GTB 3x15       Shld ext 3*15 GTB 3x15       T/S ext in chair Seated t/s extension with green strap 5" x 30    C7 SNAG into ext: 5" x 15 Seated t/s extension with green strap 5" x 30    C7 SNAG into ext: 5" x 15       Elbow extension Blue TB 3*15 3x15  7 5# cc, 3x10     Open books 5''x 20 ea    5" x20 each      Wall push up 2*10 2x10  2x10 *additional reps stopped due to dizziness/off balance sensation              Neuro Re-Ed         Web gripper for thumb flex/add    At PIP, DIP, 5" x30 each     Chin tuck  3*10, 10" 3x10 10" x30            TA sos: 5" x20    TA iso with bilat shld abd: GTB, x20            TherAct         Patient education  Rev Pt edu, PT POC 30 min Assessment of left wrist, thumb, monitor blood pressure, surveys 30 min  Eval of lumbar spine                              Modalities         CP             Precautions:   Past Medical History:   Diagnosis Date   • Acid reflux    • Anesthesia complication     woke up twice during anesthesia   • Anxiety    • Arthritis    • Cerebral palsy (HCC)    • GERD (gastroesophageal reflux disease)    • H/O ETOH abuse    • Psychiatric disorder     anxiety, depression   • Ulcer

## 2023-03-20 NOTE — PROGRESS NOTES
Re-Evaluation     Today's date: 3/20/2023  Patient name: Portia Craig  : 1970  MRN: [id number]  Referring provider: Kennedy De La Cruz  Dx:   Encounter Diagnosis     ICD-10-CM    1  Acute left-sided low back pain without sciatica  M54 50       2  Chronic left shoulder pain  M25 512     G89 29       3  Cervical radiculopathy  M54 12                      Subjective: Patient reports to physical therapy session with onset of left sided low back pain that began three days ago  States this pain is unlike the pain he was experiencing with his kidney stones  Believes trying to rise out of his broken recliner requiring increased momentum may have been contributing factor to the onset of his pain  States he has been experiencing significant difficulty performing transfers over the last 3 days, including sit <> stand and supine <> sit  Denies hematuria, dysuria, urinary retention      Goals  Short Term Goals (5 weeks):    - Patient will be independent in basic HEP 2-3 weeks - MET  - Patient will report >50% reduction in pain - MET  - Patient will demonstrate >1/3 improvement in MMT grade as applicable - PROGRESSED  - Patient will demo pain free shld ROM - MET    Long Term Goals (10 weeks):  - Patient will be independent in a comprehensive home exercise program - MET  - Patient FOTO score will improve to >64/100 - MET  - Patient will self-report >75% improvement in function - MET  - Patient will demo equal neural tension bilaterally - MET  - Patient will report no pain with pushing up out of chair - MET    Goals updated at re-eval on 3/20/23, set for 6 weeks  - Patient will demo equal triceps strength bilaterally  - Patient will deny pain with transfers  - Patient will demo full return to lumbar flexion ROM for ADLs      Objective: See treatment diary below    Negative bump test over B flank  TTP over L SIJ, L L4/5    Lumbar Spine ROM  - Flexion: max restriction with pain  - Extension: max restriction with onset of spasticity into L LE that subsided with return to neutral    PAIVM  - Hypomobility noted at right L4/5 in both sitting and R SL    Diagnostic Testing  - (+) Sacral thrust  - (-) Quadrant  - (-) SI distraction  - (+) SI compression      Assessment: Patient requests to undergo evaluation of lumbar spine due to irritability of symptoms and interference in ability to perform ADLs  Patient presents with primary movement impairment of acute low back pain with mobility dysfunction, resulting in pathoanatomical symptoms of pain, restricted range of motion, muscular power/coordination deficits, and functional limitations  The aforementioned impairments have limited the patients ability to perform ADLs and perform transfers without pain  PT POC and HEP were updated during today's session and will include both lumbar spine and cervical spine due to lingering strength deficits  Patient expressed no questions/concerns with updates made to PT POC  Plan: Continue per plan of care  Assess response of lumbar spine to today's session  Insurance:  [place]/CMS Eval/ Re-eval POC expires Yanelis Zunigaobinna #/ Referral # Total visits  Start date  Expiration date Extension  Visit limitation? PT only or  PT+OT?  Co-Insurance   MCR: CMS 1/4/23 3/15/23 47 -- -- -- -- -- -- -- 20% co-insurance after $226 ded met; applied for tomas care                   Date 2/20/2023 2/23/23 2/27/23 3/6/23 3/8/23 3/20/23   Visit Number [phone] 16 17 18   Manual     Pain Group    C7 upglide HVLA bilat  Wrist mobs, all planes, gr II-III       Cervical distraction         Lower cervical gapping Cervical distraction 3' 10'              L4/5 gapping in R SL         L SI inferior HVLA x3   TherEx         Cerv ext         Rows 3*15, GTB 3x15       Shld ext 3*15 GTB 3x15       T/S ext in chair Seated t/s extension with green strap 5" x 30    C7 SNAG into ext: 5" x 15 Seated t/s extension with green strap 5" x 30    C7 SNAG into ext: 5" x 15       Elbow extension Blue TB 3*15 3x15  7 5# cc, 3x10     Open books 5''x 20 ea    5" x20 each      Wall push up 2*10 2x10  2x10 *additional reps stopped due to dizziness/off balance sensation              Neuro Re-Ed         Web gripper for thumb flex/add    At PIP, DIP, 5" x30 each     Chin tuck  3*10, 10" 3x10 10" x30            TA sos: 5" x20    TA iso with bilat shld abd: GTB, x20            TherAct         Patient education  Rev Pt edu, PT POC 30 min Assessment of left wrist, thumb, monitor blood pressure, surveys 30 min  Eval of lumbar spine                              Modalities         CP             Precautions:   Past Medical History:   Diagnosis Date   • Acid reflux    • Anesthesia complication     woke up twice during anesthesia   • Anxiety    • Arthritis    • Cerebral palsy (HCC)    • GERD (gastroesophageal reflux disease)    • H/O ETOH abuse    • Psychiatric disorder     anxiety, depression   • Ulcer

## 2023-03-22 ENCOUNTER — OFFICE VISIT (OUTPATIENT)
Dept: PHYSICAL THERAPY | Facility: CLINIC | Age: 53
End: 2023-03-22

## 2023-03-22 ENCOUNTER — OFFICE VISIT (OUTPATIENT)
Dept: OCCUPATIONAL THERAPY | Facility: CLINIC | Age: 53
End: 2023-03-22

## 2023-03-22 DIAGNOSIS — G89.29 CHRONIC LEFT SHOULDER PAIN: Primary | ICD-10-CM

## 2023-03-22 DIAGNOSIS — M54.12 CERVICAL RADICULOPATHY: ICD-10-CM

## 2023-03-22 DIAGNOSIS — M25.512 CHRONIC LEFT SHOULDER PAIN: ICD-10-CM

## 2023-03-22 DIAGNOSIS — G89.29 CHRONIC LEFT SHOULDER PAIN: ICD-10-CM

## 2023-03-22 DIAGNOSIS — G80.9 CEREBRAL PALSY, UNSPECIFIED TYPE (HCC): ICD-10-CM

## 2023-03-22 DIAGNOSIS — M25.512 CHRONIC LEFT SHOULDER PAIN: Primary | ICD-10-CM

## 2023-03-22 DIAGNOSIS — M54.50 ACUTE LEFT-SIDED LOW BACK PAIN WITHOUT SCIATICA: Primary | ICD-10-CM

## 2023-03-22 NOTE — PROGRESS NOTES
Daily Note     Today's date: 3/22/2023  Patient name: Kenyatta Ding  : 1970  MRN: [id number]  Referring provider: Gigi Roche NP  Dx:   Encounter Diagnoses   Name Primary? • Chronic left shoulder pain Yes   • [facility]        Start Time: 1545  Stop Time: 1630  Total time in clinic (min): 45 minutes    Precautions: falls  Visit 2   PN due visit 8  POC valid 23    Subjective: Pt reports no changes since last session      Objective: See treatment below  This session performed within a 90 minute group session with PT/OT  - PT focus: pain neuroscience education, therapeutic activities/exercises  - OT focus: mindfulness-based and CBT strategies to address chronic pain management, lifestyle factors affecting chronic pain    TA:  Pt education on the following:  -Reviewed homework from prior weeks (positive activity engagement, progressive muscle relaxation, guided imagery)  -Effects of diet on chronic pain  -Characteristics of diet that reduces inflammation   -Effects of smoking on chronic pain     TherEx:  Neuro PNE Exercises    Week 7 - Generalized Strengthening and Coordination  Circuit 1  - Week 7 - Ambulation w/ alternate hand to knee taps (with contact guard)  - Week 7 - Bird dogs  - Week 7 - Agility ladder (icky shuffle, forward/backward)    Circuit 2 (2 minutes each, 1 round)  - Week 7 - Bear crawls  - Week 7 - Random Blaze Pods (2) w/ UE/LE and R/L distinction  - Week 7 - Tossing tennis balls off the wall         Assessment: Tolerated treatment well  Pt reported good understanding of lifestyle factors discussed today  Pt would benefit from continued OT services to address chronic pain management strategies for improved QOL  Plan: Continued skilled OT per POC

## 2023-03-22 NOTE — PROGRESS NOTES
Daily Note     Today's date: 3/22/2023  Patient name: Frann Severe  : 1970  MRN: [id number]  Referring provider: Jey Escobedo  Dx:   Encounter Diagnosis     ICD-10-CM    1  Acute left-sided low back pain without sciatica  M54 50       2  Chronic left shoulder pain  M25 512     G89 29       3  Cervical radiculopathy  M54 12           Start Time: 1500  Stop Time: 1545  Total time in clinic (min): 45 minutes    Subjective: Patient reports improvement in intensity of LBP since previous session, although still present  States he was able to pick soap up off floor following off session  Objective: See treatment diary below    Neuro Re-Ed:  Patient educated regarding the role of the primary somatosensory cortex in pain and body maps (which depend on movement and use of the body parts) and that for people with persistent pain, decreased use of the body and other biologic processes change the body maps  Laterality proficiency was discussed and addressed  Patient educated on proper lifting mechanics, will benefit from additional follow up in future sessions to review technique  Neuro PNE Exercises    Week 7 - Generalized Strengthening and Coordination  Circuit 1  - Week 7 - Ambulation w/ alternate hand to knee taps (with contact guard)  - Week 7 - Bird dogs  - Week 7 - Agility ladder (icky shuffle, forward/backward)    Circuit 2 (2 minutes each, 1 round)  - Week 7 - Bear crawls  - Week 7 - Random Blaze Pods (2) w/ UE/LE and R/L distinction  - Week 7 - Tossing tennis balls off the wall        Assessment: Tolerated treatment well  Patient provided with graded verbal cues during agility ladder work to improve step length  Patient provided with contact guard with standing progressions to promote safety  Patient demo good utilization of motor imagery techniques during today's session  Patient with fatigue post session   Patient will continue to benefit from skilled PT to improve functional mobility and activity tolerance  This session performed within a 90 minute group session with PT/OT  - PT focus: pain neuroscience education, therapeutic activities/exercises  - OT focus: diet and smoking      Plan: Continue per plan of care  Insurance:  A/CMS Eval/ Re-eval POC expires Caitlin Frank #/ Referral # Total visits  Start date  Expiration date Extension  Visit limitation? PT only or  PT+OT?  Co-Insurance   MCR: CMS 1/4/23 3/15/23 47 -- -- -- -- -- -- -- 20% co-insurance after $226 ded met; applied for tomas care                   Date 2/23/23 2/27/23 3/6/23 3/8/23 3/20/23 3/22/23   Visit Number [phone] 17 18 19   Manual    Pain Group  Pain Group   C7 upglide HVLA bilat Wrist mobs, all planes, gr II-III        Cervical distraction         Lower cervical gapping 10'              L4/5 gapping in R SL         L SI inferior HVLA x3    TherEx         Cerv ext         Rows 3x15        Shld ext 3x15        T/S ext in chair Seated t/s extension with green strap 5" x 30    C7 SNAG into ext: 5" x 15        Elbow extension 3x15  7 5# cc, 3x10      Open books  5" x20 each       Wall push up 2x10  2x10 *additional reps stopped due to dizziness/off balance sensation               Neuro Re-Ed         Web gripper for thumb flex/add   At PIP, DIP, 5" x30 each      Chin tuck  3x10 10" x30            TA sos: 5" x20    TA iso with bilat shld abd: GTB, x20             TherAct         Patient education Rev Pt edu, PT POC 30 min Assessment of left wrist, thumb, monitor blood pressure, surveys 30 min  Eval of lumbar spine                               Modalities         CP             Precautions:   Past Medical History:   Diagnosis Date   • Acid reflux    • Anesthesia complication     woke up twice during anesthesia   • Anxiety    • Arthritis    • Cerebral palsy (HCC)    • GERD (gastroesophageal reflux disease)    • H/O ETOH abuse    • Psychiatric disorder     anxiety, depression   • Ulcer

## 2023-06-23 ENCOUNTER — NURSE TRIAGE (OUTPATIENT)
Age: 53
End: 2023-06-23

## 2023-06-23 DIAGNOSIS — K21.00 GASTROESOPHAGEAL REFLUX DISEASE WITH ESOPHAGITIS WITHOUT HEMORRHAGE: ICD-10-CM

## 2023-06-23 RX ORDER — FAMOTIDINE 40 MG/1
40 TABLET, FILM COATED ORAL DAILY
Qty: 30 TABLET | Refills: 6 | Status: SHIPPED | OUTPATIENT
Start: 2023-06-23 | End: 2023-06-28 | Stop reason: SDUPTHER

## 2023-06-23 NOTE — TELEPHONE ENCOUNTER
Spoke with pt who explained that his psychiatrist quit and pt did not wish to follow with someone new  He weaned himself off of psych medication and started on low dose medical marijuana  He has noticed relief of anxiety and cerebral palsy symptoms with use of medial marijuana, however, pt has had increased GERD symptoms  He inquired if pepcid dosage could be increased until OV

## 2023-06-23 NOTE — TELEPHONE ENCOUNTER
----- Message from Wilmer Oneal sent at 6/23/2023  3:50 PM EDT -----  Patient called and has some questions regarding medication pepcid, he wants to know he the dosage  can be increased please review and reach out thank you

## 2023-06-28 DIAGNOSIS — K21.00 GASTROESOPHAGEAL REFLUX DISEASE WITH ESOPHAGITIS WITHOUT HEMORRHAGE: ICD-10-CM

## 2023-06-28 RX ORDER — FAMOTIDINE 40 MG/1
40 TABLET, FILM COATED ORAL 2 TIMES DAILY
Qty: 30 TABLET | Refills: 6 | Status: SHIPPED | OUTPATIENT
Start: 2023-06-28

## 2023-06-29 NOTE — TELEPHONE ENCOUNTER
Reason for Disposition  • Caller with prescription and triager answers question    Answer Assessment - Initial Assessment Questions  Left a voicemail advising that a new prescritption was sent to his local pharmacy [facility], he is to take famotidine 40 mg BID  Encouraged him to keep his O V  8/23/23, call with any worsening symptoms      Protocols used: MEDICATION REFILL AND RENEWAL [facility]

## 2023-07-05 ENCOUNTER — TELEPHONE (OUTPATIENT)
Age: 53
End: 2023-07-05

## 2023-07-06 ENCOUNTER — OFFICE VISIT (OUTPATIENT)
Dept: FAMILY MEDICINE CLINIC | Facility: CLINIC | Age: 53
End: 2023-07-06
Payer: MEDICARE

## 2023-07-06 VITALS
HEART RATE: 87 BPM | SYSTOLIC BLOOD PRESSURE: 102 MMHG | TEMPERATURE: 98.8 F | OXYGEN SATURATION: 96 % | DIASTOLIC BLOOD PRESSURE: 76 MMHG | RESPIRATION RATE: 18 BRPM | HEIGHT: 70 IN | BODY MASS INDEX: 28.49 KG/M2 | WEIGHT: 199 LBS

## 2023-07-06 DIAGNOSIS — E78.2 MIXED HYPERLIPIDEMIA: ICD-10-CM

## 2023-07-06 DIAGNOSIS — Z00.00 MEDICARE ANNUAL WELLNESS VISIT, SUBSEQUENT: Primary | ICD-10-CM

## 2023-07-06 DIAGNOSIS — R73.01 ELEVATED FASTING BLOOD SUGAR: ICD-10-CM

## 2023-07-06 PROCEDURE — G0439 PPPS, SUBSEQ VISIT: HCPCS | Performed by: FAMILY MEDICINE

## 2023-07-06 NOTE — PATIENT INSTRUCTIONS
Medicare Preventive Visit Patient Instructions  Thank you for completing your Welcome to Medicare Visit or Medicare Annual Wellness Visit today. Your next wellness visit will be due in one year (7/6/2024). The screening/preventive services that you may require over the next 5-10 years are detailed below. Some tests may not apply to you based off risk factors and/or age. Screening tests ordered at today's visit but not completed yet may show as past due. Also, please note that scanned in results may not display below. Preventive Screenings:  Service Recommendations Previous Testing/Comments   Colorectal Cancer Screening  · Colonoscopy    · Fecal Occult Blood Test (FOBT)/Fecal Immunochemical Test (FIT)  · Fecal DNA/Cologuard Test  · Flexible Sigmoidoscopy Age: 43-73 years old   Colonoscopy: every 10 years (May be performed more frequently if at higher risk)  OR  FOBT/FIT: every 1 year  OR  Cologuard: every 3 years  OR  Sigmoidoscopy: every 5 years  Screening may be recommended earlier than age 39 if at higher risk for colorectal cancer. Also, an individualized decision between you and your healthcare provider will decide whether screening between the ages of 77-80 would be appropriate.  Colonoscopy: 07/28/2022  FOBT/FIT: Not on file  Cologuard: Not on file  Sigmoidoscopy: Not on file          Prostate Cancer Screening Individualized decision between patient and health care provider in men between ages of 53-66   Medicare will cover every 12 months beginning on the day after your 50th birthday PSA: 1.0 ng/mL           Hepatitis C Screening Once for adults born between 1945 and 1965  More frequently in patients at high risk for Hepatitis C Hep C Antibody: 04/06/2021        Diabetes Screening 1-2 times per year if you're at risk for diabetes or have pre-diabetes Fasting glucose: 115 mg/dL (2/16/2023)  A1C: No results in last 5 years (No results in last 5 years)      Cholesterol Screening Once every 5 years if you don't have a lipid disorder. May order more often based on risk factors. Lipid panel: 07/26/2022         Other Preventive Screenings Covered by Medicare:  1. Abdominal Aortic Aneurysm (AAA) Screening: covered once if your at risk. You're considered to be at risk if you have a family history of AAA or a male between the age of 70-76 who smoking at least 100 cigarettes in your lifetime. 2. Lung Cancer Screening: covers low dose CT scan once per year if you meet all of the following conditions: (1) Age 48-67; (2) No signs or symptoms of lung cancer; (3) Current smoker or have quit smoking within the last 15 years; (4) You have a tobacco smoking history of at least 20 pack years (packs per day x number of years you smoked); (5) You get a written order from a healthcare provider. 3. Glaucoma Screening: covered annually if you're considered high risk: (1) You have diabetes OR (2) Family history of glaucoma OR (3)  aged 48 and older OR (3)  American aged 72 and older  3. Osteoporosis Screening: covered every 2 years if you meet one of the following conditions: (1) Have a vertebral abnormality; (2) On glucocorticoid therapy for more than 3 months; (3) Have primary hyperparathyroidism; (4) On osteoporosis medications and need to assess response to drug therapy. 5. HIV Screening: covered annually if you're between the age of 14-79. Also covered annually if you are younger than 13 and older than 72 with risk factors for HIV infection. For pregnant patients, it is covered up to 3 times per pregnancy.     Immunizations:  Immunization Recommendations   Influenza Vaccine Annual influenza vaccination during flu season is recommended for all persons aged >= 6 months who do not have contraindications   Pneumococcal Vaccine   * Pneumococcal conjugate vaccine = PCV13 (Prevnar 13), PCV15 (Vaxneuvance), PCV20 (Prevnar 20)  * Pneumococcal polysaccharide vaccine = PPSV23 (Pneumovax) Adults 20-63 years old: 1-3 doses may be recommended based on certain risk factors  Adults 72 years old: 1-2 doses may be recommended based off what pneumonia vaccine you previously received   Hepatitis B Vaccine 3 dose series if at intermediate or high risk (ex: diabetes, end stage renal disease, liver disease)   Tetanus (Td) Vaccine - COST NOT COVERED BY MEDICARE PART B Following completion of primary series, a booster dose should be given every 10 years to maintain immunity against tetanus. Td may also be given as tetanus wound prophylaxis. Tdap Vaccine - COST NOT COVERED BY MEDICARE PART B Recommended at least once for all adults. For pregnant patients, recommended with each pregnancy. Shingles Vaccine (Shingrix) - COST NOT COVERED BY MEDICARE PART B  2 shot series recommended in those aged 48 and above     Health Maintenance Due:      Topic Date Due   • Colorectal Cancer Screening  07/27/2025   • HIV Screening  Completed   • Hepatitis C Screening  Completed     Immunizations Due:      Topic Date Due   • COVID-19 Vaccine (5 - Pfizer series) 02/08/2023   • Influenza Vaccine (1) 09/01/2023     Advance Directives   What are advance directives? Advance directives are legal documents that state your wishes and plans for medical care. These plans are made ahead of time in case you lose your ability to make decisions for yourself. Advance directives can apply to any medical decision, such as the treatments you want, and if you want to donate organs. What are the types of advance directives? There are many types of advance directives, and each state has rules about how to use them. You may choose a combination of any of the following:  · Living will: This is a written record of the treatment you want. You can also choose which treatments you do not want, which to limit, and which to stop at a certain time. This includes surgery, medicine, IV fluid, and tube feedings. · Durable power of  for healthcare [facility]):   This is a written record that states who you want to make healthcare choices for you when you are unable to make them for yourself. This person, called a proxy, is usually a family member or a friend. You may choose more than 1 proxy. · Do not resuscitate (DNR) order:  A DNR order is used in case your heart stops beating or you stop breathing. It is a request not to have certain forms of treatment, such as CPR. A DNR order may be included in other types of advance directives. · Medical directive: This covers the care that you want if you are in a coma, near death, or unable to make decisions for yourself. You can list the treatments you want for each condition. Treatment may include pain medicine, surgery, blood transfusions, dialysis, IV or tube feedings, and a ventilator (breathing machine). · Values history: This document has questions about your views, beliefs, and how you feel and think about life. This information can help others choose the care that you would choose. Why are advance directives important? An advance directive helps you control your care. Although spoken wishes may be used, it is better to have your wishes written down. Spoken wishes can be misunderstood, or not followed. Treatments may be given even if you do not want them. An advance directive may make it easier for your family to make difficult choices about your care. Weight Management   Why it is important to manage your weight:  Being overweight increases your risk of health conditions such as heart disease, high blood pressure, type 2 diabetes, and certain types of cancer. It can also increase your risk for osteoarthritis, sleep apnea, and other respiratory problems. Aim for a slow, steady weight loss. Even a small amount of weight loss can lower your risk of health problems. How to lose weight safely:  A safe and healthy way to lose weight is to eat fewer calories and get regular exercise.  You can lose up about 1 pound a week by decreasing the number of calories you eat by 500 calories each day. Healthy meal plan for weight management:  A healthy meal plan includes a variety of foods, contains fewer calories, and helps you stay healthy. A healthy meal plan includes the following:  · Eat whole-grain foods more often. A healthy meal plan should contain fiber. Fiber is the part of grains, fruits, and vegetables that is not broken down by your body. Whole-grain foods are healthy and provide extra fiber in your diet. Some examples of whole-grain foods are whole-wheat breads and pastas, oatmeal, brown rice, and bulgur. · Eat a variety of vegetables every day. Include dark, leafy greens such as spinach, kale, shannen greens, and mustard greens. Eat yellow and orange vegetables such as carrots, sweet potatoes, and winter squash. · Eat a variety of fruits every day. Choose fresh or canned fruit (canned in its own juice or light syrup) instead of juice. Fruit juice has very little or no fiber. · Eat low-fat dairy foods. Drink fat-free (skim) milk or 1% milk. Eat fat-free yogurt and low-fat cottage cheese. Try low-fat cheeses such as mozzarella and other reduced-fat cheeses. · Choose meat and other protein foods that are low in fat. Choose beans or other legumes such as split peas or lentils. Choose fish, skinless poultry (chicken or turkey), or lean cuts of red meat (beef or pork). Before you cook meat or poultry, cut off any visible fat. · Use less fat and oil. Try baking foods instead of frying them. Add less fat, such as margarine, sour cream, regular salad dressing and mayonnaise to foods. Eat fewer high-fat foods. Some examples of high-fat foods include french fries, doughnuts, ice cream, and cakes. · Eat fewer sweets. Limit foods and drinks that are high in sugar. This includes candy, cookies, regular soda, and sweetened drinks. Exercise:  Exercise at least 30 minutes per day on most days of the week.  Some examples of exercise include walking, biking, dancing, and swimming. You can also fit in more physical activity by taking the stairs instead of the elevator or parking farther away from stores. Ask your healthcare provider about the best exercise plan for you. © Copyright [employer] 2018 Information is for End User's use only and may not be sold, redistributed or otherwise used for commercial purposes. All illustrations and images included in CareNotes® are the copyrighted property of [employer] Inc. or [facility] Preventive Visit Patient Instructions  Thank you for completing your Welcome to Medicare Visit or Medicare Annual Wellness Visit today. Your next wellness visit will be due in one year (7/6/2024). The screening/preventive services that you may require over the next 5-10 years are detailed below. Some tests may not apply to you based off risk factors and/or age. Screening tests ordered at today's visit but not completed yet may show as past due. Also, please note that scanned in results may not display below. Preventive Screenings:  Service Recommendations Previous Testing/Comments   Colorectal Cancer Screening  · Colonoscopy    · Fecal Occult Blood Test (FOBT)/Fecal Immunochemical Test (FIT)  · Fecal DNA/Cologuard Test  · Flexible Sigmoidoscopy Age: 43-73 years old   Colonoscopy: every 10 years (May be performed more frequently if at higher risk)  OR  FOBT/FIT: every 1 year  OR  Cologuard: every 3 years  OR  Sigmoidoscopy: every 5 years  Screening may be recommended earlier than age 39 if at higher risk for colorectal cancer. Also, an individualized decision between you and your healthcare provider will decide whether screening between the ages of 77-80 would be appropriate.  Colonoscopy: 07/28/2022  FOBT/FIT: Not on file  Cologuard: Not on file  Sigmoidoscopy: Not on file          Prostate Cancer Screening Individualized decision between patient and health care provider in men between ages of 53-66 Medicare will cover every 12 months beginning on the day after your 50th birthday PSA: 1.0 ng/mL           Hepatitis C Screening Once for adults born between 1945 and 1965  More frequently in patients at high risk for Hepatitis C Hep C Antibody: 04/06/2021        Diabetes Screening 1-2 times per year if you're at risk for diabetes or have pre-diabetes Fasting glucose: 115 mg/dL (2/16/2023)  A1C: No results in last 5 years (No results in last 5 years)      Cholesterol Screening Once every 5 years if you don't have a lipid disorder. May order more often based on risk factors. Lipid panel: 07/26/2022         Other Preventive Screenings Covered by Medicare:  6. Abdominal Aortic Aneurysm (AAA) Screening: covered once if your at risk. You're considered to be at risk if you have a family history of AAA or a male between the age of 70-76 who smoking at least 100 cigarettes in your lifetime. 7. Lung Cancer Screening: covers low dose CT scan once per year if you meet all of the following conditions: (1) Age 48-67; (2) No signs or symptoms of lung cancer; (3) Current smoker or have quit smoking within the last 15 years; (4) You have a tobacco smoking history of at least 20 pack years (packs per day x number of years you smoked); (5) You get a written order from a healthcare provider. 8. Glaucoma Screening: covered annually if you're considered high risk: (1) You have diabetes OR (2) Family history of glaucoma OR (3)  aged 48 and older OR (3)  American aged 72 and older  5. Osteoporosis Screening: covered every 2 years if you meet one of the following conditions: (1) Have a vertebral abnormality; (2) On glucocorticoid therapy for more than 3 months; (3) Have primary hyperparathyroidism; (4) On osteoporosis medications and need to assess response to drug therapy. 10. HIV Screening: covered annually if you're between the age of 14-79.  Also covered annually if you are younger than 13 and older than 72 with risk factors for HIV infection. For pregnant patients, it is covered up to 3 times per pregnancy. Immunizations:  Immunization Recommendations   Influenza Vaccine Annual influenza vaccination during flu season is recommended for all persons aged >= 6 months who do not have contraindications   Pneumococcal Vaccine   * Pneumococcal conjugate vaccine = PCV13 (Prevnar 13), PCV15 (Vaxneuvance), PCV20 (Prevnar 20)  * Pneumococcal polysaccharide vaccine = PPSV23 (Pneumovax) Adults 20-63 years old: 1-3 doses may be recommended based on certain risk factors  Adults 72 years old: 1-2 doses may be recommended based off what pneumonia vaccine you previously received   Hepatitis B Vaccine 3 dose series if at intermediate or high risk (ex: diabetes, end stage renal disease, liver disease)   Tetanus (Td) Vaccine - COST NOT COVERED BY MEDICARE PART B Following completion of primary series, a booster dose should be given every 10 years to maintain immunity against tetanus. Td may also be given as tetanus wound prophylaxis. Tdap Vaccine - COST NOT COVERED BY MEDICARE PART B Recommended at least once for all adults. For pregnant patients, recommended with each pregnancy. Shingles Vaccine (Shingrix) - COST NOT COVERED BY MEDICARE PART B  2 shot series recommended in those aged 48 and above     Health Maintenance Due:      Topic Date Due   • Colorectal Cancer Screening  07/27/2025   • HIV Screening  Completed   • Hepatitis C Screening  Completed     Immunizations Due:      Topic Date Due   • COVID-19 Vaccine (5 - Pfizer series) 02/08/2023   • Influenza Vaccine (1) 09/01/2023     Advance Directives   What are advance directives? Advance directives are legal documents that state your wishes and plans for medical care. These plans are made ahead of time in case you lose your ability to make decisions for yourself.  Advance directives can apply to any medical decision, such as the treatments you want, and if you want to donate organs. What are the types of advance directives? There are many types of advance directives, and each state has rules about how to use them. You may choose a combination of any of the following:  · Living will: This is a written record of the treatment you want. You can also choose which treatments you do not want, which to limit, and which to stop at a certain time. This includes surgery, medicine, IV fluid, and tube feedings. · Durable power of  for healthcare [facility]): This is a written record that states who you want to make healthcare choices for you when you are unable to make them for yourself. This person, called a proxy, is usually a family member or a friend. You may choose more than 1 proxy. · Do not resuscitate (DNR) order:  A DNR order is used in case your heart stops beating or you stop breathing. It is a request not to have certain forms of treatment, such as CPR. A DNR order may be included in other types of advance directives. · Medical directive: This covers the care that you want if you are in a coma, near death, or unable to make decisions for yourself. You can list the treatments you want for each condition. Treatment may include pain medicine, surgery, blood transfusions, dialysis, IV or tube feedings, and a ventilator (breathing machine). · Values history: This document has questions about your views, beliefs, and how you feel and think about life. This information can help others choose the care that you would choose. Why are advance directives important? An advance directive helps you control your care. Although spoken wishes may be used, it is better to have your wishes written down. Spoken wishes can be misunderstood, or not followed. Treatments may be given even if you do not want them. An advance directive may make it easier for your family to make difficult choices about your care.    Weight Management   Why it is important to manage your weight:  Being overweight increases your risk of health conditions such as heart disease, high blood pressure, type 2 diabetes, and certain types of cancer. It can also increase your risk for osteoarthritis, sleep apnea, and other respiratory problems. Aim for a slow, steady weight loss. Even a small amount of weight loss can lower your risk of health problems. How to lose weight safely:  A safe and healthy way to lose weight is to eat fewer calories and get regular exercise. You can lose up about 1 pound a week by decreasing the number of calories you eat by 500 calories each day. Healthy meal plan for weight management:  A healthy meal plan includes a variety of foods, contains fewer calories, and helps you stay healthy. A healthy meal plan includes the following:  · Eat whole-grain foods more often. A healthy meal plan should contain fiber. Fiber is the part of grains, fruits, and vegetables that is not broken down by your body. Whole-grain foods are healthy and provide extra fiber in your diet. Some examples of whole-grain foods are whole-wheat breads and pastas, oatmeal, brown rice, and bulgur. · Eat a variety of vegetables every day. Include dark, leafy greens such as spinach, kale, shannen greens, and mustard greens. Eat yellow and orange vegetables such as carrots, sweet potatoes, and winter squash. · Eat a variety of fruits every day. Choose fresh or canned fruit (canned in its own juice or light syrup) instead of juice. Fruit juice has very little or no fiber. · Eat low-fat dairy foods. Drink fat-free (skim) milk or 1% milk. Eat fat-free yogurt and low-fat cottage cheese. Try low-fat cheeses such as mozzarella and other reduced-fat cheeses. · Choose meat and other protein foods that are low in fat. Choose beans or other legumes such as split peas or lentils. Choose fish, skinless poultry (chicken or turkey), or lean cuts of red meat (beef or pork). Before you cook meat or poultry, cut off any visible fat.    · Use less fat and oil. Try baking foods instead of frying them. Add less fat, such as margarine, sour cream, regular salad dressing and mayonnaise to foods. Eat fewer high-fat foods. Some examples of high-fat foods include french fries, doughnuts, ice cream, and cakes. · Eat fewer sweets. Limit foods and drinks that are high in sugar. This includes candy, cookies, regular soda, and sweetened drinks. Exercise:  Exercise at least 30 minutes per day on most days of the week. Some examples of exercise include walking, biking, dancing, and swimming. You can also fit in more physical activity by taking the stairs instead of the elevator or parking farther away from stores. Ask your healthcare provider about the best exercise plan for you. © Copyright [employer] 2018 Information is for End User's use only and may not be sold, redistributed or otherwise used for commercial purposes. All illustrations and images included in CareNotes® are the copyrighted property of "[employer]. or [facility] Preventive Visit Patient Instructions  Thank you for completing your Welcome to Medicare Visit or Medicare Annual Wellness Visit today. Your next wellness visit will be due in one year (7/6/2024). The screening/preventive services that you may require over the next 5-10 years are detailed below. Some tests may not apply to you based off risk factors and/or age. Screening tests ordered at today's visit but not completed yet may show as past due. Also, please note that scanned in results may not display below.   Preventive Screenings:  Service Recommendations Previous Testing/Comments   Colorectal Cancer Screening  · Colonoscopy    · Fecal Occult Blood Test (FOBT)/Fecal Immunochemical Test (FIT)  · Fecal DNA/Cologuard Test  · Flexible Sigmoidoscopy Age: 43-73 years old   Colonoscopy: every 10 years (May be performed more frequently if at higher risk)  OR  FOBT/FIT: every 1 year  OR  Cologuard: every 3 years OR  Sigmoidoscopy: every 5 years  Screening may be recommended earlier than age 39 if at higher risk for colorectal cancer. Also, an individualized decision between you and your healthcare provider will decide whether screening between the ages of 77-80 would be appropriate. Colonoscopy: 07/28/2022  FOBT/FIT: Not on file  Cologuard: Not on file  Sigmoidoscopy: Not on file    Screening Current     Prostate Cancer Screening Individualized decision between patient and health care provider in men between ages of 53-66   Medicare will cover every 12 months beginning on the day after your 50th birthday PSA: 1.0 ng/mL     Screening Not Indicated     Hepatitis C Screening Once for adults born between 1945 and 1965  More frequently in patients at high risk for Hepatitis C Hep C Antibody: 04/06/2021    Screening Current   Diabetes Screening 1-2 times per year if you're at risk for diabetes or have pre-diabetes Fasting glucose: 115 mg/dL (2/16/2023)  A1C: No results in last 5 years (No results in last 5 years)  Screening Current   Cholesterol Screening Once every 5 years if you don't have a lipid disorder. May order more often based on risk factors. Lipid panel: 07/26/2022  Screening Not Indicated  History Lipid Disorder      Other Preventive Screenings Covered by Medicare:  11. Abdominal Aortic Aneurysm (AAA) Screening: covered once if your at risk. You're considered to be at risk if you have a family history of AAA or a male between the age of 70-76 who smoking at least 100 cigarettes in your lifetime. 12. Lung Cancer Screening: covers low dose CT scan once per year if you meet all of the following conditions: (1) Age 48-67; (2) No signs or symptoms of lung cancer; (3) Current smoker or have quit smoking within the last 15 years; (4) You have a tobacco smoking history of at least 20 pack years (packs per day x number of years you smoked); (5) You get a written order from a healthcare provider.   13. Glaucoma Screening: covered annually if you're considered high risk: (1) You have diabetes OR (2) Family history of glaucoma OR (3)  aged 48 and older OR (3)  American aged 72 and older  15. Osteoporosis Screening: covered every 2 years if you meet one of the following conditions: (1) Have a vertebral abnormality; (2) On glucocorticoid therapy for more than 3 months; (3) Have primary hyperparathyroidism; (4) On osteoporosis medications and need to assess response to drug therapy. 15. HIV Screening: covered annually if you're between the age of 14-79. Also covered annually if you are younger than 13 and older than 72 with risk factors for HIV infection. For pregnant patients, it is covered up to 3 times per pregnancy. Immunizations:  Immunization Recommendations   Influenza Vaccine Annual influenza vaccination during flu season is recommended for all persons aged >= 6 months who do not have contraindications   Pneumococcal Vaccine   * Pneumococcal conjugate vaccine = PCV13 (Prevnar 13), PCV15 (Vaxneuvance), PCV20 (Prevnar 20)  * Pneumococcal polysaccharide vaccine = PPSV23 (Pneumovax) Adults 20-63 years old: 1-3 doses may be recommended based on certain risk factors  Adults 72 years old: 1-2 doses may be recommended based off what pneumonia vaccine you previously received   Hepatitis B Vaccine 3 dose series if at intermediate or high risk (ex: diabetes, end stage renal disease, liver disease)   Tetanus (Td) Vaccine - COST NOT COVERED BY MEDICARE PART B Following completion of primary series, a booster dose should be given every 10 years to maintain immunity against tetanus. Td may also be given as tetanus wound prophylaxis. Tdap Vaccine - COST NOT COVERED BY MEDICARE PART B Recommended at least once for all adults. For pregnant patients, recommended with each pregnancy.    Shingles Vaccine (Shingrix) - COST NOT COVERED BY MEDICARE PART B  2 shot series recommended in those aged 48 and above     Health Maintenance Due:      Topic Date Due   • Colorectal Cancer Screening  07/27/2025   • HIV Screening  Completed   • Hepatitis C Screening  Completed     Immunizations Due:      Topic Date Due   • COVID-19 Vaccine (5 - Pfizer series) 02/08/2023   • Influenza Vaccine (1) 09/01/2023     Advance Directives   What are advance directives? Advance directives are legal documents that state your wishes and plans for medical care. These plans are made ahead of time in case you lose your ability to make decisions for yourself. Advance directives can apply to any medical decision, such as the treatments you want, and if you want to donate organs. What are the types of advance directives? There are many types of advance directives, and each state has rules about how to use them. You may choose a combination of any of the following:  · Living will: This is a written record of the treatment you want. You can also choose which treatments you do not want, which to limit, and which to stop at a certain time. This includes surgery, medicine, IV fluid, and tube feedings. · Durable power of  for healthcare [facility]): This is a written record that states who you want to make healthcare choices for you when you are unable to make them for yourself. This person, called a proxy, is usually a family member or a friend. You may choose more than 1 proxy. · Do not resuscitate (DNR) order:  A DNR order is used in case your heart stops beating or you stop breathing. It is a request not to have certain forms of treatment, such as CPR. A DNR order may be included in other types of advance directives. · Medical directive: This covers the care that you want if you are in a coma, near death, or unable to make decisions for yourself. You can list the treatments you want for each condition. Treatment may include pain medicine, surgery, blood transfusions, dialysis, IV or tube feedings, and a ventilator (breathing machine).   · Values history: This document has questions about your views, beliefs, and how you feel and think about life. This information can help others choose the care that you would choose. Why are advance directives important? An advance directive helps you control your care. Although spoken wishes may be used, it is better to have your wishes written down. Spoken wishes can be misunderstood, or not followed. Treatments may be given even if you do not want them. An advance directive may make it easier for your family to make difficult choices about your care. Weight Management   Why it is important to manage your weight:  Being overweight increases your risk of health conditions such as heart disease, high blood pressure, type 2 diabetes, and certain types of cancer. It can also increase your risk for osteoarthritis, sleep apnea, and other respiratory problems. Aim for a slow, steady weight loss. Even a small amount of weight loss can lower your risk of health problems. How to lose weight safely:  A safe and healthy way to lose weight is to eat fewer calories and get regular exercise. You can lose up about 1 pound a week by decreasing the number of calories you eat by 500 calories each day. Healthy meal plan for weight management:  A healthy meal plan includes a variety of foods, contains fewer calories, and helps you stay healthy. A healthy meal plan includes the following:  · Eat whole-grain foods more often. A healthy meal plan should contain fiber. Fiber is the part of grains, fruits, and vegetables that is not broken down by your body. Whole-grain foods are healthy and provide extra fiber in your diet. Some examples of whole-grain foods are whole-wheat breads and pastas, oatmeal, brown rice, and bulgur. · Eat a variety of vegetables every day. Include dark, leafy greens such as spinach, kale, shannen greens, and mustard greens. Eat yellow and orange vegetables such as carrots, sweet potatoes, and winter squash. · Eat a variety of fruits every day. Choose fresh or canned fruit (canned in its own juice or light syrup) instead of juice. Fruit juice has very little or no fiber. · Eat low-fat dairy foods. Drink fat-free (skim) milk or 1% milk. Eat fat-free yogurt and low-fat cottage cheese. Try low-fat cheeses such as mozzarella and other reduced-fat cheeses. · Choose meat and other protein foods that are low in fat. Choose beans or other legumes such as split peas or lentils. Choose fish, skinless poultry (chicken or turkey), or lean cuts of red meat (beef or pork). Before you cook meat or poultry, cut off any visible fat. · Use less fat and oil. Try baking foods instead of frying them. Add less fat, such as margarine, sour cream, regular salad dressing and mayonnaise to foods. Eat fewer high-fat foods. Some examples of high-fat foods include french fries, doughnuts, ice cream, and cakes. · Eat fewer sweets. Limit foods and drinks that are high in sugar. This includes candy, cookies, regular soda, and sweetened drinks. Exercise:  Exercise at least 30 minutes per day on most days of the week. Some examples of exercise include walking, biking, dancing, and swimming. You can also fit in more physical activity by taking the stairs instead of the elevator or parking farther away from stores. Ask your healthcare provider about the best exercise plan for you. © Copyright [employer] 2018 Information is for End User's use only and may not be sold, redistributed or otherwise used for commercial purposes. All illustrations and images included in CareNotes® are the copyrighted property of A.D.A[employer]M., Inc. or [facility] Preventive Visit Patient Instructions  Thank you for completing your Welcome to Medicare Visit or Medicare Annual Wellness Visit today. Your next wellness visit will be due in one year (7/6/2024).   The screening/preventive services that you may require over the next 5-10 years are detailed below. Some tests may not apply to you based off risk factors and/or age. Screening tests ordered at today's visit but not completed yet may show as past due. Also, please note that scanned in results may not display below. Preventive Screenings:  Service Recommendations Previous Testing/Comments   Colorectal Cancer Screening  · Colonoscopy    · Fecal Occult Blood Test (FOBT)/Fecal Immunochemical Test (FIT)  · Fecal DNA/Cologuard Test  · Flexible Sigmoidoscopy Age: 43-73 years old   Colonoscopy: every 10 years (May be performed more frequently if at higher risk)  OR  FOBT/FIT: every 1 year  OR  Cologuard: every 3 years  OR  Sigmoidoscopy: every 5 years  Screening may be recommended earlier than age 39 if at higher risk for colorectal cancer. Also, an individualized decision between you and your healthcare provider will decide whether screening between the ages of 77-80 would be appropriate. Colonoscopy: 07/28/2022  FOBT/FIT: Not on file  Cologuard: Not on file  Sigmoidoscopy: Not on file    Screening Current     Prostate Cancer Screening Individualized decision between patient and health care provider in men between ages of 53-66   Medicare will cover every 12 months beginning on the day after your 50th birthday PSA: 1.0 ng/mL     Screening Not Indicated     Hepatitis C Screening Once for adults born between 1945 and 1965  More frequently in patients at high risk for Hepatitis C Hep C Antibody: 04/06/2021    Screening Current   Diabetes Screening 1-2 times per year if you're at risk for diabetes or have pre-diabetes Fasting glucose: 115 mg/dL (2/16/2023)  A1C: No results in last 5 years (No results in last 5 years)  Screening Current   Cholesterol Screening Once every 5 years if you don't have a lipid disorder. May order more often based on risk factors.  Lipid panel: 07/26/2022  Screening Not Indicated  History Lipid Disorder      Other Preventive Screenings Covered by Medicare:  12. Abdominal Aortic Aneurysm (AAA) Screening: covered once if your at risk. You're considered to be at risk if you have a family history of AAA or a male between the age of 70-76 who smoking at least 100 cigarettes in your lifetime. 17. Lung Cancer Screening: covers low dose CT scan once per year if you meet all of the following conditions: (1) Age 48-67; (2) No signs or symptoms of lung cancer; (3) Current smoker or have quit smoking within the last 15 years; (4) You have a tobacco smoking history of at least 20 pack years (packs per day x number of years you smoked); (5) You get a written order from a healthcare provider. 25. Glaucoma Screening: covered annually if you're considered high risk: (1) You have diabetes OR (2) Family history of glaucoma OR (3)  aged 48 and older OR (3)  American aged 72 and older  23. Osteoporosis Screening: covered every 2 years if you meet one of the following conditions: (1) Have a vertebral abnormality; (2) On glucocorticoid therapy for more than 3 months; (3) Have primary hyperparathyroidism; (4) On osteoporosis medications and need to assess response to drug therapy. 20. HIV Screening: covered annually if you're between the age of 14-79. Also covered annually if you are younger than 13 and older than 72 with risk factors for HIV infection. For pregnant patients, it is covered up to 3 times per pregnancy.     Immunizations:  Immunization Recommendations   Influenza Vaccine Annual influenza vaccination during flu season is recommended for all persons aged >= 6 months who do not have contraindications   Pneumococcal Vaccine   * Pneumococcal conjugate vaccine = PCV13 (Prevnar 13), PCV15 (Vaxneuvance), PCV20 (Prevnar 20)  * Pneumococcal polysaccharide vaccine = PPSV23 (Pneumovax) Adults 20-63 years old: 1-3 doses may be recommended based on certain risk factors  Adults 72 years old: 1-2 doses may be recommended based off what pneumonia vaccine you previously received Hepatitis B Vaccine 3 dose series if at intermediate or high risk (ex: diabetes, end stage renal disease, liver disease)   Tetanus (Td) Vaccine - COST NOT COVERED BY MEDICARE PART B Following completion of primary series, a booster dose should be given every 10 years to maintain immunity against tetanus. Td may also be given as tetanus wound prophylaxis. Tdap Vaccine - COST NOT COVERED BY MEDICARE PART B Recommended at least once for all adults. For pregnant patients, recommended with each pregnancy. Shingles Vaccine (Shingrix) - COST NOT COVERED BY MEDICARE PART B  2 shot series recommended in those aged 48 and above     Health Maintenance Due:      Topic Date Due   • Colorectal Cancer Screening  07/27/2025   • HIV Screening  Completed   • Hepatitis C Screening  Completed     Immunizations Due:      Topic Date Due   • COVID-19 Vaccine (5 - Pfizer series) 02/08/2023   • Influenza Vaccine (1) 09/01/2023     Advance Directives   What are advance directives? Advance directives are legal documents that state your wishes and plans for medical care. These plans are made ahead of time in case you lose your ability to make decisions for yourself. Advance directives can apply to any medical decision, such as the treatments you want, and if you want to donate organs. What are the types of advance directives? There are many types of advance directives, and each state has rules about how to use them. You may choose a combination of any of the following:  · Living will: This is a written record of the treatment you want. You can also choose which treatments you do not want, which to limit, and which to stop at a certain time. This includes surgery, medicine, IV fluid, and tube feedings. · Durable power of  for healthcare [facility]): This is a written record that states who you want to make healthcare choices for you when you are unable to make them for yourself.  This person, called a proxy, is usually a family member or a friend. You may choose more than 1 proxy. · Do not resuscitate (DNR) order:  A DNR order is used in case your heart stops beating or you stop breathing. It is a request not to have certain forms of treatment, such as CPR. A DNR order may be included in other types of advance directives. · Medical directive: This covers the care that you want if you are in a coma, near death, or unable to make decisions for yourself. You can list the treatments you want for each condition. Treatment may include pain medicine, surgery, blood transfusions, dialysis, IV or tube feedings, and a ventilator (breathing machine). · Values history: This document has questions about your views, beliefs, and how you feel and think about life. This information can help others choose the care that you would choose. Why are advance directives important? An advance directive helps you control your care. Although spoken wishes may be used, it is better to have your wishes written down. Spoken wishes can be misunderstood, or not followed. Treatments may be given even if you do not want them. An advance directive may make it easier for your family to make difficult choices about your care. Weight Management   Why it is important to manage your weight:  Being overweight increases your risk of health conditions such as heart disease, high blood pressure, type 2 diabetes, and certain types of cancer. It can also increase your risk for osteoarthritis, sleep apnea, and other respiratory problems. Aim for a slow, steady weight loss. Even a small amount of weight loss can lower your risk of health problems. How to lose weight safely:  A safe and healthy way to lose weight is to eat fewer calories and get regular exercise. You can lose up about 1 pound a week by decreasing the number of calories you eat by 500 calories each day.    Healthy meal plan for weight management:  A healthy meal plan includes a variety of foods, contains fewer calories, and helps you stay healthy. A healthy meal plan includes the following:  · Eat whole-grain foods more often. A healthy meal plan should contain fiber. Fiber is the part of grains, fruits, and vegetables that is not broken down by your body. Whole-grain foods are healthy and provide extra fiber in your diet. Some examples of whole-grain foods are whole-wheat breads and pastas, oatmeal, brown rice, and bulgur. · Eat a variety of vegetables every day. Include dark, leafy greens such as spinach, kale, shannen greens, and mustard greens. Eat yellow and orange vegetables such as carrots, sweet potatoes, and winter squash. · Eat a variety of fruits every day. Choose fresh or canned fruit (canned in its own juice or light syrup) instead of juice. Fruit juice has very little or no fiber. · Eat low-fat dairy foods. Drink fat-free (skim) milk or 1% milk. Eat fat-free yogurt and low-fat cottage cheese. Try low-fat cheeses such as mozzarella and other reduced-fat cheeses. · Choose meat and other protein foods that are low in fat. Choose beans or other legumes such as split peas or lentils. Choose fish, skinless poultry (chicken or turkey), or lean cuts of red meat (beef or pork). Before you cook meat or poultry, cut off any visible fat. · Use less fat and oil. Try baking foods instead of frying them. Add less fat, such as margarine, sour cream, regular salad dressing and mayonnaise to foods. Eat fewer high-fat foods. Some examples of high-fat foods include french fries, doughnuts, ice cream, and cakes. · Eat fewer sweets. Limit foods and drinks that are high in sugar. This includes candy, cookies, regular soda, and sweetened drinks. Exercise:  Exercise at least 30 minutes per day on most days of the week. Some examples of exercise include walking, biking, dancing, and swimming. You can also fit in more physical activity by taking the stairs instead of the elevator or parking farther away from stores. Ask your healthcare provider about the best exercise plan for you. © Copyright [employer] 2018 Information is for End User's use only and may not be sold, redistributed or otherwise used for commercial purposes.  All illustrations and images included in CareNotes® are the copyrighted property of [employer] or [address] [place] [address]

## 2023-07-06 NOTE — PROGRESS NOTES
Assessment and Plan:     Problem List Items Addressed This Visit    None  Visit Diagnoses     Medicare annual wellness visit, subsequent    -  Primary           Preventive health issues were discussed with patient, and age appropriate screening tests were ordered as noted in patient's After Visit Summary. Personalized health advice and appropriate referrals for health education or preventive services given if needed, as noted in patient's After Visit Summary. History of Present Illness:     Patient presents for a Medicare Wellness Visit    HPI   Patient Care Team:  Karla Garces DO as PCP - General (Family Medicine)  Jolene Mac MD (Inactive)  MD Kavon Goyal MD (Gastroenterology)     Review of Systems:     Review of Systems     Problem List:     Patient Active Problem List   Diagnosis   • Cerebral palsy [facility])   • GERD (gastroesophageal reflux disease)   • MDD (major depressive disorder)   • H/O ETOH abuse   • Anxiety   • Ambulatory dysfunction   • Hyperlipidemia   • Hydronephrosis, right, due to ureteral calculus   • Chronic left shoulder pain      Past Medical and Surgical History:     Past Medical History:   Diagnosis Date   • Acid reflux    • Anesthesia complication     woke up twice during anesthesia   • Anxiety    • Arthritis    • Cerebral palsy ([address])    • GERD (gastroesophageal reflux disease)    • H/O ETOH abuse    • Psychiatric disorder     anxiety, depression   • Ulcer      Past Surgical History:   Procedure Laterality Date   • ESOPHAGOGASTRODUODENOSCOPY N/A 5/9/2016    Procedure: ESOPHAGOGASTRODUODENOSCOPY (EGD); Surgeon: Kavon Otto MD;  Location: [facility];   Service:    • FOOT SURGERY Bilateral     hardware   • HIP SURGERY     • OTHER SURGICAL HISTORY Bilateral     lower extremity ligiment extensions-multiple   • DC SURGICAL ARTHROSCOPY SHOULDER W/ROTATOR CUFF RPR Right 6/4/2021    Procedure: SHOULDER ARTHROSCOPIC ROTATOR CUFF REPAIR, SUBACROMIAL DECOMPRESSION; Surgeon: Yuridia Soto MD;  Location: [facility];  Service: Orthopedics   • UPPER GASTROINTESTINAL ENDOSCOPY        Family History:     Family History   Problem Relation Age of Onset   • No Known Problems Mother    • No Known Problems Father    • Cancer Maternal Grandmother    • Cancer Maternal Grandfather    • Cancer Maternal Aunt       Social History:     Social History     Socioeconomic History   • Marital status: Single     Spouse name: None   • Number of children: None   • Years of education: None   • Highest education level: None   Occupational History   • None   Tobacco Use   • Smoking status: Former     Packs/day: 3.00     Years: 15.00     Total pack years: 45.00     Types: Cigarettes     Quit date: 1994     Years since quittin.4     Passive exposure: Past   • Smokeless tobacco: Never   Vaping Use   • Vaping Use: Never used   Substance and Sexual Activity   • Alcohol use: Not Currently     Comment: 2015 quit   • Drug use: Yes     Frequency: 7.0 times per week     Types: Marijuana     Comment: Spends $100 a month, smoke once for sleeping   • Sexual activity: Not Currently   Other Topics Concern   • None   Social History Narrative   • None     Social Determinants of Health     Financial Resource Strain: Low Risk  (2023)    Overall Financial Resource Strain (CARDIA)    • Difficulty of Paying Living Expenses: Not hard at all   Recent Concern: Financial Resource Strain - High Risk (7/3/2023)    Overall Financial Resource Strain (CARDIA)    • Difficulty of Paying Living Expenses: Very hard   Food Insecurity: No Food Insecurity (2023)    Hunger Vital Sign    • Worried About Running Out of Food in the Last Year: Never true    • Ran Out of Food in the Last Year: Never true   Transportation Needs: No Transportation Needs (7/3/2023)    PRAPARE - Transportation    • Lack of Transportation (Medical): No    • Lack of Transportation (Non-Medical):  No   Physical Activity: Inactive (2023) Exercise Vital Sign    • Days of Exercise per Week: 0 days    • Minutes of Exercise per Session: 0 min   Stress: No Stress Concern Present (7/6/2023)    [address]    • Feeling of Stress : Not at all   Social Connections: Socially Isolated (7/6/2023)    Social Connection and Isolation Panel [NHANES]    • Frequency of Communication with Friends and Family: More than three times a week    • Frequency of Social Gatherings with Friends and Family: More than three times a week    • Attends [employer] Services: Never    • Active Member of Clubs or Organizations: No    • Attends Club or Organization Meetings: Never    • Marital Status: Never    Intimate Partner Violence: Not At Risk (7/6/2023)    Humiliation, Afraid, Rape, and Kick questionnaire    • Fear of Current or Ex-Partner: No    • Emotionally Abused: No    • Physically Abused: No    • Sexually Abused: No   Housing Stability: Unknown (7/6/2023)    Housing Stability Vital Sign    • Unable to Pay for Housing in the Last Year: No    • Number of [address] in the Last Year: Not on file    • Unstable Housing in the Last Year: No      Medications and Allergies:     Current Outpatient Medications   Medication Sig Dispense Refill   • ergocalciferol (VITAMIN D2) 50,000 units daily   0   • famotidine (PEPCID) 40 MG tablet Take 1 tablet (40 mg total) by mouth 2 (two) times a day 30 tablet 6   • Multiple Vitamins-Minerals (CENTRUM ADULTS PO) Centrum       No current facility-administered medications for this visit.      Allergies   Allergen Reactions   • Aspirin GI Bleeding and Vomiting     Other reaction(s): Unknown  Patient has ulcers and does not take aspirin       Immunizations:     Immunization History   Administered Date(s) Administered   • COVID-19 MODERNA VACC 0.5 ML IM 12/14/2022   • COVID-19 PFIZER VACCINE 0.3 ML IM 01/31/2021, 02/21/2021, 10/14/2021   • INFLUENZA 10/13/2020, 10/01/2021, 11/23/2022   • Influenza Injectable, MDCK, Preservative Free, Quadrivalent, 0.5 mL 10/13/2020   • Tdap 10/24/2019      Health Maintenance:         Topic Date Due   • Colorectal Cancer Screening  07/27/2025   • HIV Screening  Completed   • Hepatitis C Screening  Completed         Topic Date Due   • COVID-19 Vaccine (5 - Pfizer series) 02/08/2023   • Influenza Vaccine (1) 09/01/2023      Medicare Screening Tests and Risk Assessments:     Abhinav Vasquez is here for his Subsequent Wellness visit. Health Risk Assessment:   Patient rates overall health as good. Patient feels that their physical health rating is slightly better. Patient is satisfied with their life. Eyesight was rated as same. Hearing was rated as same. Patient feels that their emotional and mental health rating is much better. Patients states they are never, rarely angry. Patient states they are never, rarely unusually tired/fatigued. Pain experienced in the last 7 days has been some. Patient's pain rating has been 2/10. Patient states that he has experienced no weight loss or gain in last 6 months. Depression Screening:   PHQ-2 Score: 0  PHQ-9 Score: 0      Fall Risk Screening: In the past year, patient has experienced: no history of falling in past year      Home Safety:  Patient does not have trouble with stairs inside or outside of their home. Patient has working smoke alarms and has working carbon monoxide detector. Home safety hazards include: none. Nutrition:   Current diet is Regular. Medications:   Patient is currently taking over-the-counter supplements. OTC medications include: Multivitamin & Vitamin D. Patient is able to manage medications. Activities of Daily Living (ADLs)/Instrumental Activities of Daily Living (IADLs):   Walk and transfer into and out of bed and chair?: Yes  Dress and groom yourself?: Yes    Bathe or shower yourself?: Yes    Feed yourself?  Yes  Do your laundry/housekeeping?: Yes  Manage your money, pay your bills and track your expenses?: Yes  Make your own meals?: Yes    Do your own shopping?: Yes    Previous Hospitalizations:   Any hospitalizations or ED visits within the last 12 months?: No      Advance Care Planning:   Living will: Yes    Durable POA for healthcare: Yes    Advanced directive: Yes      PREVENTIVE SCREENINGS      Cardiovascular Screening:    General: Screening Not Indicated and History Lipid Disorder      Diabetes Screening:     General: Screening Current      Colorectal Cancer Screening:     General: Screening Current      Prostate Cancer Screening:    General: Screening Not Indicated      Abdominal Aortic Aneurysm (AAA) Screening:    Risk factors include: tobacco use        Lung Cancer Screening:     General: Screening Not Indicated      Hepatitis C Screening:    General: Screening Current    Screening, Brief Intervention, and Referral to Treatment (SBIRT)    Screening  Typical number of drinks in a day: 0  Typical number of drinks in a week: 0  Interpretation: Low risk drinking behavior. AUDIT-C Screenin) How often did you have a drink containing alcohol in the past year? never  2) How many drinks did you have on a typical day when you were drinking in the past year? 0  3) How often did you have 6 or more drinks on one occasion in the past year? never    AUDIT-C Score: 0  Interpretation: Score 0-3 (male): Negative screen for alcohol misuse    Single Item Drug Screening:  How often have you used an illegal drug (including marijuana) or a prescription medication for non-medical reasons in the past year? never    Single Item Drug Screen Score: 0  Interpretation: Negative screen for possible drug use disorder    No results found.      Physical Exam:     /76 (BP Location: Left arm, Patient Position: Sitting, Cuff Size: Standard)   Pulse 87   Temp 98.8 °F (37.1 °C) (Tympanic Core)   Resp 18   Ht 5' 10" (1.778 m)   Wt 90.3 kg (199 lb)   SpO2 96%   BMI 28.55 kg/m²     Physical Exam Kike Higuera for HPI/ROS submitted by the patient on 7/3/2023  How would you rate your overall health?: good  Compared to last year, how is your physical health?: slightly better  In general, how satisfied are you with your life?: satisfied  Compared to last year, how is your eyesight?: same  Compared to last year, how is your hearing?: same  Compared to last year, how is your emotional/mental health?: much better  How often is anger a problem for you?: never, rarely  How often do you feel unusually tired/fatigued?: never, rarely  In the past 7 days, how much pain have you experienced?: some  If you answered "some" or "a lot", please rate the severity of your pain on a scale of 1 to 10 (1 being the least severe pain and 10 being the most intense pain). : 2/10  In the past 6 months, have you lost or gained 10 pounds without trying?: No  One or more falls in the last year: No  Do you have trouble with the stairs inside or outside your home?: No  Does your home have working smoke alarms?: Yes  Does your home have a carbon monoxide monitor?: Yes  Which safety hazards (if any) have you experienced in your home? Please select all that apply.: none  How would you describe your current diet?  Please select all that apply.: Regular  In addition to prescription medications, are you taking any over-the-counter supplements?: Yes  If yes, what supplements are you taking?: Multivitamin & Vitamin D  Can you manage your medications?: Yes  Are you currently taking any opioid medications?: No  Can you walk and transfer into and out of your bed and chair?: Yes  Can you dress and groom yourself?: Yes  Can you bathe or shower yourself?: Yes  Can you feed yourself?: Yes  Can you do your laundry/ housekeeping?: Yes  Can you manage your money, pay your bills, and track your expenses?: Yes  Can you make your own meals?: Yes  Can you do your own shopping?: Yes  Within the last 12 months, have you had any hospitalizations or Emergency Department visits?: No  Do you have a living will?: Yes  Do you have a Durable POA (Power of ) for healthcare decisions?: Yes  Do you have an Advanced Directive for end of life decisions?: Yes  How often have you used an illegal drug (including marijuana) or a prescription medication for non-medical reasons in the past year?: never  What is the typical number of drinks you consume in a day?: 0  What is the typical number of drinks you consume in a week?: 0  How often did you have a drink containing alcohol in the past year?: never  How many drinks did you have on a typical day  when you were drinking in the past year?: 0  How often did you have 6 or more drinks on one occasion in the past year?: never

## 2023-07-06 NOTE — LETTER
July 6, 2023     Patient: Casi Ariza  YOB: 1970  Date of Visit: 7/6/2023      To Whom it May Concern:    Vicki Piyushhanna is under my professional care. Shannon Erazo was seen in my office on 7/6/2023. Shannon Erazo claims that he hasn't had alcohol drinks since January of 2015. Pt has history of alcohol abuse prior to 2015, but he has not had any ED visits or Doctors visits that contradicts his claim looking back past 8 and a half year on his charts. If you have any questions or concerns, please don't hesitate to call.          Sincerely,          Brandon German MD        CC: No Recipients

## 2023-07-06 NOTE — PROGRESS NOTES
Assessment and Plan:     Problem List Items Addressed This Visit        Other    Hyperlipidemia    Relevant Orders    Lipid panel   Other Visit Diagnoses     Medicare annual wellness visit, subsequent    -  Primary    Elevated fasting blood sugar        Relevant Orders    Hemoglobin A1C        BMI Counseling: Body mass index is 28.55 kg/m². The BMI is above normal. Nutrition recommendations include decreasing portion sizes, encouraging healthy choices of fruits and vegetables, decreasing fast food intake, consuming healthier snacks, limiting drinks that contain sugar, moderation in carbohydrate intake, increasing intake of lean protein, reducing intake of saturated and trans fat and reducing intake of cholesterol. Exercise recommendations include moderate physical activity 150 minutes/week and exercising 3-5 times per week. No pharmacotherapy was ordered. Patient referred to PCP. Rationale for BMI follow-up plan is due to patient being overweight or obese. Preventive health issues were discussed with patient, and age appropriate screening tests were ordered as noted in patient's After Visit Summary. Personalized health advice and appropriate referrals for health education or preventive services given if needed, as noted in patient's After Visit Summary. History of Present Illness:     Patient presents for a Medicare Wellness Visit      Patient Care Team:  Nataly Roldan DO as PCP - General (Family Medicine)  Artemus Cushing, MD (Inactive)  MD Arvind Dumont MD (Gastroenterology)     Review of Systems:     Review of Systems   Constitutional: Negative for chills and fever. HENT: Negative for ear pain and sore throat. Eyes: Negative for pain and visual disturbance. Respiratory: Negative for cough and shortness of breath. Cardiovascular: Negative for chest pain and palpitations. Gastrointestinal: Negative for abdominal pain, constipation, diarrhea, nausea and vomiting. Genitourinary: Negative for dysuria and hematuria. Musculoskeletal: Negative for arthralgias, back pain, neck pain and neck stiffness. Skin: Negative for color change and rash. Neurological: Positive for weakness (cerebral palsy, weakness on LE). Negative for dizziness, seizures, syncope and headaches. Psychiatric/Behavioral: Negative for agitation, behavioral problems and confusion. The patient is not nervous/anxious. All other systems reviewed and are negative. Problem List:     Patient Active Problem List   Diagnosis   • Cerebral palsy (HCC)   • GERD (gastroesophageal reflux disease)   • MDD (major depressive disorder)   • H/O ETOH abuse   • Anxiety   • Ambulatory dysfunction   • Hyperlipidemia   • Hydronephrosis, right, due to ureteral calculus   • Chronic left shoulder pain      Past Medical and Surgical History:     Past Medical History:   Diagnosis Date   • Acid reflux    • Anesthesia complication     woke up twice during anesthesia   • Anxiety    • Arthritis    • Cerebral palsy (HCC)    • GERD (gastroesophageal reflux disease)    • H/O ETOH abuse    • Psychiatric disorder     anxiety, depression   • Ulcer      Past Surgical History:   Procedure Laterality Date   • ESOPHAGOGASTRODUODENOSCOPY N/A 5/9/2016    Procedure: ESOPHAGOGASTRODUODENOSCOPY (EGD); Surgeon: See Lozano MD;  Location: [facility] GI LAB;   Service:    • FOOT SURGERY Bilateral     hardware   • HIP SURGERY     • OTHER SURGICAL HISTORY Bilateral     lower extremity ligiment extensions-multiple   • [place] SURGICAL ARTHROSCOPY SHOULDER W/ROTATOR CUFF RPR Right 6/4/2021    Procedure: SHOULDER ARTHROSCOPIC ROTATOR CUFF REPAIR, SUBACROMIAL DECOMPRESSION;  Surgeon: Ryan Avalos MD;  Location: [facility] OR;  Service: Orthopedics   • UPPER GASTROINTESTINAL ENDOSCOPY        Family History:     Family History   Problem Relation Age of Onset   • No Known Problems Mother    • No Known Problems Father    • Cancer Maternal Grandmother    • Cancer Maternal Grandfather    • Cancer Maternal Aunt       Social History:     Social History     Socioeconomic History   • Marital status: Single     Spouse name: None   • Number of children: None   • Years of education: None   • Highest education level: None   Occupational History   • None   Tobacco Use   • Smoking status: Former     Packs/day: 3.00     Years: 15.00     Total pack years: 45.00     Types: Cigarettes     Quit date: 1994     Years since quittin.4     Passive exposure: Past   • Smokeless tobacco: Never   Vaping Use   • Vaping Use: Never used   Substance and Sexual Activity   • Alcohol use: Not Currently     Comment: 2015 quit   • Drug use: Yes     Frequency: 7.0 times per week     Types: Marijuana     Comment: Spends $100 a month, smoke once for sleeping   • Sexual activity: Not Currently   Other Topics Concern   • None   Social History Narrative   • None     Social Determinants of Health     Financial Resource Strain: Low Risk  (2023)    Overall Financial Resource Strain (CARDIA)    • Difficulty of Paying Living Expenses: Not hard at all   Recent Concern: Financial Resource Strain - High Risk (7/3/2023)    Overall Financial Resource Strain (CARDIA)    • Difficulty of Paying Living Expenses: Very hard   Food Insecurity: No Food Insecurity (2023)    Hunger Vital Sign    • Worried About Running Out of Food in the Last Year: Never true    • Ran Out of Food in the Last Year: Never true   Transportation Needs: No Transportation Needs (7/3/2023)    PRAPARE - Transportation    • Lack of Transportation (Medical): No    • Lack of Transportation (Non-Medical):  No   Physical Activity: Inactive (2023)    Exercise Vital Sign    • Days of Exercise per Week: 0 days    • Minutes of Exercise per Session: 0 min   Stress: No Stress Concern Present (2023)    [address] [place] [address]    • Feeling of Stress : Not at all   Social Connections: Socially Isolated (7/6/2023)    Social Connection and Isolation Panel [NHANES]    • Frequency of Communication with Friends and Family: More than three times a week    • Frequency of Social Gatherings with Friends and Family: More than three times a week    • Attends Mandaen Services: Never    • Active Member of Clubs or Organizations: No    • Attends Club or Organization Meetings: Never    • Marital Status: Never    Intimate Partner Violence: Not At Risk (7/6/2023)    Humiliation, Afraid, Rape, and Kick questionnaire    • Fear of Current or Ex-Partner: No    • Emotionally Abused: No    • Physically Abused: No    • Sexually Abused: No   Housing Stability: Unknown (7/6/2023)    Housing Stability Vital Sign    • Unable to Pay for Housing in the Last Year: No    • Number of [address] in the Last Year: Not on file    • Unstable Housing in the Last Year: No      Medications and Allergies:     Current Outpatient Medications   Medication Sig Dispense Refill   • ergocalciferol (VITAMIN D2) 50,000 units daily   0   • famotidine (PEPCID) 40 MG tablet Take 1 tablet (40 mg total) by mouth 2 (two) times a day 30 tablet 6   • Multiple Vitamins-Minerals (CENTRUM ADULTS PO) Centrum       No current facility-administered medications for this visit.      Allergies   Allergen Reactions   • Aspirin GI Bleeding and Vomiting     Other reaction(s): Unknown  Patient has ulcers and does not take aspirin       Immunizations:     Immunization History   Administered Date(s) Administered   • COVID-19 MODERNA VACC 0.5 ML IM 12/14/2022   • COVID-19 PFIZER VACCINE 0.3 ML IM 01/31/2021, 02/21/2021, 10/14/2021   • INFLUENZA 10/13/2020, 10/01/2021, 11/23/2022   • Influenza Injectable, MDCK, Preservative Free, Quadrivalent, 0.5 mL 10/13/2020   • Tdap 10/24/2019      Health Maintenance:         Topic Date Due   • Colorectal Cancer Screening  07/27/2025   • HIV Screening  Completed   • Hepatitis C Screening  Completed Topic Date Due   • COVID-19 Vaccine (5 - Pfizer series) 02/08/2023   • Influenza Vaccine (1) 09/01/2023      Medicare Screening Tests and Risk Assessments:         Health Risk Assessment:   Patient rates overall health as good. Patient feels that their physical health rating is slightly better. Patient is satisfied with their life. Eyesight was rated as same. Hearing was rated as same. Patient feels that their emotional and mental health rating is much better. Patients states they are never, rarely angry. Patient states they are never, rarely unusually tired/fatigued. Pain experienced in the last 7 days has been some. Patient's pain rating has been 2/10. Patient states that he has experienced no weight loss or gain in last 6 months. Depression Screening:   PHQ-2 Score: 0  PHQ-9 Score: 0      Fall Risk Screening: In the past year, patient has experienced: no history of falling in past year      Home Safety:  Patient does not have trouble with stairs inside or outside of their home. Patient has working smoke alarms and has working carbon monoxide detector. Home safety hazards include: none. Nutrition:   Current diet is Regular. Medications:   Patient is currently taking over-the-counter supplements. OTC medications include: Multivitamin & Vitamin D. Patient is able to manage medications. Activities of Daily Living (ADLs)/Instrumental Activities of Daily Living (IADLs):   Walk and transfer into and out of bed and chair?: Yes  Dress and groom yourself?: Yes    Bathe or shower yourself?: Yes    Feed yourself? Yes  Do your laundry/housekeeping?: Yes  Manage your money, pay your bills and track your expenses?: Yes  Make your own meals?: Yes    Do your own shopping?: Yes    Previous Hospitalizations:   Any hospitalizations or ED visits within the last 12 months?: No      Advance Care Planning:   Living will: Yes    Durable POA for healthcare:  Yes    Advanced directive: Yes      PREVENTIVE SCREENINGS Cardiovascular Screening:    General: Screening Not Indicated and History Lipid Disorder      Diabetes Screening:     General: Screening Current      Colorectal Cancer Screening:     General: Screening Current      Prostate Cancer Screening:    General: Screening Not Indicated      Abdominal Aortic Aneurysm (AAA) Screening:    Risk factors include: tobacco use        Lung Cancer Screening:     General: Screening Not Indicated      Hepatitis C Screening:    General: Screening Current    Screening, Brief Intervention, and Referral to Treatment (SBIRT)    Screening  Typical number of drinks in a day: 0  Typical number of drinks in a week: 0  Interpretation: Low risk drinking behavior. AUDIT-C Screenin) How often did you have a drink containing alcohol in the past year? never  2) How many drinks did you have on a typical day when you were drinking in the past year? 0  3) How often did you have 6 or more drinks on one occasion in the past year? never    AUDIT-C Score: 0  Interpretation: Score 0-3 (male): Negative screen for alcohol misuse    Single Item Drug Screening:  How often have you used an illegal drug (including marijuana) or a prescription medication for non-medical reasons in the past year? never    Single Item Drug Screen Score: 0  Interpretation: Negative screen for possible drug use disorder    No results found. Physical Exam:     /76 (BP Location: Left arm, Patient Position: Sitting, Cuff Size: Standard)   Pulse 87   Temp 98.8 °F (37.1 °C) (Tympanic Core)   Resp 18   Ht 5' 10" (1.778 m)   Wt 90.3 kg (199 lb)   SpO2 96%   BMI 28.55 kg/m²     Physical Exam  Vitals reviewed. Constitutional:       General: He is not in acute distress. Appearance: Normal appearance. He is obese. He is not ill-appearing. HENT:      Head: Normocephalic and atraumatic.       Right Ear: Tympanic membrane, ear canal and external ear normal.      Left Ear: Tympanic membrane, ear canal and external ear normal.      Nose: Nose normal. No congestion or rhinorrhea. Mouth/Throat:      Mouth: Mucous membranes are moist.      Pharynx: Oropharynx is clear. No oropharyngeal exudate or posterior oropharyngeal erythema. Eyes:      General:         Right eye: No discharge. Left eye: No discharge. Extraocular Movements: Extraocular movements intact. Conjunctiva/sclera: Conjunctivae normal.      Pupils: Pupils are equal, round, and reactive to light. Cardiovascular:      Rate and Rhythm: Normal rate and regular rhythm. Pulses: Normal pulses. Heart sounds: Normal heart sounds. No murmur heard. No friction rub. No gallop. Pulmonary:      Effort: Pulmonary effort is normal. No respiratory distress. Breath sounds: Normal breath sounds. No stridor. No wheezing, rhonchi or rales. Chest:      Chest wall: No tenderness. Abdominal:      General: Abdomen is flat. Bowel sounds are normal. There is no distension. Palpations: Abdomen is soft. Tenderness: There is no abdominal tenderness. There is no guarding. Musculoskeletal:         General: No swelling, tenderness, deformity or signs of injury. Normal range of motion. Cervical back: Normal range of motion and neck supple. No deformity, signs of trauma or rigidity. Thoracic back: No deformity or signs of trauma. No scoliosis. Lumbar back: No deformity or signs of trauma. No scoliosis. Right lower leg: No edema. Left lower leg: No edema. Skin:     General: Skin is warm and dry. Capillary Refill: Capillary refill takes less than 2 seconds. Findings: No bruising, erythema, lesion or rash. Neurological:      General: No focal deficit present. Mental Status: He is alert and oriented to person, place, and time. Motor: Weakness (Bilateral LE due to cerebral palsy ) present. Gait: Gait abnormal (due to cerebral palsy, uses cane).       Deep Tendon Reflexes: Reflexes normal. Psychiatric:         Mood and Affect: Mood normal.         Behavior: Behavior normal.         Thought Content:  Thought content normal.          Antonia Hyman MD

## 2023-07-17 ENCOUNTER — APPOINTMENT (OUTPATIENT)
Dept: LAB | Facility: HOSPITAL | Age: 53
End: 2023-07-17
Payer: MEDICARE

## 2023-07-17 DIAGNOSIS — E78.2 MIXED HYPERLIPIDEMIA: ICD-10-CM

## 2023-07-17 DIAGNOSIS — R73.01 ELEVATED FASTING BLOOD SUGAR: ICD-10-CM

## 2023-07-17 LAB
CHOLEST SERPL-MCNC: 201 MG/DL
HDLC SERPL-MCNC: 37 MG/DL
LDLC SERPL CALC-MCNC: 98 MG/DL (ref 0–100)
NONHDLC SERPL-MCNC: 164 MG/DL
TRIGL SERPL-MCNC: 332 MG/DL

## 2023-07-17 PROCEDURE — 36415 COLL VENOUS BLD VENIPUNCTURE: CPT

## 2023-07-17 PROCEDURE — 80061 LIPID PANEL: CPT

## 2023-07-17 PROCEDURE — 83036 HEMOGLOBIN GLYCOSYLATED A1C: CPT

## 2023-07-18 ENCOUNTER — TELEPHONE (OUTPATIENT)
Dept: FAMILY MEDICINE CLINIC | Facility: CLINIC | Age: 53
End: 2023-07-18

## 2023-07-18 LAB
EST. AVERAGE GLUCOSE BLD GHB EST-MCNC: 108 MG/DL
HBA1C MFR BLD: 5.4 %

## 2023-07-18 RX ORDER — CHLORAL HYDRATE 500 MG
1000 CAPSULE ORAL DAILY
COMMUNITY

## 2023-07-18 NOTE — TELEPHONE ENCOUNTER
Pt was called to notify that his HbA1c is normal and he does not have DM. However, Pt's total cholesterol was little elevated, triglycerides was high, and HDL was low. Pt was advised with diet and lifestyle changes/exercise. Pt was also advised to continue taking fish oils. No need of statin at the moment due to ASCVD score of 3.2%. Pt acknowledged his understanding and answered all his questions.

## 2023-08-23 ENCOUNTER — OFFICE VISIT (OUTPATIENT)
Dept: GASTROENTEROLOGY | Facility: CLINIC | Age: 53
End: 2023-08-23
Payer: MEDICARE

## 2023-08-23 VITALS
SYSTOLIC BLOOD PRESSURE: 125 MMHG | BODY MASS INDEX: 28.23 KG/M2 | HEART RATE: 93 BPM | WEIGHT: 197.2 LBS | HEIGHT: 70 IN | DIASTOLIC BLOOD PRESSURE: 82 MMHG

## 2023-08-23 DIAGNOSIS — D69.6 PLATELETS DECREASED (HCC): ICD-10-CM

## 2023-08-23 DIAGNOSIS — Z86.010 HISTORY OF COLON POLYPS: ICD-10-CM

## 2023-08-23 DIAGNOSIS — K21.00 GASTROESOPHAGEAL REFLUX DISEASE WITH ESOPHAGITIS WITHOUT HEMORRHAGE: Primary | ICD-10-CM

## 2023-08-23 DIAGNOSIS — R13.19 ESOPHAGEAL DYSPHAGIA: ICD-10-CM

## 2023-08-23 PROCEDURE — 99214 OFFICE O/P EST MOD 30 MIN: CPT | Performed by: PHYSICIAN ASSISTANT

## 2023-08-23 NOTE — PATIENT INSTRUCTIONS
Scheduled date of EGD(as of today): 9/14/23  Physician performing EGD: Dr. Hanna Meza  Location of EGD: [facility]  Instructions reviewed with patient by: Phillip ARMAS  Clearances: n/a

## 2023-08-23 NOTE — LETTER
August 23, 2023     [place]pin, [address] [facility] [id number]    Patient: Apolonia Montana   YOB: 1970   Date of Visit: 8/23/2023       Dear Dr. Jose M Vicente: Thank you for referring Cristofer Greenwood to me for evaluation. Below are my notes for this consultation. If you have questions, please do not hesitate to call me. I look forward to following your patient along with you. Sincerely,        Erin Schultz PA-C        CC: No Recipients    Erin Schultz PA-C  8/23/2023  1:21 PM  Sign when Signing Visit  Assessment and Plan    #1. GERD with occasional esophageal dysphagia: patient was weaned off PPI and was on pepcid 20 mg BID with some control of symptoms for last year. Stopped his anxiety med and has been using marijuana daily which has helped with his anxiety and physical pain but he feels it worsened his reflux symptoms, was increased to pepcid 40 mg BID 2 months ago with some improvement. chan reports worse symptoms in last 2 weeks due to being sick with a cold   -continue pepcid 40 mg for now  -anti-reflux diet and measures  -will plan for EGD at this time, last EGD was 7 years ago, did have some esophagitis at that time. Will assess for any esophagitis or calles's esophagus that would warrant resuming PPI     #2. History of colon polyps  -repeat colonoscopy in 2025  --------------------------------------------------------------------------------------------------------------------    Chief Complaint: f/u GERD    HPI: Apolonia Montana is a 50 y.o. male with a history of GERD, esophagitis, cerebral palsy, HLD, and depression and anxiety who presents today for follow up. He was seen one year ago and was weaned off his PPI onto pepcid 20 mg BID. He reports relatively good control of symptoms with this but more recently he had worsening symptoms which he attributes to daily marijuana use for his anxiety and pain.  He also has had a cold for the last 2 weeks which has made the GERD worse due to post nasal drip. He denies nausea or vomiting normally but occasionally has solid food dysphagia. Denies abdominal pain. His bowels are normal typically although he reports abnormalities over the past 2 weeks while sick. Denies blood in stool or black stools. No unexplained weight loss. His last EGD was in 2016 with mild esophagitis and gastritis. He had a colonoscopy last year with polyps removed and was recommended repeat in 3 years. Review of Systems:   General: negative for fatigue, fever, night sweats or unexpected weight loss  Psychological: negative for depression; +anxiety  Ophthalmic: negative for blurry vision or scleral icterus  ENT: negative for headaches, oral lesions, sore throat, vocal changes  Hematological and Lymphatic: negative for pallor or swollen lymph nodes  Respiratory: negative for shortness of breath or wheezing; +cough  Cardiovascular: negative for chest pain, edema or murmur  Gastrointestinal: as mentioned in HPI  Genito-Urinary: negative for dysuria or incontinence  Musculoskeletal: negative for joint pain, joint stiffness or joint swelling  Dermatological: negative for pruritus, rash, or jaundice    Current Medications  Current Outpatient Medications   Medication Sig Dispense Refill   • ergocalciferol (VITAMIN D2) 50,000 units daily   0   • famotidine (PEPCID) 40 MG tablet Take 1 tablet (40 mg total) by mouth 2 (two) times a day 30 tablet 6   • Multiple Vitamins-Minerals (CENTRUM ADULTS PO) Centrum     • Omega-3 Fatty Acids (fish oil) 1,000 mg Take 1,000 mg by mouth daily       No current facility-administered medications for this visit.        Past Medical History  Past Medical History:   Diagnosis Date   • Acid reflux    • Anesthesia complication     woke up twice during anesthesia   • Anxiety    • Arthritis    • Cerebral palsy (HCC)    • GERD (gastroesophageal reflux disease)    • H/O ETOH abuse    • Psychiatric disorder     anxiety, depression   • Ulcer        Past Surgical History  Past Surgical History:   Procedure Laterality Date   • ESOPHAGOGASTRODUODENOSCOPY N/A 2016    Procedure: ESOPHAGOGASTRODUODENOSCOPY (EGD); Surgeon: Johnson Wallis MD;  Location[facility] GI LAB;   Service:    • FOOT SURGERY Bilateral     hardware   • HIP SURGERY     • OTHER SURGICAL HISTORY Bilateral     lower extremity ligiment extensions-multiple   • [place] SURGICAL ARTHROSCOPY SHOULDER W/ROTATOR CUFF RPR Right 2021    Procedure: SHOULDER ARTHROSCOPIC ROTATOR CUFF REPAIR, SUBACROMIAL DECOMPRESSION;  Surgeon: Carissa Altamirano MD;  Location: [facility] MAIN OR;  Service: Orthopedics   • UPPER GASTROINTESTINAL ENDOSCOPY         Past Social History   Social History     Socioeconomic History   • Marital status: Single     Spouse name: None   • Number of children: None   • Years of education: None   • Highest education level: None   Occupational History   • None   Tobacco Use   • Smoking status: Former     Packs/day: 3.00     Years: 15.00     Total pack years: 45.00     Types: Cigarettes     Quit date: 1994     Years since quittin.5     Passive exposure: Past   • Smokeless tobacco: Never   Vaping Use   • Vaping Use: Never used   Substance and Sexual Activity   • Alcohol use: Not Currently     Comment: 2015 quit   • Drug use: Yes     Frequency: 7.0 times per week     Types: Marijuana     Comment: Spends $100 a month, smoke once for sleeping   • Sexual activity: Not Currently   Other Topics Concern   • None   Social History Narrative   • None     Social Determinants of Health     Financial Resource Strain: Low Risk  (2023)    Overall Financial Resource Strain (CARDIA)    • Difficulty of Paying Living Expenses: Not hard at all   Recent Concern: Financial Resource Strain - High Risk (7/3/2023)    Overall Financial Resource Strain (CARDIA)    • Difficulty of Paying Living Expenses: Very hard   Food Insecurity: No Food Insecurity (2023)    Hunger Vital Sign • Worried About [place] in the Last Year: Never true    • Ran Out of Food in the Last Year: Never true   Transportation Needs: No Transportation Needs (7/3/2023)    PRAPARE - Transportation    • Lack of Transportation (Medical): No    • Lack of Transportation (Non-Medical):  No   Physical Activity: Inactive (7/6/2023)    Exercise Vital Sign    • Days of Exercise per Week: 0 days    • Minutes of Exercise per Session: 0 min   Stress: No Stress Concern Present (7/6/2023)    [address] [facility]    • Feeling of Stress : Not at all   Social Connections: Socially Isolated (7/6/2023)    Social Connection and Isolation Panel [NHANES]    • Frequency of Communication with Friends and Family: More than three times a week    • Frequency of Social Gatherings with Friends and Family: More than three times a week    • Attends Restorationist Services: Never    • Active Member of Clubs or Organizations: No    • Attends Club or Organization Meetings: Never    • Marital Status: Never    Intimate Partner Violence: Not At Risk (7/6/2023)    Humiliation, Afraid, Rape, and Kick questionnaire    • Fear of Current or Ex-Partner: No    • Emotionally Abused: No    • Physically Abused: No    • Sexually Abused: No   Housing Stability: Unknown (7/6/2023)    Housing Stability Vital Sign    • Unable to Pay for Housing in the Last Year: No    • Number of [address] in the Last Year: Not on file    • Unstable Housing in the Last Year: No       The following portions of the patient's history were reviewed and updated as appropriate: allergies, current medications, past family history, past medical history, past social history, past surgical history and problem list.    Vital Signs  Vitals:    08/23/23 1226   BP: 125/82   BP Location: Right arm   Patient Position: Sitting   Cuff Size: Standard   Pulse: 93   Weight: 89.4 kg (197 lb 3.2 oz)   Height: 5' 10" (1.778 m) Physical Exam:  General appearance: alert, cooperative, no distress  HEENT: normocephalic, anicteric, no eye erythema or discharge, no oropharyngeal thrush  Neck: supple, trachea midline, no adenopathy  Lungs: CTA b/l, no rales, rhonchi, or wheezing, unlabored respirations  Heart: RRR, no murmur, rubs, or gallops  Abdomen: soft, non-tender, non-distended, normal bowel sounds, no masses or organomegaly  Rectal: deferred  Extremities: no cyanosis, clubbing, or edema  Musculoskeletal: uses cane, has CP  Skin: color and texture normal, no jaundice, no rashes or lesions  Psychiatric: alert and oriented, normal affect and behavior

## 2023-08-23 NOTE — PROGRESS NOTES
Assessment and Plan    #1. GERD with occasional esophageal dysphagia: patient was weaned off PPI and was on pepcid 20 mg BID with some control of symptoms for last year. Stopped his anxiety med and has been using marijuana daily which has helped with his anxiety and physical pain but he feels it worsened his reflux symptoms, was increased to pepcid 40 mg BID 2 months ago with some improvement. chan reports worse symptoms in last 2 weeks due to being sick with a cold   -continue pepcid 40 mg for now  -anti-reflux diet and measures  -will plan for EGD at this time, last EGD was 7 years ago, did have some esophagitis at that time. Will assess for any esophagitis or calles's esophagus that would warrant resuming PPI     #2. History of colon polyps  -repeat colonoscopy in 2025  --------------------------------------------------------------------------------------------------------------------    Chief Complaint: f/u GERD    HPI: Marian Del Angel is a 50 y.o. male with a history of GERD, esophagitis, cerebral palsy, HLD, and depression and anxiety who presents today for follow up. He was seen one year ago and was weaned off his PPI onto pepcid 20 mg BID. He reports relatively good control of symptoms with this but more recently he had worsening symptoms which he attributes to daily marijuana use for his anxiety and pain. He also has had a cold for the last 2 weeks which has made the GERD worse due to post nasal drip. He denies nausea or vomiting normally but occasionally has solid food dysphagia. Denies abdominal pain. His bowels are normal typically although he reports abnormalities over the past 2 weeks while sick. Denies blood in stool or black stools. No unexplained weight loss. His last EGD was in 2016 with mild esophagitis and gastritis. He had a colonoscopy last year with polyps removed and was recommended repeat in 3 years.        Review of Systems:   General: negative for fatigue, fever, night sweats or unexpected weight loss  Psychological: negative for depression; +anxiety  Ophthalmic: negative for blurry vision or scleral icterus  ENT: negative for headaches, oral lesions, sore throat, vocal changes  Hematological and Lymphatic: negative for pallor or swollen lymph nodes  Respiratory: negative for shortness of breath or wheezing; +cough  Cardiovascular: negative for chest pain, edema or murmur  Gastrointestinal: as mentioned in HPI  Genito-Urinary: negative for dysuria or incontinence  Musculoskeletal: negative for joint pain, joint stiffness or joint swelling  Dermatological: negative for pruritus, rash, or jaundice    Current Medications  Current Outpatient Medications   Medication Sig Dispense Refill   • ergocalciferol (VITAMIN D2) 50,000 units daily   0   • famotidine (PEPCID) 40 MG tablet Take 1 tablet (40 mg total) by mouth 2 (two) times a day 30 tablet 6   • Multiple Vitamins-Minerals (CENTRUM ADULTS PO) Centrum     • Omega-3 Fatty Acids (fish oil) 1,000 mg Take 1,000 mg by mouth daily       No current facility-administered medications for this visit. Past Medical History  Past Medical History:   Diagnosis Date   • Acid reflux    • Anesthesia complication     woke up twice during anesthesia   • Anxiety    • Arthritis    • Cerebral palsy (HCC)    • GERD (gastroesophageal reflux disease)    • H/O ETOH abuse    • Psychiatric disorder     anxiety, depression   • Ulcer        Past Surgical History  Past Surgical History:   Procedure Laterality Date   • ESOPHAGOGASTRODUODENOSCOPY N/A 5/9/2016    Procedure: ESOPHAGOGASTRODUODENOSCOPY (EGD); Surgeon: Kasia Mcneill MD;  Location: [facility];   Service:    • FOOT SURGERY Bilateral     hardware   • HIP SURGERY     • OTHER SURGICAL HISTORY Bilateral     lower extremity ligiment extensions-multiple   • [place] SURGICAL ARTHROSCOPY SHOULDER W/ROTATOR CUFF RPR Right 6/4/2021    Procedure: SHOULDER ARTHROSCOPIC ROTATOR CUFF REPAIR, SUBACROMIAL DECOMPRESSION;  Surgeon: Tita Cramer MD;  Location: [facility];  Service: Orthopedics   • UPPER GASTROINTESTINAL ENDOSCOPY         Past Social History   Social History     Socioeconomic History   • Marital status: Single     Spouse name: None   • Number of children: None   • Years of education: None   • Highest education level: None   Occupational History   • None   Tobacco Use   • Smoking status: Former     Packs/day: 3.00     Years: 15.00     Total pack years: 45.00     Types: Cigarettes     Quit date: 1994     Years since quittin.5     Passive exposure: Past   • Smokeless tobacco: Never   Vaping Use   • Vaping Use: Never used   Substance and Sexual Activity   • Alcohol use: Not Currently     Comment: 2015 quit   • Drug use: Yes     Frequency: 7.0 times per week     Types: Marijuana     Comment: Spends $100 a month, smoke once for sleeping   • Sexual activity: Not Currently   Other Topics Concern   • None   Social History Narrative   • None     Social Determinants of Health     Financial Resource Strain: Low Risk  (2023)    Overall Financial Resource Strain (CARDIA)    • Difficulty of Paying Living Expenses: Not hard at all   Recent Concern: Financial Resource Strain - High Risk (7/3/2023)    Overall Financial Resource Strain (CARDIA)    • Difficulty of Paying Living Expenses: Very hard   Food Insecurity: No Food Insecurity (2023)    Hunger Vital Sign    • Worried About Running Out of Food in the Last Year: Never true    • Ran Out of Food in the Last Year: Never true   Transportation Needs: No Transportation Needs (7/3/2023)    PRAPARE - Transportation    • Lack of Transportation (Medical): No    • Lack of Transportation (Non-Medical):  No   Physical Activity: Inactive (2023)    Exercise Vital Sign    • Days of Exercise per Week: 0 days    • Minutes of Exercise per Session: 0 min   Stress: No Stress Concern Present (2023)    [address] [place] [address] • Feeling of Stress : Not at all   Social Connections: Socially Isolated (7/6/2023)    Social Connection and Isolation Panel [NHANES]    • Frequency of Communication with Friends and Family: More than three times a week    • Frequency of Social Gatherings with Friends and Family: More than three times a week    • Attends Holiness Services: Never    • Active Member of Clubs or Organizations: No    • Attends Club or Organization Meetings: Never    • Marital Status: Never    Intimate Partner Violence: Not At Risk (7/6/2023)    Humiliation, Afraid, Rape, and Kick questionnaire    • Fear of Current or Ex-Partner: No    • Emotionally Abused: No    • Physically Abused: No    • Sexually Abused: No   Housing Stability: Unknown (7/6/2023)    Housing Stability Vital Sign    • Unable to Pay for Housing in the Last Year: No    • Number of [address] in the Last Year: Not on file    • Unstable Housing in the Last Year: No       The following portions of the patient's history were reviewed and updated as appropriate: allergies, current medications, past family history, past medical history, past social history, past surgical history and problem list.    Vital Signs  Vitals:    08/23/23 1226   BP: 125/82   BP Location: Right arm   Patient Position: Sitting   Cuff Size: Standard   Pulse: 93   Weight: 89.4 kg (197 lb 3.2 oz)   Height: 5' 10" (1.778 m)       Physical Exam:  General appearance: alert, cooperative, no distress  HEENT: normocephalic, anicteric, no eye erythema or discharge, no oropharyngeal thrush  Neck: supple, trachea midline, no adenopathy  Lungs: CTA b/l, no rales, rhonchi, or wheezing, unlabored respirations  Heart: RRR, no murmur, rubs, or gallops  Abdomen: soft, non-tender, non-distended, normal bowel sounds, no masses or organomegaly  Rectal: deferred  Extremities: no cyanosis, clubbing, or edema  Musculoskeletal: uses cane, has CP  Skin: color and texture normal, no jaundice, no rashes or lesions  Psychiatric: alert and oriented, normal affect and behavior

## 2023-08-23 NOTE — H&P (VIEW-ONLY)
Assessment and Plan    #1. GERD with occasional esophageal dysphagia: patient was weaned off PPI and was on pepcid 20 mg BID with some control of symptoms for last year. Stopped his anxiety med and has been using marijuana daily which has helped with his anxiety and physical pain but he feels it worsened his reflux symptoms, was increased to pepcid 40 mg BID 2 months ago with some improvement. chan reports worse symptoms in last 2 weeks due to being sick with a cold   -continue pepcid 40 mg for now  -anti-reflux diet and measures  -will plan for EGD at this time, last EGD was 7 years ago, did have some esophagitis at that time. Will assess for any esophagitis or calles's esophagus that would warrant resuming PPI     #2. History of colon polyps  -repeat colonoscopy in 2025  --------------------------------------------------------------------------------------------------------------------    Chief Complaint: f/u GERD    HPI: Apolonia Montana is a 50 y.o. male with a history of GERD, esophagitis, cerebral palsy, HLD, and depression and anxiety who presents today for follow up. He was seen one year ago and was weaned off his PPI onto pepcid 20 mg BID. He reports relatively good control of symptoms with this but more recently he had worsening symptoms which he attributes to daily marijuana use for his anxiety and pain. He also has had a cold for the last 2 weeks which has made the GERD worse due to post nasal drip. He denies nausea or vomiting normally but occasionally has solid food dysphagia. Denies abdominal pain. His bowels are normal typically although he reports abnormalities over the past 2 weeks while sick. Denies blood in stool or black stools. No unexplained weight loss. His last EGD was in 2016 with mild esophagitis and gastritis. He had a colonoscopy last year with polyps removed and was recommended repeat in 3 years.        Review of Systems:   General: negative for fatigue, fever, night sweats or unexpected weight loss  Psychological: negative for depression; +anxiety  Ophthalmic: negative for blurry vision or scleral icterus  ENT: negative for headaches, oral lesions, sore throat, vocal changes  Hematological and Lymphatic: negative for pallor or swollen lymph nodes  Respiratory: negative for shortness of breath or wheezing; +cough  Cardiovascular: negative for chest pain, edema or murmur  Gastrointestinal: as mentioned in HPI  Genito-Urinary: negative for dysuria or incontinence  Musculoskeletal: negative for joint pain, joint stiffness or joint swelling  Dermatological: negative for pruritus, rash, or jaundice    Current Medications  Current Outpatient Medications   Medication Sig Dispense Refill   • ergocalciferol (VITAMIN D2) 50,000 units daily   0   • famotidine (PEPCID) 40 MG tablet Take 1 tablet (40 mg total) by mouth 2 (two) times a day 30 tablet 6   • Multiple Vitamins-Minerals (CENTRUM ADULTS PO) Centrum     • Omega-3 Fatty Acids (fish oil) 1,000 mg Take 1,000 mg by mouth daily       No current facility-administered medications for this visit. Past Medical History  Past Medical History:   Diagnosis Date   • Acid reflux    • Anesthesia complication     woke up twice during anesthesia   • Anxiety    • Arthritis    • Cerebral palsy (HCC)    • GERD (gastroesophageal reflux disease)    • H/O ETOH abuse    • Psychiatric disorder     anxiety, depression   • Ulcer        Past Surgical History  Past Surgical History:   Procedure Laterality Date   • ESOPHAGOGASTRODUODENOSCOPY N/A 5/9/2016    Procedure: ESOPHAGOGASTRODUODENOSCOPY (EGD); Surgeon: Santy Vazquez MD;  Location: [facility];   Service:    • FOOT SURGERY Bilateral     hardware   • HIP SURGERY     • OTHER SURGICAL HISTORY Bilateral     lower extremity ligiment extensions-multiple   • [place] SURGICAL ARTHROSCOPY SHOULDER W/ROTATOR CUFF RPR Right 6/4/2021    Procedure: SHOULDER ARTHROSCOPIC ROTATOR CUFF REPAIR, SUBACROMIAL DECOMPRESSION;  Surgeon: Tamiko Lomax MD;  Location: [facility];  Service: Orthopedics   • UPPER GASTROINTESTINAL ENDOSCOPY         Past Social History   Social History     Socioeconomic History   • Marital status: Single     Spouse name: None   • Number of children: None   • Years of education: None   • Highest education level: None   Occupational History   • None   Tobacco Use   • Smoking status: Former     Packs/day: 3.00     Years: 15.00     Total pack years: 45.00     Types: Cigarettes     Quit date: 1994     Years since quittin.5     Passive exposure: Past   • Smokeless tobacco: Never   Vaping Use   • Vaping Use: Never used   Substance and Sexual Activity   • Alcohol use: Not Currently     Comment: 2015 quit   • Drug use: Yes     Frequency: 7.0 times per week     Types: Marijuana     Comment: Spends $100 a month, smoke once for sleeping   • Sexual activity: Not Currently   Other Topics Concern   • None   Social History Narrative   • None     Social Determinants of Health     Financial Resource Strain: Low Risk  (2023)    Overall Financial Resource Strain (CARDIA)    • Difficulty of Paying Living Expenses: Not hard at all   Recent Concern: Financial Resource Strain - High Risk (7/3/2023)    Overall Financial Resource Strain (CARDIA)    • Difficulty of Paying Living Expenses: Very hard   Food Insecurity: No Food Insecurity (2023)    Hunger Vital Sign    • Worried About Running Out of Food in the Last Year: Never true    • Ran Out of Food in the Last Year: Never true   Transportation Needs: No Transportation Needs (7/3/2023)    PRAPARE - Transportation    • Lack of Transportation (Medical): No    • Lack of Transportation (Non-Medical):  No   Physical Activity: Inactive (2023)    Exercise Vital Sign    • Days of Exercise per Week: 0 days    • Minutes of Exercise per Session: 0 min   Stress: No Stress Concern Present (2023)    [address] [place] [address] • Feeling of Stress : Not at all   Social Connections: Socially Isolated (7/6/2023)    Social Connection and Isolation Panel [NHANES]    • Frequency of Communication with Friends and Family: More than three times a week    • Frequency of Social Gatherings with Friends and Family: More than three times a week    • Attends Latter-day Services: Never    • Active Member of Clubs or Organizations: No    • Attends Club or Organization Meetings: Never    • Marital Status: Never    Intimate Partner Violence: Not At Risk (7/6/2023)    Humiliation, Afraid, Rape, and Kick questionnaire    • Fear of Current or Ex-Partner: No    • Emotionally Abused: No    • Physically Abused: No    • Sexually Abused: No   Housing Stability: Unknown (7/6/2023)    Housing Stability Vital Sign    • Unable to Pay for Housing in the Last Year: No    • Number of [address] in the Last Year: Not on file    • Unstable Housing in the Last Year: No       The following portions of the patient's history were reviewed and updated as appropriate: allergies, current medications, past family history, past medical history, past social history, past surgical history and problem list.    Vital Signs  Vitals:    08/23/23 1226   BP: 125/82   BP Location: Right arm   Patient Position: Sitting   Cuff Size: Standard   Pulse: 93   Weight: 89.4 kg (197 lb 3.2 oz)   Height: 5' 10" (1.778 m)       Physical Exam:  General appearance: alert, cooperative, no distress  HEENT: normocephalic, anicteric, no eye erythema or discharge, no oropharyngeal thrush  Neck: supple, trachea midline, no adenopathy  Lungs: CTA b/l, no rales, rhonchi, or wheezing, unlabored respirations  Heart: RRR, no murmur, rubs, or gallops  Abdomen: soft, non-tender, non-distended, normal bowel sounds, no masses or organomegaly  Rectal: deferred  Extremities: no cyanosis, clubbing, or edema  Musculoskeletal: uses cane, has CP  Skin: color and texture normal, no jaundice, no rashes or lesions  Psychiatric: alert and oriented, normal affect and behavior

## 2023-08-28 ENCOUNTER — TELEPHONE (OUTPATIENT)
Dept: GASTROENTEROLOGY | Facility: CLINIC | Age: 53
End: 2023-08-28

## 2023-09-20 RX ORDER — SODIUM CHLORIDE, SODIUM LACTATE, POTASSIUM CHLORIDE, CALCIUM CHLORIDE 600; 310; 30; 20 MG/100ML; MG/100ML; MG/100ML; MG/100ML
75 INJECTION, SOLUTION INTRAVENOUS CONTINUOUS
Status: CANCELLED | OUTPATIENT
Start: 2023-09-20

## 2023-09-21 ENCOUNTER — HOSPITAL ENCOUNTER (OUTPATIENT)
Dept: GASTROENTEROLOGY | Facility: AMBULARY SURGERY CENTER | Age: 53
Setting detail: OUTPATIENT SURGERY
End: 2023-09-21
Attending: INTERNAL MEDICINE
Payer: MEDICARE

## 2023-09-21 ENCOUNTER — ANESTHESIA (OUTPATIENT)
Dept: GASTROENTEROLOGY | Facility: AMBULARY SURGERY CENTER | Age: 53
End: 2023-09-21

## 2023-09-21 ENCOUNTER — ANESTHESIA EVENT (OUTPATIENT)
Dept: GASTROENTEROLOGY | Facility: AMBULARY SURGERY CENTER | Age: 53
End: 2023-09-21

## 2023-09-21 VITALS
DIASTOLIC BLOOD PRESSURE: 72 MMHG | RESPIRATION RATE: 18 BRPM | HEART RATE: 78 BPM | OXYGEN SATURATION: 98 % | SYSTOLIC BLOOD PRESSURE: 106 MMHG

## 2023-09-21 DIAGNOSIS — K21.00 GASTROESOPHAGEAL REFLUX DISEASE WITH ESOPHAGITIS WITHOUT HEMORRHAGE: ICD-10-CM

## 2023-09-21 PROCEDURE — 88305 TISSUE EXAM BY PATHOLOGIST: CPT | Performed by: STUDENT IN AN ORGANIZED HEALTH CARE EDUCATION/TRAINING PROGRAM

## 2023-09-21 RX ORDER — SODIUM CHLORIDE, SODIUM LACTATE, POTASSIUM CHLORIDE, CALCIUM CHLORIDE 600; 310; 30; 20 MG/100ML; MG/100ML; MG/100ML; MG/100ML
75 INJECTION, SOLUTION INTRAVENOUS CONTINUOUS
Status: DISCONTINUED | OUTPATIENT
Start: 2023-09-21 | End: 2023-09-25 | Stop reason: HOSPADM

## 2023-09-21 RX ORDER — PROPOFOL 10 MG/ML
INJECTION, EMULSION INTRAVENOUS AS NEEDED
Status: DISCONTINUED | OUTPATIENT
Start: 2023-09-21 | End: 2023-09-21

## 2023-09-21 RX ORDER — LIDOCAINE HYDROCHLORIDE 20 MG/ML
INJECTION, SOLUTION EPIDURAL; INFILTRATION; INTRACAUDAL; PERINEURAL AS NEEDED
Status: DISCONTINUED | OUTPATIENT
Start: 2023-09-21 | End: 2023-09-21

## 2023-09-21 RX ADMIN — LIDOCAINE HYDROCHLORIDE 100 MG: 20 INJECTION, SOLUTION EPIDURAL; INFILTRATION; INTRACAUDAL; PERINEURAL at 09:23

## 2023-09-21 RX ADMIN — LIDOCAINE HYDROCHLORIDE 50 MG: 20 INJECTION, SOLUTION EPIDURAL; INFILTRATION; INTRACAUDAL; PERINEURAL at 09:27

## 2023-09-21 RX ADMIN — PROPOFOL 200 MG: 10 INJECTION, EMULSION INTRAVENOUS at 09:23

## 2023-09-21 RX ADMIN — SODIUM CHLORIDE, SODIUM LACTATE, POTASSIUM CHLORIDE, AND CALCIUM CHLORIDE: .6; .31; .03; .02 INJECTION, SOLUTION INTRAVENOUS at 08:52

## 2023-09-21 NOTE — INTERVAL H&P NOTE
H&P reviewed. After examining the patient I find no changes in the patients condition since the H&P had been written.     Vitals:    09/21/23 0828   BP: 101/70   Pulse: 78   Resp: 18   SpO2: 94%

## 2023-09-21 NOTE — ANESTHESIA POSTPROCEDURE EVALUATION
Post-Op Assessment Note    CV Status:  Stable  Pain Score: 0    Pain management: adequate     Mental Status:  Sleepy and arousable   Hydration Status:  Stable   PONV Controlled:  None   Airway Patency:  Patent and adequate      Post Op Vitals Reviewed: Yes      Staff: CRNA   Comments: dentures in place        No notable events documented.     BP      Temp      Pulse     Resp      SpO2

## 2023-09-21 NOTE — ANESTHESIA PREPROCEDURE EVALUATION
Procedure:  EGD    Relevant Problems   ANESTHESIA (within normal limits)   (-) History of anesthesia complications      CARDIO   (+) Hyperlipidemia      ENDO (within normal limits)      GI/HEPATIC   (+) GERD (gastroesophageal reflux disease)      /RENAL   (+) Hydronephrosis, right, due to ureteral calculus      HEMATOLOGY (within normal limits)      NEURO/PSYCH   (+) Anxiety   (+) Chronic left shoulder pain   (+) MDD (major depressive disorder)      PULMONARY (within normal limits)      Nervous and Auditory   (+) Cerebral palsy (HCC)        Physical Exam    Airway    Mallampati score: II  TM Distance: >3 FB  Neck ROM: full     Dental   lower dentures and upper dentures,     Cardiovascular  Rhythm: regular, Rate: normal, Cardiovascular exam normal    Pulmonary  Pulmonary exam normal Breath sounds clear to auscultation,     Other Findings        Anesthesia Plan  ASA Score- 2     Anesthesia Type- IV sedation with anesthesia with ASA Monitors. Additional Monitors:   Airway Plan:           Plan Factors-    Chart reviewed. EKG reviewed. Imaging results reviewed. Existing labs reviewed. Patient summary reviewed. Patient is a current smoker. Patient did not smoke on day of surgery. Induction- intravenous. Postoperative Plan-     Informed Consent- Anesthetic plan and risks discussed with patient. I personally reviewed this patient with the CRNA. Discussed and agreed on the Anesthesia Plan with the CRNA. .          NPO and allergies verified. Plan:  IV sedation/MAC, GA as backup    Benefits and risks of sedation were discussed with the patient including possibility of recall under sedation and the potential for conversion to general anesthesia if necessary. All questions were answered. Anesthesia consent was obtained from the patient.

## 2023-09-25 PROCEDURE — 88305 TISSUE EXAM BY PATHOLOGIST: CPT | Performed by: STUDENT IN AN ORGANIZED HEALTH CARE EDUCATION/TRAINING PROGRAM

## 2024-06-12 DIAGNOSIS — K21.00 GASTROESOPHAGEAL REFLUX DISEASE WITH ESOPHAGITIS WITHOUT HEMORRHAGE: ICD-10-CM

## 2024-06-12 RX ORDER — FAMOTIDINE 40 MG/1
40 TABLET, FILM COATED ORAL 2 TIMES DAILY
Qty: 30 TABLET | Refills: 5 | Status: SHIPPED | OUTPATIENT
Start: 2024-06-12

## 2024-06-12 NOTE — TELEPHONE ENCOUNTER
Reason for call:   [x] Refill   [] Prior Auth  [] Other:     Office:   [] PCP/Provider -   [x] Specialty/Provider -                   Does the patient have enough for 3 days?   [] Yes   [x] No - Send as HP to POD    
f/u with PMD

## 2024-07-17 ENCOUNTER — OFFICE VISIT (OUTPATIENT)
Age: 54
End: 2024-07-17

## 2024-07-17 VITALS
RESPIRATION RATE: 14 BRPM | HEART RATE: 98 BPM | BODY MASS INDEX: 28.77 KG/M2 | HEIGHT: 70 IN | WEIGHT: 201 LBS | TEMPERATURE: 98 F | OXYGEN SATURATION: 98 % | SYSTOLIC BLOOD PRESSURE: 126 MMHG | DIASTOLIC BLOOD PRESSURE: 91 MMHG

## 2024-07-17 DIAGNOSIS — B37.9 CANDIDA INFECTION: Primary | ICD-10-CM

## 2024-07-17 DIAGNOSIS — Q66.89 BILATERAL CLUB FEET: ICD-10-CM

## 2024-07-17 PROCEDURE — 99203 OFFICE O/P NEW LOW 30 MIN: CPT | Performed by: FAMILY MEDICINE

## 2024-07-17 RX ORDER — NYSTATIN 100000 [USP'U]/G
POWDER TOPICAL 2 TIMES DAILY
Qty: 60 G | Refills: 1 | Status: SHIPPED | OUTPATIENT
Start: 2024-07-17

## 2024-07-17 RX ORDER — NYSTATIN 100000 U/G
OINTMENT TOPICAL 2 TIMES DAILY
Qty: 30 G | Refills: 1 | Status: SHIPPED | OUTPATIENT
Start: 2024-07-17

## 2024-07-17 NOTE — PROGRESS NOTES
"[facility] Office visit    Assessment/Plan:     1. Candida infection  Assessment & Plan:  Hx of cerebral palsy with club foot bilaterally. Patient states that he has pain in between his right 4th and 5th digit, and difficult to separate those 2 digits. Tries to dry off moisture after showering. Followed up with podiatry in the past as a child and a couple years ago as an adult but had a copay of $250 at the time so he has not seen the podiatrist since.     Prescribed nystatin powder and ointment to use twice a day  Follow-up in 2 weeks  Follow up with podiatry  Orders:  -     nystatin (MYCOSTATIN) powder; Apply topically 2 (two) times a day  -     nystatin (MYCOSTATIN) ointment; Apply topically 2 (two) times a day  2. Bilateral club feet  -     Ambulatory Referral to Podiatry; Future        Return in about 2 weeks (around 7/31/2024) for Follow up candida on right 4th and 5th interdigit space.     Subjective:   HPI  Gregorio Linda is a 53 y.o. male here to follow up pain in between 4th and 5th digit. No other complaints or issues at this time.      Review of Systems   Constitutional:  Negative for chills and fever.   HENT:  Negative for ear pain and sore throat.    Eyes:  Negative for pain and visual disturbance.   Respiratory:  Negative for cough and shortness of breath.    Cardiovascular:  Negative for chest pain and palpitations.   Gastrointestinal:  Negative for abdominal pain and vomiting.   Genitourinary:  Negative for dysuria and hematuria.   Musculoskeletal:  Negative for arthralgias and back pain.   Skin:  Positive for rash (between right 4th and 5th digit). Negative for color change.   Neurological:  Negative for seizures and syncope.   All other systems reviewed and are negative.       Objective:     /91 (BP Location: Left arm, Patient Position: Sitting, Cuff Size: Adult)   Pulse 98   Temp 98 °F (36.7 °C) (Tympanic)   Resp 14   Ht 5' 10\" (1.778 m)   Wt 91.2 kg (201 lb)   SpO2 " 98%   BMI 28.84 kg/m²      Physical Exam  Constitutional:       Appearance: Normal appearance.   Eyes:      General:         Right eye: No discharge.         Left eye: No discharge.      Conjunctiva/sclera: Conjunctivae normal.   Musculoskeletal:         General: Normal range of motion.      Cervical back: Normal range of motion and neck supple.      Right lower leg: No edema.      Left lower leg: No edema.   Skin:     General: Skin is warm and dry.      Capillary Refill: Capillary refill takes less than 2 seconds.      Findings: Rash present. Erythema: erythema and scaling in between right 4th and 5th digit with pain.  Neurological:      Mental Status: He is alert. Mental status is at baseline.   Psychiatric:         Mood and Affect: Mood normal.         Behavior: Behavior normal.          ** Please Note: This note has been constructed using a voice recognition system **     Dana Srinivasan MD  07/17/24  10:16 AM

## 2024-07-17 NOTE — ASSESSMENT & PLAN NOTE
Hx of cerebral palsy with club foot bilaterally. Patient states that he has pain in between his right 4th and 5th digit, and difficult to separate those 2 digits. Tries to dry off moisture after showering. Followed up with podiatry in the past as a child and a couple years ago as an adult but had a copay of $250 at the time so he has not seen the podiatrist since. On physical exam, patient has erythema and scaling skin in between 4th and 5th digit.     Prescribed nystatin powder and ointment to use twice a day  Follow-up in 2 weeks  Follow up with podiatry

## 2024-07-30 ENCOUNTER — RA CDI HCC (OUTPATIENT)
Dept: OTHER | Facility: HOSPITAL | Age: 54
End: 2024-07-30

## 2024-08-06 ENCOUNTER — OFFICE VISIT (OUTPATIENT)
Age: 54
End: 2024-08-06
Payer: MEDICARE

## 2024-08-06 VITALS
WEIGHT: 201 LBS | HEART RATE: 99 BPM | DIASTOLIC BLOOD PRESSURE: 84 MMHG | SYSTOLIC BLOOD PRESSURE: 134 MMHG | HEIGHT: 70 IN | RESPIRATION RATE: 17 BRPM | BODY MASS INDEX: 28.77 KG/M2

## 2024-08-06 DIAGNOSIS — M79.671 RIGHT FOOT PAIN: ICD-10-CM

## 2024-08-06 DIAGNOSIS — L03.031 CELLULITIS OF TOE OF RIGHT FOOT: Primary | ICD-10-CM

## 2024-08-06 DIAGNOSIS — Q66.89 BILATERAL CLUB FEET: ICD-10-CM

## 2024-08-06 DIAGNOSIS — M20.5X1 CLAW TOE, RIGHT: ICD-10-CM

## 2024-08-06 PROCEDURE — 99202 OFFICE O/P NEW SF 15 MIN: CPT | Performed by: PODIATRIST

## 2024-08-06 NOTE — PROGRESS NOTES
Assessment/Plan: CP patient with clot toes of right foot.  Secondary cellulitis of toe.    Plan.  Chart reviewed.  PCP notes reviewed.  Patient examined.  At this time we advised patient to finish antibiotic as ordered.  Today gentian violet dry sterile dressing applied.  Patient advised a nail cutting instruction.  He will return as needed.         Diagnoses and all orders for this visit:    Cellulitis of toe of right foot    Bilateral club feet  -     Ambulatory Referral to Podiatry    Right foot pain    Claw toe, right          Subjective: Patient has pain.  He has pain in his toes of the right foot.  He saw primary care.  He was placed on antibiotic.  Patient has CP    Allergies   Allergen Reactions    Aspirin GI Bleeding and Vomiting     Other reaction(s): Unknown  Patient has ulcers and does not take aspirin          Current Outpatient Medications:     ergocalciferol (VITAMIN D2) 50,000 units, daily , Disp: , Rfl: 0    famotidine (PEPCID) 40 MG tablet, Take 1 tablet (40 mg total) by mouth 2 (two) times a day, Disp: 30 tablet, Rfl: 5    Multiple Vitamins-Minerals (CENTRUM ADULTS PO), Centrum, Disp: , Rfl:     nystatin (MYCOSTATIN) ointment, Apply topically 2 (two) times a day, Disp: 30 g, Rfl: 1    nystatin (MYCOSTATIN) powder, Apply topically 2 (two) times a day, Disp: 60 g, Rfl: 1    Omega-3 Fatty Acids (fish oil) 1,000 mg, Take 1,000 mg by mouth daily, Disp: , Rfl:     Patient Active Problem List   Diagnosis    Cerebral palsy (HCC)    GERD (gastroesophageal reflux disease)    MDD (major depressive disorder)    H/O ETOH abuse    Anxiety    Ambulatory dysfunction    Hyperlipidemia    Hydronephrosis, right, due to ureteral calculus    Chronic left shoulder pain    Platelets decreased (HCC)    Candida infection          Patient ID: Gregorio Linda is a 53 y.o. male.    HPI    The following portions of the patient's history were reviewed and updated as appropriate:     family history includes Cancer in his  maternal aunt, maternal grandfather, and maternal grandmother; No Known Problems in his father and mother.      reports that he quit smoking about 30 years ago. His smoking use included cigarettes. He started smoking about 45 years ago. He has a 45 pack-year smoking history. He has been exposed to tobacco smoke. He has never used smokeless tobacco. He reports that he does not currently use alcohol. He reports current drug use. Frequency: 7.00 times per week. Drug: Marijuana.    Vitals:    08/06/24 1407   BP: 134/84   Pulse: 99   Resp: 17       Review of Systems      Objective:  Patient's shoes and socks removed.   Foot ExamPhysical Exam  Vitals and nursing note reviewed.   Constitutional:       Appearance: Normal appearance.   Cardiovascular:      Rate and Rhythm: Normal rate and regular rhythm.   Feet:      Comments: Patient demonstrates rigid claw toes of digits 2 through 5 of the right foot.  Fourth right toe has cellulitis of the fibular nail groove.  This appears to be resolving.  No evidence of abscess.  Skin:     Capillary Refill: Capillary refill takes less than 2 seconds.   Neurological:      Mental Status: He is alert.

## 2024-08-13 ENCOUNTER — OFFICE VISIT (OUTPATIENT)
Age: 54
End: 2024-08-13

## 2024-08-13 VITALS
BODY MASS INDEX: 28.92 KG/M2 | DIASTOLIC BLOOD PRESSURE: 74 MMHG | RESPIRATION RATE: 20 BRPM | TEMPERATURE: 100 F | SYSTOLIC BLOOD PRESSURE: 108 MMHG | HEART RATE: 84 BPM | OXYGEN SATURATION: 97 % | HEIGHT: 70 IN | WEIGHT: 202 LBS

## 2024-08-13 DIAGNOSIS — Z00.00 MEDICARE ANNUAL WELLNESS VISIT, SUBSEQUENT: Primary | ICD-10-CM

## 2024-08-13 DIAGNOSIS — R29.6 MULTIPLE FALLS: ICD-10-CM

## 2024-08-13 DIAGNOSIS — Z12.5 PROSTATE CANCER SCREENING: ICD-10-CM

## 2024-08-13 DIAGNOSIS — E66.3 OVERWEIGHT (BMI 25.0-29.9): ICD-10-CM

## 2024-08-13 PROCEDURE — G0439 PPPS, SUBSEQ VISIT: HCPCS | Performed by: FAMILY MEDICINE

## 2024-08-13 NOTE — PROGRESS NOTES
Ambulatory Visit  Name: Gregorio Linda      : 1970      MRN: [id number]  Encounter Provider: Kirsty Schmidt DO  Encounter Date: 2024   Encounter department: [facility]    Assessment & Plan   1. Medicare annual wellness visit, subsequent  2. Prostate cancer screening  -     PSA, Total Screen; Future  3. Overweight (BMI 25.0-29.9)  -     Lipid Panel with Direct LDL reflex; Future  -     Basic metabolic panel; Future  4. Multiple falls  Reports multiple falls in the past 2 weeks.  Patient attributes falls to his CP and notes that he was camping, walking on uneven ground with his cane.  He denies falls during his normal daily activities.  Denies any fall risks in his home.  Denies any head trauma or loss of consciousness  Discussed fall risks with patient  Continue ambulation with cane.  Patient may need walker in the future.    BMI Counseling: Body mass index is 28.98 kg/m². The BMI is above normal. Nutrition recommendations include encouraging healthy choices of fruits and vegetables. Exercise recommendations include moderate physical activity 150 minutes/week. Rationale for BMI follow-up plan is due to patient being overweight or obese.     Depression Screening and Follow-up Plan: Patient was screened for depression during today's encounter. They screened negative with a PHQ-9 score of 0.      Preventive health issues were discussed with patient, and age appropriate screening tests were ordered as noted in patient's After Visit Summary. Personalized health advice and appropriate referrals for health education or preventive services given if needed, as noted in patient's After Visit Summary.    History of Present Illness     Patient presents for his Medicare Annual Wellness visit. He has no concerns at this time.   He previously was seen for an irritated ingrown toenail, followed up with podiatry for management, and states he feels much better now.        Patient Care  Team:  Kirsty Schmidt DO as PCP - General (Family Medicine)  Dot Daugherty MD (Inactive)  MD Tho Davis MD (Gastroenterology)    Review of Systems   Constitutional:  Negative for appetite change, chills and fever.   HENT:  Negative for congestion, sinus pain and sneezing.    Eyes:  Negative for pain, redness, itching and visual disturbance.   Respiratory:  Negative for cough, chest tightness and shortness of breath.    Cardiovascular:  Negative for chest pain and palpitations.   Gastrointestinal:  Negative for abdominal pain, constipation, diarrhea, nausea and vomiting.   Genitourinary:  Negative for difficulty urinating, dysuria and hematuria.   Musculoskeletal:  Negative for arthralgias, back pain and myalgias.   Skin:  Negative for color change, pallor and rash.   Neurological:  Negative for dizziness, light-headedness and headaches.   All other systems reviewed and are negative.    Medical History Reviewed by provider this encounter:  Tobacco  Allergies  Meds  Problems  Med Hx  Surg Hx  Fam Hx       Annual Wellness Visit Questionnaire   Gregorio is here for his Subsequent Wellness visit. Last Medicare Wellness visit information reviewed, patient interviewed, no change since last AWV.     Health Risk Assessment:   Patient rates overall health as very good. Patient feels that their physical health rating is much better. Patient is very satisfied with their life. Eyesight was rated as same. Hearing was rated as same. Patient feels that their emotional and mental health rating is same. Patients states they are never, rarely angry. Patient states they are never, rarely unusually tired/fatigued. Pain experienced in the last 7 days has been a lot. Patient's pain rating has been 6/10. Patient states that he has experienced weight loss or gain in last 6 months. Patient states he just went camping, walked about 7000 steps per day for the past 2 weeks when he is usually sedentary, which is  why he is feeling sore/pain.     Patient states his weight is usually around 193 lbs, but in the office it has been consistently around 201.     Depression Screening:   PHQ-9 Score: 0      Fall Risk Screening:   In the past year, patient has experienced: history of falling in past year    Number of falls: 2 or more  Injured during fall?: No    Feels unsteady when standing or walking?: Yes    Worried about falling?: No      Home Safety:  Patient does not have trouble with stairs inside or outside of their home. Patient has working smoke alarms and has working carbon monoxide detector. Home safety hazards include: none. Patient states the falls happened when he was camping and on unsteady ground while it was raining. He was walking with his cane but the cane slipped due the rain.     Nutrition:   Current diet is Regular.     Medications:   Patient is currently taking over-the-counter supplements. OTC medications include: see medication list. Patient is able to manage medications.     Activities of Daily Living (ADLs)/Instrumental Activities of Daily Living (IADLs):   Walk and transfer into and out of bed and chair?: Yes  Dress and groom yourself?: Yes    Bathe or shower yourself?: Yes    Feed yourself? Yes  Do your laundry/housekeeping?: No  Manage your money, pay your bills and track your expenses?: Yes  Make your own meals?: Yes    Do your own shopping?: Yes    ADL comments: Patient does his own laundry. Unable to clean floors and vacuum due to CP, has a  who helps with those tasks.     Previous Hospitalizations:   Any hospitalizations or ED visits within the last 12 months?: No      PREVENTIVE SCREENINGS      Cardiovascular Screening:    General: Screening Not Indicated and History Lipid Disorder      Colorectal Cancer Screening:     General: Screening Current      Prostate Cancer Screening:    General: Risks and Benefits Discussed      Abdominal Aortic Aneurysm (AAA) Screening:    Risk factors include:  "tobacco use        Lung Cancer Screening:     General: Screening Not Indicated      Hepatitis C Screening:    General: Screening Current    Screening, Brief Intervention, and Referral to Treatment (SBIRT)    Screening  Typical number of drinks in a day: 0  Typical number of drinks in a week: 0  Interpretation: Low risk drinking behavior.    Single Item Drug Screening:  How often have you used an illegal drug (including marijuana) or a prescription medication for non-medical reasons in the past year? never    Single Item Drug Screen Score: 0  Interpretation: Negative screen for possible drug use disorder    Social Determinants of Health     Financial Resource Strain: High Risk (8/13/2024)    Overall Financial Resource Strain (CARDIA)     Difficulty of Paying Living Expenses: Hard   Food Insecurity: No Food Insecurity (7/17/2024)    Hunger Vital Sign     Worried About Running Out of Food in the Last Year: Never true     Ran Out of Food in the Last Year: Never true   Transportation Needs: No Transportation Needs (8/13/2024)    PRAPARE - Transportation     Lack of Transportation (Medical): No     Lack of Transportation (Non-Medical): No   Housing Stability: Low Risk  (7/17/2024)    Housing Stability Vital Sign     Unable to Pay for Housing in the Last Year: No     Number of Times Moved in the Last Year: 0     Homeless in the Last Year: No   Utilities: Not At Risk (7/17/2024)    [facility] Utilities     Threatened with loss of utilities: No     No results found.    Objective     /74 (BP Location: Left arm, Patient Position: Sitting, Cuff Size: Standard)   Pulse 84   Temp 100 °F (37.8 °C) (Tympanic)   Resp 20   Ht 5' 10\" (1.778 m)   Wt 91.6 kg (202 lb)   SpO2 97%   BMI 28.98 kg/m²     Physical Exam  Constitutional:       General: He is not in acute distress.     Appearance: Normal appearance. He is overweight.   HENT:      Head: Normocephalic and atraumatic.      Right Ear: Tympanic membrane, ear canal and external " ear normal.      Left Ear: Tympanic membrane, ear canal and external ear normal.      Ears:      Comments: Excess cerumen bilaterally     Nose: Nose normal.      Mouth/Throat:      Mouth: Mucous membranes are moist.      Pharynx: Oropharynx is clear.   Eyes:      General: No scleral icterus.     Extraocular Movements: Extraocular movements intact.      Conjunctiva/sclera: Conjunctivae normal.      Pupils: Pupils are equal, round, and reactive to light.   Cardiovascular:      Rate and Rhythm: Normal rate and regular rhythm.      Heart sounds: Normal heart sounds. No murmur heard.  Pulmonary:      Effort: Pulmonary effort is normal. No respiratory distress.      Breath sounds: Normal breath sounds.   Abdominal:      General: Abdomen is flat. Bowel sounds are normal.      Palpations: Abdomen is soft. There is no mass.      Tenderness: There is no abdominal tenderness.   Musculoskeletal:      Cervical back: Normal range of motion and neck supple. No tenderness.      Right lower leg: No edema.      Left lower leg: No edema.      Comments: Ambulates with cane, has CP   Right 4th toe with dried blood under toe, recently clipped for ingrown   Lymphadenopathy:      Cervical: No cervical adenopathy.   Skin:     General: Skin is warm.   Neurological:      General: No focal deficit present.      Mental Status: He is alert and oriented to person, place, and time.      Cranial Nerves: No cranial nerve deficit.      Sensory: No sensory deficit.      Motor: No weakness.      Coordination: Coordination normal.      Gait: Gait abnormal (has CP, ambulating with cane at baseline).   Psychiatric:         Mood and Affect: Mood normal.         Behavior: Behavior normal.

## 2024-08-13 NOTE — PATIENT INSTRUCTIONS
Medicare Preventive Visit Patient Instructions  Thank you for completing your Welcome to Medicare Visit or Medicare Annual Wellness Visit today. Your next wellness visit will be due in one year (8/14/2025).  The screening/preventive services that you may require over the next 5-10 years are detailed below. Some tests may not apply to you based off risk factors and/or age. Screening tests ordered at today's visit but not completed yet may show as past due. Also, please note that scanned in results may not display below.  Preventive Screenings:  Service Recommendations Previous Testing/Comments   Colorectal Cancer Screening  Colonoscopy    Fecal Occult Blood Test (FOBT)/Fecal Immunochemical Test (FIT)  Fecal DNA/Cologuard Test  Flexible Sigmoidoscopy Age: 45-75 years old   Colonoscopy: every 10 years (May be performed more frequently if at higher risk)  OR  FOBT/FIT: every 1 year  OR  Cologuard: every 3 years  OR  Sigmoidoscopy: every 5 years  Screening may be recommended earlier than age 45 if at higher risk for colorectal cancer. Also, an individualized decision between you and your healthcare provider will decide whether screening between the ages of 76-85 would be appropriate. Colonoscopy: 07/28/2022  FOBT/FIT: Not on file  Cologuard: Not on file  Sigmoidoscopy: Not on file    Screening Current     Prostate Cancer Screening Individualized decision between patient and health care provider in men between ages of 55-69   Medicare will cover every 12 months beginning on the day after your 50th birthday PSA: 1.0 ng/mL           Hepatitis C Screening Once for adults born between 1945 and 1965  More frequently in patients at high risk for Hepatitis C Hep C Antibody: 04/06/2021    Screening Current   Diabetes Screening 1-2 times per year if you're at risk for diabetes or have pre-diabetes Fasting glucose: 115 mg/dL (2/16/2023)  A1C: 5.4 % (7/17/2023)      Cholesterol Screening Once every 5 years if you don't have a lipid  disorder. May order more often based on risk factors. Lipid panel: 07/17/2023  Screening Not Indicated  History Lipid Disorder      Other Preventive Screenings Covered by Medicare:  Abdominal Aortic Aneurysm (AAA) Screening: covered once if your at risk. You're considered to be at risk if you have a family history of AAA or a male between the age of 65-75 who smoking at least 100 cigarettes in your lifetime.  Lung Cancer Screening: covers low dose CT scan once per year if you meet all of the following conditions: (1) Age 55-77; (2) No signs or symptoms of lung cancer; (3) Current smoker or have quit smoking within the last 15 years; (4) You have a tobacco smoking history of at least 20 pack years (packs per day x number of years you smoked); (5) You get a written order from a healthcare provider.  Glaucoma Screening: covered annually if you're considered high risk: (1) You have diabetes OR (2) Family history of glaucoma OR (3)  aged 50 and older OR (4)  American aged 65 and older  Osteoporosis Screening: covered every 2 years if you meet one of the following conditions: (1) Have a vertebral abnormality; (2) On glucocorticoid therapy for more than 3 months; (3) Have primary hyperparathyroidism; (4) On osteoporosis medications and need to assess response to drug therapy.  HIV Screening: covered annually if you're between the age of 15-65. Also covered annually if you are younger than 15 and older than 65 with risk factors for HIV infection. For pregnant patients, it is covered up to 3 times per pregnancy.    Immunizations:  Immunization Recommendations   Influenza Vaccine Annual influenza vaccination during flu season is recommended for all persons aged >= 6 months who do not have contraindications   Pneumococcal Vaccine   * Pneumococcal conjugate vaccine = PCV13 (Prevnar 13), PCV15 (Vaxneuvance), PCV20 (Prevnar 20)  * Pneumococcal polysaccharide vaccine = PPSV23 (Pneumovax) Adults 19-63 yo  with certain risk factors or if 65+ yo  If never received any pneumonia vaccine: recommend Prevnar 20 (PCV20)  Give PCV20 if previously received 1 dose of PCV13 or PPSV23   Hepatitis B Vaccine 3 dose series if at intermediate or high risk (ex: diabetes, end stage renal disease, liver disease)   Respiratory syncytial virus (RSV) Vaccine - COVERED BY MEDICARE PART D  * RSVPreF3 (Arexvy) CDC recommends that adults 60 years of age and older may receive a single dose of RSV vaccine using shared clinical decision-making (SCDM)   Tetanus (Td) Vaccine - COST NOT COVERED BY MEDICARE PART B Following completion of primary series, a booster dose should be given every 10 years to maintain immunity against tetanus. Td may also be given as tetanus wound prophylaxis.   Tdap Vaccine - COST NOT COVERED BY MEDICARE PART B Recommended at least once for all adults. For pregnant patients, recommended with each pregnancy.   Shingles Vaccine (Shingrix) - COST NOT COVERED BY MEDICARE PART B  2 shot series recommended in those 19 years and older who have or will have weakened immune systems or those 50 years and older     Health Maintenance Due:      Topic Date Due   • Colorectal Cancer Screening  07/27/2025   • HIV Screening  Completed   • Hepatitis C Screening  Completed     Immunizations Due:      Topic Date Due   • COVID-19 Vaccine (5 - 2023-24 season) 09/01/2023   • Influenza Vaccine (1) 09/01/2024     Advance Directives   What are advance directives?  Advance directives are legal documents that state your wishes and plans for medical care. These plans are made ahead of time in case you lose your ability to make decisions for yourself. Advance directives can apply to any medical decision, such as the treatments you want, and if you want to donate organs.   What are the types of advance directives?  There are many types of advance directives, and each state has rules about how to use them. You may choose a combination of any of the  following:  Living will:  This is a written record of the treatment you want. You can also choose which treatments you do not want, which to limit, and which to stop at a certain time. This includes surgery, medicine, IV fluid, and tube feedings.   Durable power of  for healthcare (DPAHC):  This is a written record that states who you want to make healthcare choices for you when you are unable to make them for yourself. This person, called a proxy, is usually a family member or a friend. You may choose more than 1 proxy.  Do not resuscitate (DNR) order:  A DNR order is used in case your heart stops beating or you stop breathing. It is a request not to have certain forms of treatment, such as CPR. A DNR order may be included in other types of advance directives.  Medical directive:  This covers the care that you want if you are in a coma, near death, or unable to make decisions for yourself. You can list the treatments you want for each condition. Treatment may include pain medicine, surgery, blood transfusions, dialysis, IV or tube feedings, and a ventilator (breathing machine).  Values history:  This document has questions about your views, beliefs, and how you feel and think about life. This information can help others choose the care that you would choose.  Why are advance directives important?  An advance directive helps you control your care. Although spoken wishes may be used, it is better to have your wishes written down. Spoken wishes can be misunderstood, or not followed. Treatments may be given even if you do not want them. An advance directive may make it easier for your family to make difficult choices about your care.   Weight Management   Why it is important to manage your weight:  Being overweight increases your risk of health conditions such as heart disease, high blood pressure, type 2 diabetes, and certain types of cancer. It can also increase your risk for osteoarthritis, sleep apnea, and  other respiratory problems. Aim for a slow, steady weight loss. Even a small amount of weight loss can lower your risk of health problems.  How to lose weight safely:  A safe and healthy way to lose weight is to eat fewer calories and get regular exercise. You can lose up about 1 pound a week by decreasing the number of calories you eat by 500 calories each day.   Healthy meal plan for weight management:  A healthy meal plan includes a variety of foods, contains fewer calories, and helps you stay healthy. A healthy meal plan includes the following:  Eat whole-grain foods more often.  A healthy meal plan should contain fiber. Fiber is the part of grains, fruits, and vegetables that is not broken down by your body. Whole-grain foods are healthy and provide extra fiber in your diet. Some examples of whole-grain foods are whole-wheat breads and pastas, oatmeal, brown rice, and bulgur.  Eat a variety of vegetables every day.  Include dark, leafy greens such as spinach, kale, shannen greens, and mustard greens. Eat yellow and orange vegetables such as carrots, sweet potatoes, and winter squash.   Eat a variety of fruits every day.  Choose fresh or canned fruit (canned in its own juice or light syrup) instead of juice. Fruit juice has very little or no fiber.  Eat low-fat dairy foods.  Drink fat-free (skim) milk or 1% milk. Eat fat-free yogurt and low-fat cottage cheese. Try low-fat cheeses such as mozzarella and other reduced-fat cheeses.  Choose meat and other protein foods that are low in fat.  Choose beans or other legumes such as split peas or lentils. Choose fish, skinless poultry (chicken or turkey), or lean cuts of red meat (beef or pork). Before you cook meat or poultry, cut off any visible fat.   Use less fat and oil.  Try baking foods instead of frying them. Add less fat, such as margarine, sour cream, regular salad dressing and mayonnaise to foods. Eat fewer high-fat foods. Some examples of high-fat foods  include french fries, doughnuts, ice cream, and cakes.  Eat fewer sweets.  Limit foods and drinks that are high in sugar. This includes candy, cookies, regular soda, and sweetened drinks.  Exercise:  Exercise at least 30 minutes per day on most days of the week. Some examples of exercise include walking, biking, dancing, and swimming. You can also fit in more physical activity by taking the stairs instead of the elevator or parking farther away from stores. Ask your healthcare provider about the best exercise plan for you.      © Copyright [employer] 2018 Information is for End User's use only and may not be sold, redistributed or otherwise used for commercial purposes. All illustrations and images included in CareNotes® are the copyrighted property of [employer] or [employer]

## 2024-09-03 ENCOUNTER — APPOINTMENT (OUTPATIENT)
Dept: LAB | Facility: HOSPITAL | Age: 54
End: 2024-09-03
Payer: MEDICARE

## 2024-09-03 DIAGNOSIS — E66.3 OVERWEIGHT (BMI 25.0-29.9): ICD-10-CM

## 2024-09-03 DIAGNOSIS — Z12.5 PROSTATE CANCER SCREENING: ICD-10-CM

## 2024-09-03 LAB
ANION GAP SERPL CALCULATED.3IONS-SCNC: 6 MMOL/L (ref 4–13)
BUN SERPL-MCNC: 18 MG/DL (ref 5–25)
CALCIUM SERPL-MCNC: 8.8 MG/DL (ref 8.4–10.2)
CHLORIDE SERPL-SCNC: 107 MMOL/L (ref 96–108)
CHOLEST SERPL-MCNC: 182 MG/DL
CO2 SERPL-SCNC: 26 MMOL/L (ref 21–32)
CREAT SERPL-MCNC: 1.21 MG/DL (ref 0.6–1.3)
GFR SERPL CREATININE-BSD FRML MDRD: 68 ML/MIN/1.73SQ M
GLUCOSE P FAST SERPL-MCNC: 104 MG/DL (ref 65–99)
HDLC SERPL-MCNC: 36 MG/DL
LDLC SERPL CALC-MCNC: 90 MG/DL (ref 0–100)
POTASSIUM SERPL-SCNC: 3.8 MMOL/L (ref 3.5–5.3)
PSA SERPL-MCNC: 1.31 NG/ML (ref 0–4)
SODIUM SERPL-SCNC: 139 MMOL/L (ref 135–147)
TRIGL SERPL-MCNC: 280 MG/DL

## 2024-09-03 PROCEDURE — 80048 BASIC METABOLIC PNL TOTAL CA: CPT

## 2024-09-03 PROCEDURE — G0103 PSA SCREENING: HCPCS

## 2024-09-03 PROCEDURE — 36415 COLL VENOUS BLD VENIPUNCTURE: CPT

## 2024-09-03 PROCEDURE — 80061 LIPID PANEL: CPT

## 2024-09-25 ENCOUNTER — TELEPHONE (OUTPATIENT)
Age: 54
End: 2024-09-25

## 2024-09-25 NOTE — TELEPHONE ENCOUNTER
Caller: Patient     Doctor: Julianne     Reason for call: Patient asking for an order script or a letter he states he discussed at his last appointment with  regarding being able to be refunded if he goes to get a pedicure done but needs proof to show when going to get his toe nails done     Please advise     Call back#: [phone]

## 2024-09-27 ENCOUNTER — TELEPHONE (OUTPATIENT)
Age: 54
End: 2024-09-27

## 2024-09-27 NOTE — TELEPHONE ENCOUNTER
Caller: Venancio Linda     Doctor: Julianne    Reason for call: Venancio is returning the office's call regarding his prescription.  Can someone please call him back?  Thank you.    Call back#: [phone]

## 2024-09-27 NOTE — TELEPHONE ENCOUNTER
Spoke with patient.  Due to patient's limitations of ADL and need for proper foot hygiene he has been advised to undergo periodic pedicures.  Prescription written for this.

## 2024-11-25 DIAGNOSIS — K21.00 GASTROESOPHAGEAL REFLUX DISEASE WITH ESOPHAGITIS WITHOUT HEMORRHAGE: ICD-10-CM

## 2024-11-25 NOTE — TELEPHONE ENCOUNTER
Reason for call:   [x] Refill   [] Prior Auth  [] Other:     Office:   [] PCP/Provider -   [x] Specialty/Provider - Gastro    Medication: famotidine (PEPCID) 40 MG     Dose/Frequency: Take 1 tablet (40 mg total) by mouth 2 (two) times a day     Quantity: 180     Pharmacy: [facility]/pharmacy #[id number] [address]     Does the patient have enough for 3 days?   [] Yes   [x] No - Send as HP to POD

## 2024-11-26 RX ORDER — FAMOTIDINE 40 MG/1
40 TABLET, FILM COATED ORAL 2 TIMES DAILY
Qty: 180 TABLET | Refills: 0 | Status: SHIPPED | OUTPATIENT
Start: 2024-11-26

## 2025-01-13 ENCOUNTER — HOSPITAL ENCOUNTER (INPATIENT)
Facility: HOSPITAL | Age: 55
LOS: 3 days | Discharge: HOME | DRG: 661 | End: 2025-01-17
Attending: EMERGENCY MEDICINE | Admitting: INTERNAL MEDICINE
Payer: MEDICARE

## 2025-01-13 DIAGNOSIS — N20.0 NEPHROLITHIASIS: ICD-10-CM

## 2025-01-13 DIAGNOSIS — K59.00 CONSTIPATION: ICD-10-CM

## 2025-01-13 DIAGNOSIS — N13.30 HYDRONEPHROSIS: ICD-10-CM

## 2025-01-13 DIAGNOSIS — G80.9 CEREBRAL PALSY (HCC): ICD-10-CM

## 2025-01-13 DIAGNOSIS — N20.1 URETEROLITHIASIS: Primary | ICD-10-CM

## 2025-01-13 DIAGNOSIS — N13.30 HYDRONEPHROSIS, RIGHT: ICD-10-CM

## 2025-01-13 DIAGNOSIS — R10.9 RIGHT FLANK PAIN: ICD-10-CM

## 2025-01-13 DIAGNOSIS — N20.1 OBSTRUCTION OF URETEROPELVIC JUNCTION (UPJ) DUE TO STONE: ICD-10-CM

## 2025-01-13 PROCEDURE — 80053 COMPREHEN METABOLIC PANEL: CPT

## 2025-01-13 PROCEDURE — 83690 ASSAY OF LIPASE: CPT

## 2025-01-13 PROCEDURE — 96374 THER/PROPH/DIAG INJ IV PUSH: CPT

## 2025-01-13 PROCEDURE — 99284 EMERGENCY DEPT VISIT MOD MDM: CPT

## 2025-01-13 PROCEDURE — 81001 URINALYSIS AUTO W/SCOPE: CPT

## 2025-01-13 PROCEDURE — 36415 COLL VENOUS BLD VENIPUNCTURE: CPT

## 2025-01-13 PROCEDURE — 85025 COMPLETE CBC W/AUTO DIFF WBC: CPT | Performed by: EMERGENCY MEDICINE

## 2025-01-13 PROCEDURE — 87086 URINE CULTURE/COLONY COUNT: CPT | Performed by: EMERGENCY MEDICINE

## 2025-01-13 RX ORDER — ONDANSETRON 2 MG/ML
4 INJECTION INTRAMUSCULAR; INTRAVENOUS ONCE
Status: COMPLETED | OUTPATIENT
Start: 2025-01-13 | End: 2025-01-13

## 2025-01-13 RX ORDER — KETOROLAC TROMETHAMINE 30 MG/ML
15 INJECTION, SOLUTION INTRAMUSCULAR; INTRAVENOUS ONCE
Status: COMPLETED | OUTPATIENT
Start: 2025-01-14 | End: 2025-01-14

## 2025-01-13 RX ORDER — FAMOTIDINE 10 MG/ML
20 INJECTION, SOLUTION INTRAVENOUS ONCE
Status: COMPLETED | OUTPATIENT
Start: 2025-01-14 | End: 2025-01-14

## 2025-01-13 RX ORDER — OMEGA-3S/DHA/EPA/FISH OIL/D3 300MG-1000
400 CAPSULE ORAL DAILY
COMMUNITY
End: 2025-01-13

## 2025-01-13 RX ADMIN — ONDANSETRON 4 MG: 2 INJECTION INTRAMUSCULAR; INTRAVENOUS at 23:58

## 2025-01-14 ENCOUNTER — APPOINTMENT (EMERGENCY)
Dept: RADIOLOGY | Facility: HOSPITAL | Age: 55
DRG: 661 | End: 2025-01-14
Payer: MEDICARE

## 2025-01-14 PROBLEM — N20.1 OBSTRUCTION OF URETEROPELVIC JUNCTION (UPJ) DUE TO STONE: Status: ACTIVE | Noted: 2025-01-14

## 2025-01-14 LAB
ALBUMIN SERPL BCG-MCNC: 4.8 G/DL (ref 3.5–5)
ALP SERPL-CCNC: 55 U/L (ref 34–104)
ALT SERPL W P-5'-P-CCNC: 44 U/L (ref 7–52)
ANION GAP SERPL CALCULATED.3IONS-SCNC: 6 MMOL/L (ref 4–13)
ANION GAP SERPL CALCULATED.3IONS-SCNC: 9 MMOL/L (ref 4–13)
AST SERPL W P-5'-P-CCNC: 24 U/L (ref 13–39)
ATRIAL RATE: 71 BPM
ATRIAL RATE: 72 BPM
BACTERIA UR QL AUTO: ABNORMAL /HPF
BASOPHILS # BLD AUTO: 0.05 THOUSANDS/ΜL (ref 0–0.1)
BASOPHILS NFR BLD AUTO: 1 % (ref 0–1)
BILIRUB SERPL-MCNC: 0.46 MG/DL (ref 0.2–1)
BILIRUB UR QL STRIP: NEGATIVE
BUN SERPL-MCNC: 22 MG/DL (ref 5–25)
BUN SERPL-MCNC: 23 MG/DL (ref 5–25)
CALCIUM SERPL-MCNC: 9.1 MG/DL (ref 8.4–10.2)
CALCIUM SERPL-MCNC: 9.6 MG/DL (ref 8.4–10.2)
CHLORIDE SERPL-SCNC: 109 MMOL/L (ref 96–108)
CHLORIDE SERPL-SCNC: 112 MMOL/L (ref 96–108)
CLARITY UR: ABNORMAL
CO2 SERPL-SCNC: 24 MMOL/L (ref 21–32)
CO2 SERPL-SCNC: 26 MMOL/L (ref 21–32)
COLOR UR: YELLOW
CREAT SERPL-MCNC: 1.23 MG/DL (ref 0.6–1.3)
CREAT SERPL-MCNC: 1.28 MG/DL (ref 0.6–1.3)
EOSINOPHIL # BLD AUTO: 0.03 THOUSAND/ΜL (ref 0–0.61)
EOSINOPHIL NFR BLD AUTO: 1 % (ref 0–6)
ERYTHROCYTE [DISTWIDTH] IN BLOOD BY AUTOMATED COUNT: 15 % (ref 11.6–15.1)
GFR SERPL CREATININE-BSD FRML MDRD: 63 ML/MIN/1.73SQ M
GFR SERPL CREATININE-BSD FRML MDRD: 67 ML/MIN/1.73SQ M
GLUCOSE P FAST SERPL-MCNC: 117 MG/DL (ref 65–99)
GLUCOSE SERPL-MCNC: 117 MG/DL (ref 65–140)
GLUCOSE SERPL-MCNC: 126 MG/DL (ref 65–140)
GLUCOSE UR STRIP-MCNC: NEGATIVE MG/DL
HCT VFR BLD AUTO: 39.9 % (ref 36.5–49.3)
HGB BLD-MCNC: 12 G/DL (ref 12–17)
HGB UR QL STRIP.AUTO: ABNORMAL
IMM GRANULOCYTES # BLD AUTO: 0.03 THOUSAND/UL (ref 0–0.2)
IMM GRANULOCYTES NFR BLD AUTO: 1 % (ref 0–2)
KETONES UR STRIP-MCNC: ABNORMAL MG/DL
LEUKOCYTE ESTERASE UR QL STRIP: NEGATIVE
LIPASE SERPL-CCNC: 57 U/L (ref 11–82)
LYMPHOCYTES # BLD AUTO: 0.83 THOUSANDS/ΜL (ref 0.6–4.47)
LYMPHOCYTES NFR BLD AUTO: 13 % (ref 14–44)
MAGNESIUM SERPL-MCNC: 2 MG/DL (ref 1.9–2.7)
MCH RBC QN AUTO: 23.1 PG (ref 26.8–34.3)
MCHC RBC AUTO-ENTMCNC: 30.1 G/DL (ref 31.4–37.4)
MCV RBC AUTO: 77 FL (ref 82–98)
MONOCYTES # BLD AUTO: 0.31 THOUSAND/ΜL (ref 0.17–1.22)
MONOCYTES NFR BLD AUTO: 5 % (ref 4–12)
MUCOUS THREADS UR QL AUTO: ABNORMAL
NEUTROPHILS # BLD AUTO: 5.35 THOUSANDS/ΜL (ref 1.85–7.62)
NEUTS SEG NFR BLD AUTO: 79 % (ref 43–75)
NITRITE UR QL STRIP: NEGATIVE
NON-SQ EPI CELLS URNS QL MICRO: ABNORMAL /HPF
NRBC BLD AUTO-RTO: 0 /100 WBCS
P AXIS: 24 DEGREES
P AXIS: 70 DEGREES
PH UR STRIP.AUTO: 5.5 [PH]
PLATELET # BLD AUTO: 225 THOUSANDS/UL (ref 149–390)
PMV BLD AUTO: 12.6 FL (ref 8.9–12.7)
POTASSIUM SERPL-SCNC: 3.9 MMOL/L (ref 3.5–5.3)
POTASSIUM SERPL-SCNC: 4 MMOL/L (ref 3.5–5.3)
PR INTERVAL: 116 MS
PR INTERVAL: 120 MS
PROT SERPL-MCNC: 7 G/DL (ref 6.4–8.4)
PROT UR STRIP-MCNC: ABNORMAL MG/DL
QRS AXIS: -2 DEGREES
QRS AXIS: 1 DEGREES
QRSD INTERVAL: 82 MS
QRSD INTERVAL: 82 MS
QT INTERVAL: 408 MS
QT INTERVAL: 414 MS
QTC INTERVAL: 444 MS
QTC INTERVAL: 454 MS
RBC # BLD AUTO: 5.2 MILLION/UL (ref 3.88–5.62)
RBC #/AREA URNS AUTO: ABNORMAL /HPF
SODIUM SERPL-SCNC: 142 MMOL/L (ref 135–147)
SODIUM SERPL-SCNC: 144 MMOL/L (ref 135–147)
SP GR UR STRIP.AUTO: >=1.03 (ref 1–1.03)
T WAVE AXIS: -6 DEGREES
T WAVE AXIS: -7 DEGREES
URINE COMMENT: ABNORMAL
UROBILINOGEN UR STRIP-ACNC: <2 MG/DL
VENTRICULAR RATE: 71 BPM
VENTRICULAR RATE: 72 BPM
WBC # BLD AUTO: 6.6 THOUSAND/UL (ref 4.31–10.16)
WBC #/AREA URNS AUTO: ABNORMAL /HPF

## 2025-01-14 PROCEDURE — 99222 1ST HOSP IP/OBS MODERATE 55: CPT | Performed by: INTERNAL MEDICINE

## 2025-01-14 PROCEDURE — 93010 ELECTROCARDIOGRAM REPORT: CPT | Performed by: INTERNAL MEDICINE

## 2025-01-14 PROCEDURE — 83735 ASSAY OF MAGNESIUM: CPT | Performed by: NURSE PRACTITIONER

## 2025-01-14 PROCEDURE — 80048 BASIC METABOLIC PNL TOTAL CA: CPT | Performed by: NURSE PRACTITIONER

## 2025-01-14 PROCEDURE — 99285 EMERGENCY DEPT VISIT HI MDM: CPT | Performed by: EMERGENCY MEDICINE

## 2025-01-14 PROCEDURE — 96375 TX/PRO/DX INJ NEW DRUG ADDON: CPT

## 2025-01-14 PROCEDURE — 74176 CT ABD & PELVIS W/O CONTRAST: CPT

## 2025-01-14 PROCEDURE — 93005 ELECTROCARDIOGRAM TRACING: CPT

## 2025-01-14 RX ORDER — SODIUM CHLORIDE, SODIUM LACTATE, POTASSIUM CHLORIDE, CALCIUM CHLORIDE 600; 310; 30; 20 MG/100ML; MG/100ML; MG/100ML; MG/100ML
125 INJECTION, SOLUTION INTRAVENOUS CONTINUOUS
Status: DISCONTINUED | OUTPATIENT
Start: 2025-01-14 | End: 2025-01-17 | Stop reason: HOSPADM

## 2025-01-14 RX ORDER — CHLORAL HYDRATE 500 MG
1000 CAPSULE ORAL DAILY
Status: DISCONTINUED | OUTPATIENT
Start: 2025-01-14 | End: 2025-01-17 | Stop reason: HOSPADM

## 2025-01-14 RX ORDER — ONDANSETRON 2 MG/ML
4 INJECTION INTRAMUSCULAR; INTRAVENOUS EVERY 6 HOURS PRN
Status: DISCONTINUED | OUTPATIENT
Start: 2025-01-14 | End: 2025-01-17 | Stop reason: HOSPADM

## 2025-01-14 RX ORDER — HYDROXYZINE HYDROCHLORIDE 10 MG/1
10 TABLET, FILM COATED ORAL EVERY 6 HOURS PRN
Status: DISCONTINUED | OUTPATIENT
Start: 2025-01-14 | End: 2025-01-17 | Stop reason: HOSPADM

## 2025-01-14 RX ORDER — ACETAMINOPHEN 325 MG/1
650 TABLET ORAL EVERY 6 HOURS PRN
Status: DISCONTINUED | OUTPATIENT
Start: 2025-01-14 | End: 2025-01-17 | Stop reason: HOSPADM

## 2025-01-14 RX ORDER — HYDROMORPHONE HCL/PF 1 MG/ML
0.5 SYRINGE (ML) INJECTION
Status: DISCONTINUED | OUTPATIENT
Start: 2025-01-14 | End: 2025-01-17 | Stop reason: HOSPADM

## 2025-01-14 RX ORDER — BACLOFEN 10 MG/1
10 TABLET ORAL 3 TIMES DAILY
Status: DISCONTINUED | OUTPATIENT
Start: 2025-01-14 | End: 2025-01-17 | Stop reason: HOSPADM

## 2025-01-14 RX ORDER — TAMSULOSIN HYDROCHLORIDE 0.4 MG/1
0.4 CAPSULE ORAL
Status: DISCONTINUED | OUTPATIENT
Start: 2025-01-14 | End: 2025-01-17 | Stop reason: HOSPADM

## 2025-01-14 RX ORDER — FAMOTIDINE 20 MG/1
20 TABLET, FILM COATED ORAL 2 TIMES DAILY
Status: DISCONTINUED | OUTPATIENT
Start: 2025-01-14 | End: 2025-01-17 | Stop reason: HOSPADM

## 2025-01-14 RX ORDER — HYDROMORPHONE HCL/PF 1 MG/ML
0.2 SYRINGE (ML) INJECTION
Status: DISCONTINUED | OUTPATIENT
Start: 2025-01-14 | End: 2025-01-17 | Stop reason: HOSPADM

## 2025-01-14 RX ORDER — METHOCARBAMOL 500 MG/1
500 TABLET, FILM COATED ORAL EVERY 6 HOURS PRN
Status: DISCONTINUED | OUTPATIENT
Start: 2025-01-14 | End: 2025-01-17 | Stop reason: HOSPADM

## 2025-01-14 RX ADMIN — OMEGA-3 FATTY ACIDS CAP 1000 MG 1000 MG: 1000 CAP at 09:09

## 2025-01-14 RX ADMIN — SODIUM CHLORIDE, SODIUM LACTATE, POTASSIUM CHLORIDE, AND CALCIUM CHLORIDE 125 ML/HR: .6; .31; .03; .02 INJECTION, SOLUTION INTRAVENOUS at 18:55

## 2025-01-14 RX ADMIN — FAMOTIDINE 20 MG: 20 TABLET, FILM COATED ORAL at 09:09

## 2025-01-14 RX ADMIN — SODIUM CHLORIDE, SODIUM LACTATE, POTASSIUM CHLORIDE, AND CALCIUM CHLORIDE 125 ML/HR: .6; .31; .03; .02 INJECTION, SOLUTION INTRAVENOUS at 10:37

## 2025-01-14 RX ADMIN — KETOROLAC TROMETHAMINE 15 MG: 30 INJECTION, SOLUTION INTRAMUSCULAR; INTRAVENOUS at 00:00

## 2025-01-14 RX ADMIN — BACLOFEN 10 MG: 10 TABLET ORAL at 17:40

## 2025-01-14 RX ADMIN — FAMOTIDINE 20 MG: 20 TABLET, FILM COATED ORAL at 17:39

## 2025-01-14 RX ADMIN — SODIUM CHLORIDE, SODIUM LACTATE, POTASSIUM CHLORIDE, AND CALCIUM CHLORIDE 125 ML/HR: .6; .31; .03; .02 INJECTION, SOLUTION INTRAVENOUS at 02:49

## 2025-01-14 RX ADMIN — ACETAMINOPHEN 650 MG: 325 TABLET ORAL at 09:09

## 2025-01-14 RX ADMIN — FAMOTIDINE 20 MG: 10 INJECTION, SOLUTION INTRAVENOUS at 00:02

## 2025-01-14 RX ADMIN — BACLOFEN 10 MG: 10 TABLET ORAL at 22:07

## 2025-01-14 RX ADMIN — TAMSULOSIN HYDROCHLORIDE 0.4 MG: 0.4 CAPSULE ORAL at 17:39

## 2025-01-14 NOTE — ED PROVIDER NOTES
Final Diagnosis:  1. Ureterolithiasis    2. Hydronephrosis    3. Right flank pain    4. Obstruction of ureteropelvic junction (UPJ) due to stone        Chief Complaint   Patient presents with    Flank Pain     Patient c/o right flank pain for about 4 hours, dark urine, n/v has a history of kidney stones, did not take anything for pain yet        HPI  Pt pres w flank pain  Right  Feels like prior uretero  Had many  F/u w/ antario previously    4 hrs sudden onset  No OTC  Dark urine  No blood    No diarrhea    Some n/v, NBNB    No fever chills.     EMS additionally reports:     - Previous charting underwent limited review with attention to last ED visits, labs, ekgs, and prior imaging.  Chart review reveals :     Appointment on 09/03/2024   Component Date Value Ref Range Status    PSA 09/03/2024 1.309  0.000 - 4.000 ng/mL Final    [employer] Access chemiluminescent immunoassay. Confirm baseline values for patients being serially monitored.    Cholesterol 09/03/2024 182  See Comment mg/dL Final    Cholesterol:         Pediatric <18 Years        Desirable          <170 mg/dL      Borderline High    170-199 mg/dL      High               >=200 mg/dL        Adult >=18 Years            Desirable         <200 mg/dL      Borderline High   200-239 mg/dL      High              >239 mg/dL      Triglycerides 09/03/2024 280 (H)  See Comment mg/dL Final    Triglyceride:     0-9Y            <75mg/dL     10Y-17Y         <90 mg/dL       >=18Y     Normal          <150 mg/dL     Borderline High 150-199 mg/dL     High            200-499 mg/dL        Very High       >499 mg/dL    Specimen collection should occur prior to Metamizole administration due to the potential for falsely depressed results.    HDL, Direct 09/03/2024 36 (L)  >=40 mg/dL Final    LDL Calculated 09/03/2024 90  0 - 100 mg/dL Final    LDL Cholesterol:     Optimal           <100 mg/dl     Near Optimal      100-129 mg/dl     Above Optimal       Borderline High 130-159  mg/dl       High            160-189 mg/dl       Very High       >189 mg/dl         This screening LDL is a calculated result.   It does not have the accuracy of the Direct Measured LDL in the monitoring of patients with hyperlipidemia and/or statin therapy.   Direct Measure LDL (FAP606) must be ordered separately in these patients.    Sodium 09/03/2024 139  135 - 147 mmol/L Final    Potassium 09/03/2024 3.8  3.5 - 5.3 mmol/L Final    Chloride 09/03/2024 107  96 - 108 mmol/L Final    CO2 09/03/2024 26  21 - 32 mmol/L Final    ANION GAP 09/03/2024 6  4 - 13 mmol/L Final    BUN 09/03/2024 18  5 - 25 mg/dL Final    Creatinine 09/03/2024 1.21  0.60 - 1.30 mg/dL Final    Standardized to IDMS reference method    Glucose, Fasting 09/03/2024 104 (H)  65 - 99 mg/dL Final    Calcium 09/03/2024 8.8  8.4 - 10.2 mg/dL Final    eGFR 09/03/2024 68  ml/min/1.73sq m Final       - No language barrier.   - History obtained from patient    - Discuss patient's care, with patient permission or by chart review, with  his mother .    PMH:   has a past medical history of Anesthesia complication, Anxiety, Arthritis, Cerebral palsy (HCC), GERD (gastroesophageal reflux disease), H/O ETOH abuse, Kidney stone, and Stomach ulcer.    PSH:   has a past surgical history that includes Foot surgery (Bilateral); Other surgical history (Bilateral); Esophagogastroduodenoscopy (N/A, 05/09/2016); Hip surgery (Right); and pr surgical arthroscopy shoulder w/rotator cuff rpr (Right, 06/04/2021).     Social History:  Tobacco Use: Medium Risk (1/14/2025)    Patient History     Smoking Tobacco Use: Former     Smokeless Tobacco Use: Never     Passive Exposure: Past     Alcohol Use: Not At Risk (7/6/2023)    AUDIT-C     Frequency of Alcohol Consumption: Never     Average Number of Drinks: Patient does not drink     Frequency of Binge Drinking: Never     No illicit use       ROS:  Pertinent positives/negatives: .     Some ROS may be present in the HPI and would take  precedent over these standard questions asked below.   Review of Systems   Constitutional:  Negative for chills and fever.   Respiratory:  Negative for cough, shortness of breath, wheezing and stridor.    Cardiovascular:  Negative for chest pain, palpitations and leg swelling.   Gastrointestinal:  Positive for abdominal pain, nausea and vomiting. Negative for abdominal distention, constipation and diarrhea.   Genitourinary:  Positive for flank pain and frequency. Negative for dysuria, hematuria and testicular pain.        CONSTITUTIONAL:  No lethargy. No unexpected weight loss. No change in behavior.  EYES:  No pain, redness, or discharge. No loss of vision. No orbital trauma or pain.   ENT:  No tinnitus or decreased hearing. No epistaxis/purulent rhinorrhea. No voice change, airway closing, trismus.   CARDIOVASCULAR:  No chest pain. No skin mottling or pallor. No change in exertional capacity  RESPIRATORY:  No hemoptysis. No paroxysmal nocturnal dyspnea. No stridor. No apnea or bluing.   GASTROINTESTINAL:  No vomiting, diarrhea. No distension. No melena. No hematochezia.   GENITOURINARY:  No nocturia. No hematuria or foul smelling or cloudy urine. No discharge. No sores/adenopathy.   MUSCULOSKELETAL:  No contracture.  No new deformity.   INTEGUMENTARY:  No swelling. No unexpected contusions. No abrasions. No lymphangitis.  NEUROLOGIC:  No meningismus. No new numbness of the extremities. No new focal weakness. No postural instability  PSYCHIATRIC:  No SI HI AVH  HEMATOLOGICAL:  No bleeding. No petechiae. No bruising.  ALLERGIES:  No urticaria. No sudden abd cramping. No stridor.    PE:     Physical exam highlights:   Physical Exam       Vitals:    01/14/25 0000 01/14/25 0030 01/14/25 0130 01/14/25 0200   BP: 133/81 108/69 100/62 128/81   BP Location: Right arm Right arm Right arm Right arm   Pulse: 102 76 75 83   Resp: 18 18 16 18   Temp:       TempSrc:       SpO2: 96% 96% 93% 96%   Weight:         Vitals reviewed  by me.   Nursing note reviewed  Chaperone present for all sensitive exam.  Const: No acute distress. Alert. Nontoxic. Not diaphoretic.    HEENT: External ears normal. No protrusion drainage swelling. Nose normal. No drainage/traumatic deformity. MM. Mouth with baseline/symmetric movement. No trismus.   Eyes: No squinting. No icterus. No tearing/swelling/drainage. Tracks through the room with normal EOM.   Neck: ROM normal. No rigidity. No meningismus.  Cards: Rate as per vitals Compared to monitor sinus unless documented. Regular Well perfused.  Pulm: Effort and excursion normal. No distress. No audible wheezing/no stridor. Normal resp rate without retraction or change in work of breathing.  Abd: No distension beyond baseline. No fluctuant wave. Patient without peritoneal pain with shifting/bumping the bed. soft  MSK: ROM normal baseline. No deformity. Some change c/w hx of CP  Skin: No new rashes visible. Well perfused. No wounds visualized on exposed skin  Neuro: Nonfocal. Baseline. CN grossly intact. Moving all four with coordination.   Psych: Normal behavior and affect.        A:  - Nursing note reviewed.    Ddx and MDM  Considered diagnoses    Ureterolithaisis likely  UA r/o concurrent UTI  Urine culture      Biliary disease on Ddx  LFT  CT      MSK?  Treat symptoms      CT renal w/ LARGE stone  Too big to pass spont  Still at UPJ  Some hydro    Check for BHASKAR, obstructive  Renal fxn normal       Consult uro  Admit obs  NPO       Dispo decision       My conversation with consultant reveals:        Decision rules:                           My read of the XR/CT scan reveals:     CT renal stone study abdomen pelvis wo contrast   Final Result      0.9 x 1.3 cm obstructing calculus at the right ureteropelvic junction with mild hydronephrosis and perinephric fat stranding.         Workstation performed: [id number]             Orders Placed This Encounter   Procedures    Urine culture    CT renal stone study abdomen  pelvis wo contrast    Comprehensive metabolic panel    Lipase    UA (URINE) with reflex to Scope    Urine Microscopic    CBC and differential    Basic metabolic panel    Magnesium    Diet NPO; Sips with meds    Insert peripheral IV    Notify physician    Nursing communication Continue IV as ordered.    Notify admitting physician    Notify admitting physician on arrival    Vital Signs per unit routine    I/O    Activity as Tolerated    Out of Bed to Chair    Apply SCD only    Level 1-Full Code: all life saving measures are indicated    Inpatient consult to Urology    Place in Observation     Labs Reviewed   COMPREHENSIVE METABOLIC PANEL - Abnormal       Result Value Ref Range Status    Sodium 142  135 - 147 mmol/L Final    Potassium 3.9  3.5 - 5.3 mmol/L Final    Chloride 109 (*) 96 - 108 mmol/L Final    CO2 24  21 - 32 mmol/L Final    ANION GAP 9  4 - 13 mmol/L Final    BUN 23  5 - 25 mg/dL Final    Creatinine 1.28  0.60 - 1.30 mg/dL Final    Comment: Standardized to IDMS reference method    Glucose 126  65 - 140 mg/dL Final    Comment: If the patient is fasting, the ADA then defines impaired fasting glucose as > 100 mg/dL and diabetes as > or equal to 123 mg/dL.    Calcium 9.1  8.4 - 10.2 mg/dL Final    AST 24  13 - 39 U/L Final    ALT 44  7 - 52 U/L Final    Comment: Specimen collection should occur prior to Sulfasalazine administration due to the potential for falsely depressed results.     Alkaline Phosphatase 55  34 - 104 U/L Final    Total Protein 7.0  6.4 - 8.4 g/dL Final    Albumin 4.8  3.5 - 5.0 g/dL Final    Total Bilirubin 0.46  0.20 - 1.00 mg/dL Final    Comment: Use of this assay is not recommended for patients undergoing treatment with eltrombopag due to the potential for falsely elevated results.  N-acetyl-p-benzoquinone imine (metabolite of Acetaminophen) will generate erroneously low results in samples for patients that have taken an overdose of Acetaminophen.    eGFR 63  ml/min/1.73sq m Final     Narrative:     National Kidney Disease Foundation guidelines for Chronic Kidney Disease (CKD):     Stage 1 with normal or high GFR (GFR > 90 mL/min/1.73 square meters)    Stage 2 Mild CKD (GFR = 60-89 mL/min/1.73 square meters)    Stage 3A Moderate CKD (GFR = 45-59 mL/min/1.73 square meters)    Stage 3B Moderate CKD (GFR = 30-44 mL/min/1.73 square meters)    Stage 4 Severe CKD (GFR = 15-29 mL/min/1.73 square meters)    Stage 5 End Stage CKD (GFR <15 mL/min/1.73 square meters)  Note: GFR calculation is accurate only with a steady state creatinine   URINALYSIS WITH REFLEX TO SCOPE - Abnormal    Color, UA Yellow   Final    Clarity, UA Cloudy   Final    Specific Gravity, UA >=1.030  1.005 - 1.030 Final    pH, UA 5.5  4.5, 5.0, 5.5, 6.0, 6.5, 7.0, 7.5, 8.0 Final    Leukocytes, UA Negative  Negative Final    Nitrite, UA Negative  Negative Final    Protein,  (2+) (*) Negative mg/dl Final    Glucose, UA Negative  Negative mg/dl Final    Ketones, UA Trace (*) Negative mg/dl Final    Urobilinogen, UA <2.0  <2.0 mg/dl mg/dl Final    Bilirubin, UA Negative  Negative Final    Occult Blood, UA Large (*) Negative Final   URINE MICROSCOPIC - Abnormal    RBC, UA Innumerable (*) None Seen, 0-1, 1-2, 2-4, 0-5 /hpf Final    WBC, UA 1-2  None Seen, 0-1, 1-2, 0-5, 2-4 /hpf Final    Epithelial Cells Occasional  None Seen, Occasional /hpf Final    Bacteria, UA Occasional  None Seen, Occasional /hpf Final    MUCUS THREADS Occasional (*) None Seen Final    URINE COMMENT Starch granules present.   Final   CBC AND DIFFERENTIAL - Abnormal    WBC 6.60  4.31 - 10.16 Thousand/uL Final    RBC 5.20  3.88 - 5.62 Million/uL Final    Hemoglobin 12.0  12.0 - 17.0 g/dL Final    Hematocrit 39.9  36.5 - 49.3 % Final    MCV 77 (*) 82 - 98 fL Final    MCH 23.1 (*) 26.8 - 34.3 pg Final    MCHC 30.1 (*) 31.4 - 37.4 g/dL Final    RDW 15.0  11.6 - 15.1 % Final    MPV 12.6  8.9 - 12.7 fL Final    Platelets 225  149 - 390 Thousands/uL Final    nRBC 0  /100  WBCs Final    Segmented % 79 (*) 43 - 75 % Final    Immature Grans % 1  0 - 2 % Final    Lymphocytes % 13 (*) 14 - 44 % Final    Monocytes % 5  4 - 12 % Final    Eosinophils Relative 1  0 - 6 % Final    Basophils Relative 1  0 - 1 % Final    Absolute Neutrophils 5.35  1.85 - 7.62 Thousands/µL Final    Absolute Immature Grans 0.03  0.00 - 0.20 Thousand/uL Final    Absolute Lymphocytes 0.83  0.60 - 4.47 Thousands/µL Final    Absolute Monocytes 0.31  0.17 - 1.22 Thousand/µL Final    Eosinophils Absolute 0.03  0.00 - 0.61 Thousand/µL Final    Basophils Absolute 0.05  0.00 - 0.10 Thousands/µL Final   LIPASE - Normal    Lipase 57  11 - 82 u/L Final   URINE CULTURE       *Each of these labs was reviewed. Particular standout labs will be noted in the ED Course above     Final Diagnosis:  1. Ureterolithiasis    2. Hydronephrosis    3. Right flank pain    4. Obstruction of ureteropelvic junction (UPJ) due to stone          P:  - hospital tx includes   Medications   famotidine (PEPCID) tablet 20 mg (has no administration in time range)   fish oil capsule 1,000 mg (has no administration in time range)   lactated ringers infusion (has no administration in time range)   acetaminophen (TYLENOL) tablet 650 mg (has no administration in time range)   ondansetron (ZOFRAN) injection 4 mg (has no administration in time range)   HYDROmorphone (DILAUDID) injection 0.2 mg (has no administration in time range)     Or   HYDROmorphone (DILAUDID) injection 0.5 mg (has no administration in time range)   ondansetron (ZOFRAN) injection 4 mg (4 mg Intravenous Given 1/13/25 2358)   ketorolac (TORADOL) injection 15 mg (15 mg Intravenous Given 1/14/25 0000)   Famotidine (PF) (PEPCID) injection 20 mg (20 mg Intravenous Given 1/14/25 0002)         - disposition  Time reflects when diagnosis was documented in both MDM as applicable and the Disposition within this note       Time User Action Codes Description Comment    1/14/2025  1:49 AM Harvey  David Add [N20.1] Ureterolithiasis     1/14/2025  1:49 AM David Gabriel Add [N13.30] Hydronephrosis     1/14/2025  1:51 AM David Gabriel Add [R10.9] Right flank pain     1/14/2025  2:20 AM Dana Sinha Add [N20.1] Obstruction of ureteropelvic junction (UPJ) due to stone           ED Disposition       ED Disposition   Admit    Condition   Stable    Date/Time   Tue Jan 14, 2025  1:51 AM    Comment   Case was discussed with john/ slim and the patient's admission status was agreed to be Admission Status: observation status to the service of Dr. Zapata .               Follow-up Information    None         - patient will call their PCP to let them know they were in the emergency department. We discuss return precautions and patient is agreeable with plan and aformentioned disposition.       - additional treatment intended, if consistent with primary provider:  - patient to follow with :      Current Discharge Medication List        CONTINUE these medications which have NOT CHANGED    Details   Cholecalciferol (VITAMIN D3) 1,000 units tablet Take 5,000 Units by mouth daily      famotidine (PEPCID) 40 MG tablet Take 1 tablet (40 mg total) by mouth 2 (two) times a day  Qty: 180 tablet, Refills: 0    Associated Diagnoses: Gastroesophageal reflux disease with esophagitis without hemorrhage      Multiple Vitamins-Minerals (CENTRUM ADULTS PO) Centrum      Omega-3 Fatty Acids (fish oil) 1,000 mg Take 1,000 mg by mouth daily           No discharge procedures on file.  Prior to Admission Medications   Prescriptions Last Dose Informant Patient Reported? Taking?   Cholecalciferol (VITAMIN D3) 1,000 units tablet 1/13/2025  Yes Yes   Sig: Take 5,000 Units by mouth daily   Multiple Vitamins-Minerals (CENTRUM ADULTS PO) 1/13/2025 Self Yes Yes   Sig: Centrum   Omega-3 Fatty Acids (fish oil) 1,000 mg 1/13/2025 Self Yes Yes   Sig: Take 1,000 mg by mouth daily   famotidine (PEPCID) 40 MG tablet 1/13/2025  No Yes   Sig: Take 1 tablet  "(40 mg total) by mouth 2 (two) times a day      Facility-Administered Medications: None       Portions of the record may have been created with voice recognition software. Occasional wrong word or \"sound a like\" substitutions may have occurred due to the inherent limitations of voice recognition software. Read the chart carefully and recognize, using context, where substitutions have occurred.    Electronically signed by:  MD David Roberts MD  01/14/25 0317    "

## 2025-01-14 NOTE — CONSULTS
H&P Exam - Urology       Patient: Gregorio Linda   : 1970 Sex: male   MRN: [id number]     CSN: [id number]      History of Present Illness   HPI:  Gregorio Linda is a 54 y.o. male well-known to me history of kidney stones last treated by me a few years ago developed severe right flank pain nausea late yesterday afternoon presenting to [facility] ER last night undergoing CAT scan confirming a 1.3 cm x 0.9 cm obstructing right ureteropelvic junction stone with mild hydronephrosis admitted for analgesics seen at the bedside at this time noting mild pain has passed stones with me in the past        Review of Systems:   Constitutional:  Negative for activity change, fever, chills and diaphoresis.   HENT: Negative for hearing loss and trouble swallowing.   Eyes: Negative for itching and visual disturbance.   Respiratory: Negative for chest tightness and shortness of breath.   Cardiovascular: Negative for chest pain, edema.   Gastrointestinal: Negative for abdominal distention, na abdominal pain, constipation, diarrhea, Nausea and vomiting.   Genitourinary: Negative for decreased urine volume, difficulty urinating, dysuria, enuresis, frequency, hematuria and urgency.   Musculoskeletal: Negative for gait problem and myalgias.   Neurological: Negative for dizziness and headaches.   Hematological: Does not bruise/bleed easily.       Historical Information   Past Medical History:   Diagnosis Date    Anesthesia complication     woke up twice during anesthesia    Anxiety     Arthritis     Cerebral palsy (HCC)     GERD (gastroesophageal reflux disease)     H/O ETOH abuse     Kidney stone     Stomach ulcer      Past Surgical History:   Procedure Laterality Date    ESOPHAGOGASTRODUODENOSCOPY N/A 2016    Procedure: ESOPHAGOGASTRODUODENOSCOPY (EGD);  Surgeon: Tho Hernandez MD;  Location: [facility] GI LAB;  Service:     FOOT SURGERY Bilateral     hardware    HIP SURGERY Right     ORIF    OTHER SURGICAL HISTORY  "Bilateral     lower extremity ligiment extensions-multiple    RI SURGICAL ARTHROSCOPY SHOULDER W/ROTATOR CUFF RPR Right 2021    Procedure: SHOULDER ARTHROSCOPIC ROTATOR CUFF REPAIR, SUBACROMIAL DECOMPRESSION;  Surgeon: Ryan Araya MD;  Location: [facility]  MAIN OR;  Service: Orthopedics     Social History   Social History     Substance and Sexual Activity   Alcohol Use Not Currently    Comment: 2015 quit     Social History     Substance and Sexual Activity   Drug Use Yes    Frequency: 7.0 times per week    Types: Marijuana    Comment: rare     Social History     Tobacco Use   Smoking Status Former    Current packs/day: 0.00    Average packs/day: 3.0 packs/day for 15.0 years (45.0 ttl pk-yrs)    Types: Cigarettes    Start date: 1979    Quit date: 1994    Years since quittin.9    Passive exposure: Past   Smokeless Tobacco Never     Family History:   Family History   Problem Relation Age of Onset    No Known Problems Mother     No Known Problems Father     Cancer Maternal Grandmother     Cancer Maternal Grandfather     Cancer Maternal Aunt        Meds/Allergies     Medications Prior to Admission:     Cholecalciferol (VITAMIN D3) 1,000 units tablet    famotidine (PEPCID) 40 MG tablet    Multiple Vitamins-Minerals (CENTRUM ADULTS PO)    Omega-3 Fatty Acids (fish oil) 1,000 mg  Allergies   Allergen Reactions    Aspirin GI Bleeding and Vomiting     Other reaction(s): Unknown  Patient has ulcers and does not take aspirin        Objective   Vitals: /86 (BP Location: Right arm)   Pulse 79   Temp 98.2 °F (36.8 °C) (Oral)   Resp 14   Ht 5' 10\" (1.778 m)   Wt 96 kg (211 lb 9.6 oz)   SpO2 95%   BMI 30.36 kg/m²     Physical Exam:  General Alert awake   Normocephalic atraumatic PERRLA  Lungs clear bilaterally  Cardiac normal S1 normal S2  Abdomen soft, flank pain  Extremities no edema    I/O last 24 hours:  In: 975 [I.V.:975]  Out: 210 [Urine:210]    Invasive Devices       Peripheral " "Intravenous Line  Duration             Peripheral IV 01/13/25 Left Antecubital <1 day                        Lab Results: CBC:   Lab Results   Component Value Date    WBC 6.60 01/13/2025    HGB 12.0 01/13/2025    HCT 39.9 01/13/2025    MCV 77 (L) 01/13/2025     01/13/2025    RBC 5.20 01/13/2025    MCH 23.1 (L) 01/13/2025    MCHC 30.1 (L) 01/13/2025    RDW 15.0 01/13/2025    MPV 12.6 01/13/2025    NRBC 0 01/13/2025     CMP:   Lab Results   Component Value Date     (H) 01/14/2025    CO2 26 01/14/2025    BUN 22 01/14/2025    CREATININE 1.23 01/14/2025    CALCIUM 9.6 01/14/2025    AST 24 01/13/2025    ALT 44 01/13/2025    ALKPHOS 55 01/13/2025    EGFR 67 01/14/2025     Urinalysis:   Lab Results   Component Value Date    COLORU Yellow 01/13/2025    CLARITYU Cloudy 01/13/2025    SPECGRAV >=1.030 01/13/2025    PHUR 5.5 01/13/2025    PHUR 5.5 02/15/2018    LEUKOCYTESUR Negative 01/13/2025    NITRITE Negative 01/13/2025    GLUCOSEU Negative 01/13/2025    KETONESU Trace (A) 01/13/2025    BILIRUBINUR Negative 01/13/2025    BLOODU Large (A) 01/13/2025     Urine Culture: No results found for: \"URINECX\"  PSA:   Lab Results   Component Value Date    PSA 1.309 09/03/2024    PSA 1.0 02/16/2023           Assessment/ Plan:  Right 1.2 cm renal pelvic stone with mild hydronephrosis  Patient has less than a 10% chance of passing with medical expulsion therapy  Will watch over the next day or so if pain disappears can be discharged home undergo elective right ureteroscopy stone extraction if pain continues tomorrow we will add to the OR schedule for Thursday for cystoscopy right stent      Gregorio Collins MD    "

## 2025-01-14 NOTE — H&P
H&P - Hospitalist   Name: Gregorio Linda 54 y.o. male I MRN: [id number]  Unit/Bed#: [facility] I Date of Admission: 1/13/2025   Date of Service: 1/14/2025 I Hospital Day: 0     Assessment & Plan  Obstruction of ureteropelvic junction (UPJ) due to stone  Patient presents with right flank pain started around 5:30 PM, got worse around 10:30 PM with associated nausea vomiting and dark urine.  Reports history of kidney stones, was able to pass on his own.  CT renal stone study showed - 0.9 x 1.3 cm obstructing calculus at the right ureteropelvic junction with mild hydronephrosis and perinephric fat stranding.   BMP unremarkable  WBC normal, no bands, patient afebrile  UA positive blood, negative for WBC nitrite leukocytes  N.p.o.  IV hydration  Pain control  Repeat BMP CBC in a.m.  Consult urology  Cerebral palsy (HCC)  Underwent multiple surgeries to lower extremities  Uses cane to ambulate  Supportive care  GERD (gastroesophageal reflux disease)  Reports history of gastric ulcer with GERD  Continue PPI twice daily    H/O ETOH abuse  Sober for 10 years  Anxiety  Not on medication at home  Will order Atarax as needed  Hyperlipidemia  Continue fish oil      VTE Pharmacologic Prophylaxis: VTE Score: 2 Low Risk (Score 0-2) - Encourage Ambulation.  Code Status: Full code  Discussion with family:  no.     Anticipated Length of Stay: Patient will be admitted on an observation basis with an anticipated length of stay of less than 2 midnights secondary to kidney stone.    History of Present Illness   Chief Complaint: Right flank pain    Gregorio Linda is a 54 y.o. male with a PMH of kidney stones, cerebral palsy, GERD, gastric ulcer, anxiety, alcohol abuse who presents with right flank pain with radiation to right side of abdomen started around 5:30 PM, got worse around 10:30 PM with associated nausea vomiting and dark urine.  Reports history of kidney stones, was able to pass on his own in the past.  Patient reports pressure when  urinating, denies dysuria urgency frequency.  Reports decreased urine output today.  Patient denies bloody emesis.  Patient denies chest pain, headache, dizziness, SOB, fever.  Reports felt cold during vomiting.  Patient denies constipation.  Denies right flank pain abdominal pain currently.  No other complaints.        Review of Systems   Constitutional:  Positive for appetite change.   Gastrointestinal:  Positive for abdominal pain, nausea and vomiting.   Genitourinary:  Positive for decreased urine volume and flank pain.        Dark urine.  Pressure when urinating.   All other systems reviewed and are negative.      Historical Information   Past Medical History:   Diagnosis Date    Anesthesia complication     woke up twice during anesthesia    Anxiety     Arthritis     Cerebral palsy (HCC)     GERD (gastroesophageal reflux disease)     H/O ETOH abuse     Kidney stone     Stomach ulcer      Past Surgical History:   Procedure Laterality Date    ESOPHAGOGASTRODUODENOSCOPY N/A 2016    Procedure: ESOPHAGOGASTRODUODENOSCOPY (EGD);  Surgeon: Tho Hernandez MD;  Location: [facility] GI LAB;  Service:     FOOT SURGERY Bilateral     hardware    HIP SURGERY Right     ORIF    OTHER SURGICAL HISTORY Bilateral     lower extremity ligiment extensions-multiple    [place] SURGICAL ARTHROSCOPY SHOULDER W/ROTATOR CUFF RPR Right 2021    Procedure: SHOULDER ARTHROSCOPIC ROTATOR CUFF REPAIR, SUBACROMIAL DECOMPRESSION;  Surgeon: Ryan Araya MD;  Location: [facility] MAIN OR;  Service: Orthopedics     Social History     Tobacco Use    Smoking status: Former     Current packs/day: 0.00     Average packs/day: 3.0 packs/day for 15.0 years (45.0 ttl pk-yrs)     Types: Cigarettes     Start date: 1979     Quit date: 1994     Years since quittin.9     Passive exposure: Past    Smokeless tobacco: Never   Vaping Use    Vaping status: Never Used   Substance and Sexual Activity    Alcohol use: Not Currently     Comment: 2015  quit    Drug use: Yes     Frequency: 7.0 times per week     Types: Marijuana     Comment: rare    Sexual activity: Not Currently     E-Cigarette/Vaping    E-Cigarette Use Never User      E-Cigarette/Vaping Substances    Nicotine No     THC No     CBD No     Flavoring No     Other No     Unknown No      Family history non-contributory  Social History:  Marital Status: Single   Occupation: Disabled  Patient Pre-hospital Living Situation: Home  Patient Pre-hospital Level of Mobility: walks with cane  Patient Pre-hospital Diet Restrictions: Cardiac    Meds/Allergies   I have reviewed home medications with patient personally.  Prior to Admission medications    Medication Sig Start Date End Date Taking? Authorizing Provider   Cholecalciferol (VITAMIN D3) 1,000 units tablet Take 5,000 Units by mouth daily   Yes Historical Provider, MD   famotidine (PEPCID) 40 MG tablet Take 1 tablet (40 mg total) by mouth 2 (two) times a day 11/26/24  Yes SAMANTHA Montejo   Multiple Vitamins-Minerals (CENTRUM ADULTS PO) Centrum   Yes Historical Provider, MD   Omega-3 Fatty Acids (fish oil) 1,000 mg Take 1,000 mg by mouth daily   Yes Historical Provider, MD     Allergies   Allergen Reactions    Aspirin GI Bleeding and Vomiting     Other reaction(s): Unknown  Patient has ulcers and does not take aspirin        Objective :  Temp:  [97.4 °F (36.3 °C)] 97.4 °F (36.3 °C)  HR:  [] 83  BP: (100-133)/(62-81) 128/81  Resp:  [16-18] 18  SpO2:  [93 %-98 %] 96 %  O2 Device: None (Room air)    Physical Exam  Vitals and nursing note reviewed.   Constitutional:       General: He is not in acute distress.     Appearance: Normal appearance. He is well-developed.   HENT:      Head: Normocephalic and atraumatic.   Neck:      Thyroid: No thyromegaly.      Vascular: No JVD.      Trachea: No tracheal deviation.   Cardiovascular:      Rate and Rhythm: Normal rate and regular rhythm.      Heart sounds: Normal heart sounds. No murmur  heard.  Pulmonary:      Effort: Pulmonary effort is normal. No respiratory distress.      Breath sounds: Normal breath sounds. No wheezing or rales.   Abdominal:      General: Bowel sounds are normal. There is no distension.      Palpations: Abdomen is soft.      Tenderness: There is no abdominal tenderness. There is no guarding.   Musculoskeletal:         General: No swelling.      Cervical back: Neck supple.      Right lower leg: No edema.      Left lower leg: No edema.   Skin:     General: Skin is warm and dry.      Capillary Refill: Capillary refill takes less than 2 seconds.   Neurological:      General: No focal deficit present.      Mental Status: He is alert and oriented to person, place, and time.   Psychiatric:         Mood and Affect: Mood normal.         Judgment: Judgment normal.          Lines/Drains:            Lab Results: I have reviewed the following results:  Results from last 7 days   Lab Units 01/13/25  2348   WBC Thousand/uL 6.60   HEMOGLOBIN g/dL 12.0   HEMATOCRIT % 39.9   PLATELETS Thousands/uL 225   SEGS PCT % 79*   LYMPHO PCT % 13*   MONO PCT % 5   EOS PCT % 1     Results from last 7 days   Lab Units 01/13/25  2348   SODIUM mmol/L 142   POTASSIUM mmol/L 3.9   CHLORIDE mmol/L 109*   CO2 mmol/L 24   BUN mg/dL 23   CREATININE mg/dL 1.28   ANION GAP mmol/L 9   CALCIUM mg/dL 9.1   ALBUMIN g/dL 4.8   TOTAL BILIRUBIN mg/dL 0.46   ALK PHOS U/L 55   ALT U/L 44   AST U/L 24   GLUCOSE RANDOM mg/dL 126             Lab Results   Component Value Date    HGBA1C 5.4 07/17/2023    HGBA1C 5.3 02/15/2018    HGBA1C 5.6 12/11/2017           Imaging Results Review: I reviewed radiology reports from this admission including: CT abdomen/pelvis.  Other Study Results Review: No additional pertinent studies reviewed.  Will order EKG for preop    Administrative Statements       ** Please Note: This note has been constructed using a voice recognition system. **

## 2025-01-14 NOTE — PLAN OF CARE
Problem: PAIN - ADULT  Goal: Verbalizes/displays adequate comfort level or baseline comfort level  Description: Interventions:  - Encourage patient to monitor pain and request assistance  - Assess pain using appropriate pain scale  - Administer analgesics based on type and severity of pain and evaluate response  - Implement non-pharmacological measures as appropriate and evaluate response  - Consider cultural and social influences on pain and pain management  - Notify physician/advanced practitioner if interventions unsuccessful or patient reports new pain  Outcome: Progressing     Problem: INFECTION - ADULT  Goal: Absence or prevention of progression during hospitalization  Description: INTERVENTIONS:  - Assess and monitor for signs and symptoms of infection  - Monitor lab/diagnostic results  - Monitor all insertion sites, i.e. indwelling lines, tubes, and drains  - Monitor endotracheal if appropriate and nasal secretions for changes in amount and color  - [place] appropriate cooling/warming therapies per order  - Administer medications as ordered  - Instruct and encourage patient and family to use good hand hygiene technique  - Identify and instruct in appropriate isolation precautions for identified infection/condition  Outcome: Progressing

## 2025-01-14 NOTE — ASSESSMENT & PLAN NOTE
Patient presents with right flank pain started around 5:30 PM, got worse around 10:30 PM with associated nausea vomiting and dark urine.  Reports history of kidney stones, was able to pass on his own.  CT renal stone study showed - 0.9 x 1.3 cm obstructing calculus at the right ureteropelvic junction with mild hydronephrosis and perinephric fat stranding.   BMP unremarkable  WBC normal, no bands, patient afebrile  UA positive blood, negative for WBC nitrite leukocytes  N.p.o.  IV hydration  Pain control  Repeat BMP CBC in a.m.  Consult urology

## 2025-01-15 ENCOUNTER — ANESTHESIA EVENT (INPATIENT)
Dept: PERIOP | Facility: HOSPITAL | Age: 55
DRG: 661 | End: 2025-01-15
Payer: MEDICARE

## 2025-01-15 LAB — BACTERIA UR CULT: NORMAL

## 2025-01-15 PROCEDURE — 99232 SBSQ HOSP IP/OBS MODERATE 35: CPT | Performed by: INTERNAL MEDICINE

## 2025-01-15 RX ADMIN — FAMOTIDINE 20 MG: 20 TABLET, FILM COATED ORAL at 17:28

## 2025-01-15 RX ADMIN — TAMSULOSIN HYDROCHLORIDE 0.4 MG: 0.4 CAPSULE ORAL at 15:47

## 2025-01-15 RX ADMIN — BACLOFEN 10 MG: 10 TABLET ORAL at 21:46

## 2025-01-15 RX ADMIN — BACLOFEN 10 MG: 10 TABLET ORAL at 08:27

## 2025-01-15 RX ADMIN — SODIUM CHLORIDE, SODIUM LACTATE, POTASSIUM CHLORIDE, AND CALCIUM CHLORIDE 125 ML/HR: .6; .31; .03; .02 INJECTION, SOLUTION INTRAVENOUS at 05:22

## 2025-01-15 RX ADMIN — ACETAMINOPHEN 650 MG: 325 TABLET ORAL at 07:23

## 2025-01-15 RX ADMIN — BACLOFEN 10 MG: 10 TABLET ORAL at 15:47

## 2025-01-15 RX ADMIN — OMEGA-3 FATTY ACIDS CAP 1000 MG 1000 MG: 1000 CAP at 08:27

## 2025-01-15 RX ADMIN — FAMOTIDINE 20 MG: 20 TABLET, FILM COATED ORAL at 08:27

## 2025-01-15 NOTE — ANESTHESIA PREPROCEDURE EVALUATION
Procedure:  CYSTOSCOPY URETEROSCOPY WITH LITHOTRIPSY HOLMIUM LASER, RETROGRADE PYELOGRAM AND INSERTION STENT URETERAL (Right: Bladder)    Relevant Problems   ANESTHESIA   (+) History of unintended awareness under general anesthesia      CARDIO   (+) Hyperlipidemia      GI/HEPATIC   (+) GERD (gastroesophageal reflux disease)      /RENAL   (+) Hydronephrosis, right, due to ureteral calculus   (+) Nephrolithiasis   (+) Obstruction of ureteropelvic junction (UPJ) due to stone      MUSCULOSKELETAL  Right hip screws, hardware in both feet      NEURO/PSYCH   (+) Anxiety   (+) Chronic left shoulder pain   (+) MDD (major depressive disorder)      Neurology/Sleep   (+) Cerebral palsy (HCC)        Physical Exam    Airway    Mallampati score: III  TM Distance: >3 FB  Neck ROM: full     Dental        Cardiovascular  Rhythm: regular, Rate: normal    Pulmonary   Breath sounds clear to auscultation    Other Findings        Anesthesia Plan  ASA Score- 3     Anesthesia Type- general with ASA Monitors.         Additional Monitors:     Airway Plan: LMA.           Plan Factors-    Chart reviewed.        Patient is not a current smoker.              Induction- intravenous.    Postoperative Plan- Plan for postoperative opioid use.     Perioperative Resuscitation Plan - Level 1 - Full Code.       Informed Consent- Anesthetic plan and risks discussed with patient.  I personally reviewed this patient with the CRNA. Discussed and agreed on the Anesthesia Plan with the CRNA..      NPO Status:  No vitals data found for the desired time range.

## 2025-01-15 NOTE — PROGRESS NOTES
"Progress Note - Urology      Patient: Gregorio Linda   : 1970 Sex: male   MRN: [id number]     CSN: [id number]  Unit/Bed#: 2 [address]     SUBJECTIVE:   Patient seen on morning rounds  Continuing to note flank pain requiring analgesics  Has eaten breakfast  Does not wish to leave without addressing the stone      Objective   Vitals: /77   Pulse 94   Temp 98 °F (36.7 °C)   Resp 19   Ht 5' 10\" (1.778 m)   Wt 96 kg (211 lb 9.6 oz)   SpO2 96%   BMI 30.36 kg/m²     I/O last 24 hours:  In: 4018.8 [P.O.:500; I.V.:3518.8]  Out: 1285 [Urine:1285]      Physical Exam:   General Alert awake   Normocephalic atraumatic PERRLA  Lungs clear bilaterally  Cardiac normal S1 normal S2  Abdomen soft, flank pain  Extremities no edema      Lab Results: CBC:   Lab Results   Component Value Date    WBC 6.60 2025    HGB 12.0 2025    HCT 39.9 2025    MCV 77 (L) 2025     2025    RBC 5.20 2025    MCH 23.1 (L) 2025    MCHC 30.1 (L) 2025    RDW 15.0 2025    MPV 12.6 2025    NRBC 0 2025     CMP:   Lab Results   Component Value Date     (H) 2025    CO2 26 2025    BUN 22 2025    CREATININE 1.23 2025    CALCIUM 9.6 2025    AST 24 2025    ALT 44 2025    ALKPHOS 55 2025    EGFR 67 2025     Urinalysis:   Lab Results   Component Value Date    COLORU Yellow 2025    CLARITYU Cloudy 2025    SPECGRAV >=1.030 2025    PHUR 5.5 2025    PHUR 5.5 02/15/2018    LEUKOCYTESUR Negative 2025    NITRITE Negative 2025    GLUCOSEU Negative 2025    KETONESU Trace (A) 2025    BILIRUBINUR Negative 2025    BLOODU Large (A) 2025     Urine Culture:   Lab Results   Component Value Date    URINECX 3610-3203 cfu/ml 2025     PSA:   Lab Results   Component Value Date    PSA 1.309 2024    PSA 1.0 2023         Assessment/ Plan:  Right 14 mm renal " stone  Right hydronephrosis  Unrelenting pain  N.p.o. after midnight  Cystoscopy right stent possible ureteroscopy pending anatomy aware risk of anesthesia infection bleeding additional urologic procedures          Gregorio Collins MD

## 2025-01-15 NOTE — ASSESSMENT & PLAN NOTE
History of cerebral palsy hyperlipidemia anxiety presented to the hospital for worsening right-sided flank pain  Found to have 9 x 13 mm obstructing calculi right UVJ  Urinalysis negative  Continue fluids.  Tamsulosin added.  Urology planning cystoscopy tomorrow.

## 2025-01-15 NOTE — PLAN OF CARE
Problem: INFECTION - ADULT  Goal: Absence or prevention of progression during hospitalization  Description: INTERVENTIONS:  - Assess and monitor for signs and symptoms of infection  - Monitor lab/diagnostic results  - Monitor all insertion sites, i.e. indwelling lines, tubes, and drains  - Monitor endotracheal if appropriate and nasal secretions for changes in amount and color  - [place] appropriate cooling/warming therapies per order  - Administer medications as ordered  - Instruct and encourage patient and family to use good hand hygiene technique  - Identify and instruct in appropriate isolation precautions for identified infection/condition  Outcome: Progressing     Problem: SAFETY ADULT  Goal: Maintain or return to baseline ADL function  Description: INTERVENTIONS:  -  Assess patient's ability to carry out ADLs; assess patient's baseline for ADL function and identify physical deficits which impact ability to perform ADLs (bathing, care of mouth/teeth, toileting, grooming, dressing, etc.)  - Assess/evaluate cause of self-care deficits   - Assess range of motion  - Assess patient's mobility; develop plan if impaired  - Assess patient's need for assistive devices and provide as appropriate  - Encourage maximum independence but intervene and supervise when necessary  - Involve family in performance of ADLs  - Assess for home care needs following discharge   - Consider OT consult to assist with ADL evaluation and planning for discharge  - Provide patient education as appropriate  Outcome: Progressing     Problem: GENITOURINARY - ADULT  Goal: Maintains or returns to baseline urinary function  Description: INTERVENTIONS:  - Assess urinary function  - Encourage oral fluids to ensure adequate hydration if ordered  - Administer IV fluids as ordered to ensure adequate hydration  - Administer ordered medications as needed  - Offer frequent toileting  - Follow urinary retention protocol if ordered  Outcome:  Progressing

## 2025-01-15 NOTE — PROGRESS NOTES
Progress Note - Hospitalist   Name: Gregorio Linda 54 y.o. male I MRN: [id number]  Unit/Bed#: 2 [facility] [address]  Date of Admission: 2025   Date of Service: 1/15/2025 I Hospital Day: 1    Assessment & Plan  Obstruction of ureteropelvic junction (UPJ) due to stone  History of cerebral palsy hyperlipidemia anxiety presented to the hospital for worsening right-sided flank pain  Found to have 9 x 13 mm obstructing calculi right UVJ  Urinalysis negative  Continue fluids.  Tamsulosin added.  Urology planning cystoscopy tomorrow.  Cerebral palsy (HCC)  Baseline uses cane to ambulate  Baclofen added for spasticity  GERD (gastroesophageal reflux disease)  Continue famotidine  Anxiety  Mood at baseline.  On hydroxyzine prior to admission as needed  Hyperlipidemia  Continue omega-3    VTE Pharmacologic Prophylaxis: VTE Score: 2 Low Risk (Score 0-2) - Encourage Ambulation.    Mobility:   Basic Mobility Inpatient Raw Score: 23  JH-HLM Goal: 7: Walk 25 feet or more  JH-HLM Achieved: 7: Walk 25 feet or more  JH-HLM Goal achieved. Continue to encourage appropriate mobility.    Patient Centered Rounds: I have performed bedside rounds with nursing staff today.  Discussions with Specialists or Other Care Team Provider: Case management    Education and Discussions with Family / Patient: Updated  (mother) via phone.    Current Length of Stay: 1 day(s)  Current Patient Status: Inpatient   Certification Statement: The patient will continue to require additional inpatient hospital stay due to urology procedure tomorrow  Discharge Plan: Anticipate discharge in 24-48 hrs to home.    Code Status: Level 1 - Full Code    Subjective   Patient seen and examined.  Intermittent right-sided abdominal pains.    Objective   Vitals:   Temp (24hrs), Av.5 °F (36.4 °C), Min:96.1 °F (35.6 °C), Max:98.6 °F (37 °C)    Temp:  [96.1 °F (35.6 °C)-98.6 °F (37 °C)] 97.4 °F (36.3 °C)  HR:  [27-89] 89  Resp:  [18-19] 19  BP: ()/(58-85)  110/84  SpO2:  [94 %-97 %] 95 %  Body mass index is 30.36 kg/m².     Input and Output Summary (last 24 hours):     Intake/Output Summary (Last 24 hours) at 1/15/2025 1350  Last data filed at 1/15/2025 1241  Gross per 24 hour   Intake 3043.75 ml   Output 1075 ml   Net 1968.75 ml       Physical Exam  Vitals reviewed.   Constitutional:       General: He is not in acute distress.  HENT:      Head: Atraumatic.   Eyes:      Conjunctiva/sclera: Conjunctivae normal.   Cardiovascular:      Rate and Rhythm: Regular rhythm.   Pulmonary:      Effort: Pulmonary effort is normal.      Breath sounds: No wheezing.   Abdominal:      General: Bowel sounds are normal.      Palpations: Abdomen is soft.      Tenderness: There is no abdominal tenderness.   Musculoskeletal:         General: No swelling or tenderness.   Skin:     General: Skin is warm and dry.   Neurological:      Mental Status: He is alert. Mental status is at baseline.   Psychiatric:         Mood and Affect: Mood normal.     Lines/Drains:  Invasive Devices       Peripheral Intravenous Line  Duration             Peripheral IV 01/13/25 Left Antecubital 1 day                            Lab Results: I have reviewed the following results:   Results from last 7 days   Lab Units 01/13/25  2348   WBC Thousand/uL 6.60   HEMOGLOBIN g/dL 12.0   PLATELETS Thousands/uL 225   MCV fL 77*     Results from last 7 days   Lab Units 01/14/25  0609 01/13/25  2348   SODIUM mmol/L 144 142   POTASSIUM mmol/L 4.0 3.9   CHLORIDE mmol/L 112* 109*   CO2 mmol/L 26 24   ANION GAP mmol/L 6 9   BUN mg/dL 22 23   CREATININE mg/dL 1.23 1.28   CALCIUM mg/dL 9.6 9.1   ALBUMIN g/dL  --  4.8   TOTAL BILIRUBIN mg/dL  --  0.46   ALK PHOS U/L  --  55   ALT U/L  --  44   AST U/L  --  24   EGFR ml/min/1.73sq m 67 63   GLUCOSE RANDOM mg/dL 117 126     Results from last 7 days   Lab Units 01/14/25  0609   MAGNESIUM mg/dL 2.0                                    Recent Cultures (last 7 days):   Results from last 7  days   Lab Units 01/13/25  2358   URINE CULTURE  5162-3124 cfu/ml       Imaging:  Reviewed radiology reports from this admission including:  CT renal stone study abdomen pelvis wo contrast  Result Date: 1/14/2025  Impression: 0.9 x 1.3 cm obstructing calculus at the right ureteropelvic junction with mild hydronephrosis and perinephric fat stranding. Workstation performed: [id number]       Last 24 Hours Medication List:     Current Facility-Administered Medications:     acetaminophen (TYLENOL) tablet 650 mg, Q6H PRN    baclofen tablet 10 mg, TID    famotidine (PEPCID) tablet 20 mg, BID    fish oil capsule 1,000 mg, Daily    HYDROmorphone (DILAUDID) injection 0.2 mg, Q3H PRN **OR** HYDROmorphone (DILAUDID) injection 0.5 mg, Q3H PRN    hydrOXYzine HCL (ATARAX) tablet 10 mg, Q6H PRN    lactated ringers infusion, Continuous, Last Rate: 125 mL/hr (01/15/25 0522)    melatonin tablet 3 mg, HS PRN    methocarbamol (ROBAXIN) tablet 500 mg, Q6H PRN    ondansetron (ZOFRAN) injection 4 mg, Q6H PRN    tamsulosin (FLOMAX) capsule 0.4 mg, Daily With Dinner    Administrative Statements   Today, Patient Was Seen By: Saqib Quinonez, DO  I have spent a total time of 35 minutes in caring for this patient on the day of the visit/encounter including Instructions for management, Documenting in the medical record, Reviewing / ordering tests, medicine, procedures  , and Communicating with other healthcare professionals .    **Please Note: This note may have been constructed using a voice recognition system.**

## 2025-01-16 ENCOUNTER — ANESTHESIA (INPATIENT)
Dept: PERIOP | Facility: HOSPITAL | Age: 55
DRG: 661 | End: 2025-01-16
Payer: MEDICARE

## 2025-01-16 ENCOUNTER — APPOINTMENT (INPATIENT)
Dept: RADIOLOGY | Facility: HOSPITAL | Age: 55
DRG: 661 | End: 2025-01-16
Payer: MEDICARE

## 2025-01-16 PROBLEM — K59.00 CONSTIPATION: Status: ACTIVE | Noted: 2025-01-16

## 2025-01-16 PROBLEM — N20.0 NEPHROLITHIASIS: Status: ACTIVE | Noted: 2025-01-16

## 2025-01-16 LAB
ANION GAP SERPL CALCULATED.3IONS-SCNC: 5 MMOL/L (ref 4–13)
BUN SERPL-MCNC: 12 MG/DL (ref 5–25)
CALCIUM SERPL-MCNC: 8.5 MG/DL (ref 8.4–10.2)
CHLORIDE SERPL-SCNC: 112 MMOL/L (ref 96–108)
CO2 SERPL-SCNC: 24 MMOL/L (ref 21–32)
CREAT SERPL-MCNC: 1.08 MG/DL (ref 0.6–1.3)
ERYTHROCYTE [DISTWIDTH] IN BLOOD BY AUTOMATED COUNT: 15 % (ref 11.6–15.1)
GFR SERPL CREATININE-BSD FRML MDRD: 78 ML/MIN/1.73SQ M
GLUCOSE SERPL-MCNC: 88 MG/DL (ref 65–140)
HCT VFR BLD AUTO: 32.4 % (ref 36.5–49.3)
HGB BLD-MCNC: 9.8 G/DL (ref 12–17)
MCH RBC QN AUTO: 22.7 PG (ref 26.8–34.3)
MCHC RBC AUTO-ENTMCNC: 30.2 G/DL (ref 31.4–37.4)
MCV RBC AUTO: 75 FL (ref 82–98)
PLATELET # BLD AUTO: 170 THOUSANDS/UL (ref 149–390)
PMV BLD AUTO: 12.6 FL (ref 8.9–12.7)
POTASSIUM SERPL-SCNC: 3.5 MMOL/L (ref 3.5–5.3)
RBC # BLD AUTO: 4.32 MILLION/UL (ref 3.88–5.62)
SODIUM SERPL-SCNC: 141 MMOL/L (ref 135–147)
WBC # BLD AUTO: 3.92 THOUSAND/UL (ref 4.31–10.16)

## 2025-01-16 PROCEDURE — 85027 COMPLETE CBC AUTOMATED: CPT | Performed by: INTERNAL MEDICINE

## 2025-01-16 PROCEDURE — 99232 SBSQ HOSP IP/OBS MODERATE 35: CPT | Performed by: INTERNAL MEDICINE

## 2025-01-16 PROCEDURE — BT1D1ZZ FLUOROSCOPY OF RIGHT KIDNEY, URETER AND BLADDER USING LOW OSMOLAR CONTRAST: ICD-10-PCS | Performed by: SPECIALIST

## 2025-01-16 PROCEDURE — C2617 STENT, NON-COR, TEM W/O DEL: HCPCS | Performed by: SPECIALIST

## 2025-01-16 PROCEDURE — 80048 BASIC METABOLIC PNL TOTAL CA: CPT | Performed by: INTERNAL MEDICINE

## 2025-01-16 PROCEDURE — 74420 UROGRAPHY RTRGR +-KUB: CPT

## 2025-01-16 PROCEDURE — C1769 GUIDE WIRE: HCPCS | Performed by: SPECIALIST

## 2025-01-16 PROCEDURE — 0T768DZ DILATION OF RIGHT URETER WITH INTRALUMINAL DEVICE, VIA NATURAL OR ARTIFICIAL OPENING ENDOSCOPIC: ICD-10-PCS | Performed by: SPECIALIST

## 2025-01-16 DEVICE — INLAY URETERAL STENT W/O GUIDEWIRE
Type: IMPLANTABLE DEVICE | Site: URETER | Status: FUNCTIONAL
Brand: BARD® INLAY® URETERAL STENT

## 2025-01-16 RX ORDER — POLYETHYLENE GLYCOL 3350 17 G/17G
17 POWDER, FOR SOLUTION ORAL DAILY
Status: DISCONTINUED | OUTPATIENT
Start: 2025-01-16 | End: 2025-01-17 | Stop reason: HOSPADM

## 2025-01-16 RX ORDER — AMOXICILLIN 250 MG
1 CAPSULE ORAL 2 TIMES DAILY
Status: DISCONTINUED | OUTPATIENT
Start: 2025-01-16 | End: 2025-01-17 | Stop reason: HOSPADM

## 2025-01-16 RX ORDER — CEFAZOLIN SODIUM 2 G/50ML
2000 SOLUTION INTRAVENOUS ONCE
OUTPATIENT
Start: 2025-01-16 | End: 2025-01-16

## 2025-01-16 RX ORDER — MIDAZOLAM HYDROCHLORIDE 2 MG/2ML
INJECTION, SOLUTION INTRAMUSCULAR; INTRAVENOUS AS NEEDED
Status: DISCONTINUED | OUTPATIENT
Start: 2025-01-16 | End: 2025-01-16

## 2025-01-16 RX ORDER — LIDOCAINE HYDROCHLORIDE 10 MG/ML
INJECTION, SOLUTION EPIDURAL; INFILTRATION; INTRACAUDAL; PERINEURAL AS NEEDED
Status: DISCONTINUED | OUTPATIENT
Start: 2025-01-16 | End: 2025-01-16

## 2025-01-16 RX ORDER — MAGNESIUM HYDROXIDE 1200 MG/15ML
LIQUID ORAL AS NEEDED
Status: DISCONTINUED | OUTPATIENT
Start: 2025-01-16 | End: 2025-01-16 | Stop reason: HOSPADM

## 2025-01-16 RX ORDER — DEXAMETHASONE SODIUM PHOSPHATE 10 MG/ML
INJECTION, SOLUTION INTRAMUSCULAR; INTRAVENOUS AS NEEDED
Status: DISCONTINUED | OUTPATIENT
Start: 2025-01-16 | End: 2025-01-16

## 2025-01-16 RX ORDER — PROPOFOL 10 MG/ML
INJECTION, EMULSION INTRAVENOUS AS NEEDED
Status: DISCONTINUED | OUTPATIENT
Start: 2025-01-16 | End: 2025-01-16

## 2025-01-16 RX ORDER — FENTANYL CITRATE 50 UG/ML
INJECTION, SOLUTION INTRAMUSCULAR; INTRAVENOUS AS NEEDED
Status: DISCONTINUED | OUTPATIENT
Start: 2025-01-16 | End: 2025-01-16

## 2025-01-16 RX ORDER — CEFAZOLIN SODIUM 2 G/50ML
2000 SOLUTION INTRAVENOUS ONCE
Status: COMPLETED | OUTPATIENT
Start: 2025-01-16 | End: 2025-01-16

## 2025-01-16 RX ORDER — ONDANSETRON 2 MG/ML
INJECTION INTRAMUSCULAR; INTRAVENOUS AS NEEDED
Status: DISCONTINUED | OUTPATIENT
Start: 2025-01-16 | End: 2025-01-16

## 2025-01-16 RX ADMIN — SENNOSIDES AND DOCUSATE SODIUM 1 TABLET: 50; 8.6 TABLET ORAL at 09:05

## 2025-01-16 RX ADMIN — DEXAMETHASONE SODIUM PHOSPHATE 10 MG: 10 INJECTION, SOLUTION INTRAMUSCULAR; INTRAVENOUS at 15:55

## 2025-01-16 RX ADMIN — CEFAZOLIN SODIUM 2000 MG: 2 SOLUTION INTRAVENOUS at 15:45

## 2025-01-16 RX ADMIN — TAMSULOSIN HYDROCHLORIDE 0.4 MG: 0.4 CAPSULE ORAL at 17:17

## 2025-01-16 RX ADMIN — ACETAMINOPHEN 650 MG: 325 TABLET ORAL at 20:41

## 2025-01-16 RX ADMIN — FAMOTIDINE 20 MG: 20 TABLET, FILM COATED ORAL at 17:17

## 2025-01-16 RX ADMIN — OMEGA-3 FATTY ACIDS CAP 1000 MG 1000 MG: 1000 CAP at 09:05

## 2025-01-16 RX ADMIN — FENTANYL CITRATE 25 MCG: 50 INJECTION, SOLUTION INTRAMUSCULAR; INTRAVENOUS at 15:54

## 2025-01-16 RX ADMIN — BACLOFEN 10 MG: 10 TABLET ORAL at 20:42

## 2025-01-16 RX ADMIN — BACLOFEN 10 MG: 10 TABLET ORAL at 09:05

## 2025-01-16 RX ADMIN — POLYETHYLENE GLYCOL 3350 17 G: 17 POWDER, FOR SOLUTION ORAL at 09:05

## 2025-01-16 RX ADMIN — PROPOFOL 200 MG: 10 INJECTION, EMULSION INTRAVENOUS at 15:52

## 2025-01-16 RX ADMIN — ONDANSETRON 4 MG: 2 INJECTION INTRAMUSCULAR; INTRAVENOUS at 15:55

## 2025-01-16 RX ADMIN — ACETAMINOPHEN 650 MG: 325 TABLET ORAL at 17:17

## 2025-01-16 RX ADMIN — SENNOSIDES AND DOCUSATE SODIUM 1 TABLET: 50; 8.6 TABLET ORAL at 17:17

## 2025-01-16 RX ADMIN — LIDOCAINE HYDROCHLORIDE 50 MG: 10 INJECTION, SOLUTION EPIDURAL; INFILTRATION; INTRACAUDAL; PERINEURAL at 15:51

## 2025-01-16 RX ADMIN — MIDAZOLAM 2 MG: 1 INJECTION INTRAMUSCULAR; INTRAVENOUS at 15:45

## 2025-01-16 RX ADMIN — FAMOTIDINE 20 MG: 20 TABLET, FILM COATED ORAL at 09:05

## 2025-01-16 NOTE — PROGRESS NOTES
Progress Note - Hospitalist   Name: Gregorio Linda 54 y.o. male I MRN: [id number]  Unit/Bed#: 2 [facility] 219 B  Date of Admission: 2025   Date of Service: 2025 I Hospital Day: 2    Assessment & Plan  Obstruction of ureteropelvic junction (UPJ) due to stone  History of cerebral palsy hyperlipidemia anxiety presented to the hospital for worsening right-sided flank pain  Found to have 9 x 13 mm obstructing calculi right UVJ  Urinalysis negative  Continue fluids.  Tamsulosin added.  Urology planning cystoscopy later today  Cerebral palsy (HCC)  Baseline uses cane to ambulate  Baclofen added for spasticity  GERD (gastroesophageal reflux disease)  Continue famotidine  Anxiety  Mood at baseline.  On hydroxyzine prior to admission as needed  Hyperlipidemia  Continue omega-3  Constipation  Only had small bowel movement on day of admission  Added docusate/senna and MiraLAX    VTE Pharmacologic Prophylaxis: VTE Score: 2 Low Risk (Score 0-2) - Encourage Ambulation.    Mobility:   Basic Mobility Inpatient Raw Score: 23  JH-HLM Goal: 7: Walk 25 feet or more  JH-HLM Achieved: 7: Walk 25 feet or more  JH-HLM Goal achieved. Continue to encourage appropriate mobility.    Patient Centered Rounds: I have performed bedside rounds with nursing staff today.  Discussions with Specialists or Other Care Team Provider: Case management    Education and Discussions with Family / Patient:     Current Length of Stay: 2 day(s)  Current Patient Status: Inpatient   Certification Statement: The patient will continue to require additional inpatient hospital stay due to urologic procedure today  Discharge Plan: Anticipate discharge tomorrow to home.    Code Status: Level 1 - Full Code    Subjective   Patient seen and examined.  Intermittent flank pain.  Otherwise complains of constipation.    Objective   Vitals:   Temp (24hrs), Av.2 °F (36.8 °C), Min:97.6 °F (36.4 °C), Max:98.7 °F (37.1 °C)    Temp:  [97.6 °F (36.4 °C)-98.7 °F (37.1 °C)]  97.6 °F (36.4 °C)  HR:  [] 102  Resp:  [18-19] 19  BP: (114-115)/(77-90) 114/77  SpO2:  [95 %-96 %] 95 %  Body mass index is 30.36 kg/m².     Input and Output Summary (last 24 hours):     Intake/Output Summary (Last 24 hours) at 1/16/2025 1452  Last data filed at 1/16/2025 0909  Gross per 24 hour   Intake --   Output 975 ml   Net -975 ml       Physical Exam  Vitals reviewed.   Constitutional:       General: He is not in acute distress.  HENT:      Head: Atraumatic.   Cardiovascular:      Rate and Rhythm: Regular rhythm.   Pulmonary:      Effort: Pulmonary effort is normal.      Breath sounds: No wheezing.   Abdominal:      General: Bowel sounds are normal.      Palpations: Abdomen is soft.      Tenderness: There is no abdominal tenderness. There is no rebound.   Musculoskeletal:         General: No swelling or tenderness.   Skin:     General: Skin is warm and dry.   Neurological:      General: No focal deficit present.      Mental Status: He is alert and oriented to person, place, and time.      Motor: Weakness present.   Psychiatric:         Mood and Affect: Mood normal.       Lines/Drains:  Invasive Devices       Peripheral Intravenous Line  Duration             Peripheral IV 01/13/25 Left Antecubital 2 days                            Lab Results: I have reviewed the following results:   Results from last 7 days   Lab Units 01/16/25  0532 01/13/25  2348   WBC Thousand/uL 3.92* 6.60   HEMOGLOBIN g/dL 9.8* 12.0   PLATELETS Thousands/uL 170 225   MCV fL 75* 77*     Results from last 7 days   Lab Units 01/16/25  0532 01/14/25  0609 01/13/25  2348   SODIUM mmol/L 141 144 142   POTASSIUM mmol/L 3.5 4.0 3.9   CHLORIDE mmol/L 112* 112* 109*   CO2 mmol/L 24 26 24   ANION GAP mmol/L 5 6 9   BUN mg/dL 12 22 23   CREATININE mg/dL 1.08 1.23 1.28   CALCIUM mg/dL 8.5 9.6 9.1   ALBUMIN g/dL  --   --  4.8   TOTAL BILIRUBIN mg/dL  --   --  0.46   ALK PHOS U/L  --   --  55   ALT U/L  --   --  44   AST U/L  --   --  24   EGFR  ml/min/1.73sq m 78 67 63   GLUCOSE RANDOM mg/dL 88 117 126     Results from last 7 days   Lab Units 01/14/25  0609   MAGNESIUM mg/dL 2.0                                    Recent Cultures (last 7 days):   Results from last 7 days   Lab Units 01/13/25  2358   URINE CULTURE  8286-3468 cfu/ml       Imaging:  Reviewed radiology reports from this admission including:  CT renal stone study abdomen pelvis wo contrast  Result Date: 1/14/2025  Impression: 0.9 x 1.3 cm obstructing calculus at the right ureteropelvic junction with mild hydronephrosis and perinephric fat stranding. Workstation performed: [id number]       Last 24 Hours Medication List:     Current Facility-Administered Medications:     acetaminophen (TYLENOL) tablet 650 mg, Q6H PRN    baclofen tablet 10 mg, TID    famotidine (PEPCID) tablet 20 mg, BID    fish oil capsule 1,000 mg, Daily    HYDROmorphone (DILAUDID) injection 0.2 mg, Q3H PRN **OR** HYDROmorphone (DILAUDID) injection 0.5 mg, Q3H PRN    hydrOXYzine HCL (ATARAX) tablet 10 mg, Q6H PRN    lactated ringers infusion, Continuous, Last Rate: 125 mL/hr (01/15/25 0522)    melatonin tablet 3 mg, HS PRN    methocarbamol (ROBAXIN) tablet 500 mg, Q6H PRN    ondansetron (ZOFRAN) injection 4 mg, Q6H PRN    polyethylene glycol (MIRALAX) packet 17 g, Daily    senna-docusate sodium (SENOKOT S) 8.6-50 mg per tablet 1 tablet, BID    tamsulosin (FLOMAX) capsule 0.4 mg, Daily With Dinner    Administrative Statements   Today, Patient Was Seen By: Saqib Quinonez, DO  I have spent a total time of 35 minutes in caring for this patient on the day of the visit/encounter including Documenting in the medical record, Reviewing / ordering tests, medicine, procedures  , and Communicating with other healthcare professionals .    **Please Note: This note may have been constructed using a voice recognition system.**

## 2025-01-16 NOTE — ANESTHESIA POSTPROCEDURE EVALUATION
Post-Op Assessment Note    CV Status:  Stable  Pain Score: 0    Pain management: adequate       Mental Status:  Sleepy   Hydration Status:  Stable   PONV Controlled:  None   Airway Patency:  Patent     Post Op Vitals Reviewed: Yes    No anethesia notable event occurred.    Staff: Anesthesiologist, CRNA           Last Filed PACU Vitals:  Vitals Value Taken Time   Temp     Pulse 71    /60    Resp 14    SpO2 98

## 2025-01-16 NOTE — ANESTHESIA POSTPROCEDURE EVALUATION
Post-Op Assessment Note    CV Status:  Stable  Pain Score: 1    Pain management: adequate       Mental Status:  Awake   Hydration Status:  Stable   PONV Controlled:  None   Airway Patency:  Patent     Post Op Vitals Reviewed: Yes    No anethesia notable event occurred.    Staff: Anesthesiologist           Last Filed PACU Vitals:  Vitals Value Taken Time   Temp 97.5 °F (36.4 °C) 01/16/25 1621   Pulse 87 01/16/25 1640   /74 01/16/25 1640   Resp 20 01/16/25 1640   SpO2 93 % 01/16/25 1621       Modified Isabelle:     Vitals Value Taken Time   Activity 2 01/16/25 1621   Respiration 2 01/16/25 1621   Circulation 2 01/16/25 1621   Consciousness 2 01/16/25 1621   Oxygen Saturation 2 01/16/25 1621     Modified Isabelle Score: 10

## 2025-01-16 NOTE — OP NOTE
OPERATIVE REPORT  PATIENT NAME: Gregorio Linda    :  1970  MRN: [id number]  Pt Location: [facility] OR ROOM 04    SURGERY DATE: 2025    Surgeons and Role:     * Gregorio Collins MD - Primary    Preop Diagnosis:  Hydronephrosis, right [N13.30]  Right 12 mm proximal stone    Post-Op Diagnosis Codes:     * Hydronephrosis, right [N13.30]    Procedure(s):  Right - CYSTOSCOPY URETEROSCOPY. RETROGRADE PYELOGRAM AND INSERTION STENT URETERAL. STONE MANIPULATION    Specimen(s):  * No specimens in log *    Estimated Blood Loss:   Minimal    Drains:  * No LDAs found *    Anesthesia Type:   General/LMA    Operative Indications:  Hydronephrosis, right [N13.30]  This 54-year-old male known to me with history of kidney stones last seen 5 years ago presents to [facility] 2 days ago with worsening right flank pain nausea found on ER CAT scan to have 12 mm right UPJ proximal stone with hydronephrosis patient seen on consultation and over the last 2 days has had episodic right flank pain nausea wishing to undergo cystoscopy urgent stent possible ureteroscopy stone extraction depending on anatomy aware risk of anesthesia infection bleeding additional Yannes procedures    Operative Findings:  12 mm stone right UPJ proximal ureter on retrograde right ureteroscopy performed with narrowness in the proximal ureter with stone migrating into the lower pole do not feel ureteral sheath could be passed to remove large stone burden stent 24 cm 6 Sinhala passed will return to the OR in a few weeks for flexible ureteroscopy stone extraction      Complications:   None    Procedure and Technique:  Patient identified in holding area right side marked consent reviewed wished to proceed with procedure placed in the OR suite after anesthesia induced placed in thigh position draped and prepped standard fashion timeout performed 22 Sinhala scope passed through the bladder anterior to the maladies posterior to confirmed 2.5 cm by lobar prostatic  urethra scope inserted to the bladder lumen 5 [place] open-ended catheter placed to the right orifice right retropyelogram confirmed normal filling of the distal mid ureter with stone noted in the right proximal UPJ area a 0.038 wire was passed upward into the right renal pelvis of the safety the semirigid ureteroscope was then passed upward over the distal and into the mid ureter with significant narrowing noted stone then migrated into the lower pole at this point attempts at doing flexible ureteroscopy with ureteral narrowing just not being able to place sheath were aborted the scope was removed the cystoscope was replaced over the wire 24 cm 6 [place] stent was passed the wire was scope moved patient procedure awakened taken recovery in stable condition will undergo flexible ureteroscopy in a few weeks   I was present for the entire procedure.    Patient Disposition:  PACU              SIGNATURE: Gregorio Collins MD  DATE: January 16, 2025  TIME: 4:25 PM

## 2025-01-16 NOTE — PROGRESS NOTES
"Progress Note - Urology      Patient: Gregorio Linda   : 1970 Sex: male   MRN: [id number]     CSN: [id number]  Unit/Bed#: 2 [address]     SUBJECTIVE:   Patient seen on morning rounds  Continue to note mild flank pain  Wishes stone addressed to soon as possible does not wish to go home  Aware size of stone and position may need to undergo cystoscopy stent at this time with staged ureteroscopy lithotripsy stone extraction in a few weeks      Objective   Vitals: /77 (BP Location: Right arm)   Pulse 102   Temp 97.6 °F (36.4 °C) (Temporal)   Resp 19   Ht 5' 10\" (1.778 m)   Wt 96 kg (211 lb 9.6 oz)   SpO2 95%   BMI 30.36 kg/m²     I/O last 24 hours:  In: -   Out: 1550 [Urine:1550]      Physical Exam:   General Alert awake   Normocephalic atraumatic PERRLA  Lungs clear bilaterally  Cardiac normal S1 normal S2  Abdomen soft, flank pain  Extremities no edema      Lab Results: CBC:   Lab Results   Component Value Date    WBC 3.92 (L) 2025    HGB 9.8 (L) 2025    HCT 32.4 (L) 2025    MCV 75 (L) 2025     2025    RBC 4.32 2025    MCH 22.7 (L) 2025    MCHC 30.2 (L) 2025    RDW 15.0 2025    MPV 12.6 2025    NRBC 0 2025     CMP:   Lab Results   Component Value Date     (H) 2025    CO2 24 2025    BUN 12 2025    CREATININE 1.08 2025    CALCIUM 8.5 2025    AST 24 2025    ALT 44 2025    ALKPHOS 55 2025    EGFR 78 2025     Urinalysis:   Lab Results   Component Value Date    COLORU Yellow 2025    CLARITYU Cloudy 2025    SPECGRAV >=1.030 2025    PHUR 5.5 2025    PHUR 5.5 02/15/2018    LEUKOCYTESUR Negative 2025    NITRITE Negative 2025    GLUCOSEU Negative 2025    KETONESU Trace (A) 2025    BILIRUBINUR Negative 2025    BLOODU Large (A) 2025     Urine Culture:   Lab Results   Component Value Date    URINECX 8314-9761 cfu/ml " 01/13/2025     PSA:   Lab Results   Component Value Date    PSA 1.309 09/03/2024    PSA 1.0 02/16/2023         Assessment/ Plan:  N.p.o.  Cystoscopy right retrograde right stent possible ureteroscopy laser basket          Gregorio Collins MD

## 2025-01-16 NOTE — ASSESSMENT & PLAN NOTE
History of cerebral palsy hyperlipidemia anxiety presented to the hospital for worsening right-sided flank pain  Found to have 9 x 13 mm obstructing calculi right UVJ  Urinalysis negative  Continue fluids.  Tamsulosin added.  Urology planning cystoscopy later today

## 2025-01-17 ENCOUNTER — TRANSITIONAL CARE MANAGEMENT (OUTPATIENT)
Age: 55
End: 2025-01-17

## 2025-01-17 VITALS
TEMPERATURE: 97.8 F | HEART RATE: 100 BPM | WEIGHT: 211.6 LBS | SYSTOLIC BLOOD PRESSURE: 116 MMHG | BODY MASS INDEX: 30.29 KG/M2 | HEIGHT: 70 IN | OXYGEN SATURATION: 93 % | RESPIRATION RATE: 18 BRPM | DIASTOLIC BLOOD PRESSURE: 79 MMHG

## 2025-01-17 PROCEDURE — 99239 HOSP IP/OBS DSCHRG MGMT >30: CPT | Performed by: INTERNAL MEDICINE

## 2025-01-17 RX ORDER — BACLOFEN 10 MG/1
10 TABLET ORAL 3 TIMES DAILY PRN
Qty: 60 TABLET | Refills: 0 | Status: SHIPPED | OUTPATIENT
Start: 2025-01-17

## 2025-01-17 RX ORDER — MAGNESIUM CARB/ALUMINUM HYDROX 105-160MG
30 TABLET,CHEWABLE ORAL DAILY PRN
COMMUNITY
Start: 2025-01-17

## 2025-01-17 RX ORDER — BISACODYL 10 MG
10 SUPPOSITORY, RECTAL RECTAL ONCE
Status: COMPLETED | OUTPATIENT
Start: 2025-01-17 | End: 2025-01-17

## 2025-01-17 RX ORDER — ONDANSETRON 4 MG/1
4 TABLET, ORALLY DISINTEGRATING ORAL EVERY 8 HOURS PRN
Qty: 20 TABLET | Refills: 2 | Status: SHIPPED | OUTPATIENT
Start: 2025-01-17

## 2025-01-17 RX ORDER — TAMSULOSIN HYDROCHLORIDE 0.4 MG/1
0.4 CAPSULE ORAL
Qty: 30 CAPSULE | Refills: 0 | Status: SHIPPED | OUTPATIENT
Start: 2025-01-17

## 2025-01-17 RX ADMIN — SENNOSIDES AND DOCUSATE SODIUM 1 TABLET: 50; 8.6 TABLET ORAL at 08:35

## 2025-01-17 RX ADMIN — OMEGA-3 FATTY ACIDS CAP 1000 MG 1000 MG: 1000 CAP at 08:34

## 2025-01-17 RX ADMIN — MINERAL OIL 1 ENEMA: 100 ENEMA RECTAL at 12:57

## 2025-01-17 RX ADMIN — BISACODYL 10 MG: 10 SUPPOSITORY RECTAL at 09:13

## 2025-01-17 RX ADMIN — BACLOFEN 10 MG: 10 TABLET ORAL at 08:34

## 2025-01-17 RX ADMIN — FAMOTIDINE 20 MG: 20 TABLET, FILM COATED ORAL at 08:34

## 2025-01-17 RX ADMIN — SODIUM CHLORIDE, SODIUM LACTATE, POTASSIUM CHLORIDE, AND CALCIUM CHLORIDE 125 ML/HR: .6; .31; .03; .02 INJECTION, SOLUTION INTRAVENOUS at 05:49

## 2025-01-17 NOTE — NURSING NOTE
AVS reviewed with patient and mother. Prescription and belongings given to patient. Assisted by RN to his car.

## 2025-01-17 NOTE — PLAN OF CARE

## 2025-01-17 NOTE — PLAN OF CARE
Problem: Potential for Falls  Goal: Patient will remain free of falls  Description: INTERVENTIONS:  - Educate patient/family on patient safety including physical limitations  - Instruct patient to call for assistance with activity   - Consult OT/PT to assist with strengthening/mobility   - Keep Call bell within reach  - Keep bed low and locked with side rails adjusted as appropriate  - Keep care items and personal belongings within reach  - Initiate and maintain comfort rounds  - Make Fall Risk Sign visible to staff  - Apply yellow socks and bracelet for high fall risk patients  - Consider moving patient to room near nurses station  Outcome: Progressing     Problem: PAIN - ADULT  Goal: Verbalizes/displays adequate comfort level or baseline comfort level  Description: Interventions:  - Encourage patient to monitor pain and request assistance  - Assess pain using appropriate pain scale  - Administer analgesics based on type and severity of pain and evaluate response  - Implement non-pharmacological measures as appropriate and evaluate response  - Consider cultural and social influences on pain and pain management  - Notify physician/advanced practitioner if interventions unsuccessful or patient reports new pain  Outcome: Progressing     Problem: INFECTION - ADULT  Goal: Absence or prevention of progression during hospitalization  Description: INTERVENTIONS:  - Assess and monitor for signs and symptoms of infection  - Monitor lab/diagnostic results  - Monitor all insertion sites, i.e. indwelling lines, tubes, and drains  - Monitor endotracheal if appropriate and nasal secretions for changes in amount and color  - [place] appropriate cooling/warming therapies per order  - Administer medications as ordered  - Instruct and encourage patient and family to use good hand hygiene technique  - Identify and instruct in appropriate isolation precautions for identified infection/condition  Outcome: Progressing  Goal: Absence  of fever/infection during neutropenic period  Description: INTERVENTIONS:  - Monitor WBC    Outcome: Progressing     Problem: SAFETY ADULT  Goal: Patient will remain free of falls  Description: INTERVENTIONS:  - Educate patient/family on patient safety including physical limitations  - Instruct patient to call for assistance with activity   - Consult OT/PT to assist with strengthening/mobility   - Keep Call bell within reach  - Keep bed low and locked with side rails adjusted as appropriate  - Keep care items and personal belongings within reach  - Initiate and maintain comfort rounds  - Make Fall Risk Sign visible to staff  - Apply yellow socks and bracelet for high fall risk patients  - Consider moving patient to room near nurses station  Outcome: Progressing  Goal: Maintain or return to baseline ADL function  Description: INTERVENTIONS:  -  Assess patient's ability to carry out ADLs; assess patient's baseline for ADL function and identify physical deficits which impact ability to perform ADLs (bathing, care of mouth/teeth, toileting, grooming, dressing, etc.)  - Assess/evaluate cause of self-care deficits   - Assess range of motion  - Assess patient's mobility; develop plan if impaired  - Assess patient's need for assistive devices and provide as appropriate  - Encourage maximum independence but intervene and supervise when necessary  - Involve family in performance of ADLs  - Assess for home care needs following discharge   - Consider OT consult to assist with ADL evaluation and planning for discharge  - Provide patient education as appropriate  Outcome: Progressing  Goal: Maintains/Returns to pre admission functional level  Description: INTERVENTIONS:  - Perform AM-PAC 6 Click Basic Mobility/ Daily Activity assessment daily.  - Set and communicate daily mobility goal to care team and patient/family/caregiver.   - Collaborate with rehabilitation services on mobility goals if consulted  - Out of bed for  toileting  - Record patient progress and toleration of activity level   Outcome: Progressing     Problem: DISCHARGE PLANNING  Goal: Discharge to home or other facility with appropriate resources  Description: INTERVENTIONS:  - Identify barriers to discharge w/patient and caregiver  - Arrange for needed discharge resources and transportation as appropriate  - Identify discharge learning needs (meds, wound care, etc.)  - Arrange for interpretive services to assist at discharge as needed  - Refer to Case Management Department for coordinating discharge planning if the patient needs post-hospital services based on physician/advanced practitioner order or complex needs related to functional status, cognitive ability, or social support system  Outcome: Progressing     Problem: Knowledge Deficit  Goal: Patient/family/caregiver demonstrates understanding of disease process, treatment plan, medications, and discharge instructions  Description: Complete learning assessment and assess knowledge base.  Interventions:  - Provide teaching at level of understanding  - Provide teaching via preferred learning methods  Outcome: Progressing     Problem: GENITOURINARY - ADULT  Goal: Maintains or returns to baseline urinary function  Description: INTERVENTIONS:  - Assess urinary function  - Encourage oral fluids to ensure adequate hydration if ordered  - Administer IV fluids as ordered to ensure adequate hydration  - Administer ordered medications as needed  - Offer frequent toileting  - Follow urinary retention protocol if ordered  Outcome: Progressing

## 2025-01-17 NOTE — PROGRESS NOTES
"Progress Note - Urology      Patient: Gregorio Linda   : 1970 Sex: male   MRN: [id number]     CSN: [id number]  Unit/Bed#: 2 [address]     SUBJECTIVE:   Patient seen on morning rounds  No significant pain with right stent to be discharged home      Objective   Vitals: /79 (BP Location: Right arm)   Pulse 100   Temp 97.8 °F (36.6 °C) (Oral)   Resp 18   Ht 5' 10\" (1.778 m)   Wt 96 kg (211 lb 9.6 oz)   SpO2 93%   BMI 30.36 kg/m²     I/O last 24 hours:  In: 830 [P.O.:480; I.V.:300; IV Piggyback:50]  Out: 1125 [Urine:1125]      Physical Exam:   General Alert awake   Normocephalic atraumatic PERRLA  Lungs clear bilaterally  Cardiac normal S1 normal S2  Abdomen soft, flank pain  Extremities no edema      Lab Results: CBC:   Lab Results   Component Value Date    WBC 3.92 (L) 2025    HGB 9.8 (L) 2025    HCT 32.4 (L) 2025    MCV 75 (L) 2025     2025    RBC 4.32 2025    MCH 22.7 (L) 2025    MCHC 30.2 (L) 2025    RDW 15.0 2025    MPV 12.6 2025    NRBC 0 2025     CMP:   Lab Results   Component Value Date     (H) 2025    CO2 24 2025    BUN 12 2025    CREATININE 1.08 2025    CALCIUM 8.5 2025    AST 24 2025    ALT 44 2025    ALKPHOS 55 2025    EGFR 78 2025     Urinalysis:   Lab Results   Component Value Date    COLORU Yellow 2025    CLARITYU Cloudy 2025    SPECGRAV >=1.030 2025    PHUR 5.5 2025    PHUR 5.5 02/15/2018    LEUKOCYTESUR Negative 2025    NITRITE Negative 2025    GLUCOSEU Negative 2025    KETONESU Trace (A) 2025    BILIRUBINUR Negative 2025    BLOODU Large (A) 2025     Urine Culture:   Lab Results   Component Value Date    URINECX 6336-3301 cfu/ml 2025     PSA:   Lab Results   Component Value Date    PSA 1.309 2024    PSA 1.0 2023         Assessment/ Plan:  Status post cystoscopy right " ureteroscopy stone manipulation and stent placement day #1  Discharge home   To be scheduled for cystoscopy right flexible scope afebrile on 6          Gregorio Collins MD

## 2025-01-17 NOTE — ASSESSMENT & PLAN NOTE
Only had small bowel movement on day of admission  Added docusate/senna and MiraLAX  Had good bowel movement after 1 minimal oil enema

## 2025-01-17 NOTE — ASSESSMENT & PLAN NOTE
History of cerebral palsy hyperlipidemia anxiety presented to the hospital for worsening right-sided flank pain  Found to have 9 x 13 mm obstructing calculi right UVJ  Urinalysis negative  Tamsulosin added.  Seen by urology underwent cystoscopy/stent placement.  Will need definitive treatment as an outpatient.

## 2025-01-17 NOTE — DISCHARGE SUMMARY
Discharge Summary - Hospitalist   Name: Gregorio Linda 54 y.o. male I MRN: [id number]  Unit/Bed#: 2 [address] Date of Admission: 1/13/2025   Date of Service: 1/17/2025 I Hospital Day: 3    Assessment & Plan  Obstruction of ureteropelvic junction (UPJ) due to stone  History of cerebral palsy hyperlipidemia anxiety presented to the hospital for worsening right-sided flank pain  Found to have 9 x 13 mm obstructing calculi right UVJ  Urinalysis negative  Tamsulosin added.  Seen by urology underwent cystoscopy/stent placement.  Will need definitive treatment as an outpatient.  Cerebral palsy (HCC)  Baseline uses cane to ambulate  Baclofen added for spasticity as needed  GERD (gastroesophageal reflux disease)  Continue famotidine  Anxiety  Mood at baseline.  On hydroxyzine prior to admission as needed  Hyperlipidemia  Continue omega-3  Constipation  Only had small bowel movement on day of admission  Added docusate/senna and MiraLAX  Had good bowel movement after 1 minimal oil enema      Medical Problems       Resolved Problems  Date Reviewed: 1/16/2025   None        Discharging Physician / Practitioner: Saqib Quinonez DO  PCP: Kirsty Schmidt DO  Admission Date:   Admission Orders (From admission, onward)       Ordered        01/14/25 1205  INPATIENT ADMISSION  Once            01/14/25 0157  Place in Observation  Once                        Discharge Date: 01/17/25    Consultations During Hospital Stay:  IP CONSULT TO UROLOGY     Procedures Performed:   Procedure(s) (LRB):  CYSTOSCOPY URETEROSCOPY, RETROGRADE PYELOGRAM AND INSERTION STENT URETERAL, STONE MANIPULATION (Right)     Images:     CT renal stone study abdomen pelvis wo contrast  Result Date: 1/14/2025  Impression: 0.9 x 1.3 cm obstructing calculus at the right ureteropelvic junction with mild hydronephrosis and perinephric fat stranding. Workstation performed: [id number]       Lab Results: I have reviewed the following results:  Results from last 7 days   Lab  Units 01/16/25  0532 01/13/25  2348   WBC Thousand/uL 3.92* 6.60   HEMOGLOBIN g/dL 9.8* 12.0   HEMATOCRIT % 32.4* 39.9   MCV fL 75* 77*   PLATELETS Thousands/uL 170 225     Results from last 7 days   Lab Units 01/16/25  0532 01/14/25  0609 01/13/25  2348   SODIUM mmol/L 141 144 142   POTASSIUM mmol/L 3.5 4.0 3.9   CHLORIDE mmol/L 112* 112* 109*   CO2 mmol/L 24 26 24   BUN mg/dL 12 22 23   CREATININE mg/dL 1.08 1.23 1.28   CALCIUM mg/dL 8.5 9.6 9.1   ALBUMIN g/dL  --   --  4.8   TOTAL BILIRUBIN mg/dL  --   --  0.46   ALK PHOS U/L  --   --  55   ALT U/L  --   --  44   AST U/L  --   --  24   EGFR ml/min/1.73sq m 78 67 63   GLUCOSE RANDOM mg/dL 88 117 126               Results from last 7 days   Lab Units 01/13/25  2358   COLOR UA  Yellow   CLARITY UA  Cloudy   SPEC GRAV UA  >=1.030   PH UA  5.5   LEUKOCYTES UA  Negative   NITRITE UA  Negative   GLUCOSE UA mg/dl Negative   KETONES UA mg/dl Trace*   BLOOD UA  Large*      Results from last 7 days   Lab Units 01/13/25  2358   RBC UA /hpf Innumerable*   WBC UA /hpf 1-2   EPITHELIAL CELLS WET PREP /hpf Occasional   BACTERIA UA /hpf Occasional      Results from last 7 days   Lab Units 01/13/25  2358   URINE CULTURE  1989-1232 cfu/ml           Incidental Findings:      Test Results Pending at Discharge (will require follow up):      Reason for Admission:   Flank Pain (Patient c/o right flank pain for about 4 hours, dark urine, n/v has a history of kidney stones, did not take anything for pain yet )    Hospital Course:   Gregorio Linda is a 54 y.o. male patient who originally presented to the hospital on 1/13/2025 due to large kidney stone.  She was seen by urology eventually underwent cystoscopy and stent placement.  Due to size he will need to follow-up with urology for definitive management.  He will be discharged with tamsulosin.    Please see above list of diagnoses and related plan for additional information.     Condition at Discharge: stable     Discharge Day Visit /  "Exam:   Subjective: Patient seen and examined.  Currently had a bowel movement after mineral oil enema.    Vitals: Blood Pressure: 116/79 (01/17/25 0830)  Pulse: 100 (01/17/25 0830)  Temperature: 97.8 °F (36.6 °C) (01/17/25 0830)  Temp Source: Oral (01/17/25 0830)  Respirations: 18 (01/17/25 0830)  Height: 5' 10\" (177.8 cm) (01/14/25 0304)  Weight - Scale: 96 kg (211 lb 9.6 oz) (01/14/25 0304)  SpO2: 93 % (01/17/25 0830)    Exam:   Physical Exam  Vitals reviewed.   Constitutional:       General: He is not in acute distress.  HENT:      Head: Atraumatic.   Cardiovascular:      Rate and Rhythm: Regular rhythm.   Pulmonary:      Effort: Pulmonary effort is normal.      Breath sounds: Decreased breath sounds present. No wheezing.   Abdominal:      General: Bowel sounds are normal.      Palpations: Abdomen is soft.      Tenderness: There is no abdominal tenderness. There is no rebound.   Musculoskeletal:         General: No swelling.   Skin:     General: Skin is warm and dry.   Neurological:      General: No focal deficit present.      Mental Status: He is alert.      Cranial Nerves: No cranial nerve deficit.   Psychiatric:         Mood and Affect: Mood normal.       Discussion with Family: Mother during hospitalization    Discharge instructions/Information to patient and family:   See after visit summary for information provided to patient and family.      Provisions for Follow-Up Care:  See after visit summary for information related to follow-up care and any pertinent home health orders.      Mobility at time of Discharge:  Basic Mobility Inpatient Raw Score: 23  JH-HLM Goal: 7: Walk 25 feet or more  JH-HLM Achieved: 8: Walk 250 feet ot more  JH-HLM Goal achieved. Continue to encourage appropriate mobility.    Disposition:   Home    Planned Readmission: No     Discharge Medications:  See after visit summary for reconciled discharge medications provided to patient and family.      Administrative Statements   I spent 35 " minutes discharging the patient. This time was spent on the day of discharge. I had direct contact with the patient on the day of discharge. Greater than 50% of the total time was spent examining patient, answering all patient questions, arranging and discussing plan of care with patient as well as directly providing post-discharge instructions.  Additional time then spent on discharge activities.    **Please Note: This note may have been constructed using a voice recognition system**

## 2025-01-27 ENCOUNTER — APPOINTMENT (OUTPATIENT)
Dept: RADIOLOGY | Facility: HOSPITAL | Age: 55
DRG: 698 | End: 2025-01-27
Payer: MEDICARE

## 2025-01-27 ENCOUNTER — HOSPITAL ENCOUNTER (INPATIENT)
Facility: HOSPITAL | Age: 55
LOS: 1 days | Discharge: HOME | DRG: 698 | End: 2025-01-29
Attending: EMERGENCY MEDICINE | Admitting: INTERNAL MEDICINE
Payer: MEDICARE

## 2025-01-27 ENCOUNTER — OFFICE VISIT (OUTPATIENT)
Age: 55
End: 2025-01-27

## 2025-01-27 VITALS
HEART RATE: 110 BPM | RESPIRATION RATE: 18 BRPM | BODY MASS INDEX: 29.56 KG/M2 | TEMPERATURE: 98.2 F | SYSTOLIC BLOOD PRESSURE: 114 MMHG | WEIGHT: 206 LBS | DIASTOLIC BLOOD PRESSURE: 78 MMHG

## 2025-01-27 DIAGNOSIS — N20.0 NEPHROLITHIASIS: Primary | ICD-10-CM

## 2025-01-27 DIAGNOSIS — R30.9 PAINFUL URINATION: ICD-10-CM

## 2025-01-27 DIAGNOSIS — Z09 HOSPITAL DISCHARGE FOLLOW-UP: Primary | ICD-10-CM

## 2025-01-27 DIAGNOSIS — N20.1 OBSTRUCTION OF URETEROPELVIC JUNCTION (UPJ) DUE TO STONE: ICD-10-CM

## 2025-01-27 DIAGNOSIS — R82.90 ABNORMAL URINALYSIS: ICD-10-CM

## 2025-01-27 DIAGNOSIS — R31.9 HEMATURIA: ICD-10-CM

## 2025-01-27 DIAGNOSIS — N39.0 UTI (URINARY TRACT INFECTION): ICD-10-CM

## 2025-01-27 DIAGNOSIS — Z87.448 HISTORY OF HYDRONEPHROSIS: ICD-10-CM

## 2025-01-27 PROBLEM — R65.10 SIRS (SYSTEMIC INFLAMMATORY RESPONSE SYNDROME) (HCC): Status: ACTIVE | Noted: 2025-01-27

## 2025-01-27 LAB
ALBUMIN SERPL BCG-MCNC: 4.4 G/DL (ref 3.5–5)
ALP SERPL-CCNC: 54 U/L (ref 34–104)
ALT SERPL W P-5'-P-CCNC: 36 U/L (ref 7–52)
AMORPH URATE CRY URNS QL MICRO: ABNORMAL
ANION GAP SERPL CALCULATED.3IONS-SCNC: 11 MMOL/L (ref 4–13)
AST SERPL W P-5'-P-CCNC: 21 U/L (ref 13–39)
BACTERIA UR QL AUTO: ABNORMAL /HPF
BASOPHILS # BLD AUTO: 0.05 THOUSANDS/ΜL (ref 0–0.1)
BASOPHILS NFR BLD AUTO: 1 % (ref 0–1)
BILIRUB SERPL-MCNC: 0.7 MG/DL (ref 0.2–1)
BILIRUB UR QL STRIP: NEGATIVE
BUN SERPL-MCNC: 14 MG/DL (ref 5–25)
CALCIUM SERPL-MCNC: 9.1 MG/DL (ref 8.4–10.2)
CHLORIDE SERPL-SCNC: 110 MMOL/L (ref 96–108)
CLARITY UR: ABNORMAL
CO2 SERPL-SCNC: 18 MMOL/L (ref 21–32)
COLOR UR: ABNORMAL
CREAT SERPL-MCNC: 1.25 MG/DL (ref 0.6–1.3)
EOSINOPHIL # BLD AUTO: 0.08 THOUSAND/ΜL (ref 0–0.61)
EOSINOPHIL NFR BLD AUTO: 1 % (ref 0–6)
ERYTHROCYTE [DISTWIDTH] IN BLOOD BY AUTOMATED COUNT: 16 % (ref 11.6–15.1)
GFR SERPL CREATININE-BSD FRML MDRD: 65 ML/MIN/1.73SQ M
GLUCOSE SERPL-MCNC: 102 MG/DL (ref 65–140)
GLUCOSE UR STRIP-MCNC: NEGATIVE MG/DL
HCT VFR BLD AUTO: 37.3 % (ref 36.5–49.3)
HGB BLD-MCNC: 11.5 G/DL (ref 12–17)
HGB UR QL STRIP.AUTO: ABNORMAL
IMM GRANULOCYTES # BLD AUTO: 0.02 THOUSAND/UL (ref 0–0.2)
IMM GRANULOCYTES NFR BLD AUTO: 0 % (ref 0–2)
KETONES UR STRIP-MCNC: ABNORMAL MG/DL
LEUKOCYTE ESTERASE UR QL STRIP: ABNORMAL
LYMPHOCYTES # BLD AUTO: 0.99 THOUSANDS/ΜL (ref 0.6–4.47)
LYMPHOCYTES NFR BLD AUTO: 12 % (ref 14–44)
MCH RBC QN AUTO: 23.1 PG (ref 26.8–34.3)
MCHC RBC AUTO-ENTMCNC: 30.8 G/DL (ref 31.4–37.4)
MCV RBC AUTO: 75 FL (ref 82–98)
MONOCYTES # BLD AUTO: 0.59 THOUSAND/ΜL (ref 0.17–1.22)
MONOCYTES NFR BLD AUTO: 7 % (ref 4–12)
NEUTROPHILS # BLD AUTO: 6.83 THOUSANDS/ΜL (ref 1.85–7.62)
NEUTS SEG NFR BLD AUTO: 79 % (ref 43–75)
NITRITE UR QL STRIP: NEGATIVE
NON-SQ EPI CELLS URNS QL MICRO: ABNORMAL /HPF
NRBC BLD AUTO-RTO: 0 /100 WBCS
PH UR STRIP.AUTO: 6 [PH]
PLATELET # BLD AUTO: 204 THOUSANDS/UL (ref 149–390)
PMV BLD AUTO: 12 FL (ref 8.9–12.7)
POTASSIUM SERPL-SCNC: 3.7 MMOL/L (ref 3.5–5.3)
PROT SERPL-MCNC: 7 G/DL (ref 6.4–8.4)
PROT UR STRIP-MCNC: ABNORMAL MG/DL
RBC # BLD AUTO: 4.97 MILLION/UL (ref 3.88–5.62)
RBC #/AREA URNS AUTO: ABNORMAL /HPF
SL AMB  POCT GLUCOSE, UA: NORMAL
SL AMB LEUKOCYTE ESTERASE,UA: NORMAL
SL AMB POCT BILIRUBIN,UA: NORMAL
SL AMB POCT BLOOD,UA: NORMAL
SL AMB POCT KETONES,UA: NORMAL
SL AMB POCT NITRITE,UA: NORMAL
SL AMB POCT PH,UA: 6
SL AMB POCT SPECIFIC GRAVITY,UA: 1.03
SL AMB POCT URINE PROTEIN: NORMAL
SL AMB POCT UROBILINOGEN: NORMAL
SODIUM SERPL-SCNC: 139 MMOL/L (ref 135–147)
SP GR UR STRIP.AUTO: >=1.03 (ref 1–1.03)
UROBILINOGEN UR STRIP-ACNC: <2 MG/DL
WBC # BLD AUTO: 8.56 THOUSAND/UL (ref 4.31–10.16)
WBC #/AREA URNS AUTO: ABNORMAL /HPF

## 2025-01-27 PROCEDURE — 81001 URINALYSIS AUTO W/SCOPE: CPT | Performed by: EMERGENCY MEDICINE

## 2025-01-27 PROCEDURE — 96365 THER/PROPH/DIAG IV INF INIT: CPT

## 2025-01-27 PROCEDURE — 85025 COMPLETE CBC W/AUTO DIFF WBC: CPT | Performed by: EMERGENCY MEDICINE

## 2025-01-27 PROCEDURE — 81003 URINALYSIS AUTO W/O SCOPE: CPT | Performed by: FAMILY MEDICINE

## 2025-01-27 PROCEDURE — 87086 URINE CULTURE/COLONY COUNT: CPT | Performed by: EMERGENCY MEDICINE

## 2025-01-27 PROCEDURE — 87186 SC STD MICRODIL/AGAR DIL: CPT | Performed by: EMERGENCY MEDICINE

## 2025-01-27 PROCEDURE — 36415 COLL VENOUS BLD VENIPUNCTURE: CPT | Performed by: EMERGENCY MEDICINE

## 2025-01-27 PROCEDURE — 99285 EMERGENCY DEPT VISIT HI MDM: CPT | Performed by: EMERGENCY MEDICINE

## 2025-01-27 PROCEDURE — 74018 RADEX ABDOMEN 1 VIEW: CPT

## 2025-01-27 PROCEDURE — 99223 1ST HOSP IP/OBS HIGH 75: CPT | Performed by: INTERNAL MEDICINE

## 2025-01-27 PROCEDURE — 80053 COMPREHEN METABOLIC PANEL: CPT | Performed by: EMERGENCY MEDICINE

## 2025-01-27 PROCEDURE — 99495 TRANSJ CARE MGMT MOD F2F 14D: CPT | Performed by: FAMILY MEDICINE

## 2025-01-27 PROCEDURE — 99283 EMERGENCY DEPT VISIT LOW MDM: CPT

## 2025-01-27 PROCEDURE — 87077 CULTURE AEROBIC IDENTIFY: CPT | Performed by: EMERGENCY MEDICINE

## 2025-01-27 RX ORDER — FAMOTIDINE 20 MG/1
40 TABLET, FILM COATED ORAL 2 TIMES DAILY
Status: DISCONTINUED | OUTPATIENT
Start: 2025-01-27 | End: 2025-01-29 | Stop reason: HOSPADM

## 2025-01-27 RX ORDER — TAMSULOSIN HYDROCHLORIDE 0.4 MG/1
0.4 CAPSULE ORAL
Status: CANCELLED | OUTPATIENT
Start: 2025-01-27

## 2025-01-27 RX ORDER — CEFTRIAXONE 1 G/50ML
1000 INJECTION, SOLUTION INTRAVENOUS ONCE
Status: COMPLETED | OUTPATIENT
Start: 2025-01-27 | End: 2025-01-27

## 2025-01-27 RX ORDER — SODIUM CHLORIDE 9 MG/ML
75 INJECTION, SOLUTION INTRAVENOUS CONTINUOUS
Status: DISCONTINUED | OUTPATIENT
Start: 2025-01-27 | End: 2025-01-27

## 2025-01-27 RX ORDER — CEFTRIAXONE 1 G/50ML
1000 INJECTION, SOLUTION INTRAVENOUS EVERY 24 HOURS
Status: DISCONTINUED | OUTPATIENT
Start: 2025-01-28 | End: 2025-01-29

## 2025-01-27 RX ORDER — CHLORAL HYDRATE 500 MG
1000 CAPSULE ORAL DAILY
Status: DISCONTINUED | OUTPATIENT
Start: 2025-01-28 | End: 2025-01-29 | Stop reason: HOSPADM

## 2025-01-27 RX ORDER — SODIUM CHLORIDE 450 MG/100ML
100 INJECTION, SOLUTION INTRAVENOUS CONTINUOUS
Status: DISCONTINUED | OUTPATIENT
Start: 2025-01-27 | End: 2025-01-29

## 2025-01-27 RX ADMIN — SODIUM CHLORIDE 100 ML/HR: 0.45 INJECTION, SOLUTION INTRAVENOUS at 19:22

## 2025-01-27 RX ADMIN — FAMOTIDINE 40 MG: 20 TABLET, FILM COATED ORAL at 19:25

## 2025-01-27 RX ADMIN — CEFTRIAXONE 1000 MG: 1 INJECTION, SOLUTION INTRAVENOUS at 17:09

## 2025-01-27 NOTE — ASSESSMENT & PLAN NOTE
Chronic, last lipid panel showed elevated . Home regimen: fish oil 1,000 mg.    Continue home regimen.

## 2025-01-27 NOTE — ED PROVIDER NOTES
Time reflects when diagnosis was documented in both MDM as applicable and the Disposition within this note       Time User Action Codes Description Comment    1/27/2025  5:00 PM Eulalia Valle Add [N20.0] Nephrolithiasis     1/27/2025  5:00 PM Eulalia Valle [N39.0] UTI (urinary tract infection)     1/27/2025  5:51 PM Eulalia Valle Add [R31.9] Hematuria           ED Disposition       ED Disposition   Admit    Condition   Stable    Date/Time   Mon Jan 27, 2025  5:51 PM    Comment   Case was discussed with FP and the patient's admission status was agreed to be Admission Status: observation status to the service of Dr. Story.               Assessment & Plan       Medical Decision Making  Pt is a 55yo M who presents with hematuria.     Will plan for labs, urinalysis.  Will make urology aware.  See ED course for results and details.    Plan to admit pt to FP. Pt discussed with admitting team and admission orders placed. Pt admitted without incident.       Amount and/or Complexity of Data Reviewed  Labs: ordered. Decision-making details documented in ED Course.    Risk  Prescription drug management.  Decision regarding hospitalization.        ED Course as of 01/27/25 1949 Mon Jan 27, 2025   1659 Urology made aware.    1700 Pt with urine dip from PCP office showing positive leuks and nitrites. Will initiate abx once urine sample collected and sent for culture.    1717 WBC: 8.56  WNL   1717 CBC and differential(!)  Reviewed and without actionable derangement.    1720 Leukocytes, UA(!): Large   1720 Nitrite, UA: Negative   1720 Blood, UA(!): Large   1735 Comprehensive metabolic panel(!)  Reviewed and without actionable derangement.    1746 FP made aware via EpicChat.        Medications   Cholecalciferol (VITAMIN D3) tablet 5,000 Units (has no administration in time range)   famotidine (PEPCID) tablet 40 mg (40 mg Oral Given 1/27/25 1925)   multivitamin-minerals (CENTRUM) tablet 1 tablet (has no  administration in time range)   fish oil capsule 1,000 mg (has no administration in time range)   cefTRIAXone (ROCEPHIN) IVPB (premix in dextrose) 1,000 mg 50 mL (has no administration in time range)   sodium chloride infusion 0.45 % (100 mL/hr Intravenous New Bag 1/27/25 1922)   cefTRIAXone (ROCEPHIN) IVPB (premix in dextrose) 1,000 mg 50 mL (0 mg Intravenous Stopped 1/27/25 1742)       ED Risk Strat Scores                                              History of Present Illness       Chief Complaint   Patient presents with    Blood in Urine     Admitted here short time ago and Dr Collins placed a stent for a stone that was too big to pass., having a lot of hematuria, instructed by family dr to go to ed       Past Medical History:   Diagnosis Date    Anesthesia complication     woke up twice during anesthesia    Anxiety     Arthritis     Cerebral palsy (HCC)     GERD (gastroesophageal reflux disease)     H/O ETOH abuse     Kidney stone     Stomach ulcer       Past Surgical History:   Procedure Laterality Date    ESOPHAGOGASTRODUODENOSCOPY N/A 05/09/2016    Procedure: ESOPHAGOGASTRODUODENOSCOPY (EGD);  Surgeon: Tho Hernandez MD;  Location: [facility];  Service:     FL RETROGRADE PYELOGRAM  1/16/2025    FOOT SURGERY Bilateral     hardware    HIP SURGERY Right     ORIF    OTHER SURGICAL HISTORY Bilateral     lower extremity ligiment extensions-multiple    NM CYSTO/URETERO W/LITHOTRIPSY &INDWELL STENT INSRT Right 1/16/2025    Procedure: CYSTOSCOPY URETEROSCOPY, RETROGRADE PYELOGRAM AND INSERTION STENT URETERAL, STONE MANIPULATION;  Surgeon: Gregorio Collins MD;  Location: [facility];  Service: Urology    NM SURGICAL ARTHROSCOPY SHOULDER W/ROTATOR CUFF RPR Right 06/04/2021    Procedure: SHOULDER ARTHROSCOPIC ROTATOR CUFF REPAIR, SUBACROMIAL DECOMPRESSION;  Surgeon: Ryan Araya MD;  Location: [facility];  Service: Orthopedics      Family History   Problem Relation Age of Onset    No Known Problems Mother     No  Known Problems Father     Cancer Maternal Grandmother     Cancer Maternal Grandfather     Cancer Maternal Aunt       Social History     Tobacco Use    Smoking status: Former     Current packs/day: 0.00     Average packs/day: 3.0 packs/day for 15.0 years (45.0 ttl pk-yrs)     Types: Cigarettes     Start date: 1979     Quit date: 1994     Years since quittin.0     Passive exposure: Past    Smokeless tobacco: Never   Vaping Use    Vaping status: Never Used   Substance Use Topics    Alcohol use: Not Currently     Comment: 2015 quit    Drug use: Yes     Frequency: 7.0 times per week     Types: Marijuana     Comment: rare      E-Cigarette/Vaping    E-Cigarette Use Never User       E-Cigarette/Vaping Substances    Nicotine No     THC No     CBD No     Flavoring No     Other No     Unknown No       I have reviewed and agree with the history as documented.     Pt is a 55yo M who presents for hematuria.  Patient reports recent admission for kidney stone.  Patient reports he had a stent placed and was discharged home.  Patient reports he is scheduled for an upcoming surgery to remove the stone.  Patient states that he had 1 to 2 days of clear urine and then his hematuria returned.  Patient states that he was seen by PCP today secondary to hematuria and was recommended to come to the ED for further evaluation.  He reports that prior to leaving PCP office they did a urinalysis.  Patient states that while he was in the waiting room here he received a call stating that his urinalysis showed evidence of infection and patient should therefore be admitted.  Patient states when his bladder is full he gets significant pain but otherwise has a constant 2-3/10 pain.          Objective       ED Triage Vitals [25 1551]   Temperature Pulse Blood Pressure Respirations SpO2 Patient Position - Orthostatic VS   99.9 °F (37.7 °C) 101 152/74 22 95 % Sitting      Temp Source Heart Rate Source BP Location FiO2 (%) Pain  Score    Tympanic Monitor Right arm -- 2      Vitals      Date and Time Temp Pulse SpO2 Resp BP Pain Score FACES Pain Rating User   01/27/25 1551 99.9 °F (37.7 °C) 101 95 % 22 152/74 2 -- LS            Physical Exam  Vitals reviewed.   Constitutional:       General: He is not in acute distress.     Appearance: He is well-developed. He is not toxic-appearing or diaphoretic.   HENT:      Head: Normocephalic and atraumatic.      Right Ear: External ear normal.      Left Ear: External ear normal.      Nose: Nose normal.      Mouth/Throat:      Pharynx: Oropharynx is clear.   Eyes:      Extraocular Movements: Extraocular movements intact.      Pupils: Pupils are equal, round, and reactive to light.   Cardiovascular:      Rate and Rhythm: Normal rate and regular rhythm.      Heart sounds: Normal heart sounds.   Pulmonary:      Effort: Pulmonary effort is normal. No respiratory distress.      Breath sounds: Normal breath sounds.   Abdominal:      General: Bowel sounds are normal. There is no distension.      Palpations: Abdomen is soft.      Tenderness: There is no abdominal tenderness.   Musculoskeletal:         General: Normal range of motion.      Cervical back: Neck supple.   Skin:     General: Skin is warm and dry.      Capillary Refill: Capillary refill takes less than 2 seconds.      Coloration: Skin is not pale.   Neurological:      General: No focal deficit present.      Mental Status: He is alert and oriented to person, place, and time.   Psychiatric:         Speech: Speech normal.         Behavior: Behavior is cooperative.         Results Reviewed       Procedure Component Value Units Date/Time    Urine Microscopic [[id number]]  (Abnormal) Collected: 01/27/25 1707    Lab Status: Final result Specimen: Urine, Clean Catch Updated: 01/27/25 1743     RBC, UA Innumerable /hpf      WBC, UA       Field obscured, unable to enumerate     /hpf     Epithelial Cells       Field obscured, unable to enumerate     /hpf      Bacteria, UA       Field obscured, unable to enumerate     /hpf     Amorphous Crystals, UA Field obscured, unable to enumerate    Comprehensive metabolic panel [[id number]]  (Abnormal) Collected: 01/27/25 1707    Lab Status: Final result Specimen: Blood from Arm, Left Updated: 01/27/25 1733     Sodium 139 mmol/L      Potassium 3.7 mmol/L      Chloride 110 mmol/L      CO2 18 mmol/L      ANION GAP 11 mmol/L      BUN 14 mg/dL      Creatinine 1.25 mg/dL      Glucose 102 mg/dL      Calcium 9.1 mg/dL      AST 21 U/L      ALT 36 U/L      Alkaline Phosphatase 54 U/L      Total Protein 7.0 g/dL      Albumin 4.4 g/dL      Total Bilirubin 0.70 mg/dL      eGFR 65 ml/min/1.73sq m     Narrative:      National Kidney Disease Foundation guidelines for Chronic Kidney Disease (CKD):     Stage 1 with normal or high GFR (GFR > 90 mL/min/1.73 square meters)    Stage 2 Mild CKD (GFR = 60-89 mL/min/1.73 square meters)    Stage 3A Moderate CKD (GFR = 45-59 mL/min/1.73 square meters)    Stage 3B Moderate CKD (GFR = 30-44 mL/min/1.73 square meters)    Stage 4 Severe CKD (GFR = 15-29 mL/min/1.73 square meters)    Stage 5 End Stage CKD (GFR <15 mL/min/1.73 square meters)  Note: GFR calculation is accurate only with a steady state creatinine    UA (URINE) with reflex to Scope [[id number]]  (Abnormal) Collected: 01/27/25 1707    Lab Status: Final result Specimen: Urine, Clean Catch Updated: 01/27/25 1718     Color, UA Dark Red     Clarity, UA Turbid     Specific Gravity, UA >=1.030     pH, UA 6.0     Leukocytes, UA Large     Nitrite, UA Negative     Protein,  (2+) mg/dl      Glucose, UA Negative mg/dl      Ketones, UA Trace mg/dl      Urobilinogen, UA <2.0 mg/dl      Bilirubin, UA Negative     Occult Blood, UA Large    CBC and differential [[id number]]  (Abnormal) Collected: 01/27/25 1707    Lab Status: Final result Specimen: Blood from Arm, Left Updated: 01/27/25 1716     WBC 8.56 Thousand/uL      RBC 4.97 Million/uL      Hemoglobin 11.5  g/dL      Hematocrit 37.3 %      MCV 75 fL      MCH 23.1 pg      MCHC 30.8 g/dL      RDW 16.0 %      MPV 12.0 fL      Platelets 204 Thousands/uL      nRBC 0 /100 WBCs      Segmented % 79 %      Immature Grans % 0 %      Lymphocytes % 12 %      Monocytes % 7 %      Eosinophils Relative 1 %      Basophils Relative 1 %      Absolute Neutrophils 6.83 Thousands/µL      Absolute Immature Grans 0.02 Thousand/uL      Absolute Lymphocytes 0.99 Thousands/µL      Absolute Monocytes 0.59 Thousand/µL      Eosinophils Absolute 0.08 Thousand/µL      Basophils Absolute 0.05 Thousands/µL     Urine culture [[id number]] Collected: 01/27/25 1707    Lab Status: In process Specimen: Urine, Clean Catch Updated: 01/27/25 1715            XR abdomen 1 view kub    (Results Pending)       Procedures    ED Medication and Procedure Management   Prior to Admission Medications   Prescriptions Last Dose Informant Patient Reported? Taking?   Cholecalciferol (VITAMIN D3) 1,000 units tablet 1/26/2025  Yes Yes   Sig: Take 5,000 Units by mouth daily   Multiple Vitamins-Minerals (CENTRUM ADULTS PO) 1/26/2025 Self Yes Yes   Sig: Centrum   Omega-3 Fatty Acids (fish oil) 1,000 mg 1/26/2025 Self Yes Yes   Sig: Take 1,000 mg by mouth daily   baclofen 10 mg tablet   No No   Sig: Take 1 tablet (10 mg total) by mouth 3 (three) times a day as needed for muscle spasms   famotidine (PEPCID) 40 MG tablet 1/26/2025  No Yes   Sig: Take 1 tablet (40 mg total) by mouth 2 (two) times a day   mineral oil (Fleet Laxative Mineral Oil) liquid Past Month  No Yes   Sig: Take 30 mL by mouth daily as needed for constipation   ondansetron (ZOFRAN-ODT) 4 mg disintegrating tablet Past Month  No Yes   Sig: Take 1 tablet (4 mg total) by mouth every 8 (eight) hours as needed for nausea or vomiting   tamsulosin (FLOMAX) 0.4 mg 1/26/2025  No Yes   Sig: Take 1 capsule (0.4 mg total) by mouth daily with dinner      Facility-Administered Medications: None     Patient's Medications    Discharge Prescriptions    No medications on file     No discharge procedures on file.  ED SEPSIS DOCUMENTATION   Time reflects when diagnosis was documented in both MDM as applicable and the Disposition within this note       Time User Action Codes Description Comment    1/27/2025  5:00 PM Eulalia Valle [N20.0] Nephrolithiasis     1/27/2025  5:00 PM Eulalia Valle [N39.0] UTI (urinary tract infection)     1/27/2025  5:51 PM Eulalia Valle [R31.9] Hematuria                  Eulalia Valle MD  01/27/25 1949

## 2025-01-27 NOTE — ASSESSMENT & PLAN NOTE
Presents for teresa hematuria, UA that was positive for leukocytes and nitrites in PCP's office but only leukocytes and protein in ED. Endorses chills, suprapubic pressure, flank pain, dysuria, and increased urgency/frequency. He was recently discharged from the hospital on 1/17/25 with a 0.9 x 1.3 cm calculi in the UPJ s/p cystoscopy and stent placement which was supposed to be followed up on outpatient basis by Urology.   1/27 KUB :Right ureteral stent in place. No calculi identified. Nonobstructive bowel gas pattern.   Pending urine cx   Urology consult ordered, much appreciation for recommendations  Patient scheduled for elective left ureteroscopy stone extraction 2/7/25  If culture negative will take to OR this Thursday for ureteroscopy  Holding Flomax   Urinary retention protocol ordered  Monitor I/Os  IVF NS 75 cc/hr  IV rocephin (1/27/25 -)

## 2025-01-27 NOTE — PROGRESS NOTES
Transition of Care Visit  Name: Gregorio Linda      : 1970      MRN: [id number]  Encounter Provider: Kirsty Schmidt DO  Encounter Date: 2025   Encounter department: [facility]    Assessment & Plan  Hospital discharge follow-up  See below       Obstruction of ureteropelvic junction (UPJ) due to stone  Recently admitted for 9 mm x 13 mm obstructing calculi right UVJ. Seen by urology and underwent cystoscopy/stent placement on . Discharged on . Has outpatient urology follow up on  with plan for staged ureteroscopy lithotripsy stone extraction on 25.   After being discharged, urine was red in color, then cleared up. About 5 days ago, started having teresa hematuria again, with increased urinary frequency/urgency, severe pain with urination, right sided flank pain. Patient concerned for infection.   Patient with notable history of multiple recurrent kidney stones and hx of hydronephrosis     Afebrile on exam, negative CVA tenderness bilaterally.   Tachycardia at 110 BPM. Mild right sided flank tenderness and abdominal pressure RLQ.     Urine collected during office, teresa hematuria noted   Possible movement of kidney stone vs irritated stent causing hematuria   Recommended US Kidney/Bladder, ordered as STAT, for hydronephrosis workup. Advised patient to go to ED immediately after visit.   Continue pain control with Tylenol and Baclofen. Also given Gemtesa (vibegron) by Dr. Collins.   Orders:    POCT urine dip auto non-scope    US kidney and bladder; Future    Painful urination    Orders:    POCT urine dip auto non-scope    US kidney and bladder; Future    History of hydronephrosis    Orders:    US kidney and bladder; Future         History of Present Illness     Transitional Care Management Review:   Gregorio Linda is a 54 y.o. male here for TCM follow up.     During the TCM phone call patient stated:  TCM Call       Date and time call was made   1/20/2025  2:02 PM    Hospital care reviewed  Records reviewed    Patient was hospitialized at  [facility]    Date of Admission  01/13/25    Date of discharge  01/17/25    Diagnosis  hydronephrosis    Disposition  Home    Were the patients medications reviewed and updated  No    Current Symptoms  None          TCM Call       Post hospital issues  None    Should patient be enrolled in anticoag monitoring?  No    Scheduled for follow up?  Yes  1/27    Patients specialists  Urologist    Urologist name  seferino    Did you obtain your prescribed medications  Yes    Do you need help managing your prescriptions or medications  No    Is transportation to your appointment needed  No    I have advised the patient to call PCP with any new or worsening symptoms  Shyla Dudley, YAMILEX    Living Arrangements  Alone    Support System  Family    Do you have social support  Yes, as much as I need    Are you recieving any outpatient services  No    Are you recieving home care services  No    Are you using any community resources  No    Current waiver services  No    Have you fallen in the last 12 months  No    Interperter language line needed  No    Counseling  Patient    Counseling topics  Prognosis; instructions for management; patient and family education    Comments  spoke to the pt. on 11/30/2022 for TCm care also remind him for sched appt on 12/12/2022 @ 8 AM with Dr. kwan          Patient presents to follow up after recent hospitalization for kidney stone. Had cystoscopy with stent placement on 1/16. Has follow up outpatient with Dr. Collins on 1/31 and scheduled repeat surgery for staged ureteroscopy lithotripsy stone extraction on 2/6/25.   Per patient, when he came home his urine was dark red, and then cleared up. For the past 5 days, his urine has gotten dark red in color again. Patient reports irritation with urination, right sided flank pain, and is concerned about infection.   Also notes increased frequency and  urgency with urination, states he has to urinate every 10-15 minutes, with small amount of dark red urine. States that every few times he urinates, he will have a larger amount of urine, and when that happens he is in severe pain which goes away after finishing urination.       Review of Systems   Constitutional:  Positive for diaphoresis. Negative for appetite change, chills and fever.   Respiratory:  Negative for chest tightness.    Cardiovascular:  Negative for chest pain.   Gastrointestinal:  Positive for abdominal pain and nausea (with dysuria). Negative for vomiting.   Genitourinary:  Positive for difficulty urinating, dysuria, flank pain, frequency, hematuria and urgency.   Musculoskeletal:  Positive for back pain (right lower back). Negative for neck pain.   Skin:  Negative for color change, pallor and rash.   Neurological:  Positive for light-headedness (with dysuria). Negative for dizziness and headaches.   All other systems reviewed and are negative.    Objective   /78 (BP Location: Left arm, Patient Position: Sitting, Cuff Size: Standard)   Pulse (!) 110   Temp 98.2 °F (36.8 °C) (Tympanic)   Resp 18   Wt 93.4 kg (206 lb)   BMI 29.56 kg/m²     Physical Exam  HENT:      Head: Normocephalic and atraumatic.      Mouth/Throat:      Mouth: Mucous membranes are moist.   Eyes:      Extraocular Movements: Extraocular movements intact.      Conjunctiva/sclera: Conjunctivae normal.   Cardiovascular:      Rate and Rhythm: Regular rhythm. Tachycardia present.      Heart sounds: Normal heart sounds. No murmur heard.  Pulmonary:      Effort: Pulmonary effort is normal.      Breath sounds: Normal breath sounds. No wheezing.   Abdominal:      Palpations: Abdomen is soft.      Tenderness: There is abdominal tenderness (pressure) in the right lower quadrant. There is no right CVA tenderness (mild tenderness) or left CVA tenderness.   Skin:     General: Skin is warm.   Neurological:      Mental Status: He is  alert and oriented to person, place, and time. Mental status is at baseline.   Psychiatric:         Mood and Affect: Mood is anxious.         Behavior: Behavior normal.       Medications have been reviewed by provider in current encounter

## 2025-01-27 NOTE — H&P
History and Physical - [facility]  Family Medicine Residency     Patient Information: Gregorio Linda 54 y.o. male MRN: [id number]  Unit/Bed#: ED 03 Encounter: [id number]  Admitting Physician: Cathi Story MD   PCP: Kirsty Schmidt DO  Date of Admission:  01/27/25     Assessment and Plan     * Complicated UTI (urinary tract infection)  Assessment & Plan  Presents for teresa hematuria, UA that was positive for leukocytes and nitrites in PCP's office but only leukocytes and protein in ED. Endorses chills, suprapubic pressure, flank pain, dysuria, and increased urgency/frequency. He was recently discharged from the hospital on 1/17/25 with a 0.9 x 1.3 cm calculi in the UPJ s/p cystoscopy and stent placement which was supposed to be followed up on outpatient basis by Urology.     Pending urine cx and KUB  Urology consult ordered, much appreciation for recommendations  Patient scheduled for elective left ureteroscopy stone extraction 2/7/25  If culture negative will take to OR this Thursday for ureteroscopy  Holding Flomax   Urinary retention protocol ordered  Monitor I/Os  IVF NS 75 cc/hr  IV rocephin (1/27/25 -)    SIRS (systemic inflammatory response syndrome) (HCC)  Assessment & Plan  POA tachycardia and tachypnea. Presenting for complicated UTI and currently pending urine culture and KUB.    Continue to monitor fever curve and labs  If fever present will order CT abd/pelvis non-contrast  Rest of plan per Complicated UTI    Obstruction of ureteropelvic junction (UPJ) due to stone  Assessment & Plan  Previously admitted on 1/14/25 for 9 mm x 13 mm obstructing calculi right UVJ and had cystoscopy and stent placement performed 1/16/25. He was then discharged on 1/17/25 and was scheduled for outpatient Urology f/u on 1/31 with plan for ureteroscopy lithotripsy stone extraction on 2/6/25. He was seen by PCP on 1/27/25 with hematuria and other urinary symptoms which warranted evaluation in the ED.    Rest  of plan per Complicated UTI    Hyperlipidemia  Assessment & Plan  Chronic, last lipid panel showed elevated . Home regimen: fish oil 1,000 mg.    Continue home regimen.    GERD (gastroesophageal reflux disease)  Assessment & Plan  Chronic, well controlled.    Continue home medication famotidine 40 mg.    Cerebral palsy (HCC)  Assessment & Plan  Chronic, not on medications, walks with cane to ambulate. Home regimen: PRN Baclofen    Can re-order PRN as needed.         Fluids: 75 cc/hr NS  Electrolyte repletion: Replete as needed.  Nutrition:        Diet Orders   (From admission, onward)                 Start     Ordered    01/27/25 1839  Diet Regular; Regular House  Diet effective now        References:    Adult Nutrition Support Algorithm    RD Therapeutic Diet Order Protocol   Question Answer Comment   Diet Type Regular    Regular Regular House    RD to adjust diet per protocol? Yes        01/27/25 1838                   VTE Prophylaxis: VTE Score: 2 Low Risk (Score 0-2) - Encourage Ambulation.  Code Status: Level 1 - Full Code  Anticipated Length of Stay:  Patient will be admitted on an Observation basis with an anticipated length of stay of  less than 2 midnights.     Justification for Hospital Stay: Evaluation of hematuria and possible UTI  Total Time for Visit, including Counseling / Coordination of Care: >30 mins. Greater than 50% of this total time spent on direct patient counseling and coordination of care.  Patient Information Sharing: With consent of patient, he would like his Mom to be updated at least once a day.    Chief Complaint:     Chief Complaint   Patient presents with    Blood in Urine     Admitted here short time ago and Dr Collins placed a stent for a stone that was too big to pass., having a lot of hematuria, instructed by family dr to go to ed       Subjective      History of Present Illness:     Gregorio Linda is a 54 y.o. male with a pmhx of cerebral palsy, HLD, GERD, h/o renal  stones who presents with hematuria from PCP's office. Since discharge from the hospital on 1/17/25 with 9 x 13 mm calculi in UVJ s/p cystoscopy and stent placement, he has continued to have dark red hematuria which has now recently involved the following symptoms: dysuria, chills, flank pain that will shoot to 8/10 with urination and then decrease to 2/10 when finished, suprapubic pressure and urgency/frequency. He has been taking Flomax 0.4 mg since hospital discharge and is compliant with his other chronic home medications. Denies fever, h/a, CP, SOB, N/V/D, and has been able to tolerate regular diet with consistent appetite.    Denies alcohol, smoking, recreational drug use. Ambulates with cane.     Review of Systems:  Review of Systems   Constitutional:  Positive for chills. Negative for fatigue and fever.   HENT:  Negative for sore throat.    Respiratory:  Negative for cough, chest tightness and shortness of breath.    Cardiovascular:  Negative for chest pain and palpitations.   Gastrointestinal:  Negative for abdominal pain, constipation, diarrhea, nausea and vomiting.   Genitourinary:  Positive for dysuria, flank pain, frequency, hematuria and urgency.   Musculoskeletal:  Negative for arthralgias and back pain.   Skin:  Negative for rash and wound.   Neurological:  Negative for dizziness, weakness, light-headedness and headaches.        Past Medical History:   Diagnosis Date    Anesthesia complication     woke up twice during anesthesia    Anxiety     Arthritis     Cerebral palsy (HCC)     GERD (gastroesophageal reflux disease)     H/O ETOH abuse     Kidney stone     Stomach ulcer      Past Surgical History:   Procedure Laterality Date    ESOPHAGOGASTRODUODENOSCOPY N/A 05/09/2016    Procedure: ESOPHAGOGASTRODUODENOSCOPY (EGD);  Surgeon: Tho Hernandez MD;  Location: [facility] GI LAB;  Service:     FL RETROGRADE PYELOGRAM  1/16/2025    FOOT SURGERY Bilateral     hardware    HIP SURGERY Right     ORIF    OTHER  SURGICAL HISTORY Bilateral     lower extremity ligiment extensions-multiple    DC CYSTO/URETERO W/LITHOTRIPSY &INDWELL STENT INSRT Right 1/16/2025    Procedure: CYSTOSCOPY URETEROSCOPY, RETROGRADE PYELOGRAM AND INSERTION STENT URETERAL, STONE MANIPULATION;  Surgeon: Gregorio Collins MD;  Location: [facility] MAIN OR;  Service: Urology    DC SURGICAL ARTHROSCOPY SHOULDER W/ROTATOR CUFF RPR Right 06/04/2021    Procedure: SHOULDER ARTHROSCOPIC ROTATOR CUFF REPAIR, SUBACROMIAL DECOMPRESSION;  Surgeon: Ryan Araya MD;  Location: [facility] MAIN OR;  Service: Orthopedics     Allergies   Allergen Reactions    Aspirin GI Bleeding and Vomiting     Other reaction(s): Unknown  Patient has ulcers and does not take aspirin      Prior to Admission Medications   Prescriptions Last Dose Informant Patient Reported? Taking?   Cholecalciferol (VITAMIN D3) 1,000 units tablet 1/26/2025  Yes Yes   Sig: Take 5,000 Units by mouth daily   Multiple Vitamins-Minerals (CENTRUM ADULTS PO) 1/26/2025 Self Yes Yes   Sig: Centrum   Omega-3 Fatty Acids (fish oil) 1,000 mg 1/26/2025 Self Yes Yes   Sig: Take 1,000 mg by mouth daily   baclofen 10 mg tablet   No No   Sig: Take 1 tablet (10 mg total) by mouth 3 (three) times a day as needed for muscle spasms   famotidine (PEPCID) 40 MG tablet 1/26/2025  No Yes   Sig: Take 1 tablet (40 mg total) by mouth 2 (two) times a day   mineral oil (Fleet Laxative Mineral Oil) liquid Past Month  No Yes   Sig: Take 30 mL by mouth daily as needed for constipation   ondansetron (ZOFRAN-ODT) 4 mg disintegrating tablet Past Month  No Yes   Sig: Take 1 tablet (4 mg total) by mouth every 8 (eight) hours as needed for nausea or vomiting   tamsulosin (FLOMAX) 0.4 mg 1/26/2025  No Yes   Sig: Take 1 capsule (0.4 mg total) by mouth daily with dinner      Facility-Administered Medications: None     Social History     Socioeconomic History    Marital status: Single     Spouse name: Not on file    Number of children: Not on file    Years  of education: Not on file    Highest education level: Not on file   Occupational History    Not on file   Tobacco Use    Smoking status: Former     Current packs/day: 0.00     Average packs/day: 3.0 packs/day for 15.0 years (45.0 ttl pk-yrs)     Types: Cigarettes     Start date: 1979     Quit date: 1994     Years since quittin.0     Passive exposure: Past    Smokeless tobacco: Never   Vaping Use    Vaping status: Never Used   Substance and Sexual Activity    Alcohol use: Not Currently     Comment: 2015 quit    Drug use: Yes     Frequency: 7.0 times per week     Types: Marijuana     Comment: rare    Sexual activity: Not Currently   Other Topics Concern    Not on file   Social History Narrative    Not on file     Social Drivers of Health     Financial Resource Strain: High Risk (2024)    Overall Financial Resource Strain (CARDIA)     Difficulty of Paying Living Expenses: Hard   Food Insecurity: Food Insecurity Present (2025)    Nursing - Inadequate Food Risk Classification     Worried About Running Out of Food in the Last Year: Never true     Ran Out of Food in the Last Year: Never true     Ran Out of Food in the Last Year: Sometimes true   Transportation Needs: No Transportation Needs (2025)    Nursing - Transportation Risk Classification     Lack of Transportation: Not on file     Lack of Transportation: No   Physical Activity: Inactive (2023)    Exercise Vital Sign     Days of Exercise per Week: 0 days     Minutes of Exercise per Session: 0 min   Stress: No Stress Concern Present (2023)    Citizen of [place] [employer] - Occupational Stress Questionnaire     Feeling of Stress : Not at all   Social Connections: Socially Isolated (2023)    Social Connection and Isolation Panel [NHANES]     Frequency of Communication with Friends and Family: More than three times a week     Frequency of Social Gatherings with Friends and Family: More than three times a week      Attends Islam Services: Never     Active Member of Clubs or Organizations: No     Attends Club or Organization Meetings: Never     Marital Status: Never    Intimate Partner Violence: Unknown (2025)    Nursing IPS     Feels Physically and Emotionally Safe: Not on file     Physically Hurt by Someone: Not on file     Humiliated or Emotionally Abused by Someone: Not on file     Physically Hurt by Someone: No     Hurt or Threatened by Someone: No   Housing Stability: Unknown (2025)    Nursing: Inadequate Housing Risk Classification     Has Housing: Not on file     Worried About Losing Housing: Not on file     Unable to Get Utilities: Not on file     Unable to Pay for Housing in the Last Year: No     Has Housin     Family History   Problem Relation Age of Onset    No Known Problems Mother     No Known Problems Father     Cancer Maternal Grandmother     Cancer Maternal Grandfather     Cancer Maternal Aunt           Objective     Physical Exam:   Vitals:   Blood Pressure: 152/74 (25 1551)  Pulse: 101 (25 1551)  Temperature: 99.9 °F (37.7 °C) (25 1551)  Temp Source: Tympanic (25 1551)  Respirations: 22 (25 1551)  SpO2: 95 % (25 1551)     Physical Exam  Vitals and nursing note reviewed.   Constitutional:       General: He is not in acute distress.     Appearance: Normal appearance.   HENT:      Head: Normocephalic and atraumatic.      Right Ear: External ear normal.      Left Ear: External ear normal.      Nose: Nose normal.      Mouth/Throat:      Mouth: Mucous membranes are moist.      Pharynx: Oropharynx is clear.   Eyes:      Extraocular Movements: Extraocular movements intact.   Cardiovascular:      Rate and Rhythm: Normal rate and regular rhythm.      Pulses: Normal pulses.      Heart sounds: Normal heart sounds.   Pulmonary:      Effort: Pulmonary effort is normal.      Breath sounds: Normal breath sounds.   Abdominal:      General: Abdomen is flat. Bowel  sounds are normal. There is no distension.      Palpations: Abdomen is soft.      Tenderness: There is abdominal tenderness (suprapubic region). There is no right CVA tenderness, left CVA tenderness or guarding.   Musculoskeletal:         General: No tenderness. Normal range of motion.      Cervical back: Normal range of motion.      Right lower leg: No edema.      Left lower leg: No edema.      Comments: Walks with cane.   Skin:     General: Skin is warm and dry.      Capillary Refill: Capillary refill takes less than 2 seconds.   Neurological:      General: No focal deficit present.      Mental Status: He is alert and oriented to person, place, and time.   Psychiatric:         Mood and Affect: Mood normal.         Behavior: Behavior normal.       Lab Results: Results Review Statement: No pertinent imaging studies reviewed.  Results from last 7 days   Lab Units 01/27/25  1707   WBC Thousand/uL 8.56   HEMOGLOBIN g/dL 11.5*   HEMATOCRIT % 37.3   PLATELETS Thousands/uL 204   SEGS PCT % 79*   LYMPHO PCT % 12*   MONO PCT % 7   EOS PCT % 1     Results from last 7 days   Lab Units 01/27/25  1707   POTASSIUM mmol/L 3.7   CHLORIDE mmol/L 110*   CO2 mmol/L 18*   BUN mg/dL 14   CREATININE mg/dL 1.25   CALCIUM mg/dL 9.1   ALK PHOS U/L 54   ALT U/L 36   AST U/L 21   EGFR ml/min/1.73sq m 65          Results from last 7 days   Lab Units 01/27/25  1707   COLOR UA  Dark Red   CLARITY UA  Turbid   SPEC GRAV UA  >=1.030   PH UA  6.0   LEUKOCYTES UA  Large*   NITRITE UA  Negative   GLUCOSE UA mg/dl Negative   KETONES UA mg/dl Trace*   BILIRUBIN UA  Negative   BLOOD UA  Large*      Results from last 7 days   Lab Units 01/27/25  1707   RBC UA /hpf Innumerable*   WBC UA /hpf Field obscured, unable to enumerate*   EPITHELIAL CELLS WET PREP /hpf Field obscured, unable to enumerate*   BACTERIA UA /hpf Field obscured, unable to enumerate*                  Imaging: Results Review Statement: No pertinent imaging studies reviewed.  No results  found.           Entire H&P was discussed with Dr. Story who agreed to what is noted above     ** Please Note: This note has been constructed using a voice recognition system **     Jeff Monte DO  01/27/25  7:35 PM

## 2025-01-27 NOTE — ASSESSMENT & PLAN NOTE
Recently admitted for 9 mm x 13 mm obstructing calculi right UVJ. Seen by urology and underwent cystoscopy/stent placement on 1/16. Discharged on 1/17. Has outpatient urology follow up on 1/31 with plan for staged ureteroscopy lithotripsy stone extraction on 2/6/25.   After being discharged, urine was red in color, then cleared up. About 5 days ago, started having teresa hematuria again, with increased urinary frequency/urgency, severe pain with urination, right sided flank pain. Patient concerned for infection.   Patient with notable history of multiple recurrent kidney stones and hx of hydronephrosis     Afebrile on exam, negative CVA tenderness bilaterally.   Tachycardia at 110 BPM. Mild right sided flank tenderness and abdominal pressure RLQ.     Urine collected during office, teresa hematuria noted   Possible movement of kidney stone vs irritated stent causing hematuria   Recommended US Kidney/Bladder, ordered as STAT, for hydronephrosis workup. Advised patient to go to ED immediately after visit.   Continue pain control with Tylenol and Baclofen. Also given Gemtesa (vibegron) by Dr. Collins.   Orders:    POCT urine dip auto non-scope    US kidney and bladder; Future

## 2025-01-27 NOTE — CONSULTS
H&P Exam - Urology       Patient: Gregorio Linda   : 1970 Sex: male   MRN: [id number]     CSN: [id number]      History of Present Illness   HPI:  Gregorio Linda is a 54 y.o. male well-known to me admitted back on  with left 12 mm UPJ stone taken to the OR on  for cystoscopy left ureteroscopy stoma Nappe stent placement with stone migrating into the lower pole sent home presenting to the ER with complicated UTI now to be admitted since seen in the ER tonight no dysuria fevers or chills told that urinalysis consistent with UTI cultures sent        Review of Systems:   Constitutional:  Negative for activity change, fever, chills and diaphoresis.   HENT: Negative for hearing loss and trouble swallowing.   Eyes: Negative for itching and visual disturbance.   Respiratory: Negative for chest tightness and shortness of breath.   Cardiovascular: Negative for chest pain, edema.   Gastrointestinal: Negative for abdominal distention, na abdominal pain, constipation, diarrhea, Nausea and vomiting.   Genitourinary: Negative for decreased urine volume, difficulty urinating, dysuria, enuresis, frequency, hematuria and urgency.   Musculoskeletal: Negative for gait problem and myalgias.   Neurological: Negative for dizziness and headaches.   Hematological: Does not bruise/bleed easily.       Historical Information   Past Medical History:   Diagnosis Date    Anesthesia complication     woke up twice during anesthesia    Anxiety     Arthritis     Cerebral palsy (HCC)     GERD (gastroesophageal reflux disease)     H/O ETOH abuse     Kidney stone     Stomach ulcer      Past Surgical History:   Procedure Laterality Date    ESOPHAGOGASTRODUODENOSCOPY N/A 2016    Procedure: ESOPHAGOGASTRODUODENOSCOPY (EGD);  Surgeon: Tho Hernandez MD;  Location: [facility] GI LAB;  Service:     FL RETROGRADE PYELOGRAM  2025    FOOT SURGERY Bilateral     hardware    HIP SURGERY Right     ORIF    OTHER SURGICAL HISTORY  Bilateral     lower extremity ligiment extensions-multiple    AZ CYSTO/URETERO W/LITHOTRIPSY &INDWELL STENT INSRT Right 2025    Procedure: CYSTOSCOPY URETEROSCOPY, RETROGRADE PYELOGRAM AND INSERTION STENT URETERAL, STONE MANIPULATION;  Surgeon: Gregorio Collins MD;  Location: [facility] MAIN OR;  Service: Urology    AZ SURGICAL ARTHROSCOPY SHOULDER W/ROTATOR CUFF RPR Right 2021    Procedure: SHOULDER ARTHROSCOPIC ROTATOR CUFF REPAIR, SUBACROMIAL DECOMPRESSION;  Surgeon: Ryan Araya MD;  Location: [facility] MAIN OR;  Service: Orthopedics     Social History   Social History     Substance and Sexual Activity   Alcohol Use Not Currently    Comment: 2015 quit     Social History     Substance and Sexual Activity   Drug Use Yes    Frequency: 7.0 times per week    Types: Marijuana    Comment: rare     Social History     Tobacco Use   Smoking Status Former    Current packs/day: 0.00    Average packs/day: 3.0 packs/day for 15.0 years (45.0 ttl pk-yrs)    Types: Cigarettes    Start date: 1979    Quit date: 1994    Years since quittin.0    Passive exposure: Past   Smokeless Tobacco Never     Family History:   Family History   Problem Relation Age of Onset    No Known Problems Mother     No Known Problems Father     Cancer Maternal Grandmother     Cancer Maternal Grandfather     Cancer Maternal Aunt        Meds/Allergies   Not in a hospital admission.  Allergies   Allergen Reactions    Aspirin GI Bleeding and Vomiting     Other reaction(s): Unknown  Patient has ulcers and does not take aspirin        Objective   Vitals: /74 (BP Location: Right arm)   Pulse 101   Temp 99.9 °F (37.7 °C) (Tympanic)   Resp 22   SpO2 95%     Physical Exam:  General Alert awake   Normocephalic atraumatic PERRLA  Lungs clear bilaterally  Cardiac normal S1 normal S2  Abdomen soft, flank pain  Extremities no edema    No intake/output data recorded.    Invasive Devices       Peripheral Intravenous Line  Duration              Peripheral IV 01/13/25 Left Antecubital 13 days    Peripheral IV 01/27/25 Left;Ventral (anterior) Forearm <1 day                        Lab Results: CBC:   Lab Results   Component Value Date    WBC 8.56 01/27/2025    HGB 11.5 (L) 01/27/2025    HCT 37.3 01/27/2025    MCV 75 (L) 01/27/2025     01/27/2025    RBC 4.97 01/27/2025    MCH 23.1 (L) 01/27/2025    MCHC 30.8 (L) 01/27/2025    RDW 16.0 (H) 01/27/2025    MPV 12.0 01/27/2025    NRBC 0 01/27/2025     CMP:   Lab Results   Component Value Date     (H) 01/16/2025    CO2 24 01/16/2025    BUN 12 01/16/2025    CREATININE 1.08 01/16/2025    CALCIUM 8.5 01/16/2025    AST 24 01/13/2025    ALT 44 01/13/2025    ALKPHOS 55 01/13/2025    EGFR 78 01/16/2025     Urinalysis:   Lab Results   Component Value Date    COLORU Yellow 01/13/2025    CLARITYU Cloudy 01/13/2025    SPECGRAV >=1.030 01/13/2025    PHUR 5.5 01/13/2025    PHUR 5.5 02/15/2018    LEUKOCYTESUR 3+ 01/27/2025    LEUKOCYTESUR Negative 01/13/2025    NITRITE + 01/27/2025    NITRITE Negative 01/13/2025    GLUCOSEU neg 01/27/2025    GLUCOSEU Negative 01/13/2025    KETONESU + 01/27/2025    KETONESU Trace (A) 01/13/2025    BILIRUBINUR 1+ 01/27/2025    BILIRUBINUR Negative 01/13/2025    BLOODU 3+ 01/27/2025    BLOODU Large (A) 01/13/2025     Urine Culture:   Lab Results   Component Value Date    URINECX 5820-2931 cfu/ml 01/13/2025     PSA:   Lab Results   Component Value Date    PSA 1.309 09/03/2024    PSA 1.0 02/16/2023           Assessment/ Plan:  Complicated UTI  Left stent  Patient scheduled for elective left ureteroscopy stone extraction next Thursday, February 6  Urine culture  Broad-spectrum antibiotics  If culture negative will take the OR this Thursday stoma ureteroscopy      Gregorio Collins MD

## 2025-01-27 NOTE — ASSESSMENT & PLAN NOTE
Chronic, not on medications, walks with cane to ambulate. Home regimen: PRN Baclofen    Can re-order PRN as needed.

## 2025-01-28 PROBLEM — R82.90 ABNORMAL URINALYSIS: Status: ACTIVE | Noted: 2025-01-27

## 2025-01-28 LAB
ALBUMIN SERPL BCG-MCNC: 3.9 G/DL (ref 3.5–5)
ALP SERPL-CCNC: 46 U/L (ref 34–104)
ALT SERPL W P-5'-P-CCNC: 31 U/L (ref 7–52)
ANION GAP SERPL CALCULATED.3IONS-SCNC: 10 MMOL/L (ref 4–13)
AST SERPL W P-5'-P-CCNC: 20 U/L (ref 13–39)
BASOPHILS # BLD AUTO: 0.07 THOUSANDS/ΜL (ref 0–0.1)
BASOPHILS NFR BLD AUTO: 1 % (ref 0–1)
BILIRUB SERPL-MCNC: 0.9 MG/DL (ref 0.2–1)
BUN SERPL-MCNC: 13 MG/DL (ref 5–25)
CALCIUM SERPL-MCNC: 8.7 MG/DL (ref 8.4–10.2)
CHLORIDE SERPL-SCNC: 110 MMOL/L (ref 96–108)
CO2 SERPL-SCNC: 20 MMOL/L (ref 21–32)
CREAT SERPL-MCNC: 1.13 MG/DL (ref 0.6–1.3)
EOSINOPHIL # BLD AUTO: 0.1 THOUSAND/ΜL (ref 0–0.61)
EOSINOPHIL NFR BLD AUTO: 1 % (ref 0–6)
ERYTHROCYTE [DISTWIDTH] IN BLOOD BY AUTOMATED COUNT: 16 % (ref 11.6–15.1)
GFR SERPL CREATININE-BSD FRML MDRD: 74 ML/MIN/1.73SQ M
GLUCOSE P FAST SERPL-MCNC: 91 MG/DL (ref 65–99)
GLUCOSE SERPL-MCNC: 91 MG/DL (ref 65–140)
HCT VFR BLD AUTO: 33.6 % (ref 36.5–49.3)
HGB BLD-MCNC: 10.3 G/DL (ref 12–17)
IMM GRANULOCYTES # BLD AUTO: 0.03 THOUSAND/UL (ref 0–0.2)
IMM GRANULOCYTES NFR BLD AUTO: 0 % (ref 0–2)
LYMPHOCYTES # BLD AUTO: 1.37 THOUSANDS/ΜL (ref 0.6–4.47)
LYMPHOCYTES NFR BLD AUTO: 18 % (ref 14–44)
MCH RBC QN AUTO: 23 PG (ref 26.8–34.3)
MCHC RBC AUTO-ENTMCNC: 30.7 G/DL (ref 31.4–37.4)
MCV RBC AUTO: 75 FL (ref 82–98)
MONOCYTES # BLD AUTO: 0.75 THOUSAND/ΜL (ref 0.17–1.22)
MONOCYTES NFR BLD AUTO: 10 % (ref 4–12)
NEUTROPHILS # BLD AUTO: 5.37 THOUSANDS/ΜL (ref 1.85–7.62)
NEUTS SEG NFR BLD AUTO: 70 % (ref 43–75)
NRBC BLD AUTO-RTO: 0 /100 WBCS
PLATELET # BLD AUTO: 188 THOUSANDS/UL (ref 149–390)
PMV BLD AUTO: 12.3 FL (ref 8.9–12.7)
POTASSIUM SERPL-SCNC: 3.8 MMOL/L (ref 3.5–5.3)
PROT SERPL-MCNC: 6.3 G/DL (ref 6.4–8.4)
RBC # BLD AUTO: 4.48 MILLION/UL (ref 3.88–5.62)
SODIUM SERPL-SCNC: 140 MMOL/L (ref 135–147)
WBC # BLD AUTO: 7.69 THOUSAND/UL (ref 4.31–10.16)

## 2025-01-28 PROCEDURE — 80053 COMPREHEN METABOLIC PANEL: CPT

## 2025-01-28 PROCEDURE — 85025 COMPLETE CBC W/AUTO DIFF WBC: CPT

## 2025-01-28 PROCEDURE — 99232 SBSQ HOSP IP/OBS MODERATE 35: CPT | Performed by: INTERNAL MEDICINE

## 2025-01-28 RX ORDER — ACETAMINOPHEN 325 MG/1
650 TABLET ORAL EVERY 6 HOURS PRN
Status: DISCONTINUED | OUTPATIENT
Start: 2025-01-28 | End: 2025-01-29 | Stop reason: HOSPADM

## 2025-01-28 RX ADMIN — Medication 5000 UNITS: at 09:46

## 2025-01-28 RX ADMIN — OMEGA-3 FATTY ACIDS CAP 1000 MG 1000 MG: 1000 CAP at 09:45

## 2025-01-28 RX ADMIN — FAMOTIDINE 40 MG: 20 TABLET, FILM COATED ORAL at 17:46

## 2025-01-28 RX ADMIN — CEFTRIAXONE 1000 MG: 1 INJECTION, SOLUTION INTRAVENOUS at 17:47

## 2025-01-28 RX ADMIN — FAMOTIDINE 40 MG: 20 TABLET, FILM COATED ORAL at 09:46

## 2025-01-28 RX ADMIN — ACETAMINOPHEN 650 MG: 325 TABLET ORAL at 17:46

## 2025-01-28 RX ADMIN — Medication 1 TABLET: at 09:45

## 2025-01-28 RX ADMIN — SODIUM CHLORIDE 100 ML/HR: 0.45 INJECTION, SOLUTION INTRAVENOUS at 06:44

## 2025-01-28 NOTE — ASSESSMENT & PLAN NOTE
POA tachycardia and tachypnea. Presenting for complicated UTI and currently pending urine culture and KUB.    Continue to monitor fever curve and labs  If fever present will order CT abd/pelvis non-contrast  Rest of plan per Complicated UTI

## 2025-01-28 NOTE — PROGRESS NOTES
Progress Note - Family Medicine   Name: Gregorio Linda 54 y.o. male I MRN: [id number]  Unit/Bed#: [address] [place] [address] I Date of Admission: 1/27/2025   Date of Service: 1/28/2025 I Hospital Day: 0     Assessment & Plan  Complicated UTI (urinary tract infection)  Presents for teresa hematuria, UA that was positive for leukocytes and nitrites in PCP's office but only leukocytes and protein in ED. Endorses chills, suprapubic pressure, flank pain, dysuria, and increased urgency/frequency. He was recently discharged from the hospital on 1/17/25 with a 0.9 x 1.3 cm calculi in the UPJ s/p cystoscopy and stent placement which was supposed to be followed up on outpatient basis by Urology.   1/27 KUB :Right ureteral stent in place. No calculi identified. Nonobstructive bowel gas pattern.   Pending urine cx   Urology consult ordered, much appreciation for recommendations  Patient scheduled for elective left ureteroscopy stone extraction 2/7/25  If culture negative will take to OR this Thursday for ureteroscopy  Holding Flomax   Urinary retention protocol ordered  Monitor I/Os  IVF NS 75 cc/hr  IV rocephin (1/27/25 -)  Cerebral palsy (HCC)  Chronic, not on medications, walks with cane to ambulate. Home regimen: PRN Baclofen    Can re-order PRN as needed.  GERD (gastroesophageal reflux disease)  Chronic, well controlled.    Continue home medication famotidine 40 mg.  Hyperlipidemia  Chronic, last lipid panel showed elevated . Home regimen: fish oil 1,000 mg.    Continue home regimen.  Obstruction of ureteropelvic junction (UPJ) due to stone  Previously admitted on 1/14/25 for 9 mm x 13 mm obstructing calculi right UVJ and had cystoscopy and stent placement performed 1/16/25. He was then discharged on 1/17/25 and was scheduled for outpatient Urology f/u on 1/31 with plan for ureteroscopy lithotripsy stone extraction on 2/6/25. He was seen by PCP on 1/27/25 with hematuria and other urinary symptoms which warranted evaluation in  the ED.    Rest of plan per Complicated UTI  SIRS (systemic inflammatory response syndrome) (HCC)  POA tachycardia and tachypnea. Presenting for complicated UTI and currently pending urine culture and KUB.    Continue to monitor fever curve and labs  If fever present will order CT abd/pelvis non-contrast  Rest of plan per Complicated UTI    VTE Pharmacologic Prophylaxis: VTE Score: 2 Low Risk (Score 0-2) - Encourage Ambulation.    Mobility:   Basic Mobility Inpatient Raw Score: 21  JH-HLM Goal: 6: Walk 10 steps or more  JH-HLM Achieved: 7: Walk 25 feet or more  JH-HLM Goal achieved. Continue to encourage appropriate mobility.    Patient Centered Rounds: I performed bedside rounds with nursing staff today.   Discussions with Specialists or Other Care Team Provider: Urology    Education and Discussions with Family / Patient: Patient declined call to .     Current Length of Stay: 0 day(s)  Current Patient Status: Observation   Certification Statement: The patient will continue to require additional inpatient hospital stay due to Suspected Complicated UTI  Discharge Plan: Anticipate discharge in 24-48 hrs to home.    Code Status: Level 1 - Full Code    Subjective   Patient is seen and examined at bedside. Pain is currently well controlled. Patient has a total urine output of 500 mL. No complaints.     Objective :  Temp:  [98 °F (36.7 °C)-99.9 °F (37.7 °C)] 98 °F (36.7 °C)  HR:  [] 90  BP: (103-152)/(64-83) 103/64  Resp:  [18-22] 20  SpO2:  [95 %-96 %] 96 %  O2 Device: None (Room air)    Body mass index is 29.99 kg/m².     Input and Output Summary (last 24 hours):     Intake/Output Summary (Last 24 hours) at 1/28/2025 0647  Last data filed at 1/28/2025 0500  Gross per 24 hour   Intake 50 ml   Output 500 ml   Net -450 ml       Physical Exam  Vitals and nursing note reviewed.   Constitutional:       General: He is not in acute distress.     Appearance: He is well-developed.   HENT:      Head:  Normocephalic and atraumatic.   Eyes:      Conjunctiva/sclera: Conjunctivae normal.   Cardiovascular:      Rate and Rhythm: Normal rate and regular rhythm.      Heart sounds: No murmur heard.  Pulmonary:      Effort: Pulmonary effort is normal. No respiratory distress.      Breath sounds: Normal breath sounds.   Abdominal:      Palpations: Abdomen is soft.      Tenderness: There is abdominal tenderness.   Musculoskeletal:         General: No swelling.      Cervical back: Neck supple.   Skin:     General: Skin is warm and dry.      Capillary Refill: Capillary refill takes less than 2 seconds.   Neurological:      Mental Status: He is alert.   Psychiatric:         Mood and Affect: Mood normal.           Lines/Drains:        Lab Results: I have reviewed the following results:   Results from last 7 days   Lab Units 01/28/25  0457   WBC Thousand/uL 7.69   HEMOGLOBIN g/dL 10.3*   HEMATOCRIT % 33.6*   PLATELETS Thousands/uL 188   SEGS PCT % 70   LYMPHO PCT % 18   MONO PCT % 10   EOS PCT % 1     Results from last 7 days   Lab Units 01/28/25  0457   SODIUM mmol/L 140   POTASSIUM mmol/L 3.8   CHLORIDE mmol/L 110*   CO2 mmol/L 20*   BUN mg/dL 13   CREATININE mg/dL 1.13   ANION GAP mmol/L 10   CALCIUM mg/dL 8.7   ALBUMIN g/dL 3.9   TOTAL BILIRUBIN mg/dL 0.90   ALK PHOS U/L 46   ALT U/L 31   AST U/L 20   GLUCOSE RANDOM mg/dL 91                       Recent Cultures (last 7 days):           Last 24 Hours Medication List:     Current Facility-Administered Medications:     cefTRIAXone (ROCEPHIN) IVPB (premix in dextrose) 1,000 mg 50 mL, Q24H    Cholecalciferol (VITAMIN D3) tablet 5,000 Units, Daily    famotidine (PEPCID) tablet 40 mg, BID    fish oil capsule 1,000 mg, Daily    multivitamin-minerals (CENTRUM) tablet 1 tablet, Daily    sodium chloride infusion 0.45 %, Continuous, Last Rate: 100 mL/hr (01/27/25 1922)    Administrative Statements   Today, Patient Was Seen By: Sherry Gibson MD      **Please Note: This note may have  been constructed using a voice recognition system.**

## 2025-01-28 NOTE — ASSESSMENT & PLAN NOTE
Previously admitted on 1/14/25 for 9 mm x 13 mm obstructing calculi right UVJ and had cystoscopy and stent placement performed 1/16/25. He was then discharged on 1/17/25 and was scheduled for outpatient Urology f/u on 1/31 with plan for ureteroscopy lithotripsy stone extraction on 2/6/25. He was seen by PCP on 1/27/25 with hematuria and other urinary symptoms which warranted evaluation in the ED.    Rest of plan per Complicated UTI

## 2025-01-28 NOTE — PROGRESS NOTES
"Progress Note - Urology      Patient: Gregorio Linda   : 1970 Sex: male   MRN: [id number]     CSN: [id number]  Unit/Bed#: [address] [place] [address]     SUBJECTIVE:   Patient seen on morning rounds  Noting right flank pain from stent  Cultures pending      Objective   Vitals: /77 (BP Location: Left arm)   Pulse 102   Temp 97.9 °F (36.6 °C) (Oral)   Resp 20   Ht 5' 10\" (1.778 m)   Wt 94.8 kg (209 lb)   SpO2 97%   BMI 29.99 kg/m²     I/O last 24 hours:  In: 290 [P.O.:240; IV Piggyback:50]  Out: 500 [Urine:500]      Physical Exam:   General Alert awake   Normocephalic atraumatic PERRLA  Lungs clear bilaterally  Cardiac normal S1 normal S2  Abdomen soft, flank pain  Extremities no edema      Lab Results: CBC:   Lab Results   Component Value Date    WBC 7.69 2025    HGB 10.3 (L) 2025    HCT 33.6 (L) 2025    MCV 75 (L) 2025     2025    RBC 4.48 2025    MCH 23.0 (L) 2025    MCHC 30.7 (L) 2025    RDW 16.0 (H) 2025    MPV 12.3 2025    NRBC 0 2025     CMP:   Lab Results   Component Value Date     (H) 2025    CO2 20 (L) 2025    BUN 13 2025    CREATININE 1.13 2025    CALCIUM 8.7 2025    AST 20 2025    ALT 31 2025    ALKPHOS 46 2025    EGFR 74 2025     Urinalysis:   Lab Results   Component Value Date    COLORU Dark Red 2025    CLARITYU Turbid 2025    SPECGRAV >=1.030 2025    PHUR 6.0 2025    PHUR 5.5 02/15/2018    LEUKOCYTESUR Large (A) 2025    LEUKOCYTESUR 3+ 2025    NITRITE Negative 2025    NITRITE + 2025    GLUCOSEU Negative 2025    GLUCOSEU neg 2025    KETONESU Trace (A) 2025    KETONESU + 2025    BILIRUBINUR Negative 2025    BILIRUBINUR 1+ 2025    BLOODU Large (A) 2025    BLOODU 3+ 2025     Urine Culture:   Lab Results   Component Value Date    URINECX 4542-2653 cfu/ml 2025     PSA: "   Lab Results   Component Value Date    PSA 1.309 09/03/2024    PSA 1.0 02/16/2023         Assessment/ Plan:  Complicated UTI continue Rocephin  Cultures pending  If negative to the OR on Thursday for right ureteroscopy laser basket stent exchange  If cultures positive continue antibiotics and push ureteroscopy to February 6          Gregorio Collins MD

## 2025-01-28 NOTE — PLAN OF CARE
Patient alert and oriented no c/o pain at this time. Vitals monitored and medication administered as ordered. NS 0.45% running at 100 ml/hr. Call bell and cane within reach. Plan of care is ongoing.    Problem: PAIN - ADULT  Goal: Verbalizes/displays adequate comfort level or baseline comfort level  Description: Interventions:  - Encourage patient to monitor pain and request assistance  - Assess pain using appropriate pain scale  - Administer analgesics based on type and severity of pain and evaluate response  - Implement non-pharmacological measures as appropriate and evaluate response  - Consider cultural and social influences on pain and pain management  - Notify physician/advanced practitioner if interventions unsuccessful or patient reports new pain  Outcome: Progressing     Problem: INFECTION - ADULT  Goal: Absence or prevention of progression during hospitalization  Description: INTERVENTIONS:  - Assess and monitor for signs and symptoms of infection  - Monitor lab/diagnostic results  - Monitor all insertion sites, i.e. indwelling lines, tubes, and drains  - Monitor endotracheal if appropriate and nasal secretions for changes in amount and color  - [place] appropriate cooling/warming therapies per order  - Administer medications as ordered  - Instruct and encourage patient and family to use good hand hygiene technique  - Identify and instruct in appropriate isolation precautions for identified infection/condition  Outcome: Progressing  Goal: Absence of fever/infection during neutropenic period  Description: INTERVENTIONS:  - Monitor WBC    Outcome: Progressing     Problem: SAFETY ADULT  Goal: Patient will remain free of falls  Description: INTERVENTIONS:  - Educate patient/family on patient safety including physical limitations  - Instruct patient to call for assistance with activity   - Consult OT/PT to assist with strengthening/mobility   - Keep Call bell within reach  - Keep bed low and locked with side  rails adjusted as appropriate  - Keep care items and personal belongings within reach  - Initiate and maintain comfort rounds  - Make Fall Risk Sign visible to staff  - Apply yellow socks and bracelet for high fall risk patients  - Consider moving patient to room near nurses station  Outcome: Progressing  Goal: Maintain or return to baseline ADL function  Description: INTERVENTIONS:  -  Assess patient's ability to carry out ADLs; assess patient's baseline for ADL function and identify physical deficits which impact ability to perform ADLs (bathing, care of mouth/teeth, toileting, grooming, dressing, etc.)  - Assess/evaluate cause of self-care deficits   - Assess range of motion  - Assess patient's mobility; develop plan if impaired  - Assess patient's need for assistive devices and provide as appropriate  - Encourage maximum independence but intervene and supervise when necessary  - Involve family in performance of ADLs  - Assess for home care needs following discharge   - Consider OT consult to assist with ADL evaluation and planning for discharge  - Provide patient education as appropriate  Outcome: Progressing  Goal: Maintains/Returns to pre admission functional level  Description: INTERVENTIONS:  - Perform AM-PAC 6 Click Basic Mobility/ Daily Activity assessment daily.  - Set and communicate daily mobility goal to care team and patient/family/caregiver.   - Collaborate with rehabilitation services on mobility goals if consulted  - Out of bed for toileting  - Record patient progress and toleration of activity level   Outcome: Progressing     Problem: DISCHARGE PLANNING  Goal: Discharge to home or other facility with appropriate resources  Description: INTERVENTIONS:  - Identify barriers to discharge w/patient and caregiver  - Arrange for needed discharge resources and transportation as appropriate  - Identify discharge learning needs (meds, wound care, etc.)  - Arrange for interpretive services to assist at  discharge as needed  - Refer to Case Management Department for coordinating discharge planning if the patient needs post-hospital services based on physician/advanced practitioner order or complex needs related to functional status, cognitive ability, or social support system  Outcome: Progressing     Problem: Knowledge Deficit  Goal: Patient/family/caregiver demonstrates understanding of disease process, treatment plan, medications, and discharge instructions  Description: Complete learning assessment and assess knowledge base.  Interventions:  - Provide teaching at level of understanding  - Provide teaching via preferred learning methods  Outcome: Progressing

## 2025-01-29 ENCOUNTER — RESULTS FOLLOW-UP (OUTPATIENT)
Dept: EMERGENCY DEPT | Facility: HOSPITAL | Age: 55
End: 2025-01-29

## 2025-01-29 ENCOUNTER — TRANSITIONAL CARE MANAGEMENT (OUTPATIENT)
Age: 55
End: 2025-01-29

## 2025-01-29 VITALS
DIASTOLIC BLOOD PRESSURE: 72 MMHG | HEART RATE: 100 BPM | TEMPERATURE: 100.4 F | WEIGHT: 209 LBS | RESPIRATION RATE: 16 BRPM | HEIGHT: 70 IN | OXYGEN SATURATION: 95 % | BODY MASS INDEX: 29.92 KG/M2 | SYSTOLIC BLOOD PRESSURE: 117 MMHG

## 2025-01-29 LAB
ALBUMIN SERPL BCG-MCNC: 3.9 G/DL (ref 3.5–5)
ALP SERPL-CCNC: 48 U/L (ref 34–104)
ALT SERPL W P-5'-P-CCNC: 27 U/L (ref 7–52)
ANION GAP SERPL CALCULATED.3IONS-SCNC: 9 MMOL/L (ref 4–13)
AST SERPL W P-5'-P-CCNC: 18 U/L (ref 13–39)
BACTERIA UR CULT: ABNORMAL
BASOPHILS # BLD AUTO: 0.03 THOUSANDS/ΜL (ref 0–0.1)
BASOPHILS NFR BLD AUTO: 0 % (ref 0–1)
BILIRUB SERPL-MCNC: 0.7 MG/DL (ref 0.2–1)
BUN SERPL-MCNC: 12 MG/DL (ref 5–25)
CALCIUM SERPL-MCNC: 8.7 MG/DL (ref 8.4–10.2)
CHLORIDE SERPL-SCNC: 109 MMOL/L (ref 96–108)
CO2 SERPL-SCNC: 19 MMOL/L (ref 21–32)
CREAT SERPL-MCNC: 1.2 MG/DL (ref 0.6–1.3)
EOSINOPHIL # BLD AUTO: 0.05 THOUSAND/ΜL (ref 0–0.61)
EOSINOPHIL NFR BLD AUTO: 1 % (ref 0–6)
ERYTHROCYTE [DISTWIDTH] IN BLOOD BY AUTOMATED COUNT: 16 % (ref 11.6–15.1)
GFR SERPL CREATININE-BSD FRML MDRD: 69 ML/MIN/1.73SQ M
GLUCOSE SERPL-MCNC: 101 MG/DL (ref 65–140)
HCT VFR BLD AUTO: 32.9 % (ref 36.5–49.3)
HGB BLD-MCNC: 10.1 G/DL (ref 12–17)
IMM GRANULOCYTES # BLD AUTO: 0.03 THOUSAND/UL (ref 0–0.2)
IMM GRANULOCYTES NFR BLD AUTO: 0 % (ref 0–2)
LYMPHOCYTES # BLD AUTO: 0.69 THOUSANDS/ΜL (ref 0.6–4.47)
LYMPHOCYTES NFR BLD AUTO: 10 % (ref 14–44)
MCH RBC QN AUTO: 22.6 PG (ref 26.8–34.3)
MCHC RBC AUTO-ENTMCNC: 30.7 G/DL (ref 31.4–37.4)
MCV RBC AUTO: 74 FL (ref 82–98)
MONOCYTES # BLD AUTO: 0.62 THOUSAND/ΜL (ref 0.17–1.22)
MONOCYTES NFR BLD AUTO: 9 % (ref 4–12)
NEUTROPHILS # BLD AUTO: 5.71 THOUSANDS/ΜL (ref 1.85–7.62)
NEUTS SEG NFR BLD AUTO: 80 % (ref 43–75)
NRBC BLD AUTO-RTO: 0 /100 WBCS
PLATELET # BLD AUTO: 187 THOUSANDS/UL (ref 149–390)
PMV BLD AUTO: 12.2 FL (ref 8.9–12.7)
POTASSIUM SERPL-SCNC: 3.7 MMOL/L (ref 3.5–5.3)
PROT SERPL-MCNC: 6.4 G/DL (ref 6.4–8.4)
RBC # BLD AUTO: 4.46 MILLION/UL (ref 3.88–5.62)
SODIUM SERPL-SCNC: 137 MMOL/L (ref 135–147)
WBC # BLD AUTO: 7.13 THOUSAND/UL (ref 4.31–10.16)

## 2025-01-29 PROCEDURE — 85025 COMPLETE CBC W/AUTO DIFF WBC: CPT

## 2025-01-29 PROCEDURE — 80053 COMPREHEN METABOLIC PANEL: CPT

## 2025-01-29 PROCEDURE — 99239 HOSP IP/OBS DSCHRG MGMT >30: CPT | Performed by: INTERNAL MEDICINE

## 2025-01-29 RX ORDER — ACETAMINOPHEN 325 MG/1
650 TABLET ORAL EVERY 6 HOURS PRN
Qty: 15 TABLET | Refills: 0 | Status: SHIPPED | OUTPATIENT
Start: 2025-01-29

## 2025-01-29 RX ORDER — AMOXICILLIN 500 MG/1
500 CAPSULE ORAL EVERY 12 HOURS SCHEDULED
Qty: 13 CAPSULE | Refills: 0 | Status: SHIPPED | OUTPATIENT
Start: 2025-01-29 | End: 2025-02-06

## 2025-01-29 RX ORDER — ONDANSETRON 4 MG/1
4 TABLET, ORALLY DISINTEGRATING ORAL EVERY 8 HOURS PRN
Qty: 21 TABLET | Refills: 0 | Status: SHIPPED | OUTPATIENT
Start: 2025-01-29 | End: 2025-02-05

## 2025-01-29 RX ORDER — POTASSIUM CHLORIDE 1500 MG/1
20 TABLET, EXTENDED RELEASE ORAL ONCE
Status: COMPLETED | OUTPATIENT
Start: 2025-01-29 | End: 2025-01-29

## 2025-01-29 RX ADMIN — Medication 5000 UNITS: at 09:19

## 2025-01-29 RX ADMIN — POTASSIUM CHLORIDE 20 MEQ: 1500 TABLET, EXTENDED RELEASE ORAL at 06:55

## 2025-01-29 RX ADMIN — OMEGA-3 FATTY ACIDS CAP 1000 MG 1000 MG: 1000 CAP at 09:19

## 2025-01-29 RX ADMIN — FAMOTIDINE 40 MG: 20 TABLET, FILM COATED ORAL at 09:19

## 2025-01-29 RX ADMIN — Medication 1 TABLET: at 09:19

## 2025-01-29 RX ADMIN — AMPICILLIN SODIUM 1000 MG: 1 INJECTION, POWDER, FOR SOLUTION INTRAMUSCULAR; INTRAVENOUS at 09:56

## 2025-01-29 NOTE — DISCHARGE INSTR - AVS FIRST PAGE
1.Please take Amoxicillin 500 mg every 12 hours starting  from 1800 1/29/25 - 2/5/25    2.Take zofran 4mg oral for nausea as needed.    3. Advised to return for procedure in 7 days.     4.Advised to f/u with urology for appointment on 1/31/25.

## 2025-01-29 NOTE — DISCHARGE SUMMARY
Discharge Summary - Internal Medicine   Name: Gregorio Linda 54 y.o. male I MRN: [id number]  Unit/Bed#: [address] [address]02 I Date of Admission: 1/27/2025   Date of Service: 1/29/2025 I Hospital Day: 1    Medical Problems       Resolved Problems  Date Reviewed: 1/27/2025   None       Discharging Physician / Practitioner: Sherry Gibson MD  PCP: Kirsty Schmidt DO  Admission Date:   Admission Orders (From admission, onward)       Ordered        01/28/25 1516  INPATIENT ADMISSION  Once            01/27/25 1752  Place in Observation  Once                          Discharge Date: 01/29/25    Consultations During Hospital Stay:  Urology    Procedures Performed:   none    Significant Findings / Test Results:   1/29- K 3.7  1/28: Hgb 10.3, WBC 7.69  1/27: WBC 8.56, Hgb 11.5  UA: large leuks, neg nitrite, large occult blood   Ucx: Enterococcus faecalis  XR KUB: Right ureteral stent in place.  No calculi identified.  Nonobstructive bowel gas pattern.    Incidental Findings:    none    Test Results Pending at Discharge (will require follow up):   Culture Sensitivities     Outpatient Tests Requested:  UA, Electrolytes    Complications:  none    Reason for Admission: Abnormal UA,     History of Present Illness:     Gregorio Lnida is a 54 y.o. male with a pmhx of cerebral palsy, HLD, GERD, h/o renal stones who presents with hematuria from PCP's office. Since discharge from the hospital on 1/17/25 with 9 x 13 mm calculi in UVJ s/p cystoscopy and stent placement, he has continued to have dark red hematuria which has now recently involved the following symptoms: dysuria, chills, flank pain that will shoot to 8/10 with urination and then decrease to 2/10 when finished, suprapubic pressure and urgency/frequency. He has been taking Flomax 0.4 mg since hospital discharge and is compliant with his other chronic home medications. Denies fever, h/a, CP, SOB, N/V/D, and has been able to tolerate regular diet with consistent appetite.      Denies alcohol, smoking, recreational drug use. Ambulates with cane.  Hospital Course:   Gregorio Linda is a 54 y.o. male patient was admitted for abnormal urinalysis- suspectec complicated Uti versus asymptomatic bacteriuria. Patient was initially on IV ceftriaxone. Urin cultures showed positive for enterococcus faecalis and was later transitioned to IV ampicillin then on amoxicillin oral on discharge. Patient has a plan for cystoscopy with ureteroscopy on 2/6/25 as patient will be scheduled for Thursday procedure. Patient is stable for discharge on oral antibiotics along with f/u readdmission in 7 days for urology procedure.     Hospital Course: No notes on file    Please see above list of diagnoses and related plan for additional information.    Abnormal urinalysis  Assessment & Plan  Presents for teresa hematuria, UA that was positive for leukocytes and nitrites in PCP's office but only leukocytes and protein in ED. Endorses chills, suprapubic pressure, flank pain, dysuria, and increased urgency/frequency. He was recently discharged from the hospital on 1/17/25 with a 0.9 x 1.3 cm calculi in the UPJ s/p cystoscopy and stent placement which was supposed to be followed up on outpatient basis by Urology.   1/27 KUB :Right ureteral stent in place. No calculi identified. Nonobstructive bowel gas pattern.   Ucx- >100 E.faecalis     Urology consult ordered, much appreciation for recommendations  Patient scheduled for elective left ureteroscopy stone extraction 2/6/25  IV rocephin (1/27/25 -1/29/25)  One dose of IV Ampicillin on 1/29/25  Discharged on oral Amoxicillin 500 mg Q12 x 13 doses starting from 1/29/25 1800 to2/5/25  SIRS (systemic inflammatory response syndrome) (HCC)  Assessment & Plan  POA tachycardia and tachypnea. Presenting for complicated UTI and currently pending urine culture and KUB.    Rest of plan per Abnormal UA    Obstruction of ureteropelvic junction (UPJ) due to stone  Assessment &  "Plan  Previously admitted on 1/14/25 for 9 mm x 13 mm obstructing calculi right UVJ and had cystoscopy and stent placement performed 1/16/25. He was then discharged on 1/17/25 and was scheduled for outpatient Urology f/u on 1/31 with plan for ureteroscopy lithotripsy stone extraction on 2/6/25. He was seen by PCP on 1/27/25 with hematuria and other urinary symptoms which warranted evaluation in the ED.    Rest of plan per Abnormal UA  *  Hyperlipidemia  Assessment & Plan  Chronic, last lipid panel showed elevated . Home regimen: fish oil 1,000 mg.    Continue home regimen.      GERD (gastroesophageal reflux disease)  Assessment & Plan  Chronic, well controlled.    Continue home medication famotidine 40 mg.    Cerebral palsy (HCC)  Assessment & Plan  Chronic, not on medications, walks with cane to ambulate. Home regimen: PRN Baclofen      Condition at Discharge: good    Discharge Day Visit / Exam:   Subjective:  Patient is seen and examined at bedside. No complaints  Vitals: Blood Pressure: 103/63 (01/28/25 2300)  Pulse: 97 (01/28/25 2300)  Temperature: 99.5 °F (37.5 °C) (01/28/25 2300)  Temp Source: Temporal (01/28/25 2300)  Respirations: 18 (01/28/25 2300)  Height: 5' 10\" (177.8 cm) (01/27/25 2143)  Weight - Scale: 94.8 kg (209 lb) (01/27/25 2143)  SpO2: 98 % (01/28/25 1900)  Physical Exam  Vitals and nursing note reviewed.   Constitutional:       General: He is not in acute distress.     Appearance: He is well-developed.   HENT:      Head: Normocephalic and atraumatic.   Eyes:      Conjunctiva/sclera: Conjunctivae normal.   Cardiovascular:      Rate and Rhythm: Normal rate and regular rhythm.      Heart sounds: No murmur heard.  Pulmonary:      Effort: Pulmonary effort is normal. No respiratory distress.      Breath sounds: Normal breath sounds.   Abdominal:      Palpations: Abdomen is soft.      Tenderness: There is abdominal tenderness.   Musculoskeletal:         General: No swelling.      Cervical back: " Neck supple.   Skin:     General: Skin is warm and dry.      Capillary Refill: Capillary refill takes less than 2 seconds.   Neurological:      Mental Status: He is alert.   Psychiatric:         Mood and Affect: Mood normal.          Discussion with Family: Patient declined call to .     Discharge instructions/Information to patient and family:   See after visit summary for information provided to patient and family.      Provisions for Follow-Up Care:  See after visit summary for information related to follow-up care and any pertinent home health orders.      Mobility at time of Discharge:   Basic Mobility Inpatient Raw Score: 21  JH-HLM Goal: 6: Walk 10 steps or more  JH-HLM Achieved: 8: Walk 250 feet ot more  HLM Goal achieved. Continue to encourage appropriate mobility.     Disposition:   Home    Planned Readmission: 2/5/25    Discharge Medications:  See after visit summary for reconciled discharge medications provided to patient and/or family.      Administrative Statements   Discharge Statement:  I have spent a total time of >45 minutes in caring for this patient on the day of the visit/encounter. .    **Please Note: This note may have been constructed using a voice recognition system**

## 2025-01-29 NOTE — PROGRESS NOTES
Progress Note - Family Medicine   Name: Gregorio Linda 54 y.o. male I MRN: [id number]  Unit/Bed#: [address] [address]02 I Date of Admission: 1/27/2025   Date of Service: 1/29/2025 I Hospital Day: 1   { ?Quick Links I Problem List I INDIRA STONE Billing Tip:19737}  Assessment & Plan  Abnormal urinalysis  Presents for teresa hematuria, UA that was positive for leukocytes and nitrites in PCP's office but only leukocytes and protein in ED. Endorses chills, suprapubic pressure, flank pain, dysuria, and increased urgency/frequency. He was recently discharged from the hospital on 1/17/25 with a 0.9 x 1.3 cm calculi in the UPJ s/p cystoscopy and stent placement which was supposed to be followed up on outpatient basis by Urology.   1/27 KUB :Right ureteral stent in place. No calculi identified. Nonobstructive bowel gas pattern.   Ucx- >100 E.faecalis     Urology consult ordered, much appreciation for recommendations  Patient scheduled for elective left ureteroscopy stone extraction 2/7/25  If culture negative will take to OR this Thursday for ureteroscopy  Holding Flomax   Urinary retention protocol ordered  Monitor I/Os  IVF NS 75 cc/hr  IV rocephin (1/27/25 -)  Cerebral palsy (HCC)  Chronic, not on medications, walks with cane to ambulate. Home regimen: PRN Baclofen    Can re-order PRN as needed.  GERD (gastroesophageal reflux disease)  Chronic, well controlled.    Continue home medication famotidine 40 mg.  Hyperlipidemia  Chronic, last lipid panel showed elevated . Home regimen: fish oil 1,000 mg.    Continue home regimen.  Obstruction of ureteropelvic junction (UPJ) due to stone  Previously admitted on 1/14/25 for 9 mm x 13 mm obstructing calculi right UVJ and had cystoscopy and stent placement performed 1/16/25. He was then discharged on 1/17/25 and was scheduled for outpatient Urology f/u on 1/31 with plan for ureteroscopy lithotripsy stone extraction on 2/6/25. He was seen by PCP on 1/27/25 with hematuria and other urinary  symptoms which warranted evaluation in the ED.    Rest of plan per Complicated UTI  SIRS (systemic inflammatory response syndrome) (HCC)  POA tachycardia and tachypnea. Presenting for complicated UTI and currently pending urine culture and KUB.    Continue to monitor fever curve and labs  If fever present will order CT abd/pelvis non-contrast  Rest of plan per Complicated UTI    VTE Pharmacologic Prophylaxis: VTE Score: 2 Low Risk (Score 0-2) - Encourage Ambulation.    Mobility:   Basic Mobility Inpatient Raw Score: 21  JH-HLM Goal: 6: Walk 10 steps or more  JH-HLM Achieved: 8: Walk 250 feet ot more  JH-HLM Goal achieved. Continue to encourage appropriate mobility.    Patient Centered Rounds: I performed bedside rounds with nursing staff today.   Discussions with Specialists or Other Care Team Provider: Urology    Education and Discussions with Family / Patient: Patient declined call to .     Current Length of Stay: 1 day(s)  Current Patient Status: Inpatient   Certification Statement: The patient will continue to require additional inpatient hospital stay due to Suspected Complicated UTI  Discharge Plan: Anticipate discharge in 24-48 hrs to home.    Code Status: Level 1 - Full Code    Subjective   Patient is seen and examined at bedside. Pain is currently well controlled, no complaints.    Objective :{?Quick Links I ICU Summary I Vitals I I/Os I LDAs I Mobility (PT/OT) I Code Status / ACP   ?Quick Links I Active Meds I Pain Meds I Antibiotics I Anticoagulants:25561}  Temp:  [97.9 °F (36.6 °C)-99.5 °F (37.5 °C)] 99.5 °F (37.5 °C)  HR:  [] 97  BP: (103-121)/(63-78) 103/63  Resp:  [18-20] 18  SpO2:  [97 %-98 %] 98 %  O2 Device: None (Room air)    Body mass index is 29.99 kg/m².     Input and Output Summary (last 24 hours):     Intake/Output Summary (Last 24 hours) at 1/29/2025 0642  Last data filed at 1/29/2025 0401  Gross per 24 hour   Intake 240 ml   Output 100 ml   Net 140 ml       Physical  Exam  Vitals and nursing note reviewed.   Constitutional:       General: He is not in acute distress.     Appearance: He is well-developed.   HENT:      Head: Normocephalic and atraumatic.   Eyes:      Conjunctiva/sclera: Conjunctivae normal.   Cardiovascular:      Rate and Rhythm: Normal rate and regular rhythm.      Heart sounds: No murmur heard.  Pulmonary:      Effort: Pulmonary effort is normal. No respiratory distress.      Breath sounds: Normal breath sounds.   Abdominal:      Palpations: Abdomen is soft.      Tenderness: There is no abdominal tenderness.   Musculoskeletal:         General: No swelling.      Cervical back: Neck supple.   Skin:     General: Skin is warm and dry.      Capillary Refill: Capillary refill takes less than 2 seconds.   Neurological:      Mental Status: He is alert.   Psychiatric:         Mood and Affect: Mood normal.           Lines/Drains:      {?Quick Links I Lab Review I Micro Results I Radiology I Cardiology:57672}  Lab Results: I have reviewed the following results:   Results from last 7 days   Lab Units 01/29/25  0458   WBC Thousand/uL 7.13   HEMOGLOBIN g/dL 10.1*   HEMATOCRIT % 32.9*   PLATELETS Thousands/uL 187   SEGS PCT % 80*   LYMPHO PCT % 10*   MONO PCT % 9   EOS PCT % 1     Results from last 7 days   Lab Units 01/29/25  0458   SODIUM mmol/L 137   POTASSIUM mmol/L 3.7   CHLORIDE mmol/L 109*   CO2 mmol/L 19*   BUN mg/dL 12   CREATININE mg/dL 1.20   ANION GAP mmol/L 9   CALCIUM mg/dL 8.7   ALBUMIN g/dL 3.9   TOTAL BILIRUBIN mg/dL 0.70   ALK PHOS U/L 48   ALT U/L 27   AST U/L 18   GLUCOSE RANDOM mg/dL 101                       Recent Cultures (last 7 days):   Results from last 7 days   Lab Units 01/27/25  1707   URINE CULTURE  >100,000 cfu/ml Enterococcus faecalis*         Last 24 Hours Medication List:     Current Facility-Administered Medications:     acetaminophen (TYLENOL) tablet 650 mg, Q6H PRN    cefTRIAXone (ROCEPHIN) IVPB (premix in dextrose) 1,000 mg 50 mL, Q24H,  Last Rate: 1,000 mg (01/28/25 7394)    Cholecalciferol (VITAMIN D3) tablet 5,000 Units, Daily    famotidine (PEPCID) tablet 40 mg, BID    fish oil capsule 1,000 mg, Daily    multivitamin-minerals (CENTRUM) tablet 1 tablet, Daily    sodium chloride infusion 0.45 %, Continuous, Last Rate: 100 mL/hr (01/28/25 8535)    Administrative Statements   Today, Patient Was Seen By: Sherry Gibson MD      **Please Note: This note may have been constructed using a voice recognition system.**

## 2025-01-29 NOTE — PROGRESS NOTES
"Progress Note - Urology      Patient: Gregorio Linda   : 1970 Sex: male   MRN: [id number]     CSN: [id number]  Unit/Bed#: [address] [place] [address]     SUBJECTIVE:   Patient seen on morning rounds  Definite UTI to be discharged home on amoxicillin will be seen in the office next week for preop visit for left ureteroscopy stone extraction      Objective   Vitals: /72   Pulse 100   Temp 100.4 °F (38 °C) (Temporal)   Resp 16   Ht 5' 10\" (1.778 m)   Wt 94.8 kg (209 lb)   SpO2 95%   BMI 29.99 kg/m²     I/O last 24 hours:  In: 720 [P.O.:720]  Out: 201 [Urine:200; Emesis/NG output:1]      Physical Exam:   General Alert awake   Normocephalic atraumatic PERRLA  Lungs clear bilaterally  Cardiac normal S1 normal S2  Abdomen soft, flank pain  Extremities no edema      Lab Results: CBC:   Lab Results   Component Value Date    WBC 7.13 2025    HGB 10.1 (L) 2025    HCT 32.9 (L) 2025    MCV 74 (L) 2025     2025    RBC 4.46 2025    MCH 22.6 (L) 2025    MCHC 30.7 (L) 2025    RDW 16.0 (H) 2025    MPV 12.2 2025    NRBC 0 2025     CMP:   Lab Results   Component Value Date     (H) 2025    CO2 19 (L) 2025    BUN 12 2025    CREATININE 1.20 2025    CALCIUM 8.7 2025    AST 18 2025    ALT 27 2025    ALKPHOS 48 2025    EGFR 69 2025     Urinalysis:   Lab Results   Component Value Date    COLORU Dark Red 2025    CLARITYU Turbid 2025    SPECGRAV >=1.030 2025    PHUR 6.0 2025    PHUR 5.5 02/15/2018    LEUKOCYTESUR Large (A) 2025    LEUKOCYTESUR 3+ 2025    NITRITE Negative 2025    NITRITE + 2025    GLUCOSEU Negative 2025    GLUCOSEU neg 2025    KETONESU Trace (A) 2025    KETONESU + 2025    BILIRUBINUR Negative 2025    BILIRUBINUR 1+ 2025    BLOODU Large (A) 2025    BLOODU 3+ 2025     Urine Culture:   Lab Results "   Component Value Date    URINECX >100,000 cfu/ml Enterococcus faecalis (A) 01/27/2025     PSA:   Lab Results   Component Value Date    PSA 1.309 09/03/2024    PSA 1.0 02/16/2023         Assessment/ Plan:  Complicated UTI  [place] home will keep on schedule for left ureteroscopy stone extraction February 6          Gregorio Collins MD

## 2025-01-29 NOTE — NURSING NOTE
AVS reviewed, patient verbalized understanding. Patient wheeled down to lobby with PCA and belongings for discharge to home.

## 2025-01-29 NOTE — ASSESSMENT & PLAN NOTE
Presents for teresa hematuria, UA that was positive for leukocytes and nitrites in PCP's office but only leukocytes and protein in ED. Endorses chills, suprapubic pressure, flank pain, dysuria, and increased urgency/frequency. He was recently discharged from the hospital on 1/17/25 with a 0.9 x 1.3 cm calculi in the UPJ s/p cystoscopy and stent placement which was supposed to be followed up on outpatient basis by Urology.   1/27 KUB :Right ureteral stent in place. No calculi identified. Nonobstructive bowel gas pattern.   Ucx- >100 E.faecalis     Urology consult ordered, much appreciation for recommendations  Patient scheduled for elective left ureteroscopy stone extraction 2/7/25  If culture negative will take to OR this Thursday for ureteroscopy  Holding Flomax   Urinary retention protocol ordered  Monitor I/Os  IVF NS 75 cc/hr  IV rocephin (1/27/25 -)

## 2025-01-29 NOTE — PLAN OF CARE
Problem: PAIN - ADULT  Goal: Verbalizes/displays adequate comfort level or baseline comfort level  Description: Interventions:  - Encourage patient to monitor pain and request assistance  - Assess pain using appropriate pain scale  - Administer analgesics based on type and severity of pain and evaluate response  - Implement non-pharmacological measures as appropriate and evaluate response  - Consider cultural and social influences on pain and pain management  - Notify physician/advanced practitioner if interventions unsuccessful or patient reports new pain  Outcome: Progressing     Problem: INFECTION - ADULT  Goal: Absence or prevention of progression during hospitalization  Description: INTERVENTIONS:  - Assess and monitor for signs and symptoms of infection  - Monitor lab/diagnostic results  - Monitor all insertion sites, i.e. indwelling lines, tubes, and drains  - Monitor endotracheal if appropriate and nasal secretions for changes in amount and color  - [place] appropriate cooling/warming therapies per order  - Administer medications as ordered  - Instruct and encourage patient and family to use good hand hygiene technique  - Identify and instruct in appropriate isolation precautions for identified infection/condition  Outcome: Progressing  Goal: Absence of fever/infection during neutropenic period  Description: INTERVENTIONS:  - Monitor WBC    Outcome: Progressing     Problem: SAFETY ADULT  Goal: Patient will remain free of falls  Description: INTERVENTIONS:  - Educate patient/family on patient safety including physical limitations  - Instruct patient to call for assistance with activity   - Consult OT/PT to assist with strengthening/mobility   - Keep Call bell within reach  - Keep bed low and locked with side rails adjusted as appropriate  - Keep care items and personal belongings within reach  - Initiate and maintain comfort rounds  - Make Fall Risk Sign visible to staff  - Offer Toileting every 2 Hours,  in advance of need  - Initiate/Maintain bed alarm  - Obtain necessary fall risk management equipment: yes  - Apply yellow socks and bracelet for high fall risk patients  - Consider moving patient to room near nurses station  Outcome: Progressing  Goal: Maintain or return to baseline ADL function  Description: INTERVENTIONS:  -  Assess patient's ability to carry out ADLs; assess patient's baseline for ADL function and identify physical deficits which impact ability to perform ADLs (bathing, care of mouth/teeth, toileting, grooming, dressing, etc.)  - Assess/evaluate cause of self-care deficits   - Assess range of motion  - Assess patient's mobility; develop plan if impaired  - Assess patient's need for assistive devices and provide as appropriate  - Encourage maximum independence but intervene and supervise when necessary  - Involve family in performance of ADLs  - Assess for home care needs following discharge   - Consider OT consult to assist with ADL evaluation and planning for discharge  - Provide patient education as appropriate  Outcome: Progressing  Goal: Maintains/Returns to pre admission functional level  Description: INTERVENTIONS:  - Perform AM-PAC 6 Click Basic Mobility/ Daily Activity assessment daily.  - Set and communicate daily mobility goal to care team and patient/family/caregiver.   - Collaborate with rehabilitation services on mobility goals if consulted  - Perform Range of Motion 3 times a day.  - Reposition patient every 2 hours.  - Dangle patient 3 times a day  - Stand patient 3 times a day  - Ambulate patient 3 times a day  - Out of bed to chair 3 times a day   - Out of bed for meals 3 times a day  - Out of bed for toileting  - Record patient progress and toleration of activity level   Outcome: Progressing     Problem: DISCHARGE PLANNING  Goal: Discharge to home or other facility with appropriate resources  Description: INTERVENTIONS:  - Identify barriers to discharge w/patient and  caregiver  - Arrange for needed discharge resources and transportation as appropriate  - Identify discharge learning needs (meds, wound care, etc.)  - Arrange for interpretive services to assist at discharge as needed  - Refer to Case Management Department for coordinating discharge planning if the patient needs post-hospital services based on physician/advanced practitioner order or complex needs related to functional status, cognitive ability, or social support system  Outcome: Progressing     Problem: Knowledge Deficit  Goal: Patient/family/caregiver demonstrates understanding of disease process, treatment plan, medications, and discharge instructions  Description: Complete learning assessment and assess knowledge base.  Interventions:  - Provide teaching at level of understanding  - Provide teaching via preferred learning methods  Outcome: Progressing

## 2025-02-03 RX ORDER — OXYCODONE AND ACETAMINOPHEN 5; 325 MG/1; MG/1
TABLET ORAL
COMMUNITY
Start: 2025-01-17

## 2025-02-03 NOTE — PRE-PROCEDURE INSTRUCTIONS
Pre-Surgery Instructions:   Medication Instructions    acetaminophen (TYLENOL) 325 mg tablet Uses PRN- OK to take day of surgery    amoxicillin (AMOXIL) 500 mg capsule Instructions provided by MD    baclofen 10 mg tablet Uses PRN- OK to take day of surgery    Cholecalciferol (VITAMIN D3) 1,000 units tablet Stop taking 3 days prior to surgery.    famotidine (PEPCID) 40 MG tablet Take day of surgery.    mineral oil (Fleet Laxative Mineral Oil) liquid Hold day of surgery.    Multiple Vitamins-Minerals (CENTRUM ADULTS PO) Stop taking 3 days prior to surgery.    Omega-3 Fatty Acids (fish oil) 1,000 mg Stop taking 3 days prior to surgery.    ondansetron (ZOFRAN-ODT) 4 mg disintegrating tablet Uses PRN- OK to take day of surgery    oxyCODONE-acetaminophen (PERCOCET) 5-325 mg per tablet Uses PRN- OK to take day of surgery    tamsulosin (FLOMAX) 0.4 mg Take night before surgery   Medication instructions for day surgery reviewed. Please use only a sip of water to take your instructed medications. Avoid all over the counter vitamins, supplements and NSAIDS for one week prior to surgery per anesthesia guidelines. Tylenol is ok to take as needed.     You will receive a call one business day prior to surgery with an arrival time and hospital directions. If your surgery is scheduled on a Monday, the hospital will be calling you on the Friday prior to your surgery. If you have not heard from anyone by 8pm, please call the hospital supervisor through the hospital  at [phone]. ([place] [phone] or [place] [phone]).    Do not eat or drink anything after midnight the night before your surgery, including candy, mints, lifesavers, or chewing gum. Do not drink alcohol 24hrs before your surgery. Try not to smoke at least 24hrs before your surgery.       Follow the pre surgery showering instructions as listed in the “My Surgical Experience Booklet” or otherwise provided by your surgeon's office. Do not use a blade to  shave the surgical area 1 week before surgery. It is okay to use a clean electric clippers up to 24 hours before surgery. Do not apply any lotions, creams, including makeup, cologne, deodorant, or perfumes after showering on the day of your surgery. Do not use dry shampoo, hair spray, hair gel, or any type of hair products.     No contact lenses, eye make-up, or artificial eyelashes. Remove nail polish, including gel polish, and any artificial, gel, or acrylic nails if possible. Remove all jewelry including rings and body piercing jewelry.     Wear causal clothing that is easy to take on and off. Consider your type of surgery.    Keep any valuables, jewelry, piercings at home. Please bring any specially ordered equipment (sling, braces) if indicated.    Arrange for a responsible person to drive you to and from the hospital on the day of your surgery. Please confirm the visitor policy for the day of your procedure when you receive your phone call with an arrival time.     Call the surgeon's office with any new illnesses, exposures, or additional questions prior to surgery.    Please reference your “My Surgical Experience Booklet” for additional information to prepare for your upcoming surgery.    Pt. Verbalized an understanding of all instructions reviewed and offers no concerns at this time.

## 2025-02-05 ENCOUNTER — OFFICE VISIT (OUTPATIENT)
Age: 55
End: 2025-02-05

## 2025-02-05 VITALS
TEMPERATURE: 97 F | SYSTOLIC BLOOD PRESSURE: 124 MMHG | HEART RATE: 97 BPM | RESPIRATION RATE: 17 BRPM | DIASTOLIC BLOOD PRESSURE: 87 MMHG | OXYGEN SATURATION: 99 %

## 2025-02-05 DIAGNOSIS — Z09 HOSPITAL DISCHARGE FOLLOW-UP: Primary | ICD-10-CM

## 2025-02-05 DIAGNOSIS — N39.0 COMPLICATED UTI (URINARY TRACT INFECTION): ICD-10-CM

## 2025-02-05 DIAGNOSIS — N20.0 NEPHROLITHIASIS: ICD-10-CM

## 2025-02-05 DIAGNOSIS — N20.1 OBSTRUCTION OF URETEROPELVIC JUNCTION (UPJ) DUE TO STONE: ICD-10-CM

## 2025-02-05 PROCEDURE — 99496 TRANSJ CARE MGMT HIGH F2F 7D: CPT | Performed by: FAMILY MEDICINE

## 2025-02-05 NOTE — H&P
H&P Exam - Urology       Patient: Gregorio Linda   : 1970 Sex: male   MRN: [id number]     CSN: [id number]      History of Present Illness   HPI:  Gregorio Linda is a 54 y.o. male who presents with 12 mm right ureteropelvic junction stone undergoing urgent cystoscopy right stent ureteroscopy stone manipulation presenting to [facility] last week with complicated UTI being discharged on antibiotics seen in the office early this week urine analysis unremarkable now to undergo definitive cystoscopy right ureteroscopy laser basket stent aware risk of anesthesia infection bleeding additional urologic procedures in light of size of stone postop stenting.        Review of Systems:   Constitutional:  Negative for activity change, fever, chills and diaphoresis.   HENT: Negative for hearing loss and trouble swallowing.   Eyes: Negative for itching and visual disturbance.   Respiratory: Negative for chest tightness and shortness of breath.   Cardiovascular: Negative for chest pain, edema.   Gastrointestinal: Negative for abdominal distention, na abdominal pain, constipation, diarrhea, Nausea and vomiting.   Genitourinary: Negative for decreased urine volume, difficulty urinating, dysuria, enuresis, frequency, hematuria and urgency.   Musculoskeletal: Negative for gait problem and myalgias.   Neurological: Negative for dizziness and headaches.   Hematological: Does not bruise/bleed easily.       Historical Information   Past Medical History:   Diagnosis Date    Anesthesia complication     woke up twice during anesthesia    Anxiety     Arthritis     Cerebral palsy (HCC)     GERD (gastroesophageal reflux disease)     H/O ETOH abuse     Kidney stone     Stomach ulcer      Past Surgical History:   Procedure Laterality Date    ESOPHAGOGASTRODUODENOSCOPY N/A 2016    Procedure: ESOPHAGOGASTRODUODENOSCOPY (EGD);  Surgeon: Tho Hernandez MD;  Location: [facility] GI LAB;  Service:     FL RETROGRADE PYELOGRAM  2025  "   FOOT SURGERY Bilateral     hardware    HIP SURGERY Right     ORIF    OTHER SURGICAL HISTORY Bilateral     lower extremity ligiment extensions-multiple    ID CYSTO/URETERO W/LITHOTRIPSY &INDWELL STENT INSRT Right 2025    Procedure: CYSTOSCOPY URETEROSCOPY, RETROGRADE PYELOGRAM AND INSERTION STENT URETERAL, STONE MANIPULATION;  Surgeon: Gregorio Collins MD;  Location: [facility] MAIN OR;  Service: Urology    ID SURGICAL ARTHROSCOPY SHOULDER W/ROTATOR CUFF RPR Right 2021    Procedure: SHOULDER ARTHROSCOPIC ROTATOR CUFF REPAIR, SUBACROMIAL DECOMPRESSION;  Surgeon: Ryan Araya MD;  Location: [facility] MAIN OR;  Service: Orthopedics     Social History   Social History     Substance and Sexual Activity   Alcohol Use Not Currently    Comment: 2015 quit     Social History     Substance and Sexual Activity   Drug Use Yes    Types: Marijuana    Comment: rare     Social History     Tobacco Use   Smoking Status Former    Current packs/day: 0.00    Average packs/day: 3.0 packs/day for 15.0 years (45.0 ttl pk-yrs)    Types: Cigarettes    Start date: 1979    Quit date: 1994    Years since quittin.0    Passive exposure: Past   Smokeless Tobacco Never     Family History:   Family History   Problem Relation Age of Onset    No Known Problems Mother     No Known Problems Father     Cancer Maternal Grandmother     Cancer Maternal Grandfather     Cancer Maternal Aunt        Meds/Allergies   No medications prior to admission.  Allergies   Allergen Reactions    Aspirin GI Bleeding and Vomiting     Other reaction(s): Unknown  Patient has ulcers and does not take aspirin        Objective   Vitals: Ht 5' 10\" (1.778 m)   Wt 94.8 kg (209 lb)   BMI 29.99 kg/m²     Physical Exam:  General Alert awake   Normocephalic atraumatic PERRLA  Lungs clear bilaterally  Cardiac normal S1 normal S2  Abdomen soft, flank pain  Extremities no edema    No intake/output data recorded.    Invasive Devices       Peripheral Intravenous " Line  Duration             Peripheral IV 01/13/25 Left Antecubital 22 days                        Lab Results: CBC:   Lab Results   Component Value Date    WBC 7.13 01/29/2025    HGB 10.1 (L) 01/29/2025    HCT 32.9 (L) 01/29/2025    MCV 74 (L) 01/29/2025     01/29/2025    RBC 4.46 01/29/2025    MCH 22.6 (L) 01/29/2025    MCHC 30.7 (L) 01/29/2025    RDW 16.0 (H) 01/29/2025    MPV 12.2 01/29/2025    NRBC 0 01/29/2025     CMP:   Lab Results   Component Value Date     (H) 01/29/2025    CO2 19 (L) 01/29/2025    BUN 12 01/29/2025    CREATININE 1.20 01/29/2025    CALCIUM 8.7 01/29/2025    AST 18 01/29/2025    ALT 27 01/29/2025    ALKPHOS 48 01/29/2025    EGFR 69 01/29/2025     Urinalysis:   Lab Results   Component Value Date    COLORU Dark Red 01/27/2025    CLARITYU Turbid 01/27/2025    SPECGRAV >=1.030 01/27/2025    PHUR 6.0 01/27/2025    PHUR 5.5 02/15/2018    LEUKOCYTESUR Large (A) 01/27/2025    LEUKOCYTESUR 3+ 01/27/2025    NITRITE Negative 01/27/2025    NITRITE + 01/27/2025    GLUCOSEU Negative 01/27/2025    GLUCOSEU neg 01/27/2025    KETONESU Trace (A) 01/27/2025    KETONESU + 01/27/2025    BILIRUBINUR Negative 01/27/2025    BILIRUBINUR 1+ 01/27/2025    BLOODU Large (A) 01/27/2025    BLOODU 3+ 01/27/2025     Urine Culture:   Lab Results   Component Value Date    URINECX >100,000 cfu/ml Enterococcus faecalis (A) 01/27/2025     PSA:   Lab Results   Component Value Date    PSA 1.309 09/03/2024    PSA 1.0 02/16/2023           Assessment/ Plan:  Right ureteroscopy laser stent exchange      Gregorio Collins MD

## 2025-02-05 NOTE — PROGRESS NOTES
Transition of Care Visit  Name: Gregorio Linda      : 1970      MRN: [id number]  Encounter Provider: Kirsty Schmidt DO  Encounter Date: 2025   Encounter department: [facility]    Assessment & Plan  Hospital discharge follow-up  Follow up after recent hospitalization/discharge from  to  for complicated UTI associated with right ureteral stent, sepsis secondary to complicated UTI, nephrolithiasis s/p right UVJ stent.   Was seen in office on  for TCM for prior hospitalization. At that time patient had gross hematuria, abnormal POCT urine dip, and mild CVA tenderness. Sent to ED for further evaluation and possible admission.   During hospitalization, patient's urine culture grew Enterococcus faecalis. Treated with IV ceftriaxone, then ampicillin, discharged on Amoxicillin BID for 1 week, last dose is today.   Also resumed his Flomax daily on discharge.   Patient reports he feels much better than last week. Still has some irritation/pain, but rates 1-2/10.     Continue medications as prescribed on discharge   Has outpatient cystoscopy with ureteroscopy with lithotripsy laser, retrograde pyelogram, stone basket extraction, and right stent exchange scheduled for 25 with Dr. Collins.        Complicated UTI (urinary tract infection)         Obstruction of ureteropelvic junction (UPJ) due to stone         Nephrolithiasis              History of Present Illness     Transitional Care Management Review:   Gregorio Linda is a 54 y.o. male here for TCM follow up.     During the TCM phone call patient stated:  TCM Call       Date and time call was made  2025  4:04 PM    Hospital care reviewed  Records reviewed    Patient was hospitial[facility] at  [facility]    Date of Admission  25    Date of discharge  25    Diagnosis  Abnormal urinalysis    Disposition  Home    Were the patients medications reviewed and updated  Yes    Current Symptoms  None           TCM Call       Post hospital issues  None    Should patient be enrolled in anticoag monitoring?  No    Scheduled for follow up?  Yes    Patients specialists  Urologist    Urologist name  Gregorio Collins    Did you obtain your prescribed medications  Yes    Do you need help managing your prescriptions or medications  No    Is transportation to your appointment needed  No    I have advised the patient to call PCP with any new or worsening symptoms  Joanna Angelo    Living Arrangements  Alone    Support System  None    Do you have social support  Yes, as much as I need    Are you recieving any outpatient services  No    Are you recieving home care services  No    Are you using any community resources  No    Current waiver services  No    Have you fallen in the last 12 months  No    Interperter language line needed  No    Counseling  Patient    Counseling topics  Prognosis; instructions for management; patient and family education    Comments  spoke to the pt. on 11/30/2022 for TCm care also remind him for sched appt on 12/12/2022 @ 8 AM with Dr. kwan          Patient presents to follow up after recent hospitalization/discharge from 1/27 to 1/29. He was seen for complicated UTI associated with right ureteral stent, sepsis secondary to complicated UTI, nephrolithiasis s/p right UVJ stent.   During hospitalization, patient's urine culture grew Enterococcus faecalis. He was treated with IV ceftriaxone, then ampicillin, discharged on Amoxicillin BID for 1 week, last dose is today. He also resumed his Flomax daily on discharge. He states that he feels much better than last week. Still has some irritation/pain, but rates 1-2/10.   Has outpatient cystoscopy with ureteroscopy with lithotripsy laser, retrograde pyelogram, stone basket extraction, and right stent exchange scheduled for 2/6/25 with Dr. Collins.       Review of Systems   Constitutional:  Negative for chills and fever.   Eyes:  Negative for pain and visual  disturbance.   Respiratory:  Negative for cough and shortness of breath.    Cardiovascular:  Negative for chest pain and palpitations.   Gastrointestinal:  Negative for abdominal pain and vomiting.   Genitourinary:  Positive for dysuria (mild, 2/10). Negative for hematuria.   Musculoskeletal:  Negative for arthralgias and back pain.   Skin:  Negative for color change and rash.   Neurological:  Negative for dizziness, light-headedness and headaches.   All other systems reviewed and are negative.    Objective   /87 (BP Location: Right arm, Patient Position: Sitting, Cuff Size: Large)   Pulse 97   Temp (!) 97 °F (36.1 °C)   Resp 17   SpO2 99%     Physical Exam  Constitutional:       General: He is not in acute distress.     Appearance: He is obese.   HENT:      Head: Normocephalic and atraumatic.      Mouth/Throat:      Mouth: Mucous membranes are moist.   Eyes:      General: No scleral icterus.     Extraocular Movements: Extraocular movements intact.      Conjunctiva/sclera: Conjunctivae normal.   Cardiovascular:      Rate and Rhythm: Normal rate and regular rhythm.      Heart sounds: Normal heart sounds. No murmur heard.  Pulmonary:      Effort: Pulmonary effort is normal.      Breath sounds: Normal breath sounds.   Abdominal:      General: Abdomen is flat. Bowel sounds are normal.      Palpations: Abdomen is soft. There is no mass.      Tenderness: There is no abdominal tenderness. There is no right CVA tenderness or left CVA tenderness.   Musculoskeletal:      Cervical back: Normal range of motion and neck supple.   Skin:     General: Skin is warm.   Neurological:      General: No focal deficit present.      Mental Status: He is alert and oriented to person, place, and time.      Gait: Gait abnormal (chronic, has CP).   Psychiatric:         Mood and Affect: Mood normal.         Behavior: Behavior normal.       Medications have been reviewed by provider in current encounter

## 2025-02-06 ENCOUNTER — APPOINTMENT (OUTPATIENT)
Dept: RADIOLOGY | Facility: HOSPITAL | Age: 55
End: 2025-02-06
Payer: MEDICARE

## 2025-02-06 ENCOUNTER — HOSPITAL ENCOUNTER (OUTPATIENT)
Facility: HOSPITAL | Age: 55
Setting detail: OUTPATIENT SURGERY
Discharge: HOME | End: 2025-02-06
Attending: SPECIALIST | Admitting: SPECIALIST
Payer: MEDICARE

## 2025-02-06 ENCOUNTER — ANESTHESIA (OUTPATIENT)
Dept: PERIOP | Facility: HOSPITAL | Age: 55
End: 2025-02-06
Payer: MEDICARE

## 2025-02-06 ENCOUNTER — ANESTHESIA EVENT (OUTPATIENT)
Dept: PERIOP | Facility: HOSPITAL | Age: 55
End: 2025-02-06
Payer: MEDICARE

## 2025-02-06 VITALS
HEART RATE: 96 BPM | TEMPERATURE: 97.8 F | HEIGHT: 70 IN | DIASTOLIC BLOOD PRESSURE: 84 MMHG | RESPIRATION RATE: 16 BRPM | SYSTOLIC BLOOD PRESSURE: 124 MMHG | WEIGHT: 214.29 LBS | OXYGEN SATURATION: 100 % | BODY MASS INDEX: 30.68 KG/M2

## 2025-02-06 DIAGNOSIS — N20.1 CALCULUS OF URETER: Primary | ICD-10-CM

## 2025-02-06 DIAGNOSIS — N20.1 URETEROLITHIASIS: ICD-10-CM

## 2025-02-06 DIAGNOSIS — N20.1 OBSTRUCTION OF URETEROPELVIC JUNCTION (UPJ) DUE TO STONE: ICD-10-CM

## 2025-02-06 DIAGNOSIS — N13.30 HYDRONEPHROSIS: ICD-10-CM

## 2025-02-06 PROCEDURE — C1747 URETEROSCOPE DIGITAL FLEX SNGL USE STD DEFLECTION APTRA: HCPCS | Performed by: SPECIALIST

## 2025-02-06 PROCEDURE — C1894 INTRO/SHEATH, NON-LASER: HCPCS | Performed by: SPECIALIST

## 2025-02-06 PROCEDURE — 82360 CALCULUS ASSAY QUANT: CPT | Performed by: SPECIALIST

## 2025-02-06 PROCEDURE — C1769 GUIDE WIRE: HCPCS | Performed by: SPECIALIST

## 2025-02-06 PROCEDURE — 74420 UROGRAPHY RTRGR +-KUB: CPT

## 2025-02-06 PROCEDURE — C2617 STENT, NON-COR, TEM W/O DEL: HCPCS | Performed by: SPECIALIST

## 2025-02-06 DEVICE — INLAY URETERAL STENT W/O GUIDEWIRE
Type: IMPLANTABLE DEVICE | Site: URETER | Status: FUNCTIONAL
Brand: BARD® INLAY® URETERAL STENT

## 2025-02-06 RX ORDER — ONDANSETRON 2 MG/ML
4 INJECTION INTRAMUSCULAR; INTRAVENOUS ONCE AS NEEDED
Status: DISCONTINUED | OUTPATIENT
Start: 2025-02-06 | End: 2025-02-06 | Stop reason: HOSPADM

## 2025-02-06 RX ORDER — SODIUM CHLORIDE, SODIUM LACTATE, POTASSIUM CHLORIDE, CALCIUM CHLORIDE 600; 310; 30; 20 MG/100ML; MG/100ML; MG/100ML; MG/100ML
INJECTION, SOLUTION INTRAVENOUS CONTINUOUS PRN
Status: DISCONTINUED | OUTPATIENT
Start: 2025-02-06 | End: 2025-02-06

## 2025-02-06 RX ORDER — CEFAZOLIN SODIUM 2 G/50ML
2000 SOLUTION INTRAVENOUS ONCE
Status: COMPLETED | OUTPATIENT
Start: 2025-02-06 | End: 2025-02-06

## 2025-02-06 RX ORDER — MEPERIDINE HYDROCHLORIDE 25 MG/ML
12.5 INJECTION INTRAMUSCULAR; INTRAVENOUS; SUBCUTANEOUS
Status: DISCONTINUED | OUTPATIENT
Start: 2025-02-06 | End: 2025-02-06 | Stop reason: HOSPADM

## 2025-02-06 RX ORDER — FENTANYL CITRATE 50 UG/ML
INJECTION, SOLUTION INTRAMUSCULAR; INTRAVENOUS AS NEEDED
Status: DISCONTINUED | OUTPATIENT
Start: 2025-02-06 | End: 2025-02-06

## 2025-02-06 RX ORDER — ONDANSETRON 2 MG/ML
INJECTION INTRAMUSCULAR; INTRAVENOUS AS NEEDED
Status: DISCONTINUED | OUTPATIENT
Start: 2025-02-06 | End: 2025-02-06

## 2025-02-06 RX ORDER — LIDOCAINE HYDROCHLORIDE 10 MG/ML
INJECTION, SOLUTION EPIDURAL; INFILTRATION; INTRACAUDAL; PERINEURAL AS NEEDED
Status: DISCONTINUED | OUTPATIENT
Start: 2025-02-06 | End: 2025-02-06

## 2025-02-06 RX ORDER — PROPOFOL 10 MG/ML
INJECTION, EMULSION INTRAVENOUS AS NEEDED
Status: DISCONTINUED | OUTPATIENT
Start: 2025-02-06 | End: 2025-02-06

## 2025-02-06 RX ORDER — DEXAMETHASONE SODIUM PHOSPHATE 10 MG/ML
INJECTION, SOLUTION INTRAMUSCULAR; INTRAVENOUS AS NEEDED
Status: DISCONTINUED | OUTPATIENT
Start: 2025-02-06 | End: 2025-02-06

## 2025-02-06 RX ORDER — FENTANYL CITRATE/PF 50 MCG/ML
25 SYRINGE (ML) INJECTION
Status: DISCONTINUED | OUTPATIENT
Start: 2025-02-06 | End: 2025-02-06 | Stop reason: HOSPADM

## 2025-02-06 RX ORDER — MIDAZOLAM HYDROCHLORIDE 2 MG/2ML
INJECTION, SOLUTION INTRAMUSCULAR; INTRAVENOUS AS NEEDED
Status: DISCONTINUED | OUTPATIENT
Start: 2025-02-06 | End: 2025-02-06

## 2025-02-06 RX ORDER — HYDROMORPHONE HCL IN WATER/PF 6 MG/30 ML
0.2 PATIENT CONTROLLED ANALGESIA SYRINGE INTRAVENOUS
Status: DISCONTINUED | OUTPATIENT
Start: 2025-02-06 | End: 2025-02-06 | Stop reason: HOSPADM

## 2025-02-06 RX ORDER — MAGNESIUM HYDROXIDE 1200 MG/15ML
LIQUID ORAL AS NEEDED
Status: DISCONTINUED | OUTPATIENT
Start: 2025-02-06 | End: 2025-02-06 | Stop reason: HOSPADM

## 2025-02-06 RX ADMIN — FENTANYL CITRATE 50 MCG: 50 INJECTION, SOLUTION INTRAMUSCULAR; INTRAVENOUS at 14:37

## 2025-02-06 RX ADMIN — MIDAZOLAM 2 MG: 1 INJECTION INTRAMUSCULAR; INTRAVENOUS at 14:01

## 2025-02-06 RX ADMIN — PROPOFOL 200 MG: 10 INJECTION, EMULSION INTRAVENOUS at 14:07

## 2025-02-06 RX ADMIN — FENTANYL CITRATE 50 MCG: 50 INJECTION, SOLUTION INTRAMUSCULAR; INTRAVENOUS at 14:15

## 2025-02-06 RX ADMIN — LIDOCAINE HYDROCHLORIDE 50 MG: 10 INJECTION, SOLUTION EPIDURAL; INFILTRATION; INTRACAUDAL; PERINEURAL at 14:07

## 2025-02-06 RX ADMIN — SODIUM CHLORIDE, SODIUM LACTATE, POTASSIUM CHLORIDE, AND CALCIUM CHLORIDE: .6; .31; .03; .02 INJECTION, SOLUTION INTRAVENOUS at 14:00

## 2025-02-06 RX ADMIN — ONDANSETRON 4 MG: 2 INJECTION INTRAMUSCULAR; INTRAVENOUS at 14:13

## 2025-02-06 RX ADMIN — FENTANYL CITRATE 50 MCG: 50 INJECTION, SOLUTION INTRAMUSCULAR; INTRAVENOUS at 14:09

## 2025-02-06 RX ADMIN — DEXAMETHASONE SODIUM PHOSPHATE 10 MG: 10 INJECTION, SOLUTION INTRAMUSCULAR; INTRAVENOUS at 14:15

## 2025-02-06 RX ADMIN — CEFAZOLIN SODIUM 2000 MG: 2 SOLUTION INTRAVENOUS at 14:05

## 2025-02-06 RX ADMIN — FENTANYL CITRATE 50 MCG: 50 INJECTION, SOLUTION INTRAMUSCULAR; INTRAVENOUS at 14:33

## 2025-02-06 NOTE — PROGRESS NOTES
"Progress Note - Urology      Patient: Gregorio Linda   : 1970 Sex: male   MRN: [id number]     CSN: [id number]  Unit/Bed#: OR POOL     SUBJECTIVE:   Patient in surgical preop unit  No change history physical finishing course of antibiotics for complicated UTI yesterday  Aware risk of anesthesia infection bleeding additional phases      Objective   Vitals: /84   Pulse 91   Temp 99.9 °F (37.7 °C) (Temporal)   Resp 18   Ht 5' 10\" (1.778 m)   Wt 97.2 kg (214 lb 4.6 oz)   SpO2 98%   BMI 30.75 kg/m²     No intake/output data recorded.      Physical Exam:   General Alert awake   Normocephalic atraumatic PERRLA  Lungs clear bilaterally  Cardiac normal S1 normal S2  Abdomen soft, flank pain  Extremities no edema      Lab Results: CBC:   Lab Results   Component Value Date    WBC 7.13 2025    HGB 10.1 (L) 2025    HCT 32.9 (L) 2025    MCV 74 (L) 2025     2025    RBC 4.46 2025    MCH 22.6 (L) 2025    MCHC 30.7 (L) 2025    RDW 16.0 (H) 2025    MPV 12.2 2025    NRBC 0 2025     CMP:   Lab Results   Component Value Date     (H) 2025    CO2 19 (L) 2025    BUN 12 2025    CREATININE 1.20 2025    CALCIUM 8.7 2025    AST 18 2025    ALT 27 2025    ALKPHOS 48 2025    EGFR 69 2025     Urinalysis:   Lab Results   Component Value Date    COLORU Dark Red 2025    CLARITYU Turbid 2025    SPECGRAV >=1.030 2025    PHUR 6.0 2025    PHUR 5.5 02/15/2018    LEUKOCYTESUR Large (A) 2025    LEUKOCYTESUR 3+ 2025    NITRITE Negative 2025    NITRITE + 2025    GLUCOSEU Negative 2025    GLUCOSEU neg 2025    KETONESU Trace (A) 2025    KETONESU + 2025    BILIRUBINUR Negative 2025    BILIRUBINUR 1+ 2025    BLOODU Large (A) 2025    BLOODU 3+ 2025     Urine Culture:   Lab Results   Component Value Date    URINECX " >100,000 cfu/ml Enterococcus faecalis (A) 01/27/2025     PSA:   Lab Results   Component Value Date    PSA 1.309 09/03/2024    PSA 1.0 02/16/2023         Assessment/ Plan:  Cystoscopy right flexible ureteroscopy laser stent exchange          Gregorio Collins MD

## 2025-02-06 NOTE — OP NOTE
OPERATIVE REPORT  PATIENT NAME: Gregorio Linda    :  1970  MRN: [id number]  Pt Location: [facility] OR ROOM 04    SURGERY DATE: 2025    Surgeons and Role:     * Gregorio Collins MD - Primary    Preop Diagnosis:  Ureterolithiasis [N20.1]  Hydronephrosis [N13.30]  Obstruction of ureteropelvic junction (UPJ) due to stone [N20.1]    Post-Op Diagnosis Codes:     * Ureterolithiasis [N20.1]     * Hydronephrosis [N13.30]     * Obstruction of ureteropelvic junction (UPJ) due to stone [N20.1]    Procedure(s):  Right - CYSTOSCOPY.  URETEROSCOPY WITH LITHOTRIPSY HOLMIUM LASER. RETROGRADE PYELOGRAM. STONE BASKET EXTRACTION. AND EXCHANGE STENT URETERAL    Specimen(s):  ID Type Source Tests Collected by Time Destination   1 :  Calculus Kidney, Right STONE ANALYSIS, TISSUE EXAM Gregorio Collins MD 2025 [id number]        Estimated Blood Loss:   Minimal    Drains:  * No LDAs found *    Anesthesia Type:   General/LMA    Operative Indications:  Ureterolithiasis [N20.1]  Hydronephrosis [N13.30]  Obstruction of ureteropelvic junction (UPJ) due to stone [N20.1]  54-year-old male admitted few weeks ago with severe right flank pain found to have 12 mm right ureteropelvic proximal stone undergoing emergent cystoscopy right stent stone manipulation recently admitted last week for complicated UTI finishing antibiotics now to undergo cystoscopy flexible ureteroscopy stone extraction aware risk of anesthesia infection bleeding postop stenting additional procedures    Operative Findings:  12 mm stone lower pole laser lithotripsy basket extraction stone analysis pending  24 cm 6 [place] stent      Complications:   None    Procedure and Technique:  Patient identified in the holding area right side marked consent signed wished to proceed with procedure placed in the OR suite after anesthesia induced placed in lithotomy position draped and prepped in standard fashion timeout performed 22 [place]ill[place] scope passed through to the bladder anterior to showed  maladies posterior to confirmed 2.5 cm bilobar obstructing prostate 0.038 wire passed up by the right stent with the stent removed with the alligator second 0.038 wire passed up first of the safety the second cannulated with 35 cm sheath advanced upward into the renal pelvis wire removed retrograde pyelogram confirmed to be within the pelvis the obturator removed the flexible ureteroscope paced stone encountered in the lower pole fragmented to multiple small pieces some grabbed with an basket for analysis scope removed sheath removed over the wire 24 cm 6 [place] stent was passed the wire was scope replaced after well awakened taken to recovery in stable condition   I was present for the entire procedure.    Patient Disposition:  PACU              SIGNATURE: Gregorio Collins MD  DATE: February 6, 2025  TIME: 3:01 PM

## 2025-02-06 NOTE — DISCHARGE INSTR - AVS FIRST PAGE
#1 no heavy straining or lifting above 10 pounds for 2 weeks    #2 call office fevers, chills, or worsening blood in the urine.    #3  Patient to come to office tomorrow February 7 at 10 AM      Gregorio Collins M.D. [facility] office  [address] [place] [address].  [place] [address]  [phone]  8:30 AM to 4:30 PM  Monday through Friday    [place] office  [address]  [address]  [place] [address]  [phone]  1:00 to 5:00 PM  Wednesday

## 2025-02-06 NOTE — ANESTHESIA PREPROCEDURE EVALUATION
Procedure:  CYSTOSCOPY URETEROSCOPY WITH LITHOTRIPSY HOLMIUM LASER, RETROGRADE PYELOGRAM AND INSERTION STENT URETERAL (Right: Bladder)    Relevant Problems   ANESTHESIA   (+) History of unintended awareness under general anesthesia      CARDIO   (+) Hyperlipidemia      GI/HEPATIC   (+) GERD (gastroesophageal reflux disease)      /RENAL   (+) Hydronephrosis, right, due to ureteral calculus   (+) Obstruction of ureteropelvic junction (UPJ) due to stone      NEURO/PSYCH   (+) Anxiety   (+) Chronic left shoulder pain   (+) MDD (major depressive disorder)        Physical Exam    Airway    Mallampati score: II  TM Distance: >3 FB  Neck ROM: full     Dental   No notable dental hx lower dentures and upper dentures    Cardiovascular  Cardiovascular exam normal    Pulmonary  Pulmonary exam normal     Other Findings        Anesthesia Plan  ASA Score- 2     Anesthesia Type- general with ASA Monitors.         Additional Monitors:     Airway Plan: LMA.           Plan Factors-Exercise tolerance (METS): >4 METS.    Chart reviewed.   Existing labs reviewed. Patient summary reviewed.    Patient is not a current smoker.      Obstructive sleep apnea risk education given perioperatively.        Induction- intravenous.    Postoperative Plan- Plan for postoperative opioid use.     Perioperative Resuscitation Plan - Level 1 - Full Code.       Informed Consent- Anesthetic plan and risks discussed with patient.  I personally reviewed this patient with the CRNA. Discussed and agreed on the Anesthesia Plan with the CRNA..      NPO Status:  Vitals Value Taken Time   Date of last liquid 02/05/25 02/06/25 1141   Time of last liquid 2200 02/06/25 1141   Date of last solid 02/05/25 02/06/25 1141   Time of last solid 2200 02/06/25 1141

## 2025-02-06 NOTE — ANESTHESIA POSTPROCEDURE EVALUATION
Post-Op Assessment Note    CV Status:  Stable  Pain Score: 1    Pain management: adequate       Mental Status:  Alert and awake   Hydration Status:  Euvolemic   PONV Controlled:  Controlled   Airway Patency:  Patent     Post Op Vitals Reviewed: Yes    No anethesia notable event occurred.    Staff: Anesthesiologist           Last Filed PACU Vitals:  Vitals Value Taken Time   Temp 97.8 °F (36.6 °C) 02/06/25 1500   Pulse 96 02/06/25 1538   /84 02/06/25 1538   Resp 16 02/06/25 1538   SpO2 100 % 02/06/25 1538       Modified Isabelle:     Vitals Value Taken Time   Activity 2 02/06/25 1500   Respiration 2 02/06/25 1500   Circulation 2 02/06/25 1500   Consciousness 2 02/06/25 1500   Oxygen Saturation 1 02/06/25 1500     Modified Isabelle Score: 9

## 2025-02-06 NOTE — PERIOPERATIVE NURSING NOTE
Received from [facility].  Awake,alert. Rails up, call bell in reach. Voided 100ml bloody urine with no visible clots noted.

## 2025-02-07 ENCOUNTER — HOSPITAL ENCOUNTER (EMERGENCY)
Facility: HOSPITAL | Age: 55
Discharge: HOME | End: 2025-02-07
Attending: EMERGENCY MEDICINE
Payer: MEDICARE

## 2025-02-07 VITALS
OXYGEN SATURATION: 96 % | TEMPERATURE: 98.6 F | SYSTOLIC BLOOD PRESSURE: 121 MMHG | HEART RATE: 109 BPM | RESPIRATION RATE: 18 BRPM | DIASTOLIC BLOOD PRESSURE: 79 MMHG

## 2025-02-07 DIAGNOSIS — R32 URINARY INCONTINENCE: Primary | ICD-10-CM

## 2025-02-07 DIAGNOSIS — N20.1 URETERAL STONE: ICD-10-CM

## 2025-02-07 PROCEDURE — 99283 EMERGENCY DEPT VISIT LOW MDM: CPT

## 2025-02-07 PROCEDURE — 99284 EMERGENCY DEPT VISIT MOD MDM: CPT | Performed by: EMERGENCY MEDICINE

## 2025-02-07 RX ORDER — ONDANSETRON 4 MG/1
4 TABLET, ORALLY DISINTEGRATING ORAL ONCE
Status: COMPLETED | OUTPATIENT
Start: 2025-02-07 | End: 2025-02-07

## 2025-02-07 RX ADMIN — ONDANSETRON 4 MG: 4 TABLET, ORALLY DISINTEGRATING ORAL at 04:30

## 2025-02-07 NOTE — DISCHARGE INSTRUCTIONS
Please make sure you make it to your urology appointment at 10 AM.  Please return to ER if develop fever.  Otherwise please continue your medication as prescribed

## 2025-02-07 NOTE — ED PROVIDER NOTES
Time reflects when diagnosis was documented in both MDM as applicable and the Disposition within this note       Time User Action Codes Description Comment    2/7/2025  4:26 AM Sean Saavedra Add [R32] Urinary incontinence     2/7/2025  4:26 AM Sean Saavedra Add [N20.1] Ureteral stone     2/7/2025  4:54 AM Sean Saavedra Modify [R32] Urinary incontinence Questionable          ED Disposition       ED Disposition   Discharge    Condition   Stable    Date/Time   Fri Feb 7, 2025  5:03 AM    Comment   Gregorio HAWKINS Maddi discharge to home/self care.                   Assessment & Plan       Medical Decision Making  54-year-old male presenting to the ED today with questionable urinary incontinence.  At this time I was considering initially some overflow incontinence however patient does not have an enlarged bladder on his ultrasound.  Patient is pain controlled at this time.  He did have some nausea and vomiting so I did give him a dose of oral Zofran ODT.  He has his follow-up appointment with urology at 10 AM.  Discussed with patient that I will discharge him home with a condom cath given that he is concerned about further episodes of urinary incontinence and he has his follow-up again with urology so they can see him and decide if he needs anything further.  Return precautions discussed and patient was discharged home.    Risk  Prescription drug management.             Medications   ondansetron (ZOFRAN-ODT) dispersible tablet 4 mg (4 mg Oral Given 2/7/25 0430)       ED Risk Strat Scores                          SBIRT 20yo+      Flowsheet Row Most Recent Value   Initial Alcohol Screen: US AUDIT-C     1. How often do you have a drink containing alcohol? 0 Filed at: 02/07/2025 0358   2. How many drinks containing alcohol do you have on a typical day you are drinking?  0 Filed at: 02/07/2025 0358   3a. Male UNDER 65: How often do you have five or more drinks on one occasion? 0 Filed at: 02/07/2025 0358   Audit-C Score 0 Filed  at: 02/07/2025 0358   ALOK: How many times in the past year have you...    Used an illegal drug or used a prescription medication for non-medical reasons? Never Filed at: 02/07/2025 0358                            History of Present Illness       Chief Complaint   Patient presents with    Post-op Problem     Pt d/c from kidney surgery yesterday afternoon. Since being home has had urinary incontinence, dysuria, and hematuria. Pt states all these symptoms are new since procedure.       Past Medical History:   Diagnosis Date    Anesthesia complication     woke up twice during anesthesia    Anxiety     Arthritis     Cerebral palsy (HCC)     GERD (gastroesophageal reflux disease)     H/O ETOH abuse     Kidney stone     Stomach ulcer       Past Surgical History:   Procedure Laterality Date    ESOPHAGOGASTRODUODENOSCOPY N/A 05/09/2016    Procedure: ESOPHAGOGASTRODUODENOSCOPY (EGD);  Surgeon: Tho Hernandez MD;  Location: [facility] GI LAB;  Service:     FL RETROGRADE PYELOGRAM  1/16/2025    FL RETROGRADE PYELOGRAM  2/6/2025    FOOT SURGERY Bilateral     hardware    HIP SURGERY Right     ORIF    OTHER SURGICAL HISTORY Bilateral     lower extremity ligiment extensions-multiple    VA CYSTO/URETERO W/LITHOTRIPSY &INDWELL STENT INSRT Right 1/16/2025    Procedure: CYSTOSCOPY URETEROSCOPY, RETROGRADE PYELOGRAM AND INSERTION STENT URETERAL, STONE MANIPULATION;  Surgeon: Gregorio Collins MD;  Location: [facility] OR;  Service: Urology    VA SURGICAL ARTHROSCOPY SHOULDER W/ROTATOR CUFF RPR Right 06/04/2021    Procedure: SHOULDER ARTHROSCOPIC ROTATOR CUFF REPAIR, SUBACROMIAL DECOMPRESSION;  Surgeon: Ryan Araya MD;  Location: [facility] MAIN OR;  Service: Orthopedics      Family History   Problem Relation Age of Onset    No Known Problems Mother     No Known Problems Father     Cancer Maternal Grandmother     Cancer Maternal Grandfather     Cancer Maternal Aunt       Social History     Tobacco Use    Smoking status: Former     Current  packs/day: 0.00     Average packs/day: 3.0 packs/day for 15.0 years (45.0 ttl pk-yrs)     Types: Cigarettes     Start date: 1979     Quit date: 1994     Years since quittin.0     Passive exposure: Past    Smokeless tobacco: Never   Vaping Use    Vaping status: Never Used   Substance Use Topics    Alcohol use: Not Currently     Comment: 2015 quit    Drug use: Yes     Types: Marijuana     Comment: rare      E-Cigarette/Vaping    E-Cigarette Use Never User       E-Cigarette/Vaping Substances    Nicotine No     THC No     CBD No     Flavoring No     Other No     Unknown No       I have reviewed and agree with the history as documented.     54-year-old male presenting to the ED today for urinary incontinence.  Patient states that this started ever since he has been home.  He just recently got discharged from the hospital after having urological procedure for kidney stones.  He states that he feels like he has the urge to pee but is feeling about time to get to the bathroom he started to urinate on himself.  This happened a few times tonight.  He also initially came in because he said he was having some pain however he does not have any pain right now.  Has a follow-up appointment at urology office at 10 AM today.        Review of Systems   Constitutional:  Negative for chills and fever.   HENT:  Negative for hearing loss.    Eyes:  Negative for visual disturbance.   Respiratory:  Negative for shortness of breath.    Cardiovascular:  Negative for chest pain.   Gastrointestinal:  Negative for abdominal pain, constipation, diarrhea, nausea and vomiting.   Genitourinary:  Negative for difficulty urinating.   Musculoskeletal:  Negative for myalgias.   Skin:  Negative for rash.   Neurological:  Negative for dizziness.   Psychiatric/Behavioral:  Negative for agitation.    All other systems reviewed and are negative.          Objective       ED Triage Vitals [25 0356]   Temperature Pulse Blood Pressure  Respirations SpO2 Patient Position - Orthostatic VS   98.9 °F (37.2 °C) (!) 113 131/87 18 95 % Sitting      Temp Source Heart Rate Source BP Location FiO2 (%) Pain Score    Oral Monitor Left arm -- 8      Vitals      Date and Time Temp Pulse SpO2 Resp BP Pain Score FACES Pain Rating User   02/07/25 0500 98.6 °F (37 °C) 109 96 % 18 121/79 -- -- SW   02/07/25 0430 -- 111 97 % 17 123/85 -- -- CAC   02/07/25 0400 -- 115 96 % 17 124/82 -- -- SW   02/07/25 0356 98.9 °F (37.2 °C) 113 95 % 18 131/87 8 -- AM            Physical Exam  Vitals and nursing note reviewed.   Constitutional:       General: He is not in acute distress.     Appearance: Normal appearance. He is not ill-appearing.   HENT:      Head: Normocephalic and atraumatic.      Right Ear: External ear normal.      Left Ear: External ear normal.      Nose: Nose normal. No congestion.      Mouth/Throat:      Mouth: Mucous membranes are moist.      Pharynx: Oropharynx is clear. No oropharyngeal exudate.   Eyes:      General:         Right eye: No discharge.         Left eye: No discharge.      Extraocular Movements: Extraocular movements intact.      Conjunctiva/sclera: Conjunctivae normal.      Pupils: Pupils are equal, round, and reactive to light.   Cardiovascular:      Rate and Rhythm: Normal rate and regular rhythm.      Pulses: Normal pulses.      Heart sounds: Normal heart sounds.   Pulmonary:      Effort: Pulmonary effort is normal. No respiratory distress.      Breath sounds: Normal breath sounds. No wheezing.   Abdominal:      General: Abdomen is flat. Bowel sounds are normal. There is no distension.      Palpations: Abdomen is soft.      Tenderness: There is no abdominal tenderness.      Comments: No enlarged bladder appreciated on bedside point-of-care ultrasound.  Pictures were not saved to the chart.   Musculoskeletal:         General: No swelling or deformity. Normal range of motion.      Cervical back: Normal range of motion. No rigidity.   Skin:      General: Skin is warm and dry.      Capillary Refill: Capillary refill takes less than 2 seconds.   Neurological:      General: No focal deficit present.      Mental Status: He is alert and oriented to person, place, and time. Mental status is at baseline.      Cranial Nerves: No cranial nerve deficit.      Motor: No weakness.      Gait: Gait normal.   Psychiatric:         Mood and Affect: Mood normal.         Behavior: Behavior normal.         Results Reviewed       None            No orders to display       Procedures    ED Medication and Procedure Management   Prior to Admission Medications   Prescriptions Last Dose Informant Patient Reported? Taking?   Cholecalciferol (VITAMIN D3) 1,000 units tablet   Yes No   Sig: Take 5,000 Units by mouth daily   Multiple Vitamins-Minerals (CENTRUM ADULTS PO)  Self Yes No   Sig: Centrum   Omega-3 Fatty Acids (fish oil) 1,000 mg  Self Yes No   Sig: Take 1,000 mg by mouth daily   acetaminophen (TYLENOL) 325 mg tablet   No No   Sig: Take 2 tablets (650 mg total) by mouth every 6 (six) hours as needed for mild pain, headaches or fever   amoxicillin (AMOXIL) 500 mg capsule   No No   Sig: Take 1 capsule (500 mg total) by mouth every 12 (twelve) hours for 13 doses   baclofen 10 mg tablet   No No   Sig: Take 1 tablet (10 mg total) by mouth 3 (three) times a day as needed for muscle spasms   famotidine (PEPCID) 40 MG tablet   No No   Sig: Take 1 tablet (40 mg total) by mouth 2 (two) times a day   mineral oil (Fleet Laxative Mineral Oil) liquid   No No   Sig: Take 30 mL by mouth daily as needed for constipation   ondansetron (ZOFRAN-ODT) 4 mg disintegrating tablet   No No   Sig: Take 1 tablet (4 mg total) by mouth every 8 (eight) hours as needed for nausea or vomiting   ondansetron (ZOFRAN-ODT) 4 mg disintegrating tablet   No No   Sig: Take 1 tablet (4 mg total) by mouth every 8 (eight) hours as needed for nausea or vomiting for up to 7 days   oxyCODONE-acetaminophen (PERCOCET) 5-325 mg  per tablet   Yes No   Sig: take 1 tablet by mouth 3 times per day as needed   tamsulosin (FLOMAX) 0.4 mg   No No   Sig: Take 1 capsule (0.4 mg total) by mouth daily with dinner      Facility-Administered Medications: None     Discharge Medication List as of 2/7/2025  5:05 AM        CONTINUE these medications which have NOT CHANGED    Details   acetaminophen (TYLENOL) 325 mg tablet Take 2 tablets (650 mg total) by mouth every 6 (six) hours as needed for mild pain, headaches or fever, Starting Wed 1/29/2025, Normal      baclofen 10 mg tablet Take 1 tablet (10 mg total) by mouth 3 (three) times a day as needed for muscle spasms, Starting Fri 1/17/2025, Normal      Cholecalciferol (VITAMIN D3) 1,000 units tablet Take 5,000 Units by mouth daily, Historical Med      famotidine (PEPCID) 40 MG tablet Take 1 tablet (40 mg total) by mouth 2 (two) times a day, Starting Tue 11/26/2024, Normal      mineral oil (Fleet Laxative Mineral Oil) liquid Take 30 mL by mouth daily as needed for constipation, Starting Fri 1/17/2025, OTC      Multiple Vitamins-Minerals (CENTRUM ADULTS PO) Centrum, Historical Med      Omega-3 Fatty Acids (fish oil) 1,000 mg Take 1,000 mg by mouth daily, Historical Med      ondansetron (ZOFRAN-ODT) 4 mg disintegrating tablet Take 1 tablet (4 mg total) by mouth every 8 (eight) hours as needed for nausea or vomiting, Starting Fri 1/17/2025, Normal      oxyCODONE-acetaminophen (PERCOCET) 5-325 mg per tablet take 1 tablet by mouth 3 times per day as needed, Historical Med      tamsulosin (FLOMAX) 0.4 mg Take 1 capsule (0.4 mg total) by mouth daily with dinner, Starting Fri 1/17/2025, Normal           STOP taking these medications       amoxicillin (AMOXIL) 500 mg capsule Comments:   Reason for Stopping:             No discharge procedures on file.  ED SEPSIS DOCUMENTATION   Time reflects when diagnosis was documented in both MDM as applicable and the Disposition within this note       Time User Action Codes  Description Comment    2/7/2025  4:26 AM Sean Saavedra [R32] Urinary incontinence     2/7/2025  4:26 AM Sean Saavedra [N20.1] Ureteral stone     2/7/2025  4:54 AM Sean Saavedra Modify [R32] Urinary incontinence Questionable                 Sean Saavedra MD  02/07/25 0605

## 2025-02-13 LAB
COLOR STONE: NORMAL
COMMENT-STONE3: NORMAL
COMPOSITION: NORMAL
LABORATORY COMMENT REPORT: NORMAL
PHOTO: NORMAL
SIZE STONE: NORMAL MM
SPEC SOURCE SUBJ: NORMAL
STONE ANALYSIS-IMP: NORMAL
STONE ANALYSIS-IMP: NORMAL
URATE MFR STONE: 100 %
WT STONE: 27 MG

## 2025-02-19 ENCOUNTER — APPOINTMENT (OUTPATIENT)
Dept: LAB | Facility: HOSPITAL | Age: 55
End: 2025-02-19

## 2025-02-20 ENCOUNTER — APPOINTMENT (OUTPATIENT)
Dept: LAB | Facility: HOSPITAL | Age: 55
End: 2025-02-20
Attending: SPECIALIST
Payer: MEDICARE

## 2025-02-20 DIAGNOSIS — N20.0 RENAL CALCULUS: ICD-10-CM

## 2025-02-20 LAB
CALCIUM 24H UR-MCNC: 95.4 MG/24 HRS (ref 100–300)
PERIOD: 24 HOURS
PH UR STRIP.AUTO: 5.5 [PH]
SODIUM 24H UR-SRATE: 87 MMOL/24 HRS (ref 40–220)
SPECIMEN VOL UR: 600 ML
URATE 24H UR-MCNC: 395.4 MG/24 HRS (ref 250–800)

## 2025-02-20 PROCEDURE — 84300 ASSAY OF URINE SODIUM: CPT

## 2025-02-20 PROCEDURE — 82340 ASSAY OF CALCIUM IN URINE: CPT

## 2025-02-20 PROCEDURE — 83986 ASSAY PH BODY FLUID NOS: CPT

## 2025-02-20 PROCEDURE — 84560 ASSAY OF URINE/URIC ACID: CPT

## 2025-02-20 PROCEDURE — 82507 ASSAY OF CITRATE: CPT

## 2025-02-20 PROCEDURE — 83945 ASSAY OF OXALATE: CPT

## 2025-02-24 DIAGNOSIS — K21.00 GASTROESOPHAGEAL REFLUX DISEASE WITH ESOPHAGITIS WITHOUT HEMORRHAGE: ICD-10-CM

## 2025-02-24 LAB
CITRATE 24H UR-MCNC: 579 MG/L
CITRATE 24H UR-MRATE: 347 MG/24 HR (ref 320–1240)
OXALATE 24H UR-MRATE: 16 MG/24 HR (ref 7–44)
OXALATE UR-MCNC: 26 MG/L

## 2025-02-26 RX ORDER — FAMOTIDINE 40 MG/1
40 TABLET, FILM COATED ORAL 2 TIMES DAILY
Qty: 180 TABLET | Refills: 0 | Status: SHIPPED | OUTPATIENT
Start: 2025-02-26

## 2025-03-04 ENCOUNTER — OFFICE VISIT (OUTPATIENT)
Age: 55
End: 2025-03-04

## 2025-03-04 VITALS
SYSTOLIC BLOOD PRESSURE: 125 MMHG | WEIGHT: 208 LBS | OXYGEN SATURATION: 98 % | TEMPERATURE: 98.9 F | BODY MASS INDEX: 29.84 KG/M2 | DIASTOLIC BLOOD PRESSURE: 85 MMHG | HEART RATE: 111 BPM

## 2025-03-04 DIAGNOSIS — J06.9 UPPER RESPIRATORY TRACT INFECTION, UNSPECIFIED TYPE: Primary | ICD-10-CM

## 2025-03-04 PROCEDURE — 99213 OFFICE O/P EST LOW 20 MIN: CPT | Performed by: FAMILY MEDICINE

## 2025-03-04 PROCEDURE — G2211 COMPLEX E/M VISIT ADD ON: HCPCS | Performed by: FAMILY MEDICINE

## 2025-03-04 RX ORDER — CEPHALEXIN 500 MG/1
1 CAPSULE ORAL 2 TIMES DAILY
COMMUNITY
Start: 2025-02-07

## 2025-03-04 RX ORDER — ACETAMINOPHEN AND CODEINE PHOSPHATE 300; 30 MG/1; MG/1
TABLET ORAL
COMMUNITY
Start: 2025-02-05

## 2025-03-04 NOTE — PROGRESS NOTES
Name: Gregorio Linda      : 1970      MRN: [id number]  Encounter Provider: Johanny Vega MD  Encounter Date: 3/4/2025   Encounter department: [facility]  :  Assessment & Plan  Upper respiratory tract infection, unspecified type  Presents with 6 days of cough, shortness of breath, postnasal drip, sneezing, sore throat, chills, headache, myalgia  Physical exam in unremarkable (negative for egophony) negative for fevers  Likely viral URI     Plan  Symptomatic management with OTC decongestants and antihistamines  Continue tylenol or ibuprofen for discomfort  Rest & hydration  Use saline nasal spray and humidifier for congestion relief  Hand hygiene to prevent spread  May consider chest x-ray if symptoms persist              History of Present Illness   Cough  This is a new problem. The current episode started in the past 7 days (maybe 6 days). The problem has been unchanged. The cough is Productive of sputum (green sputum). Associated symptoms include chills, headaches, heartburn, myalgias, nasal congestion, postnasal drip, rhinorrhea, a sore throat, shortness of breath and sweats. Pertinent negatives include no chest pain, ear congestion, ear pain, fever, hemoptysis, rash, weight loss or wheezing. Nothing aggravates the symptoms. He has tried OTC cough suppressant for the symptoms. The treatment provided moderate relief. There is no history of asthma, bronchiectasis, bronchitis, COPD, emphysema, environmental allergies or pneumonia.   URI   This is a new problem. The current episode started in the past 7 days. The problem has been gradually improving. There has been no fever. Associated symptoms include abdominal pain, congestion, coughing, headaches, rhinorrhea, sneezing and a sore throat. Pertinent negatives include no chest pain, diarrhea, dysuria, ear pain, joint pain, joint swelling, nausea, neck pain, plugged ear sensation, rash, sinus pain, swollen glands,  vomiting or wheezing. Treatments tried: Dayquil/Nyquil. The treatment provided mild relief.     Review of Systems   Constitutional:  Positive for chills. Negative for fever and weight loss.   HENT:  Positive for congestion, postnasal drip, rhinorrhea, sneezing and sore throat. Negative for ear pain and sinus pain.    Eyes:  Negative for pain and visual disturbance.   Respiratory:  Positive for cough and shortness of breath. Negative for hemoptysis and wheezing.    Cardiovascular:  Negative for chest pain and palpitations.   Gastrointestinal:  Positive for abdominal pain and heartburn. Negative for diarrhea, nausea and vomiting.   Genitourinary:  Negative for dysuria and hematuria.   Musculoskeletal:  Positive for myalgias. Negative for arthralgias, back pain, joint pain and neck pain.   Skin:  Negative for color change and rash.   Allergic/Immunologic: Negative for environmental allergies.   Neurological:  Positive for headaches. Negative for seizures and syncope.   All other systems reviewed and are negative.      Objective   /85 (BP Location: Left arm, Patient Position: Sitting, Cuff Size: Standard)   Pulse (!) 111   Temp 98.9 °F (37.2 °C) (Tympanic)   Wt 94.3 kg (208 lb)   SpO2 98%   BMI 29.84 kg/m²      Physical Exam  Vitals and nursing note reviewed.   Constitutional:       General: He is not in acute distress.     Appearance: He is well-developed.   HENT:      Head: Normocephalic and atraumatic.      Right Ear: Tympanic membrane, ear canal and external ear normal.      Left Ear: Tympanic membrane, ear canal and external ear normal.      Nose: Nose normal.      Mouth/Throat:      Mouth: Mucous membranes are moist.   Eyes:      Extraocular Movements: Extraocular movements intact.      Conjunctiva/sclera: Conjunctivae normal.   Cardiovascular:      Rate and Rhythm: Normal rate and regular rhythm.      Heart sounds: No murmur heard.  Pulmonary:      Effort: Pulmonary effort is normal. No respiratory  distress.      Breath sounds: Normal breath sounds. No wheezing or rhonchi.      Comments: Negative for egophany  Abdominal:      General: Abdomen is flat. Bowel sounds are normal.      Palpations: Abdomen is soft.      Tenderness: There is no abdominal tenderness.   Musculoskeletal:         General: No swelling. Normal range of motion.      Cervical back: Normal range of motion and neck supple.   Skin:     General: Skin is warm and dry.      Capillary Refill: Capillary refill takes less than 2 seconds.   Neurological:      Mental Status: He is alert and oriented to person, place, and time.   Psychiatric:         Mood and Affect: Mood normal.         Behavior: Behavior normal.         Thought Content: Thought content normal.         Judgment: Judgment normal.

## 2025-03-05 ENCOUNTER — TELEPHONE (OUTPATIENT)
Age: 55
End: 2025-03-05

## 2025-03-05 NOTE — TELEPHONE ENCOUNTER
VM on appt line:    Hi, my name is Venancio CUELLAR. My phone number is [phone]. I just came, well, not too long ago, I just came from a an appointment with you guevelin and when I got home and making myself dinner here, I got a phone call from a number that had medical underneath the phone number. So I pick it up and it's a lab trying to get information from me saying that my primary care provider is the one who contacted them and I have such and such insurance. I don't know what's going on. I the questions I asked them, they couldn't give me any specifics. So I think it's a fraud, but I thought I'd call you guys up and get there and I bring it to your attention that right after doctor's appointments, which is very suspicious, people are calling about lab work and stuff like that. Who? I don't have any specific information. You can give me a call. I know you won't get this until the morning, but you can give me a call. I should be home anytime tomorrow. I talk to you later. Bye.  You received a voice mail from LEANDRA POLO.    Spoke with patient, patient wanted us to be aware that he believes he received a scam call after he left the doctors office yesterday. They called from [phone], it came up as healthcare on the caller ID. He stated that they would not provide a specific doctors name or what his insurance was so he hung up.

## 2025-03-25 ENCOUNTER — HOSPITAL ENCOUNTER (OUTPATIENT)
Dept: RADIOLOGY | Facility: HOSPITAL | Age: 55
Discharge: HOME | End: 2025-03-25
Attending: SPECIALIST
Payer: MEDICARE

## 2025-03-25 DIAGNOSIS — N20.0 CALCULUS OF KIDNEY: ICD-10-CM

## 2025-03-25 PROCEDURE — 76775 US EXAM ABDO BACK WALL LIM: CPT

## 2025-04-04 ENCOUNTER — APPOINTMENT (OUTPATIENT)
Dept: LAB | Facility: HOSPITAL | Age: 55
End: 2025-04-04
Payer: MEDICARE

## 2025-04-04 DIAGNOSIS — E79.0 HYPERURICEMIA: ICD-10-CM

## 2025-04-04 LAB — URATE SERPL-MCNC: 6.6 MG/DL (ref 3.5–8.5)

## 2025-04-04 PROCEDURE — 36415 COLL VENOUS BLD VENIPUNCTURE: CPT

## 2025-04-04 PROCEDURE — 84550 ASSAY OF BLOOD/URIC ACID: CPT

## 2025-04-30 ENCOUNTER — HOSPITAL ENCOUNTER (OUTPATIENT)
Dept: RADIOLOGY | Facility: HOSPITAL | Age: 55
Discharge: HOME | End: 2025-04-30
Payer: MEDICARE

## 2025-04-30 ENCOUNTER — TELEPHONE (OUTPATIENT)
Age: 55
End: 2025-04-30

## 2025-04-30 ENCOUNTER — OFFICE VISIT (OUTPATIENT)
Age: 55
End: 2025-04-30
Payer: MEDICARE

## 2025-04-30 ENCOUNTER — RESULTS FOLLOW-UP (OUTPATIENT)
Age: 55
End: 2025-04-30

## 2025-04-30 ENCOUNTER — LAB (OUTPATIENT)
Dept: LAB | Facility: HOSPITAL | Age: 55
End: 2025-04-30
Attending: PHYSICIAN ASSISTANT
Payer: MEDICARE

## 2025-04-30 VITALS
OXYGEN SATURATION: 97 % | DIASTOLIC BLOOD PRESSURE: 81 MMHG | WEIGHT: 211 LBS | HEIGHT: 70 IN | SYSTOLIC BLOOD PRESSURE: 141 MMHG | HEART RATE: 91 BPM | BODY MASS INDEX: 30.21 KG/M2

## 2025-04-30 DIAGNOSIS — R05.2 SUBACUTE COUGH: ICD-10-CM

## 2025-04-30 DIAGNOSIS — K21.9 GASTROESOPHAGEAL REFLUX DISEASE, UNSPECIFIED WHETHER ESOPHAGITIS PRESENT: Primary | ICD-10-CM

## 2025-04-30 DIAGNOSIS — R11.2 NAUSEA AND VOMITING, UNSPECIFIED VOMITING TYPE: ICD-10-CM

## 2025-04-30 DIAGNOSIS — K21.9 GASTROESOPHAGEAL REFLUX DISEASE, UNSPECIFIED WHETHER ESOPHAGITIS PRESENT: ICD-10-CM

## 2025-04-30 DIAGNOSIS — Z86.0100 HISTORY OF COLON POLYPS: ICD-10-CM

## 2025-04-30 DIAGNOSIS — R19.5 DARK STOOLS: ICD-10-CM

## 2025-04-30 LAB
ALBUMIN SERPL BCG-MCNC: 4.7 G/DL (ref 3.5–5)
ALP SERPL-CCNC: 56 U/L (ref 34–104)
ALT SERPL W P-5'-P-CCNC: 31 U/L (ref 7–52)
ANION GAP SERPL CALCULATED.3IONS-SCNC: 12 MMOL/L (ref 4–13)
AST SERPL W P-5'-P-CCNC: 20 U/L (ref 13–39)
BILIRUB SERPL-MCNC: 0.58 MG/DL (ref 0.2–1)
BUN SERPL-MCNC: 11 MG/DL (ref 5–25)
CALCIUM SERPL-MCNC: 9.5 MG/DL (ref 8.4–10.2)
CHLORIDE SERPL-SCNC: 109 MMOL/L (ref 96–108)
CO2 SERPL-SCNC: 21 MMOL/L (ref 21–32)
CREAT SERPL-MCNC: 1.32 MG/DL (ref 0.6–1.3)
ERYTHROCYTE [DISTWIDTH] IN BLOOD BY AUTOMATED COUNT: 16.7 % (ref 11.6–15.1)
GFR SERPL CREATININE-BSD FRML MDRD: 61 ML/MIN/1.73SQ M
GLUCOSE SERPL-MCNC: 104 MG/DL (ref 65–140)
HCT VFR BLD AUTO: 40.6 % (ref 36.5–49.3)
HGB BLD-MCNC: 12.5 G/DL (ref 12–17)
MCH RBC QN AUTO: 23.1 PG (ref 26.8–34.3)
MCHC RBC AUTO-ENTMCNC: 30.8 G/DL (ref 31.4–37.4)
MCV RBC AUTO: 75 FL (ref 82–98)
PLATELET # BLD AUTO: 185 THOUSANDS/UL (ref 149–390)
PMV BLD AUTO: 12.3 FL (ref 8.9–12.7)
POTASSIUM SERPL-SCNC: 3.7 MMOL/L (ref 3.5–5.3)
PROT SERPL-MCNC: 7.1 G/DL (ref 6.4–8.4)
RBC # BLD AUTO: 5.41 MILLION/UL (ref 3.88–5.62)
SODIUM SERPL-SCNC: 142 MMOL/L (ref 135–147)
WBC # BLD AUTO: 4.58 THOUSAND/UL (ref 4.31–10.16)

## 2025-04-30 PROCEDURE — 99214 OFFICE O/P EST MOD 30 MIN: CPT | Performed by: PHYSICIAN ASSISTANT

## 2025-04-30 PROCEDURE — 80053 COMPREHEN METABOLIC PANEL: CPT

## 2025-04-30 PROCEDURE — G2211 COMPLEX E/M VISIT ADD ON: HCPCS | Performed by: PHYSICIAN ASSISTANT

## 2025-04-30 PROCEDURE — 71046 X-RAY EXAM CHEST 2 VIEWS: CPT

## 2025-04-30 PROCEDURE — 36415 COLL VENOUS BLD VENIPUNCTURE: CPT

## 2025-04-30 PROCEDURE — 85027 COMPLETE CBC AUTOMATED: CPT

## 2025-04-30 RX ORDER — OMEPRAZOLE 40 MG/1
40 CAPSULE, DELAYED RELEASE ORAL DAILY
Qty: 30 CAPSULE | Refills: 1 | Status: SHIPPED | OUTPATIENT
Start: 2025-04-30

## 2025-04-30 RX ORDER — SODIUM CHLORIDE, SODIUM LACTATE, POTASSIUM CHLORIDE, CALCIUM CHLORIDE 600; 310; 30; 20 MG/100ML; MG/100ML; MG/100ML; MG/100ML
125 INJECTION, SOLUTION INTRAVENOUS CONTINUOUS
OUTPATIENT
Start: 2025-04-30

## 2025-04-30 NOTE — PROGRESS NOTES
Name: Gregorio Linda      : 1970      MRN: [id number]  Encounter Provider: Kiesha Madden PA-C  Encounter Date: 2025   Encounter department: [facility]  :  Assessment & Plan  Nausea and vomiting, unspecified vomiting type  -appears to be caused by the coughing he has been having since the beginning of March. He will cough and then vomit.   -advised he get chest xray  -advised follow up with PCP as the symptoms have been ongoing for 2 months  -can use zofran as needed          Subacute cough  -see PCP and get cxr as above        Gastroesophageal reflux disease, unspecified whether esophagitis present  Typically well controlled on pepcid 40 mg BID but has had some worsening symptoms in last 2 months while sick. Has at times been using a third 40 mg pepcid daily  -advised to not take 40 mg TID  -will give omeprazole script for 4 weeks, then can resume pepcid BID once illness is resolved.   Orders:    XR chest pa and lateral; Future    CBC and Platelet; Future    Comprehensive metabolic panel; Future    omeprazole (PriLOSEC) 40 MG capsule; Take 1 capsule (40 mg total) by mouth daily    History of colon polyps  -due for repeat colonoscopy in 2025, will schedule this today for later this summer  -discussed procedure, risks including perforation, infection, and bleeding, missing a diagnosis, and benefits in detail with patient   -miralax/dulcolax prep  -no med holds  Orders:    Colonoscopy; Future    Dark stools  -reports dark stools that are loose for last couple weeks. No iron supplements or pepto bismol  -get labs as soon as possible to assess for anemia  -CBC and CMP ordered            History of Present Illness   Gregorio Linda is a 54 y.o. male with a history of cerebral palsy, GERD, kidney stones, history of alcohol abuse who has been sober for the last 10 years, hyperlipidemia, who presents for a follow-up.  Patient was seen by his PCP at the beginning of  March due to viral upper respiratory symptoms including cough.  He was recommended to follow-up if symptoms did not improve.  Patient reports that he has continued to have cough and feel unwell for the last 2 months.  He is at the point where he is coughing so much that he will actually get vomiting almost on a daily basis for the last couple of weeks.  The vomiting seems very much related to his chronic coughing.  He reports sometimes getting a pressure in the esophagus and has been taking his Pepcid sometimes 40 mg 3 times a day if he has this but denies any dysphagia.  He has been taking a lot of DayQuil and NyQuil.  He reports that he also is been having loose stool that have been dark for the last couple weeks.  He denies any iron supplementation or Pepto-Bismol use.  Denies any excessive NSAID use.  He is no longer smoking marijuana.  He had an EGD in September 2023 with a small hiatal hernia and some gastritis but otherwise this was unremarkable.  His last colonoscopy was in July 2022 and he was recommended have repeat in 3 years.  Medication Refill  Associated symptoms include chills, coughing, fatigue, nausea and vomiting. Pertinent negatives include no abdominal pain, arthralgias, chest pain, fever, myalgias, rash or sore throat.   Vomiting   Associated symptoms include chills, coughing and diarrhea. Pertinent negatives include no abdominal pain, arthralgias, chest pain, dizziness, fever or myalgias.   Diarrhea   Associated symptoms include chills, coughing and vomiting. Pertinent negatives include no abdominal pain, arthralgias, fever or myalgias.       Review of Systems   Constitutional:  Positive for chills and fatigue. Negative for activity change, appetite change, fever and unexpected weight change.   HENT:  Negative for drooling, mouth sores, sore throat, trouble swallowing and voice change.    Eyes:  Negative for pain, discharge and visual disturbance.   Respiratory:  Positive for cough. Negative  for choking, chest tightness and shortness of breath.    Cardiovascular:  Negative for chest pain, palpitations and leg swelling.   Gastrointestinal:  Positive for diarrhea, nausea and vomiting. Negative for abdominal distention, abdominal pain, anal bleeding, blood in stool, constipation and rectal pain.   Genitourinary:  Negative for decreased urine volume, difficulty urinating, dysuria, flank pain and urgency.   Musculoskeletal:  Negative for arthralgias, back pain, gait problem, myalgias and neck stiffness.   Skin:  Negative for color change, pallor and rash.   Neurological:  Negative for dizziness, syncope and light-headedness.   Hematological:  Negative for adenopathy.   Psychiatric/Behavioral:  Negative for confusion. The patient is not nervous/anxious.     A complete review of systems is negative other than that noted above in the HPI.      Current Outpatient Medications   Medication Sig Dispense Refill    baclofen 10 mg tablet Take 1 tablet (10 mg total) by mouth 3 (three) times a day as needed for muscle spasms 60 tablet 0    Cholecalciferol (VITAMIN D3) 1,000 units tablet Take 5,000 Units by mouth daily      famotidine (PEPCID) 40 MG tablet TAKE 1 TABLET BY MOUTH TWICE A  tablet 0    mineral oil (Fleet Laxative Mineral Oil) liquid Take 30 mL by mouth daily as needed for constipation      Multiple Vitamins-Minerals (CENTRUM ADULTS PO) Centrum      Omega-3 Fatty Acids (fish oil) 1,000 mg Take 1,000 mg by mouth daily      omeprazole (PriLOSEC) 40 MG capsule Take 1 capsule (40 mg total) by mouth daily 30 capsule 1    ondansetron (ZOFRAN-ODT) 4 mg disintegrating tablet Take 1 tablet (4 mg total) by mouth every 8 (eight) hours as needed for nausea or vomiting 20 tablet 2    ondansetron (ZOFRAN-ODT) 4 mg disintegrating tablet Take 1 tablet (4 mg total) by mouth every 8 (eight) hours as needed for nausea or vomiting for up to 7 days (Patient not taking: Reported on 4/30/2025) 21 tablet 0     No current  "facility-administered medications for this visit.     Objective   /81 (BP Location: Left arm, Patient Position: Sitting, Cuff Size: Standard)   Pulse 91   Ht 5' 10\" (1.778 m)   Wt 95.7 kg (211 lb)   SpO2 97%   BMI 30.28 kg/m²     Physical Exam  Constitutional:       General: He is not in acute distress.     Appearance: Normal appearance. He is well-developed.   HENT:      Head: Normocephalic and atraumatic.      Mouth/Throat:      Mouth: Mucous membranes are moist.   Eyes:      General: No scleral icterus.  Neck:      Trachea: No tracheal deviation.   Cardiovascular:      Rate and Rhythm: Normal rate and regular rhythm.      Heart sounds: Normal heart sounds. No murmur heard.  Pulmonary:      Effort: Pulmonary effort is normal. No respiratory distress.      Breath sounds: Normal breath sounds. No wheezing or rales.      Comments: Cough present   Chest:      Chest wall: No tenderness.   Abdominal:      General: Bowel sounds are normal. There is no distension.      Palpations: Abdomen is soft. There is no mass.      Tenderness: There is no abdominal tenderness. There is no guarding or rebound.   Musculoskeletal:         General: Normal range of motion.      Cervical back: Normal range of motion and neck supple.      Comments: Cerebral palsy    Skin:     General: Skin is warm and dry.      Coloration: Skin is not pale.      Findings: No rash.   Neurological:      Mental Status: He is alert and oriented to person, place, and time. Mental status is at baseline.   Psychiatric:         Mood and Affect: Mood normal.         Behavior: Behavior normal.            Lab Results: I personally reviewed relevant lab results.       Results for orders placed during the hospital encounter of 09/21/23    EGD    Impression  The middle third of the esophagus appeared normal. Performed random biopsy.  Small hiatal hernia  Erythematous mucosa  The 2nd part of the duodenum appeared normal. Performed random biopsy to rule out " celiac disease.      RECOMMENDATION:  Await pathology results    Avoid NSAIDs  Encourage marijuana minimization/cessation  Continue pepcid  Consider PPI  Resume regular diet  Discharge home        Yuly Bartlett MD

## 2025-04-30 NOTE — ASSESSMENT & PLAN NOTE
Typically well controlled on pepcid 40 mg BID but has had some worsening symptoms in last 2 months while sick. Has at times been using a third 40 mg pepcid daily  -advised to not take 40 mg TID  -will give omeprazole script for 4 weeks, then can resume pepcid BID once illness is resolved.   Orders:    XR chest pa and lateral; Future    CBC and Platelet; Future    Comprehensive metabolic panel; Future    omeprazole (PriLOSEC) 40 MG capsule; Take 1 capsule (40 mg total) by mouth daily

## 2025-04-30 NOTE — PATIENT INSTRUCTIONS
Scheduled date of colonoscopy (as of today):08/14/25  Physician performing colonoscopy:Dr. Bartlett  Location of colonoscopy:[facility]  Bowel prep reviewed with patient:Matty/Elio  Instructions reviewed with patient by:ti  Clearances:  n/a

## 2025-05-01 ENCOUNTER — OFFICE VISIT (OUTPATIENT)
Age: 55
End: 2025-05-01

## 2025-05-01 VITALS
WEIGHT: 211 LBS | DIASTOLIC BLOOD PRESSURE: 90 MMHG | SYSTOLIC BLOOD PRESSURE: 133 MMHG | BODY MASS INDEX: 30.28 KG/M2 | TEMPERATURE: 98.9 F | HEART RATE: 91 BPM | OXYGEN SATURATION: 98 %

## 2025-05-01 DIAGNOSIS — R71.8 LOW MEAN CORPUSCULAR VOLUME (MCV): ICD-10-CM

## 2025-05-01 DIAGNOSIS — R79.89 ELEVATED SERUM CREATININE: ICD-10-CM

## 2025-05-01 DIAGNOSIS — R11.0 NAUSEA: ICD-10-CM

## 2025-05-01 DIAGNOSIS — R09.82 POST-NASAL DRIP: ICD-10-CM

## 2025-05-01 DIAGNOSIS — K21.9 GASTROESOPHAGEAL REFLUX DISEASE, UNSPECIFIED WHETHER ESOPHAGITIS PRESENT: Primary | ICD-10-CM

## 2025-05-01 PROCEDURE — 99214 OFFICE O/P EST MOD 30 MIN: CPT | Performed by: FAMILY MEDICINE

## 2025-05-01 PROCEDURE — G2211 COMPLEX E/M VISIT ADD ON: HCPCS | Performed by: FAMILY MEDICINE

## 2025-05-01 RX ORDER — LORATADINE 10 MG/1
10 TABLET ORAL DAILY
Qty: 30 TABLET | Refills: 2 | Status: SHIPPED | OUTPATIENT
Start: 2025-05-01

## 2025-05-01 RX ORDER — ONDANSETRON 4 MG/1
4 TABLET, FILM COATED ORAL EVERY 8 HOURS PRN
Qty: 20 TABLET | Refills: 2 | Status: SHIPPED | OUTPATIENT
Start: 2025-05-01

## 2025-05-01 RX ORDER — FLUTICASONE PROPIONATE 50 MCG
1 SPRAY, SUSPENSION (ML) NASAL DAILY
Qty: 15.8 ML | Refills: 2 | Status: SHIPPED | OUTPATIENT
Start: 2025-05-01

## 2025-05-01 NOTE — PROGRESS NOTES
Name: Gregorio Linda      : 1970      MRN: [id number]  Encounter Provider: Kirsty Schmidt DO  Encounter Date: 2025   Encounter department: [facility]  :  Assessment & Plan  Gastroesophageal reflux disease, unspecified whether esophagitis present  Was taking Pepcid 40 mg BID, sometimes would take third dose due to nausea  Saw GI yesterday, switched from Pepcid BID to omeprazole 40 mg daily     Continue medications as per GI and follow up with GI in 4 weeks        Post-nasal drip  Patient noted to have post-nasal drip. This is likely contributing to continued cough after viral URI a few weeks ago     Will send Antihistamin and nasal spray to help dry out mucus and relieve postnasal drip   Educated patient on proper technique for nasal spray   Orders:  •  loratadine (CLARITIN) 10 mg tablet; Take 1 tablet (10 mg total) by mouth daily  •  fluticasone (FLONASE) 50 mcg/act nasal spray; 1 spray into each nostril daily    Nausea  Nausea secondary to continued coughing   Should resolve as cough resolves   For now will send Zofran 4 mg TID PRN. Can take up to 8 mg at once. Do not take more than 12 mg (3 tablets) total in a day   Orders:  •  ondansetron (ZOFRAN) 4 mg tablet; Take 1 tablet (4 mg total) by mouth every 8 (eight) hours as needed for nausea or vomiting    Low mean corpuscular volume (MCV)  Recent CBC with normal hemoglobin but low MCV   Will order iron panel to rule out iron deficiency   Orders:  •  Iron Panel (Includes Ferritin, Iron Sat%, Iron, and TIBC); Future    Elevated serum creatinine  Noted to have elevated Cr 1.32 on most recent CMP.   Baseline Cr 1.1-1.2     Discussed adequate hydration especially as patient has history of recurrent kidney stones               History of Present Illness {?Quick Links Encounters * My Last Note * Last Note in Specialty * Snapshot * Since Last Visit * History :42854}  Gregorio Linda is a 54 y.o. male who presents to follow  up after GI visit yesterday. Patient states he has been coughing so hard he vomits almost every day for the past few weeks. Believes it may be allergies as he was told by another physician. Wants to know how to get tested. Also has diarrhea regularly and when he has a formed stool it is black. Requesting refill of zofran as he was told to continue this PRN by GI but does not have any at home.       Review of Systems   Constitutional:  Negative for chills and fever.   HENT:  Negative for ear pain and sore throat.    Eyes:  Negative for pain and visual disturbance.   Respiratory:  Negative for cough and shortness of breath.    Cardiovascular:  Negative for chest pain and palpitations.   Gastrointestinal:  Negative for abdominal pain and vomiting.   Genitourinary:  Negative for dysuria and hematuria.   Musculoskeletal:  Negative for arthralgias and back pain.   Skin:  Negative for color change and rash.   Neurological:  Negative for dizziness and headaches.   All other systems reviewed and are negative.      Objective {?Quick Links Trend Vitals * Enter New Vitals * Results Review * Timeline (Adult) * Labs * Imaging * Cardiology * Procedures * Lung Cancer Screening * Surgical eConsent :84199}  /90 (BP Location: Left arm, Patient Position: Sitting, Cuff Size: Standard)   Pulse 91   Temp 98.9 °F (37.2 °C) (Tympanic)   Wt 95.7 kg (211 lb)   SpO2 98%   BMI 30.28 kg/m²      Physical Exam  Constitutional:       General: He is not in acute distress.     Appearance: Normal appearance.   HENT:      Head: Normocephalic and atraumatic.      Mouth/Throat:      Mouth: Mucous membranes are moist.   Eyes:      General: No scleral icterus.     Extraocular Movements: Extraocular movements intact.      Conjunctiva/sclera: Conjunctivae normal.      Pupils: Pupils are equal, round, and reactive to light.   Cardiovascular:      Rate and Rhythm: Normal rate.      Heart sounds: Normal heart sounds. No murmur heard.  Pulmonary:       Effort: Pulmonary effort is normal. No respiratory distress.      Breath sounds: Normal breath sounds.   Abdominal:      General: Abdomen is flat. Bowel sounds are normal.      Palpations: Abdomen is soft. There is no mass.      Tenderness: There is no abdominal tenderness.   Musculoskeletal:         General: No swelling.      Cervical back: Normal range of motion.   Skin:     General: Skin is warm.   Neurological:      General: No focal deficit present.      Mental Status: He is alert and oriented to person, place, and time.      Gait: Gait abnormal (has CP).   Psychiatric:         Mood and Affect: Mood normal.         Behavior: Behavior normal.

## 2025-05-01 NOTE — TELEPHONE ENCOUNTER
Hi! Thank you for letting me know. I saw this patient today and discussed follow up plans with him. His hemoglobin looked okay but his MCV was low so I ordered an iron panel for him. During my visit, it seemed like the reason he was coughing a lot was throat irritation from post-nasal drip. So I ordered Claritin and Flonase for him. He will following up with us again in a few weeks as well. Thank you for coordinating care for our mutual patient!

## 2025-05-02 DIAGNOSIS — R11.0 NAUSEA: ICD-10-CM

## 2025-05-02 RX ORDER — ONDANSETRON 4 MG/1
4 TABLET, FILM COATED ORAL EVERY 8 HOURS PRN
Qty: 20 TABLET | Refills: 2 | Status: CANCELLED | OUTPATIENT
Start: 2025-05-02

## 2025-05-02 NOTE — ASSESSMENT & PLAN NOTE
Was taking Pepcid 40 mg BID, sometimes would take third dose due to nausea  Saw GI yesterday, switched from Pepcid BID to omeprazole 40 mg daily     Continue medications as per GI and follow up with GI in 4 weeks

## 2025-05-02 NOTE — TELEPHONE ENCOUNTER
Refill request from pt   Ondansetron 4mg tab     Pt states these work better than the ones he was just prescribed

## 2025-05-05 RX ORDER — ONDANSETRON 4 MG/1
4 TABLET, ORALLY DISINTEGRATING ORAL EVERY 8 HOURS PRN
Qty: 20 TABLET | Refills: 0 | Status: SHIPPED | OUTPATIENT
Start: 2025-05-05

## 2025-05-22 DIAGNOSIS — K21.9 GASTROESOPHAGEAL REFLUX DISEASE, UNSPECIFIED WHETHER ESOPHAGITIS PRESENT: ICD-10-CM

## 2025-05-22 RX ORDER — OMEPRAZOLE 40 MG/1
40 CAPSULE, DELAYED RELEASE ORAL DAILY
Qty: 90 CAPSULE | Refills: 1 | Status: SHIPPED | OUTPATIENT
Start: 2025-05-22

## 2025-05-27 ENCOUNTER — APPOINTMENT (OUTPATIENT)
Dept: LAB | Facility: HOSPITAL | Age: 55
End: 2025-05-27
Attending: SPECIALIST
Payer: MEDICARE

## 2025-05-27 DIAGNOSIS — E79.0 URICACIDEMIA: ICD-10-CM

## 2025-05-27 LAB — URATE SERPL-MCNC: 3.8 MG/DL (ref 3.5–8.5)

## 2025-05-27 PROCEDURE — 36415 COLL VENOUS BLD VENIPUNCTURE: CPT

## 2025-05-27 PROCEDURE — 84550 ASSAY OF BLOOD/URIC ACID: CPT

## 2025-05-30 DIAGNOSIS — K21.00 GASTROESOPHAGEAL REFLUX DISEASE WITH ESOPHAGITIS WITHOUT HEMORRHAGE: ICD-10-CM

## 2025-05-30 RX ORDER — FAMOTIDINE 40 MG/1
40 TABLET, FILM COATED ORAL 2 TIMES DAILY
Qty: 180 TABLET | Refills: 1 | Status: SHIPPED | OUTPATIENT
Start: 2025-05-30

## 2025-06-03 ENCOUNTER — TELEPHONE (OUTPATIENT)
Age: 55
End: 2025-06-03

## 2025-06-05 ENCOUNTER — OFFICE VISIT (OUTPATIENT)
Age: 55
End: 2025-06-05

## 2025-06-05 VITALS
TEMPERATURE: 98 F | HEART RATE: 78 BPM | WEIGHT: 205 LBS | DIASTOLIC BLOOD PRESSURE: 80 MMHG | RESPIRATION RATE: 18 BRPM | BODY MASS INDEX: 29.41 KG/M2 | SYSTOLIC BLOOD PRESSURE: 128 MMHG | OXYGEN SATURATION: 98 %

## 2025-06-05 DIAGNOSIS — J30.2 SEASONAL ALLERGIES: ICD-10-CM

## 2025-06-05 DIAGNOSIS — K21.9 GASTROESOPHAGEAL REFLUX DISEASE, UNSPECIFIED WHETHER ESOPHAGITIS PRESENT: Primary | ICD-10-CM

## 2025-06-05 PROCEDURE — 99213 OFFICE O/P EST LOW 20 MIN: CPT | Performed by: FAMILY MEDICINE

## 2025-06-05 PROCEDURE — G2211 COMPLEX E/M VISIT ADD ON: HCPCS | Performed by: FAMILY MEDICINE

## 2025-06-05 NOTE — ASSESSMENT & PLAN NOTE
Followed by GI. Was previously on Pepcid 40 mg BID. Was switched to omeprazole 40 mg once daily for 4 weeks on 4/30/2025  Patient has still been taking omeprazole 40 mg daily as he just received refills for both medications from pharmacy and wasn't sure which one to take     Reports waking up at night around 3AM with cough and phlegm.     Middle of night cough with phlegm likely due to acid reflux   Ensure adequate hydration to help keep mucus thin  Advised patient to stop taking omeprazole and switch back to Pepcid 40 mg BID as per GI note  Follow up in 2 months at PCP office and has scheduled follow up with GI on 8/14/2025 for endoscopy

## 2025-06-05 NOTE — PATIENT INSTRUCTIONS
Do not need to take Prilosec (Omeprazole) anymore per GI note/instructions     Restart taking Pepcid (Famotidine) 40 mg twice daily for acid reflux     Try switching from Claritin to Zyrtec (Cetirizine), Xyzal (Levocetirizine), or Allegra (Fexofenadine) instead   ONLY take ONE (1) of the above alternative options   Do not take more than daily recommended dose on box

## 2025-06-05 NOTE — PROGRESS NOTES
Name: Gregorio Linda      : 1970      MRN: [id number]  Encounter Provider: Kirsty Schmidt DO  Encounter Date: 2025   Encounter department: [facility]  :  Assessment & Plan  Gastroesophageal reflux disease, unspecified whether esophagitis present  Followed by GI. Was previously on Pepcid 40 mg BID. Was switched to omeprazole 40 mg once daily for 4 weeks on 2025  Patient has still been taking omeprazole 40 mg daily as he just received refills for both medications from pharmacy and wasn't sure which one to take     Reports waking up at night around 3AM with cough and phlegm.     Middle of night cough with phlegm likely due to acid reflux   Ensure adequate hydration to help keep mucus thin  Advised patient to stop taking omeprazole and switch back to Pepcid 40 mg BID as per GI note  Follow up in 2 months at PCP office and has scheduled follow up with GI on 2025 for endoscopy        Seasonal allergies  Allergies getting bad. Reports post nasal drip, runny nose, congestion  Takes 24 hr Claritin but does not last more than 12 hrs   Uses Flonase PRN     Claritin likely no longer effective as patient has been using it for a while   Try switching from Claritin to Zyrtec (Cetirizine), Xyzal (Levocetirizine), or Allegra (Fexofenadine) instead   ONLY take ONE (1) of the above alternative options   Do not take more than daily recommended dose on box   Ensure adequate hydration to help keep mucus thin              History of Present Illness {?Quick Links Encounters * My Last Note * Last Note in Specialty * Snapshot * Since Last Visit * History :91404}  Gregorio Linda is a 54 y.o. male who presents for worsening allergy symptoms.     Allergies getting bad, takes 24 hr Claritin but does not last more than 12 hrs   Post nasal drip, runny nose, congestion   Uses Flonase PRN     Coughs up phlegm at night, sometimes around 9 PM other times around 3 AM       Review of Systems    Constitutional:  Negative for chills and fever.   HENT:  Positive for congestion, postnasal drip and rhinorrhea. Negative for ear pain and sore throat.    Eyes:  Negative for pain and visual disturbance.   Respiratory:  Positive for cough. Negative for shortness of breath.    Cardiovascular:  Negative for chest pain and palpitations.   Gastrointestinal:  Negative for abdominal pain and vomiting.   Genitourinary:  Negative for dysuria and hematuria.   Musculoskeletal:  Negative for arthralgias and back pain.   Skin:  Negative for color change and rash.   Neurological:  Negative for seizures and syncope.   All other systems reviewed and are negative.      Objective {?Quick Links Trend Vitals * Enter New Vitals * Results Review * Timeline (Adult) * Labs * Imaging * Cardiology * Procedures * Lung Cancer Screening * Surgical eConsent :29406}  /80 (BP Location: Left arm, Patient Position: Sitting, Cuff Size: Standard)   Pulse 78   Temp 98 °F (36.7 °C) (Tympanic)   Resp 18   Wt 93 kg (205 lb)   SpO2 98%   BMI 29.41 kg/m²      Physical Exam  Constitutional:       General: He is not in acute distress.     Appearance: Normal appearance.   HENT:      Head: Normocephalic.      Mouth/Throat:      Mouth: Mucous membranes are moist.     Eyes:      Extraocular Movements: Extraocular movements intact.      Conjunctiva/sclera: Conjunctivae normal.       Cardiovascular:      Rate and Rhythm: Normal rate.   Pulmonary:      Effort: Pulmonary effort is normal.     Musculoskeletal:         General: Normal range of motion.      Cervical back: Normal range of motion.      Right lower leg: No edema.      Left lower leg: No edema.     Neurological:      General: No focal deficit present.      Mental Status: He is alert. Mental status is at baseline.      Gait: Gait abnormal (has Cerebral Palsy).     Psychiatric:         Mood and Affect: Mood normal.         Behavior: Behavior normal.

## 2025-06-20 ENCOUNTER — TELEPHONE (OUTPATIENT)
Age: 55
End: 2025-06-20

## 2025-06-20 DIAGNOSIS — R11.0 NAUSEA: ICD-10-CM

## 2025-06-20 RX ORDER — ONDANSETRON 4 MG/1
4 TABLET, ORALLY DISINTEGRATING ORAL EVERY 8 HOURS PRN
Qty: 20 TABLET | Refills: 0 | Status: SHIPPED | OUTPATIENT
Start: 2025-06-20

## 2025-06-20 NOTE — TELEPHONE ENCOUNTER
Patient said he is still having nausea issues due to seasonal allergies and would like a refill on this medication since he was only given 9 pills and thinks there must have been and error.  Please advise

## 2025-06-25 NOTE — TELEPHONE ENCOUNTER
Zofran should really only be used as a short term medication so I cannot send 90 tablets. If he is using them 3 times a day everyday, he needs better long term control of GI issues and should follow up at our office or GI. Thank you.

## 2025-07-07 ENCOUNTER — NURSE TRIAGE (OUTPATIENT)
Age: 55
End: 2025-07-07

## 2025-07-07 DIAGNOSIS — R11.0 NAUSEA: ICD-10-CM

## 2025-07-07 RX ORDER — ONDANSETRON 4 MG/1
4 TABLET, ORALLY DISINTEGRATING ORAL EVERY 8 HOURS PRN
Qty: 60 TABLET | Refills: 2 | Status: SHIPPED | OUTPATIENT
Start: 2025-07-07

## 2025-07-07 NOTE — TELEPHONE ENCOUNTER
Please call and inform patient that Zofran was sent to [facility] pharmacy.Okay to schedule patient for urgent appointment with whomever is available for further evaluation of symptoms.

## 2025-07-07 NOTE — TELEPHONE ENCOUNTER
"REASON FOR CONVERSATION: Vomiting    SYMPTOMS: Daily vomiting at approximately 2 AM x 3 months; mixed constipation/diarrhea.    OTHER HEALTH INFORMATION: Pt requests refill for disintegrating Zofran to be sent to [facility] on file. PCP's office office has been treating his symptoms with allergy medication however they persist. Only Zofran helps. PCP will not refill the Zofran script.    PROTOCOL DISPOSITION: Discuss with Provider and Call Back Patient (overriding See Within 2 Weeks in Office)    CARE ADVICE PROVIDED: Defer to provider.     PRACTICE FOLLOW-UP: Next follow with BHARGAVI Cabrera, is not available until November. Please advise if an urgent slot may be used.           Reason for Disposition   Vomiting is a chronic symptom (recurrent or ongoing AND lasting > 4 weeks)    Answer Assessment - Initial Assessment Questions  1. VOMITING SEVERITY: \"How many times have you vomited in the past 24 hours?\"       Daily vomiting at 2 AM  2. ONSET: \"When did the vomiting begin?\"       March  3. FLUIDS: \"What fluids or food have you vomited up today?\" \"Have you been able to keep any fluids down?\"      Doesn't drink enough water life long struggle   4. ABDOMEN PAIN: \"Are your having any abdomen pain?\" If Yes : \"How bad is it and what does it feel like?\" (e.g., crampy, dull, intermittent, constant)       denies  7. CAUSE: \"What do you think is causing your vomiting?\"      Unsure   9. OTHER SYMPTOMS: \"Do you have any other symptoms?\" (e.g., fever, headache, vertigo, vomiting blood or coffee grounds, recent head injury)      Intermittent constipation/diarrhea    Protocols used: Vomiting-Adult-[place]    "

## 2025-07-07 NOTE — TELEPHONE ENCOUNTER
Called and spoke with patient.Informed him of recommendations.He verbalized understanding.Urgent office visit scheduled.

## 2025-07-15 ENCOUNTER — OFFICE VISIT (OUTPATIENT)
Age: 55
End: 2025-07-15
Payer: MEDICARE

## 2025-07-15 VITALS
WEIGHT: 205 LBS | HEIGHT: 70 IN | SYSTOLIC BLOOD PRESSURE: 149 MMHG | HEART RATE: 83 BPM | DIASTOLIC BLOOD PRESSURE: 124 MMHG | BODY MASS INDEX: 29.35 KG/M2

## 2025-07-15 DIAGNOSIS — R11.10 VOMITING, UNSPECIFIED VOMITING TYPE, UNSPECIFIED WHETHER NAUSEA PRESENT: Primary | ICD-10-CM

## 2025-07-15 PROCEDURE — 99214 OFFICE O/P EST MOD 30 MIN: CPT | Performed by: PHYSICIAN ASSISTANT

## 2025-07-15 PROCEDURE — G2211 COMPLEX E/M VISIT ADD ON: HCPCS | Performed by: PHYSICIAN ASSISTANT

## 2025-07-15 RX ORDER — ALLOPURINOL 300 MG/1
1 TABLET ORAL DAILY
COMMUNITY
Start: 2025-05-02

## 2025-07-30 ENCOUNTER — ANESTHESIA EVENT (OUTPATIENT)
Dept: ANESTHESIOLOGY | Facility: HOSPITAL | Age: 55
End: 2025-07-30

## 2025-07-30 ENCOUNTER — ANESTHESIA (OUTPATIENT)
Dept: ANESTHESIOLOGY | Facility: HOSPITAL | Age: 55
End: 2025-07-30

## 2025-08-07 DIAGNOSIS — K21.9 GASTROESOPHAGEAL REFLUX DISEASE, UNSPECIFIED WHETHER ESOPHAGITIS PRESENT: ICD-10-CM

## 2025-08-07 RX ORDER — OMEPRAZOLE 40 MG/1
40 CAPSULE, DELAYED RELEASE ORAL 2 TIMES DAILY
Qty: 180 CAPSULE | Refills: 0 | Status: SHIPPED | OUTPATIENT
Start: 2025-08-07 | End: 2025-11-05

## 2025-08-13 ENCOUNTER — TELEPHONE (OUTPATIENT)
Age: 55
End: 2025-08-13

## 2025-08-14 ENCOUNTER — ANESTHESIA (OUTPATIENT)
Dept: GASTROENTEROLOGY | Facility: AMBULARY SURGERY CENTER | Age: 55
End: 2025-08-14
Payer: MEDICARE

## 2025-08-14 ENCOUNTER — HOSPITAL ENCOUNTER (OUTPATIENT)
Dept: GASTROENTEROLOGY | Facility: AMBULARY SURGERY CENTER | Age: 55
Setting detail: OUTPATIENT SURGERY
End: 2025-08-14
Attending: PHYSICIAN ASSISTANT
Payer: MEDICARE

## 2025-08-21 ENCOUNTER — RESULTS FOLLOW-UP (OUTPATIENT)
Dept: GASTROENTEROLOGY | Facility: AMBULARY SURGERY CENTER | Age: 55
End: 2025-08-21

## (undated) DEVICE — CYSTOSCOPY PACK: Brand: CONVERTORS

## (undated) DEVICE — SPONGE 4 X 4 XRAY 16 PLY STRL LF RFD

## (undated) DEVICE — SEAL BIOPSY PORT ADJUST F/ACCESSORIES UP TO 3FR

## (undated) DEVICE — GUIDEWIRE STRGHT TIP 0.038 IN SOLO PLUS

## (undated) DEVICE — LUBRICANT JELLY SURGILUBE TUBE 4OZ FLIP TOP

## (undated) DEVICE — SOLUTION BOWL: Brand: KENDALL

## (undated) DEVICE — CYSTO TUBING TUR Y IRRIGATION

## (undated) DEVICE — GLOVE INDICATOR PI UNDERGLOVE SZ 7.5 BLUE

## (undated) DEVICE — POUCH URO CATCHER II STERILE

## (undated) DEVICE — TUBING SUCTION 5MM X 12 FT

## (undated) DEVICE — TOWEL SET X-RAY

## (undated) DEVICE — GLOVE SRG LF STRL BGL SKNSNS 8 PF

## (undated) DEVICE — FABRIC REINFORCED, SURGICAL GOWN, XL: Brand: CONVERTORS

## (undated) DEVICE — SPONGE STICK WITH PVP-I: Brand: KENDALL

## (undated) DEVICE — FIBER HOLMIUM 272UM SNGL USE

## (undated) DEVICE — SHEATH URETERAL ACCESS 10/12FR 35CM PROXIS

## (undated) DEVICE — 3M™ IOBAN™ 2 ANTIMICROBIAL INCISE DRAPE 6650EZ: Brand: IOBAN™ 2

## (undated) DEVICE — VAPR COOLPULSE 90 ELECTRODE 90 DEGREES SUCTION WITH INTEGRATED HANDPIECE: Brand: VAPR COOLPULSE

## (undated) DEVICE — INTENDED FOR TISSUE SEPARATION, AND OTHER PROCEDURES THAT REQUIRE A SHARP SURGICAL BLADE TO PUNCTURE OR CUT.: Brand: BARD-PARKER SAFETY BLADES SIZE 11, STERILE

## (undated) DEVICE — DISPOSABLE EQUIPMENT COVER: Brand: SMALL TOWEL DRAPE

## (undated) DEVICE — SHOULDER SUSPENSION KIT 6 PER BOX

## (undated) DEVICE — THREADED CLEAR CANNULA WITH OBTURATOR 7MM X 75MM

## (undated) DEVICE — BLADE SHAVER DISSECTOR 4MM 13CM COOLCUT

## (undated) DEVICE — SPONGE PVP SCRUB WING STERILE

## (undated) DEVICE — CATH URET POLYURETHANE 5FR 28IN WHISTLE TIP

## (undated) DEVICE — SPECIMEN CONTAINER STERILE PEEL PACK

## (undated) DEVICE — SINGLE-USE DIGITAL FLEXIBLE URETEROSCOPE: Brand: APTRA

## (undated) DEVICE — SUT MONOCRYL 4-0 PS-2 18 IN Y496G

## (undated) DEVICE — ADHESIVE SKIN HIGH VISCOSITY EXOFIN 1ML

## (undated) DEVICE — GLOVE SRG BIOGEL 7.5

## (undated) DEVICE — GLOVE SRG BIOGEL ECLIPSE 7

## (undated) DEVICE — DRESSING MEPILEX AG BORDER 4 X 4 IN

## (undated) DEVICE — BLADE SHAVER DISSECTOR 3.5MM 13CM COOLCUT

## (undated) DEVICE — PACK PBDS SHOULDER ARTHROSCOPY RF

## (undated) DEVICE — THREADED CLEAR CANNULA WITH OBTURATOR 8.5MM X 75MM

## (undated) DEVICE — NGAGE, NITINOL STONE EXTRACTOR: Brand: NGAGE